# Patient Record
Sex: FEMALE | Race: WHITE | NOT HISPANIC OR LATINO | Employment: FULL TIME | URBAN - METROPOLITAN AREA
[De-identification: names, ages, dates, MRNs, and addresses within clinical notes are randomized per-mention and may not be internally consistent; named-entity substitution may affect disease eponyms.]

---

## 2017-07-17 ENCOUNTER — TELEPHONE (OUTPATIENT)
Dept: FAMILY MEDICINE | Facility: CLINIC | Age: 32
End: 2017-07-17

## 2017-07-17 NOTE — TELEPHONE ENCOUNTER
Patient requested appointment through [facility]:  Comments:      I need to be seen for my anxiety and depression  ASAP. My heart rate has been really high for several days with tingling limbs and shortness of breath. I'm also concerned about my blood pressure. I have been experiencing chest tightness on the right side radiating up my neck and to right should blade. Difficulty falling asleep and maintaining healthy eating patterns. I have been neglecting my Healthcare.       Left message on answering machine for patient to call back. [phone]  Yoselin GALLEGOSN, RN, CPN

## 2017-07-17 NOTE — TELEPHONE ENCOUNTER
Have been off meds for awhile. Increased anxiety past few days. Patient will c/o shortness of breath and feeling tingly all over.  Chest tightness on and off for past month. Not sure if food related. Last episode last night. Lasts 30-45 minutes. Chest tightness with shortness of breath.   Appointment tomorrow with   .Yoselin GALLEGOSN, RN, CPN

## 2017-07-18 ENCOUNTER — OFFICE VISIT (OUTPATIENT)
Dept: FAMILY MEDICINE | Facility: CLINIC | Age: 32
End: 2017-07-18
Payer: COMMERCIAL

## 2017-07-18 VITALS
HEART RATE: 114 BPM | BODY MASS INDEX: 34.2 KG/M2 | DIASTOLIC BLOOD PRESSURE: 80 MMHG | SYSTOLIC BLOOD PRESSURE: 138 MMHG | WEIGHT: 193 LBS | HEIGHT: 63 IN | OXYGEN SATURATION: 97 % | TEMPERATURE: 99.5 F

## 2017-07-18 DIAGNOSIS — F33.0 MAJOR DEPRESSIVE DISORDER, RECURRENT EPISODE, MILD (H): ICD-10-CM

## 2017-07-18 DIAGNOSIS — F51.01 PRIMARY INSOMNIA: ICD-10-CM

## 2017-07-18 DIAGNOSIS — F41.1 GAD (GENERALIZED ANXIETY DISORDER): Primary | ICD-10-CM

## 2017-07-18 PROCEDURE — 99214 OFFICE O/P EST MOD 30 MIN: CPT | Performed by: FAMILY MEDICINE

## 2017-07-18 RX ORDER — BUSPIRONE HYDROCHLORIDE 5 MG/1
TABLET ORAL
Qty: 120 TABLET | Refills: 0 | Status: SHIPPED | OUTPATIENT
Start: 2017-07-18 | End: 2017-12-20

## 2017-07-18 RX ORDER — ALPRAZOLAM 0.25 MG
0.25 TABLET ORAL 2 TIMES DAILY PRN
Qty: 40 TABLET | Refills: 0 | Status: SHIPPED | OUTPATIENT
Start: 2017-07-18 | End: 2017-08-15

## 2017-07-18 ASSESSMENT — ANXIETY QUESTIONNAIRES
6. BECOMING EASILY ANNOYED OR IRRITABLE: MORE THAN HALF THE DAYS
IF YOU CHECKED OFF ANY PROBLEMS ON THIS QUESTIONNAIRE, HOW DIFFICULT HAVE THESE PROBLEMS MADE IT FOR YOU TO DO YOUR WORK, TAKE CARE OF THINGS AT HOME, OR GET ALONG WITH OTHER PEOPLE: VERY DIFFICULT
2. NOT BEING ABLE TO STOP OR CONTROL WORRYING: NEARLY EVERY DAY
1. FEELING NERVOUS, ANXIOUS, OR ON EDGE: NEARLY EVERY DAY
3. WORRYING TOO MUCH ABOUT DIFFERENT THINGS: NEARLY EVERY DAY
7. FEELING AFRAID AS IF SOMETHING AWFUL MIGHT HAPPEN: NEARLY EVERY DAY
5. BEING SO RESTLESS THAT IT IS HARD TO SIT STILL: SEVERAL DAYS
GAD7 TOTAL SCORE: 18

## 2017-07-18 ASSESSMENT — PATIENT HEALTH QUESTIONNAIRE - PHQ9: 5. POOR APPETITE OR OVEREATING: NEARLY EVERY DAY

## 2017-07-18 NOTE — NURSING NOTE
"Chief Complaint   Patient presents with     Anxiety       Initial /80  Pulse 114  Temp 99.5  F (37.5  C) (Oral)  Ht 5' 3\" (1.6 m)  Wt 193 lb (87.5 kg)  SpO2 97%  BMI 34.19 kg/m2 Estimated body mass index is 34.19 kg/(m^2) as calculated from the following:    Height as of this encounter: 5' 3\" (1.6 m).    Weight as of this encounter: 193 lb (87.5 kg).  Medication Reconciliation: complete   Rica Patino CMA    "

## 2017-07-18 NOTE — TELEPHONE ENCOUNTER
Insurance phone # [phone]    Called to do PA-they said that it does not need a PA and that the patient filled this today.Flory Saravia MA/TC

## 2017-07-18 NOTE — PROGRESS NOTES
"SUBJECTIVE:  Halima Alcaraz, a 32 year old female scheduled an appointment to discuss the following issues:  Follow-up anxiety/depression, migraines and insomnia.   Propranolol helpful with anxiety/racing heartrate. 40mg BID PRN seems to be helpful. risperdal prn helpful for sleep not regularly.  Xanax prn up to once day - panic attacks helpful and no sedating - kicks in 15 minutes can last up to 4 hours.   Home - no major stressors. Daughter 9yo - doing ok. financee going well. New job going well.   No changes with season.   Caffeine - one can/pop/day. ALCOHOL qoweekend. No illicit drugs. Tried some medidation. Needs more exercise. Some woods by home.   SSRI x2 not very helpful anxiety - some help with depression.  Medical, social, surgical, and family histories reviewed.    ROS:    OBJECTIVE:  /80  Pulse 114  Temp 99.5  F (37.5  C) (Oral)  Ht 5' 3\" (1.6 m)  Wt 193 lb (87.5 kg)  SpO2 97%  BMI 34.19 kg/m2  EXAM:  GENERAL APPEARANCE: healthy, alert and no distress  RESP: lungs clear to auscultation - no rales, rhonchi or wheezes  CV: regular rates and rhythm, normal S1 S2, no S3 or S4 and no murmur, click or rub -  ABDOMEN:  soft, nontender, no HSM or masses and bowel sounds normal  NEURO: Normal strength and tone, sensory exam grossly normal, mentation intact and speech normal  PSYCH: mentation appears normal and affect normal/bright  PSYCH: mild anxious    ASSESSMENT / PLAN:  (F41.1) ARASH (generalized anxiety disorder)  (primary encounter diagnosis)  Comment: needs help.   Plan: ALPRAZolam (XANAX) 0.25 MG tablet, busPIRone         (BUSPAR) 5 MG tablet        Reveiwed risks and side effects of medication  Add buspar. If SUICIAL IDEATION OR HOMOCIDAL IDEATION OR ANURAG TO ER. Increase exercise/nature walks. Would like [employer] message/update in 2 weeks. Sooner if worse. Call/email with questions/concerns   Limit xanax and don't take if pregnant.     (F33.0) Major depressive disorder, recurrent episode, mild " (H)  Comment: more issues with anxiety  Plan: consider prozac again if desired. Exercise. Good supportive system. Consider therapist or psych.     (F51.01) Primary insomnia  Comment: risperdol prn helpful  Plan: if needs daily will needs labs. Should get cpe in fall. Exercise. Limit caffeine. Reveiwed risks and side effects of medication    Rashad Dinh MD

## 2017-07-18 NOTE — MR AVS SNAPSHOT
"              After Visit Summary   7/18/2017    Halima Alcaraz    MRN: [id number]           Patient Information     Date Of Birth          1985        Visit Information        Provider Department      7/18/2017 3:10 PM Rashad Dinh MD [facility]        Today's Diagnoses     ARASH (generalized anxiety disorder)    -  1    Major depressive disorder, recurrent episode, mild (H)        Primary insomnia           Follow-ups after your visit        Who to contact     If you have questions or need follow up information about today's clinic visit or your schedule please contact [facility] directly at [phone].  Normal or non-critical lab and imaging results will be communicated to you by 500px[employer], letter or phone within 4 business days after the clinic has received the results. If you do not hear from us within 7 days, please contact the clinic through [employer] or phone. If you have a critical or abnormal lab result, we will notify you by phone as soon as possible.  Submit refill requests through [employer] or call your pharmacy and they will forward the refill request to us. Please allow 3 business days for your refill to be completed.          Additional Information About Your Visit        MyChart Information     [employer] gives you secure access to your electronic health record. If you see a primary care provider, you can also send messages to your care team and make appointments. If you have questions, please call your primary care clinic.  If you do not have a primary care provider, please call [phone] and they will assist you.        Care EveryWhere ID     This is your Care EveryWhere ID. This could be used by other organizations to access your [place] medical records  [id number]        Your Vitals Were     Pulse Temperature Height Pulse Oximetry BMI (Body Mass Index)       114 99.5  F (37.5  C) (Oral) 5' 3\" (1.6 m) 97% 34.19 kg/m2        Blood Pressure from Last 3 " Encounters:   07/18/17 138/80   05/04/16 124/84   03/01/16 130/80    Weight from Last 3 Encounters:   07/18/17 193 lb (87.5 kg)   05/04/16 198 lb (89.8 kg)   03/01/16 196 lb (88.9 kg)              We Performed the Following     DEPRESSION ACTION PLAN (DAP)          Today's Medication Changes          These changes are accurate as of: 7/18/17  3:55 PM.  If you have any questions, ask your nurse or doctor.               Start taking these medicines.        Dose/Directions    busPIRone 5 MG tablet   Commonly known as:  BUSPAR   Used for:  ARASH (generalized anxiety disorder)        Start at 5 mg twice daily for 3 days, then 7.5 mg (1.5 tabs) twice daily for 3 days, then 10 mg (2 tabs) twice daily. New for anxiety   Quantity:  120 tablet   Refills:  0         These medicines have changed or have updated prescriptions.        Dose/Directions    ALPRAZolam 0.25 MG tablet   Commonly known as:  XANAX   This may have changed:    - when to take this  - additional instructions   Used for:  ARASH (generalized anxiety disorder)        Dose:  0.25 mg   Take 1 tablet (0.25 mg) by mouth 2 times daily as needed for anxiety (panic attacks) Don't take with ALCOHOL or narcotics or if pregnant   Quantity:  40 tablet   Refills:  0       PROPRANOLOL HCL PO   This may have changed:  Another medication with the same name was removed. Continue taking this medication, and follow the directions you see here.        Dose:  20 mg   Take 20 mg by mouth daily 2 tablets   Refills:  0            Where to get your medicines      These medications were sent to [id number] [address] [facility]  [address] [facility] [address]    Hours:  24-hours Phone:  [phone]     busPIRone 5 MG tablet         Some of these will need a paper prescription and others can be bought over the counter.  Ask your nurse if you have questions.     Bring a paper prescription for each of these  medications     ALPRAZolam 0.25 MG tablet                Primary Care Provider Office Phone # Fax #    Rashad Dinh [phone]       [facility] [address] [facility] [address]        Equal Access to Services     HOLLIE SYLVESTER : Hadii carlos ku hadelenao Soomaali, waaxda luqadaha, qaybta kaalmada adeegyada, waxangel idiin hayjuaniton adedesiree hernández kimberlee sosa. So [facility] [phone].    ATENCIÓN: Si habla español, tiene a miramontes disposición servicios gratuitos de asistencia lingüística. Llame al [phone].    We comply with applicable federal civil rights laws and [place] laws. We do not discriminate on the basis of race, color, national origin, age, disability sex, sexual orientation or gender identity.            Thank you!     Thank you for choosing [facility]  for your care. Our goal is always to provide you with excellent care. Hearing back from our patients is one way we can continue to improve our services. Please take a few minutes to complete the written survey that you may receive in the mail after your visit with us. Thank you!             Your Updated Medication List - Protect others around you: Learn how to safely use, store and throw away your medicines at www.disposemymeds.org.          This list is accurate as of: 7/18/17  3:55 PM.  Always use your most recent med list.                   Brand Name Dispense Instructions for use Diagnosis    ALPRAZolam 0.25 MG tablet    XANAX    40 tablet    Take 1 tablet (0.25 mg) by mouth 2 times daily as needed for anxiety (panic attacks) Don't take with ALCOHOL or narcotics or if pregnant    ARASH (generalized anxiety disorder)       busPIRone 5 MG tablet    BUSPAR    120 tablet    Start at 5 mg twice daily for 3 days, then 7.5 mg (1.5 tabs) twice daily for 3 days, then 10 mg (2 tabs) twice daily. New for anxiety    ARASH (generalized anxiety disorder)       PROPRANOLOL HCL PO      Take 20 mg by mouth daily 2 tablets         risperiDONE 0.5 MG tablet    risperDAL    15 tablet    Take 0.5 tablets (0.25 mg) by mouth At Bedtime For sleep. May double or triple dosage if not effective    Primary insomnia, ARASH (generalized anxiety disorder)

## 2017-07-19 ASSESSMENT — ANXIETY QUESTIONNAIRES: GAD7 TOTAL SCORE: 18

## 2017-08-15 ENCOUNTER — MYC REFILL (OUTPATIENT)
Dept: FAMILY MEDICINE | Facility: CLINIC | Age: 32
End: 2017-08-15

## 2017-08-15 DIAGNOSIS — F41.1 GAD (GENERALIZED ANXIETY DISORDER): ICD-10-CM

## 2017-08-16 RX ORDER — ALPRAZOLAM 0.25 MG
0.25 TABLET ORAL 2 TIMES DAILY PRN
Qty: 40 TABLET | Refills: 0 | Status: SHIPPED | OUTPATIENT
Start: 2017-08-16 | End: 2017-12-28

## 2017-08-16 NOTE — TELEPHONE ENCOUNTER
Message from [employer]:  Original authorizing provider: Rashad Dinh MD    Halima Alcaraz would like a refill of the following medications:  ALPRAZolam (XANAX) 0.25 MG tablet [Rashad Dinh MD]    Preferred pharmacy: [facility] [id number] [place] [address], [place] - [address] [facility]    Comment:  I also need a refill of the propanolol but can't request from website.

## 2017-08-16 NOTE — TELEPHONE ENCOUNTER
Controlled Substance Refill Request for Xanax  Problem List Complete:  No      PROVIDER TO CONSIDER COMPLETION OF PROBLEM LIST AND OVERVIEW/CONTROLLED SUBSTANCE AGREEMENT     Last Written Prescription Date:  7/18/17  Last Fill Quantity: 40,   # refills: 0     Last Office [facility] with [facility]:  7/18/17     Future Office visit: none     Controlled substance agreement on file: No.       checked in past 6 months:  Yes 7/17/17, no concerns      Vangie Oliva RN       Vangie Oliva RN

## 2017-08-16 NOTE — TELEPHONE ENCOUNTER
The requested prescription(s) has/have been approved and has/have been printed and signed. This note was forwarded to the patient care pool to contact the patient to arrange for the patient to obtain the signed prescription(s).  Agustin Rashid    Will forward to PRIMARY MEDICAL PROVIDER for Problem List update

## 2017-08-23 ENCOUNTER — OFFICE VISIT (OUTPATIENT)
Dept: FAMILY MEDICINE | Facility: CLINIC | Age: 32
End: 2017-08-23
Payer: COMMERCIAL

## 2017-08-23 VITALS
BODY MASS INDEX: 33.48 KG/M2 | OXYGEN SATURATION: 97 % | HEART RATE: 104 BPM | SYSTOLIC BLOOD PRESSURE: 154 MMHG | WEIGHT: 189 LBS | DIASTOLIC BLOOD PRESSURE: 92 MMHG

## 2017-08-23 DIAGNOSIS — T78.40XA ALLERGIC REACTION, INITIAL ENCOUNTER: Primary | ICD-10-CM

## 2017-08-23 PROCEDURE — 99213 OFFICE O/P EST LOW 20 MIN: CPT | Performed by: PHYSICIAN ASSISTANT

## 2017-08-23 NOTE — MR AVS SNAPSHOT
After Visit Summary   8/23/2017    Halima Alcaraz    MRN: [id number]           Patient Information     Date Of Birth          1985        Visit Information        Provider Department      8/23/2017 1:50 PM Cirilo Paulino PA-C [facility]        Today's Diagnoses     Allergic reaction, initial encounter    -  1       Follow-ups after your visit        Additional Services     ALLERGY/ASTHMA ADULT REFERRAL       Your provider [place] you [place] [facility] [place] [facility]  [phone] [email or web address]    Please be aware that coverage of these services is subject to the terms and limitations of your health insurance plan.  Call member services at your health plan with any benefit or coverage questions.      Please bring the following with you to your appointment:    (1) Any X-Rays, CTs or MRIs which have been performed.  Contact the facility where they were done to arrange for  prior to your scheduled appointment.    (2) List of current medications  (3) This referral request   (4) Any documents/labs given to you for this referral                  Who to contact     If you have questions or need follow up information about today's clinic visit or your schedule please contact [facility] directly at [phone].  Normal or non-critical lab and imaging results will be communicated to you by MyChart, letter or phone within 4 business days after the clinic has received the results. If you do not hear from us within 7 days, please contact the clinic through MyChart or phone. If you have a critical or abnormal lab result, we will notify you by phone as soon as possible.  Submit refill requests through [employer] or call your pharmacy and they will forward the refill request to us. Please allow 3 business days for your refill to be completed.          Additional Information About Your Visit        MyChart Information     MyChart  gives you secure access to your electronic health record. If you see a primary care provider, you can also send messages to your care team and make appointments. If you have questions, please call your primary care clinic.  If you do not have a primary care provider, please call [phone] and they will assist you.        Care EveryWhere ID     This is your Care EveryWhere ID. This could be used by other organizations to access your [place] medical records  [id number]        Your Vitals Were     Pulse Pulse Oximetry BMI (Body Mass Index)             104 97% 33.48 kg/m2          Blood Pressure from Last 3 Encounters:   08/23/17 (!) 154/92   07/18/17 138/80   05/04/16 124/84    Weight from Last 3 Encounters:   08/23/17 189 lb (85.7 kg)   07/18/17 193 lb (87.5 kg)   05/04/16 198 lb (89.8 kg)              We Performed the Following     ALLERGY/ASTHMA ADULT REFERRAL        Primary Care Provider Office Phone # Fax #    Rashad Dinh [phone] [address] [facility] [address]        Equal Access to Services     [facility]: Hadii aad ku hadasho Soeric, waaxda luqadaha, qaybta kaalmada adeedgar, bekah mohan . So [facility] [phone].    ATENCIÓN: Si habla español, tiene a miramontes disposición servicios gratuitos de asistencia lingüística. [facility] [phone].    We comply with applicable federal civil rights laws and [place] laws. We do not discriminate on the basis of race, color, national origin, age, disability sex, sexual orientation or gender identity.            Thank you!     Thank you for choosing [facility]  for your care. Our goal is always to provide you with excellent care. Hearing back from our patients is one way we can continue to improve our services. Please take a few minutes to complete the written survey that you may receive in the mail after your visit with us. Thank you!             Your Updated Medication List - Protect  others around you: Learn how to safely use, store and throw away your medicines at www.disposemymeds.org.          This list is accurate as of: 8/23/17  2:01 PM.  Always use your most recent med list.                   Brand Name Dispense Instructions for use Diagnosis    ALPRAZolam 0.25 MG tablet    XANAX    40 tablet    Take 1 tablet (0.25 mg) by mouth 2 times daily as needed for anxiety (panic attacks) Don't take with ALCOHOL or narcotics or if pregnant    ARASH (generalized anxiety disorder)       busPIRone 5 MG tablet    BUSPAR    120 tablet    Start at 5 mg twice daily for 3 days, then 7.5 mg (1.5 tabs) twice daily for 3 days, then 10 mg (2 tabs) twice daily. New for anxiety    ARASH (generalized anxiety disorder)       PROPRANOLOL HCL PO      Take 20 mg by mouth daily 2 tablets        risperiDONE 0.5 MG tablet    risperDAL    15 tablet    Take 0.5 tablets (0.25 mg) by mouth At Bedtime For sleep. May double or triple dosage if not effective    Primary insomnia, ARASH (generalized anxiety disorder)

## 2017-08-23 NOTE — NURSING NOTE
"Chief Complaint   Patient presents with     Consult     Possible food allergy       Initial BP (!) 154/92  Pulse 104  Wt 189 lb (85.7 kg)  SpO2 97%  BMI 33.48 kg/m2 Estimated body mass index is 33.48 kg/(m^2) as calculated from the following:    Height as of 7/18/17: 5' 3\" (1.6 m).    Weight as of this encounter: 189 lb (85.7 kg).  Medication Reconciliation: complete  Belem Moralez CMA    "

## 2017-08-23 NOTE — PROGRESS NOTES
SUBJECTIVE:                                                    Halima Alcaraz is a 32 year old female who presents to clinic today for the following health issues:    Pt here for possible food allergy - the last 2 days pt has had her lips swell up on her and have tingling sensation. Pt took a couple benadryl's which helped slowly. Pt had lemonade two days ago and upper lip swelled then the next day had chinese food yesterday and bottom lip swelled.  - unsure what could have been   No chest pain or short of breath of difficulty breathing.     Is on propranolon for heart palpitation and has been out for the alst couple days.   as documentedProblem list and histories reviewed & adjusted, as indicated.  Additional history: as documented      Patient Active Problem List   Diagnosis     Family history of thyroid disease     Hyperlipidemia LDL goal <160     Obesity     Tobacco abuse     Elevated BP     Anxiety     Irregular menstrual cycle     Depression with anxiety     Major depressive disorder, recurrent episode, mild (H)     Primary insomnia     ARASH (generalized anxiety disorder)     Migraine with aura and without status migrainosus, not intractable     Past Surgical History:   Procedure Laterality Date     NO HISTORY OF SURGERY         Social History   Substance Use Topics     Smoking status: Current Every Day Smoker     Types: Cigarettes     Smokeless tobacco: Never Used     Alcohol use Yes      Comment: occ     Family History   Problem Relation Age of Onset     Thyroid Disease Mother      Thyroid Disease Maternal Grandmother      CEREBROVASCULAR DISEASE Paternal Grandfather          Current Outpatient Prescriptions   Medication Sig Dispense Refill     ALPRAZolam (XANAX) 0.25 MG tablet Take 1 tablet (0.25 mg) by mouth 2 times daily as needed for anxiety (panic attacks) Don't take with ALCOHOL or narcotics or if pregnant 40 tablet 0     PROPRANOLOL HCL PO Take 20 mg by mouth daily 2 tablets       busPIRone (BUSPAR) 5  MG tablet Start at 5 mg twice daily for 3 days, then 7.5 mg (1.5 tabs) twice daily for 3 days, then 10 mg (2 tabs) twice daily. New for anxiety 120 tablet 0     risperiDONE (RISPERDAL) 0.5 MG tablet Take 0.5 tablets (0.25 mg) by mouth At Bedtime For sleep. May double or triple dosage if not effective 15 tablet 0     No Known Allergies      ROS:  Constitutional, HEENT, cardiovascular, pulmonary, gi and gu systems are negative, except as otherwise noted.      OBJECTIVE:   BP (!) 154/92  Pulse 104  Wt 189 lb (85.7 kg)  SpO2 97%  BMI 33.48 kg/m2  Body mass index is 33.48 kg/(m^2).  GENERAL: healthy, alert and no distress  EYES: Eyes grossly normal to inspection, PERRL and conjunctivae and sclerae normal  HENT: ear canals and TM's normal, nose and mouth without ulcers or lesions  NECK: no adenopathy, no asymmetry, masses, or scars and thyroid normal to palpation  RESP: lungs clear to auscultation - no rales, rhonchi or wheezes  CV: regular rate and rhythm, normal S1 S2, no S3 or S4, no murmur, click or rub, no peripheral edema and peripheral pulses strong    Diagnostic Test Results:  none     ASSESSMENT/PLAN:         ICD-10-CM    1. Allergic reaction, initial encounter T78.40XA ALLERGY/ASTHMA ADULT REFERRAL   unclear etiology.  Consider follow up  With Allergist for 2nd opinion.   warning signs discussed.      Cirilo Paulino PA-C  [facility]

## 2017-09-08 ENCOUNTER — OFFICE VISIT (OUTPATIENT)
Dept: ALLERGY | Facility: CLINIC | Age: 32
End: 2017-09-08
Payer: COMMERCIAL

## 2017-09-08 VITALS
WEIGHT: 186.2 LBS | HEART RATE: 130 BPM | HEIGHT: 63 IN | DIASTOLIC BLOOD PRESSURE: 82 MMHG | SYSTOLIC BLOOD PRESSURE: 136 MMHG | BODY MASS INDEX: 32.99 KG/M2 | OXYGEN SATURATION: 97 %

## 2017-09-08 DIAGNOSIS — T78.3XXA ANGIOEDEMA, INITIAL ENCOUNTER: Primary | ICD-10-CM

## 2017-09-08 PROCEDURE — 99000 SPECIMEN HANDLING OFFICE-LAB: CPT | Performed by: ALLERGY & IMMUNOLOGY

## 2017-09-08 PROCEDURE — 86003 ALLG SPEC IGE CRUDE XTRC EA: CPT | Performed by: ALLERGY & IMMUNOLOGY

## 2017-09-08 PROCEDURE — 95004 PERQ TESTS W/ALRGNC XTRCS: CPT | Performed by: ALLERGY & IMMUNOLOGY

## 2017-09-08 PROCEDURE — 36415 COLL VENOUS BLD VENIPUNCTURE: CPT | Performed by: ALLERGY & IMMUNOLOGY

## 2017-09-08 PROCEDURE — 99204 OFFICE O/P NEW MOD 45 MIN: CPT | Mod: 25 | Performed by: ALLERGY & IMMUNOLOGY

## 2017-09-08 RX ORDER — EPINEPHRINE 0.3 MG/.3ML
0.3 INJECTION SUBCUTANEOUS PRN
Qty: 0.6 ML | Refills: 1 | Status: SHIPPED | OUTPATIENT
Start: 2017-09-08 | End: 2018-01-27

## 2017-09-08 NOTE — PATIENT INSTRUCTIONS
Allergy Staff Appt Hours Shot Hours Locations    Physician     Phong Ching DO       Support Staff     Zaida ANNE RN      Sandra CLARK MA  Monday:                      [place] 8-7     Tuesday:         [place] 8-5 Wednesday:        [place]: 7-5     Friday:        [place] 7-5   [place]        Monday: 9-6        Friday: 7-2     [place]        Tuesday: 7-12 Thursday: 1-6     [place] Tuesday: 1-6 Wednesday: 11-6 Thursday: 7-12 [facility]  [address] [place] [address]  Appt Line: [phone]  Allergy RN (Monday):  [phone]    [facility]  [address] [place] [address]  Appt Line: [phone]  Allergy RN (Tues & Wed):  [phone]    [facility]  [address] [place] [address]  Appt Line: [phone]  Allergy RN (Friday):  [phone]       Important Scheduling Information  Aspirin Desensitization: Appt will last 2 clinic days. Please call the Allergy RN line for your clinic to schedule. Discontinue antihistamines 7 days prior to the appointment.     Food Challenges: Appt will last 3-4 hours. Please call the Allergy RN line for your clinic to schedule. Discontinue antihistamines 7 days prior to the appointment.     Penicillin Testing: Appt will last 2-3 hours. Please call the Allergy RN line for your clinic to schedule. Discontinue antihistamines 7 days prior to the appointment.     Skin Testing: Appt will about 40 minutes. Call the appointment line for your clinic to schedule. Discontinue antihistamines 7 days prior to the appointment.     Venom Testing: Appt will last 2-3 hours. Please call the Allergy RN line for your clinic to schedule. Discontinue antihistamines 7 days prior to the appointment.     Thank you for trusting us with your Allergy, Asthma, and Immunology care. Please feel free to contact us with any questions or concerns you may have.      - See anaphylaxis action plan.   - Blood work today.   - If symptoms  return please keep a diary of potential causes.   - Continue to avoid fish, soy and pork for now.   - Return to clinic in 3 months or sooner if symptoms develop.

## 2017-09-08 NOTE — ASSESSMENT & PLAN NOTE
Angioedema involving lips and tingling of lips and tongue on 2 separate occasions over the last few weeks. Symptoms have occurred after eating at a Chinese buffet and drinking a can of lemonade. Symptoms occurred within minutes of these 2 episodes. No other etiologies were identified. Not associated with NSAIDs or aspirin. Not associated with venom stings. No other over-the-counter medications were taken. No new soaps, shampoos, lotions or detergents. Symptoms resolved within 2 hours after treatment with diphenhydramine.    Skin testing  Negative for cod, salmon, tuna, pork and soy.     - Serum IgE for fish, pork and soy.  - If negative serum IgE would have her repeat skin testing in 4 weeks. Discussed potential six-week refractory period. however, I did discuss that symptoms are not entirely clear given that occurred after 2 completely separate triggers. Discussed possibility of oral allergy syndrome, but not clearly implicated with raw fruits or vegetables. Yoly could be implicated with oral allergy syndrome, but not sure if they would be in a canned lemonade. Possible the patient is having idiopathic angioedema. If she continues to have symptoms she will keep diary.  - An anaphylaxis action plan was given and reviewed with patient and family.   - Injectable epinephrine use was reviewed and demonstrated. The patient will need to carry injectable epinephrine.   - Injectable epinephrine script provided.   -The patient has been on propranolol for heart racing with anxiety. She has not been taking recently. Discussed risk of beta blocker and epinephrine use. She will discuss continued use of propranolol with her primary care provider. If the patient is going to continue on propranolol she was instructed to contact our office.

## 2017-09-08 NOTE — PROGRESS NOTES
Halima Alcaraz is a 32 year old White female with previous medical history significant for hyperlipidemia, hypertension, anxiety. Halima Alcaraz is being seen today for evaluation of angioedema. The patient is being seen in consultation at the request of Cirilo Paulino PA-C.     The patient reports that over the course of the last few weeks she has had 2 episodes of lip angioedema. These episodes have involved both upper and lower lips. Symptoms persisted approximately 2 hours after taking diphenhydramine and were associated with tingling of tongue and lips. The first episode occurred within 10 minutes of drinking a can of lemonade. She did not eat any food. Treated with diphenhydramine as noted above with resolution after 2 hours. Symptoms started improving before 2 hours, but complete resolution took 2 hours. She denied hives, throat swelling, difficulty swallowing, hoarseness, wheezing, tightness in chest, shortness of breath, vomiting or diarrhea. A second episode occurred after she ate at a Chinese buffet. While at the Chinese buffet she had sushi which contained salmon and tuna, rice, soy, pork, chicken. She subsequently has consumed rice and chicken without symptoms. She has no history of food allergies. Second reaction was similar in resolution to first reaction. She does not believe there were raw vegetables or fruit at the Chinese restaurant as everything was cooked. No history of oral allergy syndrome symptoms. She has never had similar symptoms. No family history of angioedema. Not associated with NSAIDs or aspirin. Not associated with venom stings. No other over-the-counter medications were taken. No new soaps, shampoos, lotions or detergents.     The patient has no history of asthma, eczema, food allergies, medications allergies or hives.     ENVIRONMENTAL HISTORY: The [place] The home is heated with a forced air. They does have central air conditioning. The  patient's bedroom is furnished with Indoor plants and carpeting in bedroom.  Pets inside the house include 1 cat(s). There is not history of cockroach or mice infestation. There is/are 2 smokers in the house.  The house does not have a damp basement.     Past Medical History:   Diagnosis Date     Depression with anxiety      Irregular menstrual cycle      Papanicolaou smear of cervix with low grade squamous intraepithelial lesion (LGSIL) 2009     Family History   Problem Relation Age of Onset     Thyroid Disease Mother      Thyroid Disease Maternal Grandmother      CEREBROVASCULAR DISEASE Paternal Grandfather      Past Surgical History:   Procedure Laterality Date     NO HISTORY OF SURGERY         REVIEW OF SYSTEMS:  General: negative for weight gain. negative for weight loss. negative for changes in sleep.   Ears: negative for fullness. negative for hearing loss. negative for dizziness.   Nose: negative for snoring.negative for changes in smell. negative for drainage.   Eyes: negative for eye watering. negative for eye itching. negative for vision changes. negative for eye redness.  Throat: negative for hoarseness. negative for sore throat. negative for trouble swallowing.   Lungs: negative for shortness of breath.negative for wheezing. negative for sputum production.   Cardiovascular: positive  for chest pain. negative for swelling of ankles. positive  for fast or irregular heartbeat.   Gastrointestinal: negative for nausea. positive  for heartburn. positive  for acid reflux.   Musculoskeletal: negative for joint pain. negative for joint stiffness. negative for joint swelling.   Neurologic: negative for seizures. negative for fainting. negative for weakness.   Psychiatric: negative for changes in mood. positive  for anxiety.   Endocrine: negative for cold intolerance. negative for heat intolerance. negative for tremors.   Lymphatic: negative for lower extremity swelling. negative for lymph node swelling.    Hematologic: negative for easy bruising. negative for easy bleeding.  Integumentary: negative for rash. negative for scaling. negative for nail changes.       Current Outpatient Prescriptions:      EPINEPHrine (EPIPEN/ADRENACLICK/OR ANY BX GENERIC EQUIV) 0.3 MG/0.3ML injection 2-pack, Inject 0.3 mLs (0.3 mg) into the muscle as needed for anaphylaxis, Disp: 0.6 mL, Rfl: 1     ALPRAZolam (XANAX) 0.25 MG tablet, Take 1 tablet (0.25 mg) by mouth 2 times daily as needed for anxiety (panic attacks) Don't take with ALCOHOL or narcotics or if pregnant, Disp: 40 tablet, Rfl: 0     PROPRANOLOL HCL PO, Take 20 mg by mouth daily 2 tablets, Disp: , Rfl:      busPIRone (BUSPAR) 5 MG tablet, Start at 5 mg twice daily for 3 days, then 7.5 mg (1.5 tabs) twice daily for 3 days, then 10 mg (2 tabs) twice daily. New for anxiety, Disp: 120 tablet, Rfl: 0     risperiDONE (RISPERDAL) 0.5 MG tablet, Take 0.5 tablets (0.25 mg) by mouth At Bedtime For sleep. May double or triple dosage if not effective, Disp: 15 tablet, Rfl: 0  Immunization History   Administered Date(s) Administered     TDAP Vaccine (Adacel) 06/26/2014     No Known Allergies      EXAM:   Constitutional:  Appears well-developed and well-nourished. No distress.   HEENT:   Head: Normocephalic.   Right Ear: External ear normal. TM normal  Left Ear: External ear normal. TM normal  Mouth/Throat: No oropharyngeal exudate present.   No cobblestoning of posterior oropharynx.   Nasal tissue pink and normal appearing.  No rhinorrhea noted.    Eyes: Conjunctivae are non-erythematous   Cardiovascular: Normal rate, regular rhythm and normal heart sounds. Exam reveals no gallop and no friction rub.   No murmur heard.  Respiratory: Effort normal and breath sounds normal. No respiratory distress. No wheezes. No rales.   Musculoskeletal: Normal range of motion.   Lymphadenopathy:   No cervical adenopathy.   No lower extremity edema.   Neuro: Oriented to person, place, and time.  Skin: Skin  is warm and dry. No rash noted.   Psychiatric: Normal mood and affect.     Nursing note and vitals reviewed.      WORKUP:   Skin testing  Negative for cod, salmon, tuna, pork and soy.     ASSESSMENT/PLAN:  Problem List Items Addressed This Visit        Other    Angioedema, initial encounter - Primary     Angioedema involving lips and tingling of lips and tongue on 2 separate occasions over the last few weeks. Symptoms have occurred after eating at a Chinese buffet and drinking a can of lemonade. Symptoms occurred within minutes of these 2 episodes. No other etiologies were identified. Not associated with NSAIDs or aspirin. Not associated with venom stings. No other over-the-counter medications were taken. No new soaps, shampoos, lotions or detergents. Symptoms resolved within 2 hours after treatment with diphenhydramine.    Skin testing  Negative for cod, salmon, tuna, pork and soy.     - Serum IgE for fish, pork and soy.  - If negative serum IgE would have her repeat skin testing in 4 weeks. Discussed potential six-week refractory period. however, I did discuss that symptoms are not entirely clear given that occurred after 2 completely separate triggers. Discussed possibility of oral allergy syndrome, but not clearly implicated with raw fruits or vegetables. Yoly could be implicated with oral allergy syndrome, but not sure if they would be in a canned lemonade. Possible the patient is having idiopathic angioedema. If she continues to have symptoms she will keep diary.  - An anaphylaxis action plan was given and reviewed with patient and family.   - Injectable epinephrine use was reviewed and demonstrated. The patient will need to carry injectable epinephrine.   - Injectable epinephrine script provided.   -The patient has been on propranolol for heart racing with anxiety. She has not been taking recently. Discussed risk of beta blocker and epinephrine use. She will discuss continued use of propranolol with her  primary care provider. If the patient is going to continue on propranolol she was instructed to contact our office.          Relevant Medications    EPINEPHrine (EPIPEN/ADRENACLICK/OR ANY BX GENERIC EQUIV) 0.3 MG/0.3ML injection 2-pack    Other Relevant Orders    Allergen codfish IgE (Completed)    Allergen salmon IgE (Completed)    Allergen tuna IgE (Completed)    Allergen soybean IgE (Completed)    Allergen Pork (Completed)    ALLERGY SKIN TESTS,ALLERGENS (Completed)        Return in 4 weeks.     Chart documentation with Dragon Voice recognition Software. Although reviewed after completion, some words and grammatical errors may remain.    Phong Ching,    Allergy/Immunology  [facility] and [place]

## 2017-09-08 NOTE — Clinical Note
I saw Halima in consultation. Please see note for details. Negative testing in clinic. If symptoms continue she will keep journal of symptoms She was provided epipen and blood testing obtained. Thanks.   Phong Ching

## 2017-09-08 NOTE — NURSING NOTE
"Chief Complaint   Patient presents with     Consult     Angioedema of the lips       Initial Pulse 130  Ht 1.595 m (5' 2.8\")  Wt 84.5 kg (186 lb 3.2 oz)  SpO2 97%  BMI 33.2 kg/m2 Estimated body mass index is 33.2 kg/(m^2) as calculated from the following:    Height as of this encounter: 1.595 m (5' 2.8\").    Weight as of this encounter: 84.5 kg (186 lb 3.2 oz).  Medication Reconciliation: complete     Zaida Acuna RN      "

## 2017-09-08 NOTE — MR AVS SNAPSHOT
After Visit Summary   9/8/2017    Halima Alcaraz    MRN: [id number]           Patient Information     Date Of Birth          1985        Visit Information        Provider Department      9/8/2017 4:20 PM Phong Ching DO [facility]        Today's Diagnoses     Angioedema, initial encounter    -  1      Care Instructions    Allergy Staff Appt Hours Shot Hours Locations    Physician     Phong Ching DO       Support Staff     COLLINS Reeves MA  Monday:                      [place] 8-7     Tuesday:         [place] 8-5 Wednesday:        [place]: 7-5 Friday:        [place] 7-5   [place]        Monday: 9-6 Friday: 7-2     [place]        Tuesday: 7-12 Thursday: 1-6 [place] Tuesday: 1-6 Wednesday: 11-6 Thursday: 7-12 [facility]  [address] [place] [address]  Appt Line: [phone]  Allergy RN (Monday):  [phone]    [facility]  [address] [place] [address]  Appt Line: [phone]  Allergy RN (Tues & Wed):  [phone]    [facility]  [address] [place] [address]  Appt Line: [phone]  Allergy RN (Friday):  [phone]       Important Scheduling Information  Aspirin Desensitization: Appt will last 2 clinic days. Please call the Allergy RN line for your clinic to schedule. Discontinue antihistamines 7 days prior to the appointment.     Food Challenges: Appt will last 3-4 hours. Please call the Allergy RN line for your clinic to schedule. Discontinue antihistamines 7 days prior to the appointment.     Penicillin Testing: Appt will last 2-3 hours. Please call the Allergy RN line for your clinic to schedule. Discontinue antihistamines 7 days prior to the appointment.     Skin Testing: Appt will about 40 minutes. Call the appointment line for your clinic to schedule. Discontinue antihistamines 7 days prior to the appointment.     Venom Testing: Appt will  last 2-3 hours. Please call the Allergy RN line for your clinic to schedule. Discontinue antihistamines 7 days prior to the appointment.     Thank you for trusting us with your Allergy, Asthma, and Immunology care. Please feel free to contact us with any questions or concerns you may have.      - See anaphylaxis action plan.   - Blood work today.   - If symptoms return please keep a diary of potential causes.   - Continue to avoid fish, soy and pork for now.   - Return to clinic in 3 months or sooner if symptoms develop.           Follow-ups after your visit        Who to contact     If you have questions or need follow up information about today's clinic visit or your schedule please contact [facility] directly at [phone].  Normal or non-critical lab and imaging results will be communicated to you by [employer], letter or phone within 4 business days after the clinic has received the results. If you do not hear from us within 7 days, please contact the clinic through [employer] or phone. If you have a critical or abnormal lab result, we will notify you by phone as soon as possible.  Submit refill requests through [employer] or call your pharmacy and they will forward the refill request to us. Please allow 3 business days for your refill to be completed.          Additional Information About Your Visit        [employer] Information     [employer] gives you secure access to your electronic health record. If you see a primary care provider, you can also send messages to your care team and make appointments. If you have questions, please call your primary care clinic.  If you do not have a primary care provider, please call [phone] and they will assist you.        Care EveryWhere ID     This is your Care EveryWhere ID. This could be used by other organizations to access your [facility] medical records  [id number]        Your Vitals Were     Pulse Height Pulse Oximetry BMI (Body Mass Index)          130 1.595 m  "(5' 2.8\") 97% 33.2 kg/m2         Blood Pressure from Last 3 Encounters:   09/08/17 136/82   08/23/17 (!) 154/92   07/18/17 138/80    Weight from Last 3 Encounters:   09/08/17 84.5 kg (186 lb 3.2 oz)   08/23/17 85.7 kg (189 lb)   07/18/17 87.5 kg (193 lb)              We Performed the Following     Allergen codfish IgE     Allergen Pork     Allergen salmon IgE     Allergen soybean IgE     Allergen tuna IgE          Today's Medication Changes          These changes are accurate as of: 9/8/17  5:15 PM.  If you have any questions, ask your nurse or doctor.               Start taking these medicines.        Dose/Directions    EPINEPHrine 0.3 MG/0.3ML injection 2-pack   Commonly known as:  EPIPEN/ADRENACLICK/or ANY BX GENERIC EQUIV   Used for:  Angioedema, initial encounter   Started by:  Phong Ching DO        Dose:  0.3 mg   Inject 0.3 mLs (0.3 mg) into the muscle as needed for anaphylaxis   Quantity:  0.6 mL   Refills:  1            Where to get your medicines      These medications were sent to [facility] [id number] [place] [address] [facility]  [id number] [place] [id number]    Hours:  24-hours Phone:  [phone]     EPINEPHrine 0.3 MG/0.3ML injection 2-pack                Primary Care Provider Office Phone # Fax #    Rashad Dinh [phone] [address] [facility] [address]        Equal Access to Services     [facility] AH: Hadii carlos guy hadasho Soomaali, waaxda luqadaha, qaybta kaalmada adeegyalora, bekah sosa. So [facility] [phone].    ATENCIÓN: Si habla español, tiene a miramontes disposición servicios gratuitos de asistencia lingüística. Lisset keen [phone].    We comply with applicable federal civil rights laws and [place] laws. We do not discriminate on the basis of race, color, national origin, age, disability sex, sexual orientation or gender identity.            Thank you!  "    Thank you for choosing [facility]  for your care. Our goal is always to provide you with excellent care. Hearing back from our patients is one way we can continue to improve our services. Please take a few minutes to complete the written survey that you may receive in the mail after your visit with us. Thank you!             Your Updated Medication List - Protect others around you: Learn how to safely use, store and throw away your medicines at www.disposemymeds.org.          This list is accurate as of: 9/8/17  5:15 PM.  Always use your most recent med list.                   Brand Name Dispense Instructions for use Diagnosis    ALPRAZolam 0.25 MG tablet    XANAX    40 tablet    Take 1 tablet (0.25 mg) by mouth 2 times daily as needed for anxiety (panic attacks) Don't take with ALCOHOL or narcotics or if pregnant    ARASH (generalized anxiety disorder)       busPIRone 5 MG tablet    BUSPAR    120 tablet    Start at 5 mg twice daily for 3 days, then 7.5 mg (1.5 tabs) twice daily for 3 days, then 10 mg (2 tabs) twice daily. New for anxiety    ARASH (generalized anxiety disorder)       EPINEPHrine 0.3 MG/0.3ML injection 2-pack    EPIPEN/ADRENACLICK/or ANY BX GENERIC EQUIV    0.6 mL    Inject 0.3 mLs (0.3 mg) into the muscle as needed for anaphylaxis    Angioedema, initial encounter       PROPRANOLOL HCL PO      Take 20 mg by mouth daily 2 tablets        risperiDONE 0.5 MG tablet    risperDAL    15 tablet    Take 0.5 tablets (0.25 mg) by mouth At Bedtime For sleep. May double or triple dosage if not effective    Primary insomnia, ARASH (generalized anxiety disorder)

## 2017-09-08 NOTE — LETTER
CYNTHIA                   FOOD ALLERGY & ANAPHYLAXIS EMERGENCY CARE PLAN  Food Allergy Research & Education         Name: Halima BASILIO:  1985   Allergy to: Unknown    Weight: 186 lbs 3.2 oz  Asthma:  No        -NOTE: Do not depend on antihistamines or inhalers (bronchodilators) to treat a severe reaction. USE EPINEPHRINE.     MEDICATIONS/DOSES  Epinephrine Dose: 0.3 mg IM  Zyrtec (cetirizine) Dose: 10mg                   CYNTHIA                   FOOD ALLERGY & ANAPHYLAXIS EMERGENCY CARE PLAN   Food Allergy Research & Education                PARENT/GUARDIAN AUTHORIZATION SIGNATURE     DATE             PHYSICIAN/H CP AUTHORIZATION SIGNATURE     DATE        FORM PROVIDED COURTESY OF FOOD ALLERGY RESEARCH & EDUCATION (FARE) ([email or web address]) 2014

## 2017-09-11 LAB
DEPRECATED MISC ALLERGEN IGE RAST QL: NORMAL
PORK IGE QN: <0.1 KU/L

## 2017-09-13 LAB
CODFISH IGE QN: <0.1 KU(A)/L
SALMON IGE QN: <0.1 KU(A)/L
SOYBEAN IGE QN: <0.1 KU(A)/L
TUNA IGE QN: <0.1 KU(A)/L

## 2017-09-13 NOTE — PROGRESS NOTES
Testing negative for pork, soy and fish. Please see my note and in the plan please reviewed the treatment plan with her. Thank you.

## 2017-12-20 ENCOUNTER — MYC REFILL (OUTPATIENT)
Dept: FAMILY MEDICINE | Facility: CLINIC | Age: 32
End: 2017-12-20

## 2017-12-20 DIAGNOSIS — F41.1 GAD (GENERALIZED ANXIETY DISORDER): ICD-10-CM

## 2017-12-20 DIAGNOSIS — F51.01 PRIMARY INSOMNIA: ICD-10-CM

## 2017-12-20 NOTE — LETTER
December 21, 2017    Halima Alcaraz  [address] [address] [facility] [address]    Dear Halima,       We recently received a refill request for risperidone and buspirone.  We have refilled this for a one time 30 day supply only because you are due for a:    Anxiety and insomnia office visit      Please call at your earliest convenience so that there will not be a delay with your future refills.          Thank you,   Your [facility] Team/  [phone]

## 2017-12-21 RX ORDER — ALPRAZOLAM 0.25 MG
0.25 TABLET ORAL 2 TIMES DAILY PRN
Qty: 40 TABLET | Refills: 0 | OUTPATIENT
Start: 2017-12-21

## 2017-12-21 RX ORDER — BUSPIRONE HYDROCHLORIDE 5 MG/1
TABLET ORAL
Qty: 120 TABLET | Refills: 0 | Status: SHIPPED | OUTPATIENT
Start: 2017-12-21 | End: 2018-11-27

## 2017-12-21 RX ORDER — RISPERIDONE 0.5 MG/1
0.25 TABLET ORAL AT BEDTIME
Qty: 15 TABLET | Refills: 0 | Status: SHIPPED | OUTPATIENT
Start: 2017-12-21 | End: 2018-11-27

## 2017-12-21 NOTE — TELEPHONE ENCOUNTER
Controlled Substance Refill Request for Xanax  Problem List Complete:  No       PROVIDER TO CONSIDER COMPLETION OF PROBLEM LIST AND OVERVIEW/CONTROLLED SUBSTANCE AGREEMENT      Last Written Prescription Date:  8/16/17  Last Fill Quantity: 40,   # refills: 0      Last Office Visit with [facility] primary care provider:  7/18/17      Future Office visit: none      Controlled substance agreement on file: No.       checked in past 6 months:  Yes 12/21/17

## 2017-12-21 NOTE — TELEPHONE ENCOUNTER
Message from [employer]:  Original authorizing provider: Agustin Rashid MD    Halima Alcaraz would like a refill of the following medications:  ALPRAZolam (XANAX) 0.25 MG tablet [Agustin Rashid MD]    [facility]erred pharmacy[facility] [address] [place] [address] [facility] [address] [facility]    Comment:      Medication renewals requested in this message routed to other providers:  risperiDONE (RISPERDAL) 0.5 MG tablet [Rashad Dinh MD]  busPIRone (BUSPAR) 5 MG tablet [Rashad Dinh MD]

## 2017-12-21 NOTE — TELEPHONE ENCOUNTER
Message from [facility]:  Original authorizing provider: Rashad Dinh MD    Halima Alcaraz would like a refill of the following medications:  risperiDONE (RISPERDAL) 0.5 MG tablet [Rashad Dinh MD]  busPIRone (BUSPAR) 5 MG tablet [Rashad Dinh MD]    Preferred pharmacy: [facility] [address] [place] [address] [place] [id number] [facility]    Comment:      Medication renewals requested in this message routed to other providers:  ALPRAZolam (XANAX) 0.25 MG tablet [Agustin Rashid MD]

## 2017-12-21 NOTE — TELEPHONE ENCOUNTER
See last ov note.  Buspar added and pt advised to follow up in fall.  No appointment made.  To provider to advise.  Rica Thao RN

## 2017-12-28 ENCOUNTER — OFFICE VISIT (OUTPATIENT)
Dept: FAMILY MEDICINE | Facility: CLINIC | Age: 32
End: 2017-12-28
Payer: COMMERCIAL

## 2017-12-28 VITALS
DIASTOLIC BLOOD PRESSURE: 84 MMHG | OXYGEN SATURATION: 96 % | WEIGHT: 191 LBS | HEART RATE: 118 BPM | SYSTOLIC BLOOD PRESSURE: 144 MMHG | HEIGHT: 63 IN | BODY MASS INDEX: 33.84 KG/M2

## 2017-12-28 DIAGNOSIS — Z23 NEED FOR PROPHYLACTIC VACCINATION AND INOCULATION AGAINST INFLUENZA: ICD-10-CM

## 2017-12-28 DIAGNOSIS — I10 ESSENTIAL HYPERTENSION, BENIGN: ICD-10-CM

## 2017-12-28 DIAGNOSIS — N94.6 DYSMENORRHEA: ICD-10-CM

## 2017-12-28 DIAGNOSIS — N92.6 IRREGULAR MENSTRUAL CYCLE: Primary | ICD-10-CM

## 2017-12-28 DIAGNOSIS — R00.0 TACHYCARDIA: ICD-10-CM

## 2017-12-28 PROCEDURE — 90686 IIV4 VACC NO PRSV 0.5 ML IM: CPT | Performed by: PHYSICIAN ASSISTANT

## 2017-12-28 PROCEDURE — 99213 OFFICE O/P EST LOW 20 MIN: CPT | Mod: 25 | Performed by: PHYSICIAN ASSISTANT

## 2017-12-28 PROCEDURE — 90471 IMMUNIZATION ADMIN: CPT | Performed by: PHYSICIAN ASSISTANT

## 2017-12-28 RX ORDER — METOPROLOL SUCCINATE 25 MG/1
25 TABLET, EXTENDED RELEASE ORAL DAILY
Qty: 30 TABLET | Refills: 1 | Status: SHIPPED | OUTPATIENT
Start: 2017-12-28 | End: 2018-01-23

## 2017-12-28 ASSESSMENT — PATIENT HEALTH QUESTIONNAIRE - PHQ9: SUM OF ALL RESPONSES TO PHQ QUESTIONS 1-9: 17

## 2017-12-28 NOTE — LETTER
[facility]  [address] [facility] [address]  Phone: [phone]    December 28, 2017        Halima Alcaraz  [address] [facility] [address]          To whom it may concern:    RE: Halima Alcaraz    Patient was seen and treated today at our clinic and missed work.    Please contact me for questions or concerns.      Sincerely,        Cirilo Paulino PA-C

## 2017-12-28 NOTE — MR AVS SNAPSHOT
After Visit Summary   12/28/2017    Halima Alcaraz    MRN: [id number]           Patient Information     Date Of Birth          1985        Visit Information        Provider Department      12/28/2017 1:30 PM Cirilo Paulino PA-C [facility]        Today's Diagnoses     Need for prophylactic vaccination and inoculation against influenza    -  1    Essential hypertension, benign        Irregular menstrual cycle        Dysmenorrhea           Follow-ups after your visit        Additional Services     OB/GYN REFERRAL       Your provider has referred you to:  [facility]: [facility] [phone]   [email or web address]    Please be aware that coverage of these services is subject to the terms and limitations of your health insurance plan.  Call member services at your health plan with any benefit or coverage questions.      Please bring the following with you to your appointment:    (1) Any X-Rays, CTs or MRIs which have been performed.  Contact the facility where they were done to arrange for  prior to your scheduled appointment.   (2) List of current medications   (3) This referral request   (4) Any documents/labs given to you for this referral                  Your next 10 appointments already scheduled     Jan 03, 2018  2:10 PM CST   Office Visit with MIGUEL Mcclelland CNP   [facility]    [address] [facility] [address] [phone]           Bring a current list of meds and any records pertaining to this visit. For Physicals, please bring immunization records and any forms needing to be filled out. Please arrive 10 minutes early to complete paperwork.              Who to contact     If you have questions or need follow up information about today's clinic visit or your schedule please contact [facility] directly at [phone].  Normal or non-critical lab and  "imaging results will be communicated to you by MyChart, letter or phone within 4 business days after the clinic has received the results. If you do not hear from us within 7 days, please contact the clinic through [employer] or phone. If you have a critical or abnormal lab result, we will notify you by phone as soon as possible.  Submit refill requests through [employer] or call your pharmacy and they will forward the refill request to us. Please allow 3 business days for your refill to be completed.          Additional Information About Your Visit        [employer] Information     [employer] gives you secure access to your electronic health record. If you see a primary care provider, you can also send messages to your care team and make appointments. If you have questions, please call your primary care clinic.  If you do not have a primary care provider, please call [phone] and they will assist you.        Care EveryWhere ID     This is your Care EveryWhere ID. This could be used by other organizations to access your [place] medical records  [id number]        Your Vitals Were     Pulse Height Pulse Oximetry BMI (Body Mass Index)          118 5' 2.75\" (1.594 m) 96% 34.1 kg/m2         Blood Pressure from Last 3 Encounters:   12/28/17 144/84   09/08/17 136/82   08/23/17 (!) 154/92    Weight from Last 3 Encounters:   12/28/17 191 lb (86.6 kg)   09/08/17 186 lb 3.2 oz (84.5 kg)   08/23/17 189 lb (85.7 kg)              We Performed the Following     FLU VAC, SPLIT VIRUS IM > 3 YO (QUADRIVALENT) [40162]     OB/GYN REFERRAL     Vaccine Administration, Initial [60273]          Today's Medication Changes          These changes are accurate as of: 12/28/17  1:42 PM.  If you have any questions, ask your nurse or doctor.               Start taking these medicines.        Dose/Directions    metoprolol 25 MG 24 hr tablet   Commonly known as:  TOPROL-XL   Used for:  Essential hypertension, benign   Started by:  Cirilo Paulino, " PA-C        Dose:  25 mg   Take 1 tablet (25 mg) by mouth daily   Quantity:  30 tablet   Refills:  1            Where to get your medicines      These medications were sent to [facility] [address]  [address] [facility], [address], [facility] [address]     Phone:  [phone]     metoprolol 25 MG 24 hr tablet                Primary Care Provider Office Phone # Fax #    Rashad Dinh [phone]       [id number] [place] [address]        Equal Access to Services     [facility]: Hadii aad ku hadasho Soomaali, waaxda luqadaha, qaybta kaalmada adeegyada, waxangel beaulieu haykrys mohan . So [facility] [phone].    ATENCIÓN: Si habla español, tiene a miramontes disposición servicios gratuitos de asistencia lingüística. [facility] [phone].    We comply with applicable federal civil rights laws and [place] laws. We do not discriminate on the basis of race, color, national origin, age, disability, sex, sexual orientation, or gender identity.            Thank you!     Thank you for choosing [facility]  for your care. Our goal is always to provide you with excellent care. Hearing back from our patients is one way we can continue to improve our services. Please take a few minutes to complete the written survey that you may receive in the mail after your visit with us. Thank you!             Your Updated Medication List - Protect others around you: Learn how to safely use, store and throw away your medicines at www.disposemymeds.org.          This list is accurate as of: 12/28/17  1:42 PM.  Always use your most recent med list.                   Brand Name Dispense Instructions for use Diagnosis    busPIRone 5 MG tablet    BUSPAR    120 tablet    Start at 5 mg twice daily for 3 days, then 7.5 mg (1.5 tabs) twice daily for 3 days, then 10 mg (2 tabs) twice daily. New for anxiety    ARASH (generalized anxiety disorder)       EPINEPHrine  0.3 MG/0.3ML injection 2-pack    EPIPEN/ADRENACLICK/or ANY BX GENERIC EQUIV    0.6 mL    Inject 0.3 mLs (0.3 mg) into the muscle as needed for anaphylaxis    Angioedema, initial encounter       metoprolol 25 MG 24 hr tablet    TOPROL-XL    30 tablet    Take 1 tablet (25 mg) by mouth daily    Essential hypertension, benign       risperiDONE 0.5 MG tablet    risperDAL    15 tablet    Take 0.5 tablets (0.25 mg) by mouth At Bedtime For sleep. md appointment needed in next month please    Primary insomnia, ARASH (generalized anxiety disorder)

## 2017-12-28 NOTE — NURSING NOTE
"Chief Complaint   Patient presents with     Consult     Menstrual issues        Initial BP (!) 171/100  Pulse 118  Ht 5' 2.75\" (1.594 m)  Wt 191 lb (86.6 kg)  SpO2 96%  BMI 34.1 kg/m2 Estimated body mass index is 34.1 kg/(m^2) as calculated from the following:    Height as of this encounter: 5' 2.75\" (1.594 m).    Weight as of this encounter: 191 lb (86.6 kg).  Medication Reconciliation: complete  Belem Moralez CMA    "

## 2017-12-28 NOTE — PROGRESS NOTES
SUBJECTIVE:                                                    Halima Alcaraz is a 32 year old female who presents to clinic today for the following health issues:    Pt has had issues with her menstrual cycle for years - the last few have gotten worse. Strong pelvic pain and cramping. Pt was seen at [facility] last Friday and had a full work up done. Pt is thought to have endometriosis  - pt here to discuss today   Had CD of [place]   Was seen [place] [facility] and had testing done.     irregular periods every 1-2  months. Cycles are gotten longer. Increase pain during period but con't pulling in lower abdomin, bloating all month.     Problem list and histories reviewed & adjusted, as indicated.  Additional history: as documented    Patient Active Problem List   Diagnosis     Family history of thyroid disease     Hyperlipidemia LDL goal <160     Obesity     Tobacco abuse     Elevated BP     Anxiety     Irregular menstrual cycle     Depression with anxiety     Major depressive disorder, recurrent episode, mild (H)     Primary insomnia     ARASH (generalized anxiety disorder)     Migraine with aura and without status migrainosus, not intractable     Angioedema, initial encounter     Past Surgical History:   Procedure Laterality Date     NO HISTORY OF SURGERY         Social History   Substance Use Topics     Smoking status: Current Every Day Smoker     Types: Cigarettes     Smokeless tobacco: Never Used     Alcohol use Yes      Comment: occ     Family History   Problem Relation Age of Onset     Thyroid Disease Mother      Thyroid Disease Maternal Grandmother      CEREBROVASCULAR DISEASE Paternal Grandfather          Current Outpatient Prescriptions   Medication Sig Dispense Refill     metoprolol (TOPROL-XL) 25 MG 24 hr tablet Take 1 tablet (25 mg) by mouth daily 30 tablet 1     risperiDONE (RISPERDAL) 0.5 MG tablet Take 0.5 tablets (0.25 mg) by mouth At Bedtime For sleep. md appointment needed in next month please 15  "tablet 0     busPIRone (BUSPAR) 5 MG tablet Start at 5 mg twice daily for 3 days, then 7.5 mg (1.5 tabs) twice daily for 3 days, then 10 mg (2 tabs) twice daily. New for anxiety 120 tablet 0     EPINEPHrine (EPIPEN/ADRENACLICK/OR ANY BX GENERIC EQUIV) 0.3 MG/0.3ML injection 2-pack Inject 0.3 mLs (0.3 mg) into the muscle as needed for anaphylaxis (Patient not taking: Reported on 12/28/2017) 0.6 mL 1     No Known Allergies  Labs reviewed in EPIC      ROS:  Constitutional, HEENT, cardiovascular, pulmonary, gi and gu systems are negative, except as otherwise noted.      OBJECTIVE:   /84  Pulse 118  Ht 5' 2.75\" (1.594 m)  Wt 191 lb (86.6 kg)  SpO2 96%  BMI 34.1 kg/m2  Body mass index is 34.1 kg/(m^2).  GENERAL: healthy, alert and no distress  EYES: Eyes grossly normal to inspection, PERRL and conjunctivae and sclerae normal  HENT: ear canals and TM's normal, nose and mouth without ulcers or lesions  NECK: no adenopathy, no asymmetry, masses, or scars and thyroid normal to palpation  RESP: lungs clear to auscultation - no rales, rhonchi or wheezes  CV: regular rate and rhythm, normal S1 S2, no S3 or S4, no murmur, click or rub, no peripheral edema and peripheral pulses strong  ABDOMEN: soft, nontender, no hepatosplenomegaly, no masses and bowel sounds normal  MS: no gross musculoskeletal defects noted, no edema  SKIN: no suspicious lesions or rashes  NEURO: Normal strength and tone, mentation intact and speech normal  PSYCH: mentation appears normal, affect normal/bright    Diagnostic Test Results:  none     ASSESSMENT/PLAN:         ICD-10-CM    1. Irregular menstrual cycle N92.6 OB/GYN REFERRAL   2. Essential hypertension, benign I10 metoprolol (TOPROL-XL) 25 MG 24 hr tablet   3. Tachycardia R00.0    4. Dysmenorrhea N94.6 OB/GYN REFERRAL   5. Need for prophylactic vaccination and inoculation against influenza Z23 FLU VAC, SPLIT VIRUS IM > 3 YO (QUADRIVALENT) [15919]     Vaccine Administration, Initial [57650] "   1. Follow up  With Ob/gyn  2-3. Start metoprolol. warning signs discussed. side effects discussed recheck blood pressure in 2 wks with pharmacy. Work on Healthy diet and exercise. Getting heart rate elevated for 30 mins most days of week.      Cirilo Paulino PA-C  [facility]  Injectable Influenza Immunization Documentation    1.  Is the person to be vaccinated sick today?   No    2. Does the person to be vaccinated have an allergy to a component   of the vaccine?   No  Egg Allergy Algorithm Link    3. Has the person to be vaccinated ever had a serious reaction   to influenza vaccine in the past?   No    4. Has the person to be vaccinated ever had Guillain-Barré syndrome?   No    Form completed by Belem Moralez CMA

## 2018-01-03 ENCOUNTER — OFFICE VISIT (OUTPATIENT)
Dept: OBGYN | Facility: CLINIC | Age: 33
End: 2018-01-03
Payer: COMMERCIAL

## 2018-01-03 VITALS
DIASTOLIC BLOOD PRESSURE: 97 MMHG | WEIGHT: 189.8 LBS | BODY MASS INDEX: 33.63 KG/M2 | HEART RATE: 103 BPM | TEMPERATURE: 97.9 F | HEIGHT: 63 IN | SYSTOLIC BLOOD PRESSURE: 155 MMHG

## 2018-01-03 DIAGNOSIS — R10.2 CHRONIC PELVIC PAIN IN FEMALE: Primary | ICD-10-CM

## 2018-01-03 DIAGNOSIS — G89.29 CHRONIC PELVIC PAIN IN FEMALE: Primary | ICD-10-CM

## 2018-01-03 DIAGNOSIS — F32.81 PMDD (PREMENSTRUAL DYSPHORIC DISORDER): ICD-10-CM

## 2018-01-03 PROCEDURE — 99214 OFFICE O/P EST MOD 30 MIN: CPT | Performed by: NURSE PRACTITIONER

## 2018-01-03 RX ORDER — FLUOXETINE 20 MG/1
1 TABLET ORAL DAILY
Qty: 30 TABLET | Refills: 1 | Status: SHIPPED | OUTPATIENT
Start: 2018-01-03 | End: 2020-05-11

## 2018-01-03 ASSESSMENT — PAIN SCALES - GENERAL: PAINLEVEL: MODERATE PAIN (5)

## 2018-01-03 NOTE — MR AVS SNAPSHOT
After Visit Summary   1/3/2018    Halima Alcaraz    MRN: [id number]           Patient Information     Date Of Birth          1985        Visit Information        Provider Department      1/3/2018 2:10 PM Di Alarcon APRN CNP [facility]        Today's Diagnoses     Chronic pelvic pain in female    -  1    PMDD (premenstrual dysphoric disorder)           Follow-ups after your visit        Your next 10 appointments already scheduled     Toney 15, 2018 12:00 PM CST   Office Visit with Kei Kelley MD   [facility] ([facility]    [address] [facility] [address] [phone]           Bring a current list of meds and any records pertaining to this visit. For Physicals, please bring immunization records and any forms needing to be filled out. Please arrive 10 minutes early to complete paperwork.              Who to contact     If you have questions or need follow up information about today's clinic visit or your schedule please contact [facility] directly at [phone].  Normal or non-critical lab and imaging results will be communicated to you by Impriv[employer], letter or phone within 4 business days after the clinic has received the results. If you do not hear from us within 7 days, please contact the clinic through [employer] or phone. If you have a critical or abnormal lab result, we will notify you by phone as soon as possible.  Submit refill requests through [email or web address] or call your pharmacy and they will forward the refill request to us. Please allow 3 business days for your refill to be completed.          Additional Information About Your Visit        MyChart Information     [email or web address] gives you secure access to your electronic health record. If you see a primary care provider, you can also send messages to your care team and make appointments. If you have questions, please call your primary care clinic.  If you do not have a  "primary care provider, please call [phone] and they will assist you.        Care EveryWhere ID     This is your Care EveryWhere ID. This could be used by other organizations to access your [place] medical records  [id number]        Your Vitals Were     Pulse Temperature Height Last Period BMI (Body Mass Index)       103 97.9  F (36.6  C) (Oral) 5' 3.25\" (1.607 m) 12/20/2017 (Approximate) 33.36 kg/m2        Blood Pressure from Last 3 Encounters:   01/03/18 (!) 155/97   12/28/17 144/84   09/08/17 136/82    Weight from Last 3 Encounters:   01/03/18 189 lb 12.8 oz (86.1 kg)   12/28/17 191 lb (86.6 kg)   09/08/17 186 lb 3.2 oz (84.5 kg)              Today, you had the following     No orders found for display         Today's Medication Changes          These changes are accurate as of: 1/3/18  3:03 PM.  If you have any questions, ask your nurse or doctor.               Start taking these medicines.        Dose/Directions    FLUoxetine HCl (PMDD) 20 MG Tabs   Used for:  PMDD (premenstrual dysphoric disorder)   Started by:  Di Alarcon APRN CNP        Dose:  1 tablet   Take 1 tablet by mouth daily   Quantity:  30 tablet   Refills:  1            Where to get your medicines      These medications were sent to [facility] [address] [facility]  [address] [facility] [address]    Hours:  24-hours Phone:  [phone]     FLUoxetine HCl (PMDD) 20 MG Tabs                Primary Care Provider Office Phone # Fax #    Rashad Dinh [phone] [address] [facility] [address]        Equal Access to Services     HOLLIE SYLVESTER : Royce Cook, waaxda luqadaha, qaybta kaalmada zia, bekah sosa. So [facility] [phone].    ATENCIÓN: Si habla español, tiene a miramontes disposición servicios gratuitos de asistencia lingüística. Llame al [phone].    We comply " with applicable federal civil rights laws and [place] laws. We do not discriminate on the basis of race, color, national origin, age, disability, sex, sexual orientation, or gender identity.            Thank you!     Thank you for choosing [facility]  for your care. Our goal is always to provide you with excellent care. Hearing back from our patients is one way we can continue to improve our services. Please take a few minutes to complete the written survey that you may receive in the mail after your visit with us. Thank you!             Your Updated Medication List - Protect others around you: Learn how to safely use, store and throw away your medicines at www.disposemymeds.org.          This list is accurate as of: 1/3/18  3:03 PM.  Always use your most recent med list.                   Brand Name Dispense Instructions for use Diagnosis    busPIRone 5 MG tablet    BUSPAR    120 tablet    Start at 5 mg twice daily for 3 days, then 7.5 mg (1.5 tabs) twice daily for 3 days, then 10 mg (2 tabs) twice daily. New for anxiety    ARASH (generalized anxiety disorder)       EPINEPHrine 0.3 MG/0.3ML injection 2-pack    EPIPEN/ADRENACLICK/or ANY BX GENERIC EQUIV    0.6 mL    Inject 0.3 mLs (0.3 mg) into the muscle as needed for anaphylaxis    Angioedema, initial encounter       FLUoxetine HCl (PMDD) 20 MG Tabs     30 tablet    Take 1 tablet by mouth daily    PMDD (premenstrual dysphoric disorder)       metoprolol 25 MG 24 hr tablet    TOPROL-XL    30 tablet    Take 1 tablet (25 mg) by mouth daily    Essential hypertension, benign       risperiDONE 0.5 MG tablet    risperDAL    15 tablet    Take 0.5 tablets (0.25 mg) by mouth At Bedtime For sleep. md appointment needed in next month please    Primary insomnia, ARASH (generalized anxiety disorder)

## 2018-01-03 NOTE — PROGRESS NOTES
S: Halima is a 32 year old presenting today in follow up from Urgent Care. She has a history of progressively more painful menstrual cycles. Has been worsening over the last few years. Cycles are irregular Q 45-60 days. Can last up to 1 week. Has pain nearly consistently, but significantly worse during menses. Bleeding can be heavy for 1-2 days and then lightens up. Has brown spotting between cycles on occasion. Pain is lower pelvis, bilateral but generally R>L. Sharp, stabbing. Not relieved with NSAIDS. Not related to activities, movements. Additionally, has a hard time fully emptying bladder during menses due to discomfort, painful bowel movements as well. Constant bloating feeling. Mood symptoms worsening in the 2 weeks leading up to her menses and she often feels anxious, sad, cries easily, significant fatigue.   They are desiring a pregnancy sooner than later. Has not been using contraception x 3 years, but not actively timing and tracking cycles. Ready to start trying. Had 1  in . No gynecologic surgeries. No known family history of gynecologic disorders.  She was seen at the [facility] in [place] [facility] as her pain was unbearable and per patient pelvic ultrasound was normal. Patient would like to discuss surgical treatment with laparoscopy as was suggested by the provider at the [facility]. Patient medical, surgical, social, and family history reviewed and updated at today's visit. ROS: 10 point ROS neg other than the symptoms noted above in the HPI.    O: This is a well appearing female in no acute distress. Answers questions and maintains eye contact appropriately. Vital signs noted.  RESPIRATORY: Clear to auscultation bilaterally.  CV: Regular rate and rhythm without murmur, gallop, rub  ABDOMEN: Soft, nondistended, normoactive bowel sounds. No hepatosplenomegaly. No guarding, rebounding, or rigidity. Tenderness present in the lower quadrants bilaterally, R>L. Sharp pain radiates around  to her right low back.  Vulva: No external lesions, normal hair distribution, no adenopathy  BUS:  Normal, no masses noted  Vagina: Moist, pink, no abnormal discharge, well rugated, no lesions  Cervix: Pink, parous, midline. Cervical motion tenderness present.  Uterus: Normal size and shape, tender to palpation, mobile  Ovaries: No masses, non-tender, mobile    A/P:  (R10.2,  G89.29) Chronic pelvic pain in female  (primary encounter diagnosis)  Comment: We discussed her painful menstrual cycles and pain between cycles. We discussed her questions related to endometriosis at length. As they are hoping for pregnancy soon, she prefers not to try hormonal medication, given her history of HTN, migraines and being a smoker, she would not be a great candidate for long term estrogen use, but progesterone may be beneficial in the event she decides against pregnancy. We discussed surgical intervention to assess for endometriosis and discussed procedure, recovery. ZAHIDA completed to get ultrasound records from [facility]. Patient will then schedule consult with Dr. Kelley as she would like to move forward with surgical intervention.    (F32.81) PMDD (premenstrual dysphoric disorder)  Comment: We discussed her mood, fatigue, bloating and headaches associated with the premenstrual time. We discussed management options including hormonal medication-which is declined due to desire for pregnancy. Discussed use of Fluoxetine for 2 weeks out of the cycle or continuously. She would like to try this on a daily basis. Discussed use, possible side effects. Prescription sent. Patient is given an opportunity to ask questions and have them answered.  Plan: FLUoxetine HCl, PMDD, 20 MG TABS  Approximately 25 minutes face to face time was spent with the patient today with greater than 50% spent in education and counseling regarding pelvic pain management.          Di RIVERA CNP

## 2018-01-03 NOTE — LETTER
[facility]  [address] [place] [address]  [phone]          RE: Halima Alcaraz  : 1985    DATE: January 3, 2018      To Whom It May Concern,        Halima Alcaraz was seen in the clinic today. Please excuse her absence from work.        Thank you.      Sincerely,            Di RIVERA CNP

## 2018-01-03 NOTE — NURSING NOTE
"Chief Complaint   Patient presents with     Abnormal Uterine Bleeding     Irregular menses x 2 months       Initial BP (!) 155/97  Pulse 103  Temp 97.9  F (36.6  C) (Oral)  Ht 5' 3.25\" (1.607 m)  Wt 189 lb 12.8 oz (86.1 kg)  LMP 12/20/2017 (Approximate)  BMI 33.36 kg/m2 Estimated body mass index is 33.36 kg/(m^2) as calculated from the following:    Height as of this encounter: 5' 3.25\" (1.607 m).    Weight as of this encounter: 189 lb 12.8 oz (86.1 kg)..  BP completed using cuff size: vita Mackenzie CMA    "

## 2018-01-15 ENCOUNTER — TELEPHONE (OUTPATIENT)
Dept: OBGYN | Facility: CLINIC | Age: 33
End: 2018-01-15

## 2018-01-15 ENCOUNTER — OFFICE VISIT (OUTPATIENT)
Dept: OBGYN | Facility: CLINIC | Age: 33
End: 2018-01-15
Payer: COMMERCIAL

## 2018-01-15 VITALS
WEIGHT: 185.2 LBS | TEMPERATURE: 97.4 F | BODY MASS INDEX: 32.55 KG/M2 | OXYGEN SATURATION: 97 % | DIASTOLIC BLOOD PRESSURE: 88 MMHG | SYSTOLIC BLOOD PRESSURE: 152 MMHG | HEART RATE: 88 BPM

## 2018-01-15 DIAGNOSIS — N93.9 ABNORMAL UTERINE BLEEDING (AUB): Primary | ICD-10-CM

## 2018-01-15 DIAGNOSIS — N93.9 ABNORMAL UTERINE BLEEDING (AUB): ICD-10-CM

## 2018-01-15 DIAGNOSIS — R10.2 FEMALE PELVIC PAIN: ICD-10-CM

## 2018-01-15 DIAGNOSIS — R10.2 FEMALE PELVIC PAIN: Primary | ICD-10-CM

## 2018-01-15 PROCEDURE — 99214 OFFICE O/P EST MOD 30 MIN: CPT | Performed by: OBSTETRICS & GYNECOLOGY

## 2018-01-15 NOTE — TELEPHONE ENCOUNTER
Surgery Scheduled.    Date of Surgery 1/26/18 Time of Surgery 9:30 am  Procedure: Diagnostic Laparoscopy/ Diagnostic Hysteroscopy  Hospital/Surgical Facility: [facility]  Surgeon: Dr. Kelley  Type of Anesthesia Anticipated: General  Pre-op: 1/23/18 with Rashad Dinh at [facility]   Pre-certification 1/15/18  Consent signed: NA  Hospital Stay NA       [facility] surgery packet mailed to patient's home address.  Patient instructed NPO 12 hours prior to surgery, arrive 1 hours prior to surgery, must have a .  Patient understood and agrees to the plan.      Surgery Pre-Certification     Medical Record Number: [id number]   Halima Alcaraz   YOB: 1985   Phone: [phone] (home)   Primary Provider: Rashad Dinh     Reason for Admit:   Abnormal Uterine Bleeding/ Female Pelvic Pain     Surgeon: ZELALEM Kelley MD   Surgical Procedure: Diagnostic Laparoscopy/ Diagnostic Hysteroscopy   ICD-9 Coded: N93.9/ R10.2   Date of Surgery: 1/26/18   Consent signed? N/A   Hospital: [facility]   Outpatient     Requestor:   Joanne Anthony     Location:   [facility]   Dorcas Lopez CMA

## 2018-01-15 NOTE — PROGRESS NOTES
Halima is a 32 year old   is here today complaining of painful periods.  This has been a problem since menarche but worse over the last 6 months.  Now having it in PREMENSTRUAL SYNDROME period, but also at other times.  She is having pain with a full bladder and with BM in the PREMENSTRUAL SYNDROME and menstrual time periods.  She has some discomfort after intercourse.  Her cycles are 45-60 days.  They are very heavy for 3-4 days, but has spotting up to 10 days or more.  They have 1 child 11 years of age, but hasn't been on birth control.  She would like additional children..     POSITIVE for:, dysuria, painful menses, irregular menses, heavy periods, Negative for hematuria, stress incontinence, vaginal discharge, vaginal itching or burning, dysparunia    ROS: Ten point review of systems was reviewed and negative except the above.    Gyn Hx:      Past Medical History:   Diagnosis Date     Depression with anxiety      Irregular menstrual cycle      Papanicolaou smear of cervix with low grade squamous intraepithelial lesion (LGSIL)      Past Surgical History:   Procedure Laterality Date     NO HISTORY OF SURGERY       Patient Active Problem List   Diagnosis     Family history of thyroid disease     Hyperlipidemia LDL goal <160     Obesity     Tobacco abuse     Elevated BP     Anxiety     Irregular menstrual cycle     Depression with anxiety     Major depressive disorder, recurrent episode, mild (H)     Primary insomnia     ARASH (generalized anxiety disorder)     Migraine with aura and without status migrainosus, not intractable     Angioedema, initial encounter       ALL/Meds: Her medication and allergy histories were reviewed and are documented in their appropriate chart areas.    SH: Reviewed and documented in the appropriate area of the chart.  FH:  Her family history is reviewed and updated in the chart, today.  PMH: Her past medical, surgical, and obstetric histories were reviewed and updated today in  the appropriate chart areas.    PE: /88  Pulse 88  Temp 97.4  F (36.3  C) (Oral)  Wt 185 lb 3.2 oz (84 kg)  LMP 12/20/2017 (Approximate)  SpO2 97%  BMI 32.55 kg/m2  Body mass index is 32.55 kg/(m^2).    General Appearance:  healthy, alert, active, no distress  Cardiovascular:  Regular rate and Rhythm  Neck: Supple, no adenopathy and thyroid normal  Lungs:  Clear, without wheeze, rale or rhonchi  Breast: deferred  Abdomen: Benign, Soft, flat,  Mildly tender in the suprapubic region, No masses, organomegaly, No inguinal nodes, Bowel sounds normoactive and no rebound.   Pelvic:       - Ext: Vulva and perineum are normal without lesion, mass or discharge        - Urethra: normal without discharge or scarring no hypermobility       - Urethral Meatus: normal appearance,        - Bladder: mild tenderness, no masses       - Vagina:  without discharge and rugated       - Cervix: normal       - Uterus:Normal shape, position and consistency and mildly tender       - Adnexa: no mass, mildly tender       - Rectal: deferred    Given that she does wish more children, I would recommend a diagnostic laparoscopy and given the heavy and irregular cycles, also a hysteroscopy.  Discussed the risks, benefits and alternatives to laparoscopy.  These include the risks of bleeding, infection, and injury to other structures.  We discussed the outpatient nature and post-operative recovery.  Discussed the possible need to proceed to laparotomy.    She is given the opportunity to ask questions and have them answered.  Reviewed  risks, benefits, and alternatives of Hysteroscopy including but not limited to bleeding, infection, uterine perforation with damage to other organs, and possible need to for Laparoscopy or Laparotomy.  Reviewed pre and post operative course. Patient to see her primary care provider for pre-op evaluation.  All questions answered. .    A/P:(R10.2) Female pelvic pain  (primary encounter diagnosis)  (N93.9)  Abnormal uterine bleeding (AUB)  Comment: Out of 30 minutes spent face to face with the patient, > 50% of this was spent in consultation.  Plan: Diagnostic Laparoscopy, Diagnostic Hysteroscopy            - No orders of the defined types were placed in this encounter.

## 2018-01-15 NOTE — TELEPHONE ENCOUNTER
Halima Alcaraz  1985  Gender: Female  Phone: [phone] (home)      Pre-operative diagnosis:   Encounter Diagnoses   Name Primary?     Abnormal uterine bleeding (AUB) Yes     Female pelvic pain      Surgical procedure: Diagnostic Laparoscopy, Diagnostic Hysteroscopy  Special equipment: Myosure    Anesthesia: General  Position:  Lithotomy   Latex Allergy: No  VRE or MRSA or Other Precautions: None  Initiate Pre-op orders for above procedure: Yes as ordered in Epic.   Additional orders noted there also.   Location for Surgery: [facility],  Consent signed: Not yet.     Sterilization or Hysterectomy consent signed in advance: N/A  Date sterilization consent signed for Med Assistance pt:  Not applicable.  Surgeon: MD LARY MaldonadoOG    PA assistant: No  Physician assistant: No  Operating room  requested: Yes    Electronically signed by: MD LARY MaldonadoOG         January 15, 2018          1:21 PM   There is no height or weight on file to calculate BMI.  (Cut off for ASC  is 45)   Surgery Date:  TBD  Estimated Case Length: 1 hour(s).  Scheduled as: Same Day Surgery   Other Special Preparations: None.  Pre-anesthetic medical evaluation:to be scheduled with their primary care provider or with Di Alarcon.  Post op appointment needed:  No

## 2018-01-15 NOTE — MR AVS SNAPSHOT
After Visit Summary   1/15/2018    Halima Alcaraz    MRN: [id number]           Patient Information     Date Of Birth          1985        Visit Information        Provider Department      1/15/2018 12:00 PM Kei Kelley MD [facility]        Today's Diagnoses     Female pelvic pain    -  1    Abnormal uterine bleeding (AUB)           Follow-ups after your visit        Who to contact     If you have questions or need follow up information about today's clinic visit or your schedule please contact [facility] directly at [phone].  Normal or non-critical lab and imaging results will be communicated to you by VISEOhart, letter or phone within 4 business days after the clinic has received the results. If you do not hear from us within 7 days, please contact the clinic through Anywhere to Got or phone. If you have a critical or abnormal lab result, we will notify you by phone as soon as possible.  Submit refill requests through [employer] or call your pharmacy and they will forward the refill request to us. Please allow 3 business days for your refill to be completed.          Additional Information About Your Visit        MyChart Information     [employer] gives you secure access to your electronic health record. If you see a primary care provider, you can also send messages to your care team and make appointments. If you have questions, please call your primary care clinic.  If you do not have a primary care provider, please call [phone] and they will assist you.        Care EveryWhere ID     This is your Care EveryWhere ID. This could be used by other organizations to access your [place] medical records  [id number]        Your Vitals Were     Pulse Temperature Last Period Pulse Oximetry BMI (Body Mass Index)       88 97.4  F (36.3  C) (Oral) 12/20/2017 (Approximate) 97% 32.55 kg/m2        Blood Pressure from Last 3 Encounters:   01/15/18 152/88   01/03/18 (!) 155/97    12/28/17 144/84    Weight from Last 3 Encounters:   01/15/18 185 lb 3.2 oz (84 kg)   01/03/18 189 lb 12.8 oz (86.1 kg)   12/28/17 191 lb (86.6 kg)              Today, you had the following     No orders found for display       Primary Care Provider Office Phone # Fax #    Rashad Dinh [phone] [address] [facility] [address]        Equal Access to Services     JACKELINE SYLVESTER : Hadii aad ku hadasho Soomaali, waaxda luqadaha, qaybta kaalmada adeegyada, waxay idiin hayaan adeeg chrisarasharlene mohan . So [facility] [phone].    ATENCIÓN: Si habla español, tiene a miramontes disposición servicios gratuitos de asistencia lingüística. Llame al [phone].    We comply with applicable federal civil rights laws and [place] laws. We do not discriminate on the basis of race, color, national origin, age, disability, sex, sexual orientation, or gender identity.            Thank you!     Thank you for choosing [facility]  for your care. Our goal is always to provide you with excellent care. Hearing back from our patients is one way we can continue to improve our services. Please take a few minutes to complete the written survey that you may receive in the mail after your visit with us. Thank you!             Your Updated Medication List - Protect others around you: Learn how to safely use, store and throw away your medicines at www.disposemymeds.org.          This list is accurate as of: 1/15/18  1:30 PM.  Always use your most recent med list.                   Brand Name Dispense Instructions for use Diagnosis    busPIRone 5 MG tablet    BUSPAR    120 tablet    Start at 5 mg twice daily for 3 days, then 7.5 mg (1.5 tabs) twice daily for 3 days, then 10 mg (2 tabs) twice daily. New for anxiety    ARASH (generalized anxiety disorder)       EPINEPHrine 0.3 MG/0.3ML injection 2-pack    EPIPEN/ADRENACLICK/or ANY BX GENERIC EQUIV    0.6 mL    Inject 0.3 mLs (0.3 mg) into the muscle as needed for  anaphylaxis    Angioedema, initial encounter       FLUoxetine HCl (PMDD) 20 MG Tabs     30 tablet    Take 1 tablet by mouth daily    PMDD (premenstrual dysphoric disorder)       metoprolol succinate 25 MG 24 hr tablet    TOPROL-XL    30 tablet    Take 1 tablet (25 mg) by mouth daily    Essential hypertension, benign       risperiDONE 0.5 MG tablet    risperDAL    15 tablet    Take 0.5 tablets (0.25 mg) by mouth At Bedtime For sleep. md appointment needed in next month please    Primary insomnia, ARASH (generalized anxiety disorder)

## 2018-01-22 NOTE — PROGRESS NOTES
[facility]  [address] [address]  [phone]  Dept: [phone]    PRE-OP EVALUATION:  Today's date: 2018    Halima Alcaraz (: 1985) presents for pre-operative evaluation assessment as requested by Dr. Kelley.  She requires evaluation and anesthesia risk assessment prior to undergoing surgery/procedure for treatment of  .  Proposed procedure: Laparoscopy and Hysteroscopy  endometrometosis and fertile. Possible laser usage. No surgery in past. No family history anesthesia. Patient would like to go back to propranolol.   Date of Surgery/ Procedure: 2018  Time of Surgery/ Procedure: 9:30 am  Hospital/Surgical Facility: [facility]    Primary Physician: Rashad Dinh  Type of Anesthesia Anticipated: to be determined    Patient has a Health Care Directive or Living Will:  NO    Preop Questions 2018   1.  Do you have a history of heart attack, stroke, stent, bypass or surgery on an artery in the head, neck, heart or legs? No   2.  Do you ever have any pain or discomfort in your chest? No    3.  Do you have a history of  Heart Failure? No   4.   Are you troubled by shortness of breath when:  walking on a level surface, or up a slight hill, or at night? No   5.  Do you currently have a cold, bronchitis or other respiratory infection? No   6.  Do you have a cough, shortness of breath, or wheezing? No   7.  Do you sometimes get pains in the calves of your legs when you walk? No   8. Do you or anyone in your family have previous history of blood clots? No   9.  Do you or does anyone in your family have a serious bleeding problem such as prolonged bleeding following surgeries or cuts? No   10. Have you ever had problems with anemia or been told to take iron pills? No   11. Have you had any abnormal blood loss such as black, tarry or bloody stools, or abnormal vaginal bleeding? No   12. Have you ever had a blood transfusion? No   13. Have you or  any of your relatives ever had problems with anesthesia? No   14. Do you have sleep apnea, excessive snoring or daytime drowsiness? YES - snoring. No apnea noted   15. Do you have any prosthetic heart valves? No   16. Do you have prosthetic joints? No   17. Is there any chance that you may be pregnant? YES - history irregular menses.            HPI:                                                      Brief HPI related to upcoming procedure: heavy menses      HYPERTENSION - borderline.                                                                                                                                                                                          .  DEPRESSION - Patient has a long history of Depression of moderate severity requiring medication for control with recent symptoms being stable..Current symptoms of depression include none.                                                                                                                                                                                    .    MEDICAL HISTORY:                                                    Patient Active Problem List    Diagnosis Date Noted     Female pelvic pain 01/15/2018     Priority: Medium     Abnormal uterine bleeding (AUB) 01/15/2018     Priority: Medium     Angioedema, initial encounter 09/08/2017     Priority: Medium     Migraine with aura and without status migrainosus, not intractable 05/04/2016     Priority: Medium     ARASH (generalized anxiety disorder) 03/17/2016     Priority: Medium     Major depressive disorder, recurrent episode, mild (H) 01/21/2016     Priority: Medium     Primary insomnia 01/21/2016     Priority: Medium     Irregular menstrual cycle      Priority: Medium     Depression with anxiety      Priority: Medium     Anxiety 07/08/2014     Priority: Medium     Family history of thyroid disease 04/18/2012     Priority: Medium     Hyperlipidemia LDL goal <160 04/18/2012      Priority: Medium              Obesity 04/18/2012     Priority: Medium     Tobacco abuse 04/18/2012     Priority: Medium     Elevated BP 04/18/2012     Priority: Medium      Past Medical History:   Diagnosis Date     Depression with anxiety      Irregular menstrual cycle      Papanicolaou smear of cervix with low grade squamous intraepithelial lesion (LGSIL) 2009     Past Surgical History:   Procedure Laterality Date     NO HISTORY OF SURGERY       Current Outpatient Prescriptions   Medication Sig Dispense Refill     FLUoxetine HCl, PMDD, 20 MG TABS Take 1 tablet by mouth daily 30 tablet 1     metoprolol (TOPROL-XL) 25 MG 24 hr tablet Take 1 tablet (25 mg) by mouth daily 30 tablet 1     risperiDONE (RISPERDAL) 0.5 MG tablet Take 0.5 tablets (0.25 mg) by mouth At Bedtime For sleep. md appointment needed in next month please 15 tablet 0     busPIRone (BUSPAR) 5 MG tablet Start at 5 mg twice daily for 3 days, then 7.5 mg (1.5 tabs) twice daily for 3 days, then 10 mg (2 tabs) twice daily. New for anxiety 120 tablet 0     EPINEPHrine (EPIPEN/ADRENACLICK/OR ANY BX GENERIC EQUIV) 0.3 MG/0.3ML injection 2-pack Inject 0.3 mLs (0.3 mg) into the muscle as needed for anaphylaxis (Patient not taking: Reported on 12/28/2017) 0.6 mL 1     OTC products: None, except as noted above    No Known Allergies   Latex Allergy: NO    Social History   Substance Use Topics     Smoking status: Current Every Day Smoker     Packs/day: 0.50     Types: Cigarettes     Smokeless tobacco: Never Used     Alcohol use Yes      Comment: occ     History   Drug Use No       REVIEW OF SYSTEMS:                                                    C: NEGATIVE for fever, chills, change in weight  INTEGUMENTARY/SKIN: NEGATIVE for worrisome rashes  E/M: NEGATIVE for ear, mouth and throat problems  R: NEGATIVE for significant cough or SOB  CV: NEGATIVE for chest pain, palpitations or peripheral edema. Good exercise tolerance  GI: NEGATIVE for nausea, abdominal pain,  "+heartburn, or change in bowel habits. No black or bloody stools  : heavy menstrual cycles, no urine changes or std concerns.   MUSCULOSKELETAL: NEGATIVE for significant arthralgias or myalgia  NEURO: NEGATIVE for weakness, dizziness or paresthesias, history migraines  ENDOCRINE: NEGATIVE for temperature intolerance, skin/hair changes  HEME/ALLERGY/IMMUNE: NEGATIVE for bleeding problems  PSYCHIATRIC: NEGATIVE for changes in mood or affect.emotionally doing ok.     EXAM:                                                    LMP 12/20/2017 (Approximate)   /84  Pulse 107  Temp 97.5  F (36.4  C) (Oral)  Ht 5' 3.25\" (1.607 m)  Wt 190 lb (86.2 kg)  LMP 12/20/2017 (Approximate)  SpO2 95%  BMI 33.39 kg/m2    GENERAL APPEARANCE: healthy, alert and no distress  EYES: Eyes grossly normal to inspection, PERRL and conjunctivae and sclerae normal  HENT: ear canals and TM's normal and nose and mouth without ulcers or lesions  NECK: no adenopathy, no asymmetry, masses, or scars and thyroid normal to palpation  RESP: lungs clear to auscultation - no rales, rhonchi or wheezes  CV: regular rate and rhythm, normal S1 S2, no S3 or S4 and no murmur, click or rub   ABDOMEN: soft, nontender, no HSM or masses and bowel sounds normal  MS: extremities normal- no gross deformities noted  NEURO: Normal strength and tone, sensory exam grossly normal, mentation intact and speech normal  PSYCH: mentation appears normal, affect normal/bright and mildly anxious    DIAGNOSTICS:                                                      Labs Resulted Today:   CR=pending  Results for orders placed or performed in visit on 01/23/18   CBC with platelets   Result Value Ref Range    WBC 7.7 4.0 - 11.0 10e9/L    RBC Count 4.91 3.8 - 5.2 10e12/L    Hemoglobin 15.0 11.7 - 15.7 g/dL    Hematocrit 44.5 35.0 - 47.0 %    MCV 91 78 - 100 fl    MCH 30.5 26.5 - 33.0 pg    MCHC 33.7 31.5 - 36.5 g/dL    RDW 13.0 10.0 - 15.0 %    Platelet Count 264 150 - 450 " 10e9/L     Labs Drawn and in Process:   Unresulted Labs Ordered in the Past 30 Days of this Admission     Date and Time Order Name Status Description    1/23/2018 1446 CREATININE In process           Recent Labs   Lab Test  01/08/14   0847  04/18/12   1151   HGB  14.3  15.1   PLT  278  270   NA  138   --    POTASSIUM  4.1   --    CR  0.60   --         IMPRESSION:                                                    Reason for surgery/procedure: heavy menses/infertility// lap  Diagnosis/reason for consult: htn/anxiety//fitness for surgery  Ok for low risk procedure. Denies chest pain or shortness of breath. Blood pressure a little high but history anxiety.  Emotionally stable and here with significant order. HOLD asa/nsaids or ALCOHOL. We leave pregnancy test up to ob/gyn if they would like to do.     ASSESSMENT / PLAN:    (N93.9) Abnormal uterine bleeding (AUB)  Plan: CBC with platelets            (R03.0) Elevated blood pressure reading without diagnosis of hypertension  Plan: Creatinine        I noted that allergist had concerns about allergies and beta-blocker. Patient not had to sure her epi-pen. Propranolol was helpful prn in past. Will try norvasc instead. Reveiwed risks and side effects of medication  Consider prn hctz too.Recheck in 3 months  Sooner if side effects/worse blood pressure.  Self-monitor and exercise/ weight loss.   Sooner if worse. Chest pain or shortness of breath to er.     (K21.9) Gastroesophageal reflux disease without esophagitis  Comment: mild  Plan: omeprazole (PRILOSEC) 20 MG CR capsule        Prn prilosec. Weight loss. Limit caffeine/late eating.     (F41.1) ARASH (generalized anxiety disorder)  Plan: propranolol (INDERAL LA) 80 MG 24 hr capsule        Patient would like to go back to propranolol. Reveiwed risks and side effects of medication  Expected course and warning signs reviewed. If SUICIAL IDEATION OR HOMOCIDAL IDEATION OR ANURAG TO ER. .Recheck in 4 months  Sooner if new  issues/concerns. Call/email with questions/concerns    (G43.109) Migraine with aura and without status migrainosus, not intractable  Plan: propranolol (INDERAL LA) 80 MG 24 hr capsule        Patient would like to go back to propranolol. Reveiwed risks and side effects of medication        The proposed surgical procedure is considered LOW risk.    REVISED CARDIAC RISK INDEX  The patient has the following serious cardiovascular risks for perioperative complications such as (MI, PE, VFib and 3  AV Block):  No serious cardiac risks  INTERPRETATION: 1 risks: Class II (low risk - 0.9% complication rate)    The patient has the following additional risks for perioperative complications:  No identified additional risks    No diagnosis found.    RECOMMENDATIONS:                                                        --Patient is to take all scheduled medications on the day of surgery EXCEPT for modifications listed below. NO asa/nsaids or ALCOHOL.    APPROVAL GIVEN to proceed with proposed procedure, without further diagnostic evaluation       Signed Electronically by: Rashad Dinh MD    Copy of this evaluation report is provided to requesting physician.    [place] Preop Guidelines

## 2018-01-23 ENCOUNTER — OFFICE VISIT (OUTPATIENT)
Dept: FAMILY MEDICINE | Facility: CLINIC | Age: 33
End: 2018-01-23
Payer: COMMERCIAL

## 2018-01-23 VITALS
TEMPERATURE: 97.5 F | HEIGHT: 63 IN | SYSTOLIC BLOOD PRESSURE: 150 MMHG | DIASTOLIC BLOOD PRESSURE: 84 MMHG | WEIGHT: 190 LBS | HEART RATE: 107 BPM | BODY MASS INDEX: 33.66 KG/M2 | OXYGEN SATURATION: 95 %

## 2018-01-23 DIAGNOSIS — G43.109 MIGRAINE WITH AURA AND WITHOUT STATUS MIGRAINOSUS, NOT INTRACTABLE: ICD-10-CM

## 2018-01-23 DIAGNOSIS — R03.0 ELEVATED BLOOD PRESSURE READING WITHOUT DIAGNOSIS OF HYPERTENSION: ICD-10-CM

## 2018-01-23 DIAGNOSIS — N93.9 ABNORMAL UTERINE BLEEDING (AUB): ICD-10-CM

## 2018-01-23 DIAGNOSIS — Z01.818 PREOP GENERAL PHYSICAL EXAM: Primary | ICD-10-CM

## 2018-01-23 DIAGNOSIS — F41.1 GAD (GENERALIZED ANXIETY DISORDER): ICD-10-CM

## 2018-01-23 DIAGNOSIS — K21.9 GASTROESOPHAGEAL REFLUX DISEASE WITHOUT ESOPHAGITIS: ICD-10-CM

## 2018-01-23 LAB
CREAT SERPL-MCNC: 0.65 MG/DL (ref 0.52–1.04)
ERYTHROCYTE [DISTWIDTH] IN BLOOD BY AUTOMATED COUNT: 13 % (ref 10–15)
GFR SERPL CREATININE-BSD FRML MDRD: >90 ML/MIN/1.7M2
HCT VFR BLD AUTO: 44.5 % (ref 35–47)
HGB BLD-MCNC: 15 G/DL (ref 11.7–15.7)
MCH RBC QN AUTO: 30.5 PG (ref 26.5–33)
MCHC RBC AUTO-ENTMCNC: 33.7 G/DL (ref 31.5–36.5)
MCV RBC AUTO: 91 FL (ref 78–100)
PLATELET # BLD AUTO: 264 10E9/L (ref 150–450)
RBC # BLD AUTO: 4.91 10E12/L (ref 3.8–5.2)
WBC # BLD AUTO: 7.7 10E9/L (ref 4–11)

## 2018-01-23 PROCEDURE — 99215 OFFICE O/P EST HI 40 MIN: CPT | Mod: 25 | Performed by: FAMILY MEDICINE

## 2018-01-23 PROCEDURE — 36415 COLL VENOUS BLD VENIPUNCTURE: CPT | Performed by: FAMILY MEDICINE

## 2018-01-23 PROCEDURE — 85027 COMPLETE CBC AUTOMATED: CPT | Performed by: FAMILY MEDICINE

## 2018-01-23 PROCEDURE — 82565 ASSAY OF CREATININE: CPT | Performed by: FAMILY MEDICINE

## 2018-01-23 RX ORDER — PROPRANOLOL HYDROCHLORIDE 80 MG/1
80 CAPSULE, EXTENDED RELEASE ORAL DAILY
Qty: 90 CAPSULE | Refills: 1 | Status: SHIPPED | OUTPATIENT
Start: 2018-01-23 | End: 2018-01-23

## 2018-01-23 RX ORDER — AMLODIPINE BESYLATE 5 MG/1
5 TABLET ORAL DAILY
Qty: 30 TABLET | Refills: 2 | Status: SHIPPED | OUTPATIENT
Start: 2018-01-23 | End: 2018-11-27

## 2018-01-23 RX ORDER — PROPRANOLOL HYDROCHLORIDE 80 MG/1
80 CAPSULE, EXTENDED RELEASE ORAL DAILY
Qty: 1 CAPSULE | Refills: 0 | Status: SHIPPED | OUTPATIENT
Start: 2018-01-23 | End: 2018-01-23

## 2018-01-23 NOTE — MR AVS SNAPSHOT
After Visit Summary   1/23/2018    Halima Alcaraz    MRN: [id number]           Patient Information     Date Of Birth          1985        Visit Information        Provider Department      1/23/2018 2:30 PM Rashad Dinh MD [facility]        Today's Diagnoses     Preop general physical exam    -  1    Abnormal uterine bleeding (AUB)        Elevated blood pressure reading without diagnosis of hypertension        Gastroesophageal reflux disease without esophagitis        ARASH (generalized anxiety disorder)        Migraine with aura and without status migrainosus, not intractable          Care Instructions      Before Your Surgery      Call your surgeon if there is any change in your health. This includes signs of a cold or flu (such as a sore throat, runny nose, cough, rash or fever).    Do not smoke, drink alcohol or take over the counter medicine (unless your surgeon or primary care doctor tells you to) for the 24 hours before and after surgery.    If you take prescribed drugs: Follow your doctor s orders about which medicines to take and which to stop until after surgery.    Eating and drinking prior to surgery: follow the instructions from your surgeon    Take a shower or bath the night before surgery. Use the soap your surgeon gave you to gently clean your skin. If you do not have soap from your surgeon, use your regular soap. Do not shave or scrub the surgery site.  Wear clean pajamas and have clean sheets on your bed.           Follow-ups after your visit        Your next 10 appointments already scheduled     Jan 26, 2018   Procedure with Kei Kelley MD   [facility] (--)    [address]  [facility] [address] [phone]              Who to contact     If you have questions or need follow up information about today's clinic visit or your schedule please contact [facility] directly at [phone].  Normal or non-critical lab  "and imaging results will be communicated to you by [employer]art, letter or phone within 4 business days after the clinic has received the results. If you do not hear from us within 7 days, please contact the clinic through [employer] or phone. If you have a critical or abnormal lab result, we will notify you by phone as soon as possible.  Submit refill requests through [employer] or call your pharmacy and they will forward the refill request to us. Please allow 3 business days for your refill to be completed.          Additional Information About Your Visit        [employer]ar[employer] Information     [employer] gives you secure access to your electronic health record. If you see a primary care provider, you can also send messages to your care team and make appointments. If you have questions, please call your primary care clinic.  If you do not have a primary care provider, please call [phone] and they will assist you.        Care EveryWhere ID     This is your Care EveryWhere ID. This could be used by other organizations to access your [place] medical records  [id number]        Your Vitals Were     Pulse Temperature Height Last Period Pulse Oximetry BMI (Body Mass Index)    107 97.5  F (36.4  C) (Oral) 5' 3.25\" (1.607 m) 12/20/2017 (Approximate) 95% 33.39 kg/m2       Blood Pressure from Last 3 Encounters:   01/23/18 150/84   01/15/18 152/88   01/03/18 (!) 155/97    Weight from Last 3 Encounters:   01/23/18 190 lb (86.2 kg)   01/15/18 185 lb 3.2 oz (84 kg)   01/03/18 189 lb 12.8 oz (86.1 kg)              We Performed the Following     CBC with platelets     Creatinine          Today's Medication Changes          These changes are accurate as of: 1/23/18  3:11 PM.  If you have any questions, ask your nurse or doctor.               Start taking these medicines.        Dose/Directions    amLODIPine 5 MG tablet   Commonly known as:  NORVASC   Used for:  Elevated blood pressure reading without diagnosis of hypertension        Dose:  5 mg "   Take 1 tablet (5 mg) by mouth daily Replaces metoprolol and propranolol for blood pressure   Quantity:  30 tablet   Refills:  2       omeprazole 20 MG CR capsule   Commonly known as:  priLOSEC   Used for:  Gastroesophageal reflux disease without esophagitis        Dose:  20 mg   Take 1 capsule (20 mg) by mouth daily As needed for heartburn   Quantity:  30 capsule   Refills:  1         Stop taking these medicines if you haven't already. Please contact your care team if you have questions.     metoprolol succinate 25 MG 24 hr tablet   Commonly known as:  TOPROL-XL                Where to get your medicines      These medications were sent to [id number] - [place], [place] - [address] [address]  [address] [address] [address]    Hours:  24-hours Phone:  [phone]     amLODIPine 5 MG tablet    omeprazole 20 MG CR capsule                Primary Care Provider Office Phone # Fax #    Rashad Dinh [phone] [address] [address] [facility] [address]        Equal Access to Services     [facility]: Hadii aad ku hadasho Soomaali, waaxda luqadaha, qaybta kaalmada adeegyada, waxay idiin hayjuaniton steve mohan . So [facility] [phone].    ATENCIÓN: Si habla español, tiene a miramontes disposición servicios gratuitos de asistencia lingüística. [facility] [phone].    We comply with applicable federal civil rights laws and [place] laws. We do not discriminate on the basis of race, color, national origin, age, disability, sex, sexual orientation, or gender identity.            Thank you!     Thank you for choosing [facility]  for your care. Our goal is always to provide you with excellent care. Hearing back from our patients is one way we can continue to improve our services. Please take a few minutes to complete the written survey that you may receive in the mail after your visit with us. Thank you!             Your  Updated Medication List - Protect others around you: Learn how to safely use, store and throw away your medicines at www.disposemymeds.org.          This list is accurate as of: 1/23/18  3:11 PM.  Always use your most recent med list.                   Brand Name Dispense Instructions for use Diagnosis    amLODIPine 5 MG tablet    NORVASC    30 tablet    Take 1 tablet (5 mg) by mouth daily Replaces metoprolol and propranolol for blood pressure    Elevated blood pressure reading without diagnosis of hypertension       busPIRone 5 MG tablet    BUSPAR    120 tablet    Start at 5 mg twice daily for 3 days, then 7.5 mg (1.5 tabs) twice daily for 3 days, then 10 mg (2 tabs) twice daily. New for anxiety    ARASH (generalized anxiety disorder)       EPINEPHrine 0.3 MG/0.3ML injection 2-pack    EPIPEN/ADRENACLICK/or ANY BX GENERIC EQUIV    0.6 mL    Inject 0.3 mLs (0.3 mg) into the muscle as needed for anaphylaxis    Angioedema, initial encounter       FLUoxetine HCl (PMDD) 20 MG Tabs     30 tablet    Take 1 tablet by mouth daily    PMDD (premenstrual dysphoric disorder)       omeprazole 20 MG CR capsule    priLOSEC    30 capsule    Take 1 capsule (20 mg) by mouth daily As needed for heartburn    Gastroesophageal reflux disease without esophagitis       risperiDONE 0.5 MG tablet    risperDAL    15 tablet    Take 0.5 tablets (0.25 mg) by mouth At Bedtime For sleep. md appointment needed in next month please    Primary insomnia, ARASH (generalized anxiety disorder)

## 2018-01-23 NOTE — NURSING NOTE
"Chief Complaint   Patient presents with     Pre-Op Exam       Initial /84  Pulse 107  Temp 97.5  F (36.4  C) (Oral)  Ht 5' 3.25\" (1.607 m)  Wt 190 lb (86.2 kg)  LMP 12/20/2017 (Approximate)  SpO2 95%  BMI 33.39 kg/m2 Estimated body mass index is 33.39 kg/(m^2) as calculated from the following:    Height as of this encounter: 5' 3.25\" (1.607 m).    Weight as of this encounter: 190 lb (86.2 kg).  Medication Reconciliation: complete   Rica Patino CMA    "

## 2018-01-24 ENCOUNTER — ANESTHESIA EVENT (OUTPATIENT)
Dept: SURGERY | Facility: AMBULATORY SURGERY CENTER | Age: 33
End: 2018-01-24

## 2018-01-26 ENCOUNTER — HOSPITAL ENCOUNTER (OUTPATIENT)
Facility: AMBULATORY SURGERY CENTER | Age: 33
Discharge: HOME | End: 2018-01-26
Attending: OBSTETRICS & GYNECOLOGY | Admitting: OBSTETRICS & GYNECOLOGY
Payer: COMMERCIAL

## 2018-01-26 ENCOUNTER — SURGERY (OUTPATIENT)
Age: 33
End: 2018-01-26

## 2018-01-26 ENCOUNTER — ANESTHESIA (OUTPATIENT)
Dept: SURGERY | Facility: AMBULATORY SURGERY CENTER | Age: 33
End: 2018-01-26
Payer: COMMERCIAL

## 2018-01-26 VITALS
DIASTOLIC BLOOD PRESSURE: 73 MMHG | TEMPERATURE: 96.8 F | RESPIRATION RATE: 19 BRPM | SYSTOLIC BLOOD PRESSURE: 122 MMHG | OXYGEN SATURATION: 97 %

## 2018-01-26 DIAGNOSIS — Z98.890 POST-OPERATIVE STATE: Primary | ICD-10-CM

## 2018-01-26 LAB — BETA HCG QUAL IFA URINE: NEGATIVE

## 2018-01-26 PROCEDURE — 49320 DIAG LAPARO SEPARATE PROC: CPT

## 2018-01-26 PROCEDURE — 88305 TISSUE EXAM BY PATHOLOGIST: CPT | Performed by: OBSTETRICS & GYNECOLOGY

## 2018-01-26 PROCEDURE — G8907 PT DOC NO EVENTS ON DISCHARG: HCPCS

## 2018-01-26 PROCEDURE — 49320 DIAG LAPARO SEPARATE PROC: CPT | Mod: 51 | Performed by: OBSTETRICS & GYNECOLOGY

## 2018-01-26 PROCEDURE — 58558 HYSTEROSCOPY BIOPSY: CPT | Performed by: OBSTETRICS & GYNECOLOGY

## 2018-01-26 PROCEDURE — 58558 HYSTEROSCOPY BIOPSY: CPT

## 2018-01-26 PROCEDURE — G8916 PT W IV AB GIVEN ON TIME: HCPCS

## 2018-01-26 PROCEDURE — 84703 CHORIONIC GONADOTROPIN ASSAY: CPT | Performed by: OBSTETRICS & GYNECOLOGY

## 2018-01-26 RX ORDER — ONDANSETRON 4 MG/1
4 TABLET, ORALLY DISINTEGRATING ORAL EVERY 30 MIN PRN
Status: DISCONTINUED | OUTPATIENT
Start: 2018-01-26 | End: 2018-01-27 | Stop reason: HOSPADM

## 2018-01-26 RX ORDER — MEPERIDINE HYDROCHLORIDE 25 MG/ML
12.5 INJECTION INTRAMUSCULAR; INTRAVENOUS; SUBCUTANEOUS
Status: DISCONTINUED | OUTPATIENT
Start: 2018-01-26 | End: 2018-01-27 | Stop reason: HOSPADM

## 2018-01-26 RX ORDER — PROPOFOL 10 MG/ML
INJECTION, EMULSION INTRAVENOUS PRN
Status: DISCONTINUED | OUTPATIENT
Start: 2018-01-26 | End: 2018-01-26

## 2018-01-26 RX ORDER — GABAPENTIN 300 MG/1
300 CAPSULE ORAL ONCE
Status: COMPLETED | OUTPATIENT
Start: 2018-01-26 | End: 2018-01-26

## 2018-01-26 RX ORDER — ONDANSETRON 2 MG/ML
4 INJECTION INTRAMUSCULAR; INTRAVENOUS EVERY 30 MIN PRN
Status: DISCONTINUED | OUTPATIENT
Start: 2018-01-26 | End: 2018-01-27 | Stop reason: HOSPADM

## 2018-01-26 RX ORDER — LIDOCAINE 40 MG/G
CREAM TOPICAL
Status: DISCONTINUED | OUTPATIENT
Start: 2018-01-26 | End: 2018-01-27 | Stop reason: HOSPADM

## 2018-01-26 RX ORDER — NALOXONE HYDROCHLORIDE 0.4 MG/ML
.1-.4 INJECTION, SOLUTION INTRAMUSCULAR; INTRAVENOUS; SUBCUTANEOUS
Status: DISCONTINUED | OUTPATIENT
Start: 2018-01-26 | End: 2018-01-27 | Stop reason: HOSPADM

## 2018-01-26 RX ORDER — ACETAMINOPHEN 325 MG/1
975 TABLET ORAL ONCE
Status: DISCONTINUED | OUTPATIENT
Start: 2018-01-26 | End: 2018-01-27 | Stop reason: HOSPADM

## 2018-01-26 RX ORDER — CEFAZOLIN SODIUM 1 G/3ML
1 INJECTION, POWDER, FOR SOLUTION INTRAMUSCULAR; INTRAVENOUS SEE ADMIN INSTRUCTIONS
Status: DISCONTINUED | OUTPATIENT
Start: 2018-01-26 | End: 2018-01-27 | Stop reason: HOSPADM

## 2018-01-26 RX ORDER — SODIUM CHLORIDE, SODIUM LACTATE, POTASSIUM CHLORIDE, CALCIUM CHLORIDE 600; 310; 30; 20 MG/100ML; MG/100ML; MG/100ML; MG/100ML
INJECTION, SOLUTION INTRAVENOUS CONTINUOUS
Status: DISCONTINUED | OUTPATIENT
Start: 2018-01-26 | End: 2018-01-27 | Stop reason: HOSPADM

## 2018-01-26 RX ORDER — KETOROLAC TROMETHAMINE 30 MG/ML
INJECTION, SOLUTION INTRAMUSCULAR; INTRAVENOUS PRN
Status: DISCONTINUED | OUTPATIENT
Start: 2018-01-26 | End: 2018-01-26

## 2018-01-26 RX ORDER — CEFAZOLIN SODIUM 2 G/100ML
2 INJECTION, SOLUTION INTRAVENOUS
Status: COMPLETED | OUTPATIENT
Start: 2018-01-26 | End: 2018-01-26

## 2018-01-26 RX ORDER — LIDOCAINE HYDROCHLORIDE 20 MG/ML
INJECTION, SOLUTION INFILTRATION; PERINEURAL PRN
Status: DISCONTINUED | OUTPATIENT
Start: 2018-01-26 | End: 2018-01-26

## 2018-01-26 RX ORDER — DEXAMETHASONE SODIUM PHOSPHATE 4 MG/ML
INJECTION, SOLUTION INTRA-ARTICULAR; INTRALESIONAL; INTRAMUSCULAR; INTRAVENOUS; SOFT TISSUE PRN
Status: DISCONTINUED | OUTPATIENT
Start: 2018-01-26 | End: 2018-01-26

## 2018-01-26 RX ORDER — PROPOFOL 10 MG/ML
INJECTION, EMULSION INTRAVENOUS CONTINUOUS PRN
Status: DISCONTINUED | OUTPATIENT
Start: 2018-01-26 | End: 2018-01-26

## 2018-01-26 RX ORDER — BUPIVACAINE HYDROCHLORIDE AND EPINEPHRINE 5; 5 MG/ML; UG/ML
INJECTION, SOLUTION PERINEURAL PRN
Status: DISCONTINUED | OUTPATIENT
Start: 2018-01-26 | End: 2018-01-26 | Stop reason: HOSPADM

## 2018-01-26 RX ORDER — FENTANYL CITRATE 50 UG/ML
25-50 INJECTION, SOLUTION INTRAMUSCULAR; INTRAVENOUS
Status: DISCONTINUED | OUTPATIENT
Start: 2018-01-26 | End: 2018-01-27 | Stop reason: HOSPADM

## 2018-01-26 RX ORDER — FENTANYL CITRATE 50 UG/ML
INJECTION, SOLUTION INTRAMUSCULAR; INTRAVENOUS PRN
Status: DISCONTINUED | OUTPATIENT
Start: 2018-01-26 | End: 2018-01-26

## 2018-01-26 RX ORDER — ACETAMINOPHEN 325 MG/1
975 TABLET ORAL ONCE
Status: COMPLETED | OUTPATIENT
Start: 2018-01-26 | End: 2018-01-26

## 2018-01-26 RX ORDER — OXYCODONE HYDROCHLORIDE 5 MG/1
5 TABLET ORAL
Status: COMPLETED | OUTPATIENT
Start: 2018-01-26 | End: 2018-01-26

## 2018-01-26 RX ORDER — LIDOCAINE HYDROCHLORIDE 40 MG/ML
INJECTION, SOLUTION RETROBULBAR PRN
Status: DISCONTINUED | OUTPATIENT
Start: 2018-01-26 | End: 2018-01-26

## 2018-01-26 RX ORDER — FENTANYL CITRATE 50 UG/ML
25-50 INJECTION, SOLUTION INTRAMUSCULAR; INTRAVENOUS EVERY 5 MIN PRN
Status: DISCONTINUED | OUTPATIENT
Start: 2018-01-26 | End: 2018-01-27 | Stop reason: HOSPADM

## 2018-01-26 RX ORDER — ONDANSETRON 2 MG/ML
INJECTION INTRAMUSCULAR; INTRAVENOUS PRN
Status: DISCONTINUED | OUTPATIENT
Start: 2018-01-26 | End: 2018-01-26

## 2018-01-26 RX ORDER — HYDROMORPHONE HYDROCHLORIDE 1 MG/ML
.3-.5 INJECTION, SOLUTION INTRAMUSCULAR; INTRAVENOUS; SUBCUTANEOUS EVERY 10 MIN PRN
Status: DISCONTINUED | OUTPATIENT
Start: 2018-01-26 | End: 2018-01-27 | Stop reason: HOSPADM

## 2018-01-26 RX ORDER — GABAPENTIN 300 MG/1
300 CAPSULE ORAL ONCE
Status: DISCONTINUED | OUTPATIENT
Start: 2018-01-26 | End: 2018-01-27 | Stop reason: HOSPADM

## 2018-01-26 RX ORDER — NAPROXEN 500 MG/1
TABLET ORAL
COMMUNITY
Start: 2017-12-22 | End: 2020-05-11

## 2018-01-26 RX ORDER — OXYCODONE AND ACETAMINOPHEN 5; 325 MG/1; MG/1
1 TABLET ORAL EVERY 4 HOURS PRN
Qty: 10 TABLET | Refills: 0 | Status: SHIPPED | OUTPATIENT
Start: 2018-01-26 | End: 2018-02-06

## 2018-01-26 RX ADMIN — LIDOCAINE HYDROCHLORIDE 160 MG: 40 INJECTION, SOLUTION RETROBULBAR at 09:45

## 2018-01-26 RX ADMIN — PROPOFOL 50 MG: 10 INJECTION, EMULSION INTRAVENOUS at 10:06

## 2018-01-26 RX ADMIN — ACETAMINOPHEN 975 MG: 325 TABLET ORAL at 08:45

## 2018-01-26 RX ADMIN — FENTANYL CITRATE 50 MCG: 50 INJECTION, SOLUTION INTRAMUSCULAR; INTRAVENOUS at 09:41

## 2018-01-26 RX ADMIN — LIDOCAINE HYDROCHLORIDE 40 MG: 20 INJECTION, SOLUTION INFILTRATION; PERINEURAL at 09:41

## 2018-01-26 RX ADMIN — PROPOFOL 50 MG: 10 INJECTION, EMULSION INTRAVENOUS at 10:11

## 2018-01-26 RX ADMIN — OXYCODONE HYDROCHLORIDE 5 MG: 5 TABLET ORAL at 10:52

## 2018-01-26 RX ADMIN — PROPOFOL 150 MCG/KG/MIN: 10 INJECTION, EMULSION INTRAVENOUS at 09:43

## 2018-01-26 RX ADMIN — Medication 30 MG: at 09:45

## 2018-01-26 RX ADMIN — FENTANYL CITRATE 50 MCG: 50 INJECTION, SOLUTION INTRAMUSCULAR; INTRAVENOUS at 10:00

## 2018-01-26 RX ADMIN — SODIUM CHLORIDE, SODIUM LACTATE, POTASSIUM CHLORIDE, CALCIUM CHLORIDE: 600; 310; 30; 20 INJECTION, SOLUTION INTRAVENOUS at 09:30

## 2018-01-26 RX ADMIN — DEXAMETHASONE SODIUM PHOSPHATE 4 MG: 4 INJECTION, SOLUTION INTRA-ARTICULAR; INTRALESIONAL; INTRAMUSCULAR; INTRAVENOUS; SOFT TISSUE at 09:50

## 2018-01-26 RX ADMIN — GABAPENTIN 300 MG: 300 CAPSULE ORAL at 08:45

## 2018-01-26 RX ADMIN — BUPIVACAINE HYDROCHLORIDE AND EPINEPHRINE 10 ML: 5; 5 INJECTION, SOLUTION PERINEURAL at 10:23

## 2018-01-26 RX ADMIN — KETOROLAC TROMETHAMINE 30 MG: 30 INJECTION, SOLUTION INTRAMUSCULAR; INTRAVENOUS at 10:16

## 2018-01-26 RX ADMIN — PROPOFOL 150 MG: 10 INJECTION, EMULSION INTRAVENOUS at 09:43

## 2018-01-26 RX ADMIN — CEFAZOLIN SODIUM 2 G: 2 INJECTION, SOLUTION INTRAVENOUS at 09:37

## 2018-01-26 RX ADMIN — ONDANSETRON 4 MG: 2 INJECTION INTRAMUSCULAR; INTRAVENOUS at 10:18

## 2018-01-26 ASSESSMENT — LIFESTYLE VARIABLES: TOBACCO_USE: 1

## 2018-01-26 NOTE — DISCHARGE INSTRUCTIONS
[facility]  Same-Day Surgery   Adult Discharge Orders & Instructions   For 24 hours after surgery  1. Get plenty of rest.  A responsible adult must stay with you for at least 24 hours after you leave the hospital.   2. Do not drive or use heavy equipment.  If you have weakness or tingling, don't drive or use heavy equipment until this feeling goes away.  3. Do not drink alcohol.  4. Avoid strenuous or risky activities.  Ask for help when climbing stairs.   5. You may feel lightheaded.  IF so, sit for a few minutes before standing.  Have someone help you get up.   6. If you have nausea (feel sick to your stomach): Drink only clear liquids such as apple juice, ginger ale, broth or 7-Up.  Rest may also help.  Be sure to drink enough fluids.  Move to a regular diet as you feel able.  7. You may have a slight fever. Call the doctor if your fever is over 100 F (37.7 C) (taken under the tongue) or lasts longer than 24 hours.  8. You may have a dry mouth, a sore throat, muscle aches or trouble sleeping.  These should go away after 24 hours.  9. Do not make important or legal decisions.   Call your doctor for any of the followin.  Signs of infection (fever, growing tenderness at the surgery site, a large amount of drainage or bleeding, severe pain, foul-smelling drainage, redness, swelling).    2. It has been over 8 to 10 hours since surgery and you are still not able to urinate (pass water).    3.  Headache for over 24 hours.    4.  Numbness, tingling or weakness the day after surgery (if you had spinal anesthesia).  To contact a doctor, call _______[phone]____________________       Gynecology Outpatient Post-operative Instructions    1.Dressing (if present) may be removed tomorrow.  Leave incision uncovered.    2.You may shower as normal tomorrow.    3. You may eat as tolerated today.    4. You may take ibuprofen over the counter as tolerated.    5. Tomorrow, lifting and physical activity  as tolerated.    6.  You may drive when you are no longer requiring narcotic pain medication.    7. You may return to work/school when you feel able.    8.  Please contact my office with any problems.    9.  I would like to see you in 2 weeks.  Please contact my office to set it up.

## 2018-01-26 NOTE — ANESTHESIA POSTPROCEDURE EVALUATION
Patient: Halima Alcaraz    Procedure(s):  Diagnostic laparoscopy, hysteroscopy with biopsy - Wound Class: II-Clean Contaminated   - Wound Class: II-Clean Contaminated    Diagnosis:Uterine bleeding  Diagnosis Additional Information: No value filed.    Anesthesia Type:  General, LMA    Note:  Anesthesia Post Evaluation    Patient location during evaluation: Phase 2 and PACU  Patient participation: Able to fully participate in evaluation  Level of consciousness: awake and alert  Pain management: adequate  Airway patency: patent  Cardiovascular status: acceptable  Respiratory status: acceptable  Hydration status: acceptable  PONV: none     Anesthetic complications: None          Last vitals:  Vitals:    01/26/18 1040 01/26/18 1045 01/26/18 1050   BP: 134/76 (!) 137/105 135/77   Resp: 18 19    Temp: 96.8  F (36  C)     SpO2: 98% 98% 98%         Electronically Signed By: Phong Burch DO  January 26, 2018  11:01 AM

## 2018-01-26 NOTE — ANESTHESIA PREPROCEDURE EVALUATION
Anesthesia Evaluation     . Pt has had prior anesthetic.     No history of anesthetic complications          ROS/MED HX    ENT/Pulmonary:  - neg pulmonary ROS   (+)tobacco use, Current use , . .    Neurologic:  - neg neurologic ROS   (+)migraines,     Cardiovascular:  - neg cardiovascular ROS   (+) ----. : . . . :. . Previous cardiac testing date:results:date: results:ECG reviewed date: results:NSR date: results:          METS/Exercise Tolerance:  >4 METS   Hematologic:  - neg hematologic  ROS       Musculoskeletal:  - neg musculoskeletal ROS       GI/Hepatic:  - neg GI/hepatic ROS       Renal/Genitourinary:     (+) Other Renal/ Genitourinary, Abnormal uterine bleeding      Endo:     (+) Obesity, .      Psychiatric:     (+) psychiatric history anxiety and depression      Infectious Disease:  - neg infectious disease ROS       Malignancy:      - no malignancy   Other:    - neg other ROS                 Physical Exam  Normal systems: cardiovascular, pulmonary and dental    Airway   Mallampati: I  TM distance: >3 FB  Neck ROM: full    Dental     Cardiovascular   Rhythm and rate: regular and normal      Pulmonary    breath sounds clear to auscultation                    Anesthesia Plan      History & Physical Review  History and physical reviewed and following examination; no interval change.    ASA Status:  2 .    NPO Status:  > 8 hours    Plan for General and LMA with Intravenous and Propofol induction. Maintenance will be Balanced.    PONV prophylaxis:  Ondansetron (or other 5HT-3) and Dexamethasone or Solumedrol       Postoperative Care  Postoperative pain management:  Multi-modal analgesia.      Consents  Anesthetic plan, risks, benefits and alternatives discussed with:  Patient.  Use of blood products discussed: No .   .                          .

## 2018-01-26 NOTE — ANESTHESIA CARE TRANSFER NOTE
Patient: Halima Alcaraz    Procedure(s):  Diagnostic laparoscopy, hysteroscopy with biopsy - Wound Class: II-Clean Contaminated   - Wound Class: II-Clean Contaminated    Diagnosis: Uterine bleeding  Diagnosis Additional Information: No value filed.    Anesthesia Type:   General, LMA     Note:  Airway :Room Air  Patient transferred to:PACU  Comments: Prior to extubation, oropharynx gently suctioned, awake extubation, good aeration, SpO2 99%, equal bilateral breath sounds.  Transported to PACU on room air, SpO2 97% in PACU.  No apparent anesthesia complications.          Vitals: (Last set prior to Anesthesia Care Transfer)    CRNA VITALS  1/26/2018 1006 - 1/26/2018 1040      1/26/2018             SpO2: 97 %    Resp Rate (observed): (!)  4                Electronically Signed By: MIGUEL Pal CRNA  January 26, 2018  10:40 AM

## 2018-01-26 NOTE — PROCEDURES
Surgeon: Kei Kelley MD FACOG  Anesthesia: General  Assist:  [facility] OR staff  Procedure:  Diagnostic Laparoscopy, Diagnostic Hysteroscopy, Endometrial biopsy  Findings: Grossly normal uterus, tubes and ovaries, Normal endometrial cavity  Description of the Operative Procedure:  The patient was taken to the operating room where general anesthesia was induced.  She was then placed in dorsal lithotomy position and prepped and draped in normal sterile fashion.    A small umbilical incision was made and a blunt 5 mm trocar was placed under direct visualization.  Placement in the peritoneal cavity was verified and a pneumoperitoneum was achieved.     An additional 5 mm port is placed suprapubically.  There  Is no adhesive disease, no peritoneal implants consistent with endometriosis.  The tubes and ovaries are normal.    Good hemostasis is noted.  The pelvis is not irrigated.  The rest of the pelvis appears normal.   The skin incisions are closed using 4-0 Vicryl in an interrupted fashion.    The was then re-positioned and placed in dorsal lithotomy position..    The cervix was then visualized and grasped with a single toothed tenaculum.  The cervix was not infiltrated with marcaine.  The uterus was sounded to a depth of 7 cm and dilated to accept a 30 degree hysteroscope.  The endometrial cavity is distended using normal saline and visualized.    The endometrium appears normal and there are no structural abnormalities.  Using the myosure, represntative biopsies are taken from all 4 quadrants.    Good hemostasis was noted.  The cavity was then inspected and noted to be otherwise normal.   The scope and tenaculum are removed.  The patient tolerated the procedure well.  Sponge, lap and needle counts are correct X2  Specimen:  Endometrial biopsy  EBL:  5cc  Fluid balance: 130cc NaCl  The patient was taken to the recovery room in stable condition.    The patient is taken to the recovery room in stable condition.  Sponge,  Lap, and needle counts are correct X2.

## 2018-01-26 NOTE — IP AVS SNAPSHOT
MRN:[id number]                      After Visit Summary   1/26/2018    Halima Alcaraz    MRN: [id number]           Thank you!     Thank you for choosing [facility] for your care. Our goal is always to provide you with excellent care. Hearing back from our patients is one way we can continue to improve our services. Please take a few minutes to complete the written survey that you may receive in the mail after you visit with us. Thank you!        Patient Information     Date Of Birth          1985        About your hospital stay     You were admitted on:  January 26, 2018 You last received care in the:  [facility] [place]    You were discharged on:  January 26, 2018       Who to Call     For medical emergencies, please call 911.  For non-urgent questions about your medical care, please call your primary care provider or clinic, [phone]  For questions related to your surgery, please call your surgery clinic        Attending Provider     Provider Kei Betancourt MD OB/Gyn       Primary Care Provider Office Phone # Fax #    Rashad Dinh [phone]      After Care Instructions     Discharge Instructions       Resume pre procedure diet            Ice to affected area       PRN as tolerated            No alcohol       NO ALCOHOL for 24 hours post procedure            No driving or operating machinery       No driving or operating machinery until day after procedure            Shower        Shower on Post-op day  1.   DO NOT take a bath                  Further instructions from your care team       [facility]  Same-Day Surgery   Adult Discharge Orders & Instructions   For 24 hours after surgery  1. Get plenty of rest.  A responsible adult must stay with you for at least 24 hours after you leave the hospital.   2. Do not drive or use heavy equipment.  If you have weakness or tingling, don't drive or use heavy equipment until  this feeling goes away.  3. Do not drink alcohol.  4. Avoid strenuous or risky activities.  Ask for help when climbing stairs.   5. You may feel lightheaded.  IF so, sit for a few minutes before standing.  Have someone help you get up.   6. If you have nausea (feel sick to your stomach): Drink only clear liquids such as apple juice, ginger ale, broth or 7-Up.  Rest may also help.  Be sure to drink enough fluids.  Move to a regular diet as you feel able.  7. You may have a slight fever. Call the doctor if your fever is over 100 F (37.7 C) (taken under the tongue) or lasts longer than 24 hours.  8. You may have a dry mouth, a sore throat, muscle aches or trouble sleeping.  These should go away after 24 hours.  9. Do not make important or legal decisions.   Call your doctor for any of the followin.  Signs of infection (fever, growing tenderness at the surgery site, a large amount of drainage or bleeding, severe pain, foul-smelling drainage, redness, swelling).    2. It has been over 8 to 10 hours since surgery and you are still not able to urinate (pass water).    3.  Headache for over 24 hours.    4.  Numbness, tingling or weakness the day after surgery (if you had spinal anesthesia).  To contact a doctor, call _______[phone]____________________       Gynecology Outpatient Post-operative Instructions    1.Dressing (if present) may be removed tomorrow.  Leave incision uncovered.    2.You may shower as normal tomorrow.    3. You may eat as tolerated today.    4. You may take ibuprofen over the counter as tolerated.    5. Tomorrow, lifting and physical activity as tolerated.    6.  You may drive when you are no longer requiring narcotic pain medication.    7. You may return to work/school when you feel able.    8.  Please contact my office with any problems.    9.  I would like to see you in 2 weeks.  Please contact my office to set it up.    Additional Information     If you use hormonal birth control (such as  the pill, patch, ring or implants): You'll need a second form of birth control for 7 days (condoms, a diaphragm or contraceptive foam). While in the hospital, you received a medicine called Bridion. Your normal birth control will not work as well for a week after taking this medicine.          Pending Results     No orders found from 1/24/2018 to 1/27/2018.            Admission Information     Date & Time Provider Department Dept. Phone    1/26/2018 Kei Kelley MD [facility] [place] [phone]      Your Vitals Were     Blood Pressure Temperature Respirations Last Period Pulse Oximetry       134/76 96.8  F (36  C) (Temporal) 18 12/20/2017 (Approximate) 98%       MyChart Information     [employer] gives you secure access to your electronic health record. If you see a primary care provider, you can also send messages to your care team and make appointments. If you have questions, please call your primary care clinic.  If you do not have a primary care provider, please call [phone] and they will assist you.        Care EveryWhere ID     This is your Care EveryWhere ID. This could be used by other organizations to access your [facility] medical records  [id number]        Equal Access to Services     HOLLIE SYLVESTER : Hadii carlos Cook, walucieda thong, radha brionesalheather lizarraga, bekah sosa. So [facility] [phone].    ATENCIÓN: Si habla español, tiene a miramontes disposición servicios gratuitos de asistencia lingüística. Llame al [phone].    We comply with applicable federal civil rights laws and [place] laws. We do not discriminate on the basis of race, color, national origin, age, disability, sex, sexual orientation, or gender identity.               Review of your medicines      START taking        Dose / Directions    oxyCODONE-acetaminophen 5-325 MG per tablet   Commonly known as:  PERCOCET   Used for:  Post-operative state        Dose:  1 tablet   Take 1  tablet by mouth every 4 hours as needed for pain (moderate to severe)   Quantity:  10 tablet   Refills:  0         CONTINUE these medicines which have NOT CHANGED        Dose / Directions    amLODIPine 5 MG tablet   Commonly known as:  NORVASC   Used for:  Elevated blood pressure reading without diagnosis of hypertension        Dose:  5 mg   Take 1 tablet (5 mg) by mouth daily Replaces metoprolol and propranolol for blood pressure   Quantity:  30 tablet   Refills:  2       busPIRone 5 MG tablet   Commonly known as:  BUSPAR   Used for:  ARASH (generalized anxiety disorder)        Start at 5 mg twice daily for 3 days, then 7.5 mg (1.5 tabs) twice daily for 3 days, then 10 mg (2 tabs) twice daily. New for anxiety   Quantity:  120 tablet   Refills:  0       EPINEPHrine 0.3 MG/0.3ML injection 2-pack   Commonly known as:  EPIPEN/ADRENACLICK/or ANY BX GENERIC EQUIV   Used for:  Angioedema, initial encounter        Dose:  0.3 mg   Inject 0.3 mLs (0.3 mg) into the muscle as needed for anaphylaxis   Quantity:  0.6 mL   Refills:  1       FLUoxetine HCl (PMDD) 20 MG Tabs   Used for:  PMDD (premenstrual dysphoric disorder)        Dose:  1 tablet   Take 1 tablet by mouth daily   Quantity:  30 tablet   Refills:  1       naproxen 500 MG tablet   Commonly known as:  NAPROSYN        Refills:  0       omeprazole 20 MG CR capsule   Commonly known as:  priLOSEC   Used for:  Gastroesophageal reflux disease without esophagitis        Dose:  20 mg   Take 1 capsule (20 mg) by mouth daily As needed for heartburn   Quantity:  30 capsule   Refills:  1       risperiDONE 0.5 MG tablet   Commonly known as:  risperDAL   Used for:  Primary insomnia, ARASH (generalized anxiety disorder)        Dose:  0.25 mg   Take 0.5 tablets (0.25 mg) by mouth At Bedtime For sleep. md appointment needed in next month please   Quantity:  15 tablet   Refills:  0            Where to get your medicines      Some of these will need a paper prescription and others can be  bought over the counter. Ask your nurse if you have questions.     Bring a paper prescription for each of these medications     oxyCODONE-acetaminophen 5-325 MG per tablet                Protect others around you: Learn how to safely use, store and throw away your medicines at www.disposemymeds.org.        Information about OPIOIDS     PRESCRIPTION OPIOIDS: WHAT YOU NEED TO KNOW    Prescription opioids can be used to help relieve moderate to severe pain and are often prescribed following a surgery or injury, or for certain health conditions. These medications can be an important part of treatment but also come with serious risks. It is important to work with your health care provider to make sure you are getting the safest, most effective care.    WHAT ARE THE RISKS AND SIDE EFFECTS OF OPIOID USE?  Prescription opioids carry serious risks of addiction and overdose, especially with prolonged use. An opioid overdose, often marked by slowed breathing can cause sudden death. The use of prescription opioids can have a number of side effects as well, even when taken as directed:      Tolerance - meaning you might need to take more of a medication for the same pain relief    Physical dependence - meaning you have symptoms of withdrawal when a medication is stopped    Increased sensitivity to pain    Constipation    Nausea, vomiting, and dry mouth    Sleepiness and dizziness    Confusion    Depression    Low levels of testosterone that can result in lower sex drive, energy, and strength    Itching and sweating    RISKS ARE GREATER WITH:    History of drug misuse, substance use disorder, or overdose    Mental health conditions (such as depression or anxiety)    Sleep apnea    Older age (65 years or older)    Pregnancy    Avoid alcohol while taking prescription opioids.   Also, unless specifically advised by your health care provider, medications to avoid include:    Benzodiazepines (such as Xanax or Valium)    Muscle  relaxants (such as Soma or Flexeril)    Hypnotics (such as Ambien or Lunesta)    Other prescription opioids    KNOW YOUR OPTIONS:  Talk to your health care provider about ways to manage your pain that do not involve prescription opioids. Some of these options may actually work better and have fewer risks and side effects:    Pain relievers such as acetaminophen, ibuprofen, and naproxen    Some medications that are also used for depression or seizures    Physical therapy and exercise    Cognitive behavioral therapy, a psychological, goal-directed approach, in which patients learn how to modify physical, behavioral, and emotional triggers of pain and stress    IF YOU ARE PRESCRIBED OPIOIDS FOR PAIN:    Never take opioids in greater amounts or more often than prescribed    Follow up with your primary health care provider and work together to create a plan on how to manage your pain.    Talk about ways to help manage your pain that do not involve prescription opioids    Talk about all concerns and side effects    Help prevent misuse and abuse    Never sell or share prescription opioids    Never use another person's prescription opioids    Store prescription opioids in a secure place and out of reach of others (this may include visitors, children, friends, and family)    Visit www.cdc.gov/drugoverdose to learn about risks of opioid abuse and overdose    If you believe you may be struggling with addiction, tell your health care provider and ask for guidance or call [facility]'s National Helpline at 0-650-033-HELP    LEARN MORE / www.cdc.gov/drugoverdose/prescribing/guideline.html    Safely dispose of unused prescription opioids: Find your local drug take-back programs and more information about the importance of safe disposal at www.doseofreality.[place].gov             Medication List: This is a list of all your medications and when to take them. Check marks below indicate your daily home schedule. Keep this list as a reference.       Medications           Morning Afternoon Evening Bedtime As Needed    amLODIPine 5 MG tablet   Commonly known as:  NORVASC   Take 1 tablet (5 mg) by mouth daily Replaces metoprolol and propranolol for blood pressure                                busPIRone 5 MG tablet   Commonly known as:  BUSPAR   Start at 5 mg twice daily for 3 days, then 7.5 mg (1.5 tabs) twice daily for 3 days, then 10 mg (2 tabs) twice daily. New for anxiety                                EPINEPHrine 0.3 MG/0.3ML injection 2-pack   Commonly known as:  EPIPEN/ADRENACLICK/or ANY BX GENERIC EQUIV   Inject 0.3 mLs (0.3 mg) into the muscle as needed for anaphylaxis                                FLUoxetine HCl (PMDD) 20 MG Tabs   Take 1 tablet by mouth daily                                naproxen 500 MG tablet   Commonly known as:  NAPROSYN                                omeprazole 20 MG CR capsule   Commonly known as:  priLOSEC   Take 1 capsule (20 mg) by mouth daily As needed for heartburn                                oxyCODONE-acetaminophen 5-325 MG per tablet   Commonly known as:  PERCOCET   Take 1 tablet by mouth every 4 hours as needed for pain (moderate to severe)                                risperiDONE 0.5 MG tablet   Commonly known as:  risperDAL   Take 0.5 tablets (0.25 mg) by mouth At Bedtime For sleep. md appointment needed in next month please

## 2018-01-26 NOTE — IP AVS SNAPSHOT
[facility] [place]    [address] [place] [address]    Phone:  [phone]                                       After Visit Summary   1/26/2018    Halima Alcaraz    MRN: [id number]           After Visit Summary Signature Page     I have received my discharge instructions, and my questions have been answered. I have discussed any challenges I see with this plan with the nurse or doctor.    ..........................................................................................................................................  Patient/Patient Representative Signature      ..........................................................................................................................................  Patient Representative Print Name and Relationship to Patient    ..................................................               ................................................  Date                                            Time    ..........................................................................................................................................  Reviewed by Signature/Title    ...................................................              ..............................................  Date                                                            Time

## 2018-01-27 ENCOUNTER — MYC REFILL (OUTPATIENT)
Dept: ALLERGY | Facility: OTHER | Age: 33
End: 2018-01-27

## 2018-01-27 DIAGNOSIS — T78.3XXA ANGIOEDEMA, INITIAL ENCOUNTER: ICD-10-CM

## 2018-01-29 LAB — COPATH REPORT: NORMAL

## 2018-01-29 RX ORDER — EPINEPHRINE 0.3 MG/.3ML
0.3 INJECTION SUBCUTANEOUS PRN
Qty: 0.6 ML | Refills: 1 | Status: SHIPPED | OUTPATIENT
Start: 2018-01-29 | End: 2020-05-11

## 2018-01-29 NOTE — TELEPHONE ENCOUNTER
Message from [place]:  Sandra Young MA Mon Jan 29, 2018 8:04 AM        ----- Message -----   From: Halima Alcaraz   Sent: 1/27/2018 9:39 AM   To:  Allergy Patient Care Pool  Subject: Medication Renewal Request     Original authorizing provider: DO Halima Foster would like a refill of the following medications:  EPINEPHrine (EPIPEN/ADRENACLICK/OR ANY BX GENERIC EQUIV) 0.3 MG/0.3ML injection 2-pack [Phong Ching DO]    Preferred pharmacy: [facility] [address] [place] [id number] [facility]    Comment:  Been having hives for several days, worried I may need this.

## 2018-02-05 ENCOUNTER — MYC MEDICAL ADVICE (OUTPATIENT)
Dept: OBGYN | Facility: CLINIC | Age: 33
End: 2018-02-05

## 2018-02-05 NOTE — TELEPHONE ENCOUNTER
Left message asking pt to call back to clinic about her MyChart message. Left clinic call back phone number and RN hours. Bethanie Boyd RN, BAN

## 2018-02-06 ENCOUNTER — OFFICE VISIT (OUTPATIENT)
Dept: FAMILY MEDICINE | Facility: CLINIC | Age: 33
End: 2018-02-06
Payer: COMMERCIAL

## 2018-02-06 VITALS
SYSTOLIC BLOOD PRESSURE: 132 MMHG | DIASTOLIC BLOOD PRESSURE: 85 MMHG | RESPIRATION RATE: 16 BRPM | WEIGHT: 189 LBS | HEART RATE: 102 BPM | BODY MASS INDEX: 33.22 KG/M2 | TEMPERATURE: 97.6 F | OXYGEN SATURATION: 97 %

## 2018-02-06 DIAGNOSIS — N76.0 LABIAL INFECTION: Primary | ICD-10-CM

## 2018-02-06 PROCEDURE — 99214 OFFICE O/P EST MOD 30 MIN: CPT | Performed by: INTERNAL MEDICINE

## 2018-02-06 RX ORDER — SULFAMETHOXAZOLE/TRIMETHOPRIM 800-160 MG
1 TABLET ORAL 2 TIMES DAILY
Qty: 20 TABLET | Refills: 0 | Status: SHIPPED | OUTPATIENT
Start: 2018-02-06 | End: 2020-05-11

## 2018-02-06 NOTE — PROGRESS NOTES
SUBJECTIVE:  Halima Alcaraz is an 32 year old female who presents for concerns.  Had surgery on 1/26/17 - hysteroscopy and laparoscopy for bad menstrual bleeding.  A couple days later started to have some labial swelling. Messaged her doctor had they told her to come in.  Hurts to sit, and to touch the area.  Felt a little feverish a couple days ago, but no documented temp.  No n/v/d.  No drainage noted.  Eating normally.  No recent travel.  Has taken naproxen and iced the area which help a little.         has a past medical history of Depression with anxiety; Irregular menstrual cycle; and Papanicolaou smear of cervix with low grade squamous intraepithelial lesion (LGSIL) (2009).  Social History     Social History     Marital status: Single     Spouse name: N/A     Number of children: N/A     Years of education: N/A     Social History Main Topics     Smoking status: Current Every Day Smoker     Packs/day: 0.50     Types: Cigarettes     Smokeless tobacco: Never Used     Alcohol use Yes      Comment: occ     Drug use: No     Sexual activity: Yes     Partners: Male     Other Topics Concern     None     Social History Narrative     Family History   Problem Relation Age of Onset     Thyroid Disease Mother      Thyroid Disease Maternal Grandmother      CEREBROVASCULAR DISEASE Paternal Grandfather        ALLERGIES:  Review of patient's allergies indicates no known allergies.    Current Outpatient Prescriptions   Medication     EPINEPHrine (EPIPEN/ADRENACLICK/OR ANY BX GENERIC EQUIV) 0.3 MG/0.3ML injection 2-pack     naproxen (NAPROSYN) 500 MG tablet     omeprazole (PRILOSEC) 20 MG CR capsule     amLODIPine (NORVASC) 5 MG tablet     FLUoxetine HCl, PMDD, 20 MG TABS     risperiDONE (RISPERDAL) 0.5 MG tablet     busPIRone (BUSPAR) 5 MG tablet     No current facility-administered medications for this visit.          ROS:  ROS is done and is negative for general, constitutional, eye, ENT, Respiratory, cardiovascular, GI, ,  Skin, musculoskeletal except as noted elsewhere.      OBJECTIVE:  /85  Pulse 102  Temp 97.6  F (36.4  C) (Oral)  Resp 16  Wt 189 lb (85.7 kg)  SpO2 97%  Breastfeeding? No  BMI 33.22 kg/m2  GENERAL APPEARANCE: Alert, in no acute distress  EYES: normal  NOSE:normal  OROPHARYNX:normal  NECK:No adenopathy,masses or thyromegaly  RESP: normal and clear to auscultation  CV:regular rate and rhythm and no murmurs, clicks, or gallops  ABDOMEN: Abdomen soft, non-tender. BS normal. No masses, organomegaly.   suprapubic incision with mild erythema but no tenderness or drainage.  : left proximal labial mildly edematous and erythematous and mildly tender.  Interior of labial with 1cm raised area which is very tender, moderately erythematous, and mild fluctuance, no drainage,  small opening in center of raised area.  SKIN: no ulcers, lesions or rash  MUSCULOSKELETAL:Musculoskeletal normal      RESULTS  .  No results found for this or any previous visit (from the past 48 hour(s)).    ASSESSMENT/PLAN:    ASSESSMENT / PLAN:  (N76.0) Labial infection  (primary encounter diagnosis)  Comment: start on bactrim, which will cover mrsa as there is a small amount of fluctuance present.  She did have recent surgery, and although this was not an incision site, the recent procedure may have increased risk of infection.    Plan: sulfamethoxazole-trimethoprim (BACTRIM         DS/SEPTRA DS) 800-160 MG per tablet        Reviewed medication instructions and side effects. Follow up if experiences side effects.. I reviewed supportive care, expected course, and signs of concern including worsened pain, swelling or redness, or fevers.  Pt advised to check the affected area daily for progress. Follow up with ob/gyn  if she does not begin improve within 3 day(s) or if worsens in any way, or if not fully resolved within 10 days.  Reviewed red flag symptoms and is to go to the ER if experiences any of these.      See [facility] for orders,  medications, letters, patient instructions    Natacha Simms M.D.

## 2018-02-06 NOTE — MR AVS SNAPSHOT
After Visit Summary   2/6/2018    Halima Alcaraz    MRN: [id number]           Patient Information     Date Of Birth          1985        Visit Information        Provider Department      2/6/2018 8:20 AM Natacha Simms MD [facility]        Today's Diagnoses     Labial infection    -  1       Follow-ups after your visit        Follow-up notes from your care team     Return in about 3 days (around 2/9/2018), or if symptoms worsen or do not start to improve, for with ob/gyn.      Your next 10 appointments already scheduled     Feb 19, 2018  2:15 PM CST   Post Op with Kei Kelley MD   [facility] ([facility])    [address] [facility] [address] [phone]              Who to contact     If you have questions or need follow up information about today's clinic visit or your schedule please contact [facility] directly at [phone].  Normal or non-critical lab and imaging results will be communicated to you by [employer], letter or phone within 4 business days after the clinic has received the results. If you do not hear from us within 7 days, please contact the clinic through [employer] or phone. If you have a critical or abnormal lab result, we will notify you by phone as soon as possible.  Submit refill requests through [employer] or call your pharmacy and they will forward the refill request to us. Please allow 3 business days for your refill to be completed.          Additional Information About Your Visit        MyChart Information     [employer] gives you secure access to your electronic health record. If you see a primary care provider, you can also send messages to your care team and make appointments. If you have questions, please call your primary care clinic.  If you do not have a primary care provider, please call [phone] and they will assist you.        Care EveryWhere ID     This is your Care EveryWhere ID. This  could be used by other organizations to access your [place] medical records  [id number]        Your Vitals Were     Pulse Temperature Respirations Pulse Oximetry Breastfeeding? BMI (Body Mass Index)    102 97.6  F (36.4  C) (Oral) 16 97% No 33.22 kg/m2       Blood Pressure from Last 3 Encounters:   02/06/18 132/85   01/26/18 122/73   01/23/18 150/84    Weight from Last 3 Encounters:   02/06/18 189 lb (85.7 kg)   01/23/18 190 lb (86.2 kg)   01/15/18 185 lb 3.2 oz (84 kg)              Today, you had the following     No orders found for display         Today's Medication Changes          These changes are accurate as of 2/6/18  8:59 AM.  If you have any questions, ask your nurse or doctor.               Start taking these medicines.        Dose/Directions    sulfamethoxazole-trimethoprim 800-160 MG per tablet   Commonly known as:  BACTRIM DS/SEPTRA DS   Used for:  Labial infection   Started by:  Natacha Simms MD        Dose:  1 tablet   Take 1 tablet by mouth 2 times daily   Quantity:  20 tablet   Refills:  0            Where to get your medicines      These medications were sent to [facility] [address] [place] [address] & [facility]  [address] [facility] [address]    Hours:  24-hours Phone:  [phone]     sulfamethoxazole-trimethoprim 800-160 MG per tablet                Primary Care Provider Office Phone # Fax #    Rashad Dinh -[phone] [address] [facility] [address]        Equal Access to Services     [facility]: Hadii aad ku hadashsuresh Soeric, waaxda luqadaha, qaybta kaalmada bekah lizarraga. So [facility] [phone].    ATENCIÓN: Si habla español, tiene a miramontes disposición servicios gratuitos de asistencia lingüística. Llame al [phone].    We comply with applicable federal civil rights laws and [place] laws. We do not discriminate on the basis of race, color,  national origin, age, disability, sex, sexual orientation, or gender identity.            Thank you!     [facility] you for [facility]  [facility]. Our goal is always to provide you with excellent care. Hearing back from our patients is one way we can continue to improve our services. Please take a few minutes to complete the written survey that you may receive in the mail after your visit with us. Thank you!             Your Updated Medication List - Protect others around you: Learn how to safely use, store and throw away your medicines at www.disposemymeds.org.          This list is accurate as of 2/6/18  8:59 AM.  Always use your most recent med list.                   Brand Name Dispense Instructions for use Diagnosis    amLODIPine 5 MG tablet    NORVASC    30 tablet    Take 1 tablet (5 mg) by mouth daily Replaces metoprolol and propranolol for blood pressure    Elevated blood pressure reading without diagnosis of hypertension       busPIRone 5 MG tablet    BUSPAR    120 tablet    Start at 5 mg twice daily for 3 days, then 7.5 mg (1.5 tabs) twice daily for 3 days, then 10 mg (2 tabs) twice daily. New for anxiety    ARASH (generalized anxiety disorder)       EPINEPHrine 0.3 MG/0.3ML injection 2-pack    EPIPEN/ADRENACLICK/or ANY BX GENERIC EQUIV    0.6 mL    Inject 0.3 mLs (0.3 mg) into the muscle as needed for anaphylaxis    Angioedema, initial encounter       FLUoxetine HCl (PMDD) 20 MG Tabs     30 tablet    Take 1 tablet by mouth daily    PMDD (premenstrual dysphoric disorder)       naproxen 500 MG tablet    NAPROSYN          omeprazole 20 MG CR capsule    priLOSEC    30 capsule    Take 1 capsule (20 mg) by mouth daily As needed for heartburn    Gastroesophageal reflux disease without esophagitis       risperiDONE 0.5 MG tablet    risperDAL    15 tablet    Take 0.5 tablets (0.25 mg) by mouth At Bedtime For sleep. md appointment needed in next month please    Primary insomnia, ARASH (generalized  anxiety disorder)       sulfamethoxazole-trimethoprim 800-160 MG per tablet    BACTRIM DS/SEPTRA DS    20 tablet    Take 1 tablet by mouth 2 times daily    Labial infection

## 2018-02-06 NOTE — LETTER
February 6, 2018      Halima Alcaraz  [address] [facility] [address]        To Whom It May Concern:    Halima Alcaraz was seen in our clinic. She may return to work without restrictions.      Sincerely,        Natacha Simms MD

## 2018-02-06 NOTE — NURSING NOTE
"Chief Complaint   Patient presents with     Swelling     pt c/o swelling and pain after hysteroscopy and laparascopy on 1/26/18       Initial /85  Pulse 102  Temp 97.6  F (36.4  C) (Oral)  Resp 16  Wt 189 lb (85.7 kg)  SpO2 97%  Breastfeeding? No  BMI 33.22 kg/m2 Estimated body mass index is 33.22 kg/(m^2) as calculated from the following:    Height as of 1/23/18: 5' 3.25\" (1.607 m).    Weight as of this encounter: 189 lb (85.7 kg).  Medication Reconciliation: complete    Carolina Farrar MA    "

## 2018-03-03 ENCOUNTER — MYC REFILL (OUTPATIENT)
Dept: FAMILY MEDICINE | Facility: CLINIC | Age: 33
End: 2018-03-03

## 2018-03-03 DIAGNOSIS — N76.0 LABIAL INFECTION: ICD-10-CM

## 2018-03-03 DIAGNOSIS — K21.9 GASTROESOPHAGEAL REFLUX DISEASE WITHOUT ESOPHAGITIS: ICD-10-CM

## 2018-03-03 DIAGNOSIS — R03.0 ELEVATED BLOOD PRESSURE READING WITHOUT DIAGNOSIS OF HYPERTENSION: ICD-10-CM

## 2018-03-03 RX ORDER — AMLODIPINE BESYLATE 5 MG/1
5 TABLET ORAL DAILY
Qty: 30 TABLET | Refills: 2 | Status: CANCELLED | OUTPATIENT
Start: 2018-03-03

## 2018-03-03 NOTE — LETTER
March 8, 2018    Halima Alcaraz  [address] [address] [address]    Dear Halima,       We recently received a refill request for sulfamethoxazole-trimethoprim (BACTRIM DS/SEPTRA DS) 800-160 MG per tablet  .  We have denied this request because you are due for a:    OBGYN office visit      Please call at your earliest convenience so that there will not be a delay with your future refills.          Thank you,   Your [facility] Team  [phone]

## 2018-03-05 NOTE — TELEPHONE ENCOUNTER
Message from QuicklyChat:  Original authorizing provider: Rashad Dinh MD    Halima Alcaraz would like a refill of the following medications:  omeprazole (PRILOSEC) 20 MG CR capsule [Rashad Dinh MD]  amLODIPine (NORVASC) 5 MG tablet [Rashad Dinh MD]    Preferred pharmacy[facility] [address] [place] [address] [facility] [address] [facility]    Comment:  Infection went away with antibiotics, but seems to be coming back.    Medication renewals requested in this message routed to other providers:  sulfamethoxazole-trimethoprim (BACTRIM DS/SEPTRA DS) 800-160 MG per tablet [Natacha Simms MD]

## 2018-03-05 NOTE — TELEPHONE ENCOUNTER
Message from Hua Kang:  Original authorizing provider: Natacha Simms MD    Halima Alcaraz would like a refill of the following medications:  sulfamethoxazole-trimethoprim (BACTRIM DS/SEPTRA DS) 800-160 MG per tablet [Natacha Simms MD]    Preferred pharmacy: [facility] [id number] - [place], [place] - [address] [facility]    Comment:  Infection went away with antibiotics, but seems to be coming back.    Medication renewals requested in this message routed to other providers:  omeprazole (PRILOSEC) 20 MG CR capsule [Rashad Dinh MD]  amLODIPine (NORVASC) 5 MG tablet [Rashad Dinh MD]

## 2018-03-05 NOTE — TELEPHONE ENCOUNTER
"Requested Prescriptions   Pending Prescriptions Disp Refills     sulfamethoxazole-trimethoprim (BACTRIM DS/SEPTRA DS) 800-160 MG per tablet  Last Written Prescription Date:  02/06/18  Last Fill Quantity: 20,  # refills: 0   Last Office Visit with [facility], P or [facility] prescribing provider:  02/06/18   Future Office Visit:    20 tablet 0     Sig: Take 1 tablet by mouth 2 times daily    Oral Acne/Rosacea Medications Protocol Failed    3/5/2018  6:37 AM       Failed - Confirmation of diagnosis is required    Please confirm diagnosis is acne or rosacea.     If NOT acne or rosacea; refer request to provider for further evaluation.    If diagnosis IS acne or rosacea, OK to refill BASED ON PREVIOUS REFILL CLINICAL NOTE RECOMMENDATION.         Passed - Patient is 12 years of age or older       Passed - Recent (12 mo) or future (30 days) visit within the authorizing provider's specialty    Patient had office visit in the last year or has a visit in the next 30 days with authorizing provider.  See \"Patient Info\" tab in inbasket, or \"Choose Columns\" in Meds & Orders section of the refill encounter.            Passed - No active pregnancy on record       Passed - No positive prenancy test is past 12 months          "

## 2018-03-06 RX ORDER — SULFAMETHOXAZOLE/TRIMETHOPRIM 800-160 MG
1 TABLET ORAL 2 TIMES DAILY
Qty: 20 TABLET | Refills: 0 | OUTPATIENT
Start: 2018-03-06

## 2018-03-06 NOTE — TELEPHONE ENCOUNTER
Please inform patient she needs an appt or follow up with her obgyn. We do not refill antibiotics. She needs evaluation if she is not improved.

## 2018-11-27 ENCOUNTER — MYC REFILL (OUTPATIENT)
Dept: OBGYN | Facility: CLINIC | Age: 33
End: 2018-11-27

## 2018-11-27 DIAGNOSIS — F32.81 PMDD (PREMENSTRUAL DYSPHORIC DISORDER): ICD-10-CM

## 2018-11-27 NOTE — LETTER
[facility]  [address] [facility] [address]  Phone: [phone]    11/30/18    Halima Alcaraz  [address] [address] [facility] [address]      Dear Halima-    Regarding a recent refill request we received- your refill has been denied.  You are due for a follow up visit in clinic.  Please contact our office to schedule this appointment    Sincerely,      MIGUEL Mcclelland CNP

## 2018-11-29 RX ORDER — FLUOXETINE 20 MG/1
1 TABLET ORAL DAILY
OUTPATIENT
Start: 2018-11-29

## 2018-11-29 NOTE — TELEPHONE ENCOUNTER
Prescription given in January for #30 and 1 refill, patient would need to be seen for additional refills. Denial sent to pharmacy. Di RIVERA CNP

## 2018-11-29 NOTE — TELEPHONE ENCOUNTER
"Requested Prescriptions   Pending Prescriptions Disp Refills     FLUoxetine HCl, PMDD, 20 MG TABS 30 tablet 1     Sig: Take 1 tablet by mouth daily    SSRIs Protocol Failed    11/29/2018 10:36 AM       Failed - PHQ-9 score less than 5 in past 6 months    Please review last PHQ-9 score.          Failed - Recent (6 mo) or future (30 days) visit within the authorizing provider's specialty    Patient had office visit in the last 6 months or has a visit in the next 30 days with authorizing provider or within the authorizing provider's specialty.  See \"Patient Info\" tab in inbasket, or \"Choose Columns\" in Meds & Orders section of the refill encounter.           Passed - Patient is age 18 or older       Passed - No active pregnancy on record       Passed - No positive pregnancy test in last 12 months        Routing refill request to provider for review/approval because:  A break in medication.  Rx was last prescribed on 1/3/18 with a month supply and one additional refill.    Jenny Diaz RN          "

## 2018-11-29 NOTE — TELEPHONE ENCOUNTER
Message from Mignon:  Original authorizing provider: MIGUEL Mcclelland CNP    Halima Alcaraz would like a refill of the following medications:  FLUoxetine HCl, PMDD, 20 MG TABS [MIGUEL Mcclelland CNP]    Preferred pharmacy: [facility] [id number] - [place], [place] - [address] [facility]    Comment:      Medication renewals requested in this message routed to other providers:  risperiDONE (RISPERDAL) 0.5 MG tablet [Rashad Dinh MD]  busPIRone (BUSPAR) 5 MG tablet [Rashad Dinh MD]  omeprazole (PRILOSEC) 20 MG CR capsule [Rashad Dinh MD]  amLODIPine (NORVASC) 5 MG tablet [Rashad Dinh MD]

## 2018-11-29 NOTE — TELEPHONE ENCOUNTER
Left message for patient. Will need office visit per Di for further refills.    Amanda Calderon  Women's Health

## 2019-04-19 ENCOUNTER — HEALTH MAINTENANCE LETTER (OUTPATIENT)
Age: 34
End: 2019-04-19

## 2019-08-22 ENCOUNTER — TRANSFERRED RECORDS (OUTPATIENT)
Dept: HEALTH INFORMATION MANAGEMENT | Facility: CLINIC | Age: 34
End: 2019-08-22

## 2020-01-27 ENCOUNTER — MEDICAL CORRESPONDENCE (OUTPATIENT)
Dept: HEALTH INFORMATION MANAGEMENT | Facility: CLINIC | Age: 35
End: 2020-01-27

## 2020-02-23 ENCOUNTER — HEALTH MAINTENANCE LETTER (OUTPATIENT)
Age: 35
End: 2020-02-23

## 2020-05-11 ENCOUNTER — APPOINTMENT (OUTPATIENT)
Dept: OBGYN | Facility: CLINIC | Age: 35
End: 2020-05-11

## 2020-05-11 ENCOUNTER — PRENATAL OFFICE VISIT (OUTPATIENT)
Dept: OBGYN | Facility: CLINIC | Age: 35
End: 2020-05-11

## 2020-05-11 DIAGNOSIS — Z34.80 PRENATAL CARE, SUBSEQUENT PREGNANCY: ICD-10-CM

## 2020-05-11 PROCEDURE — 99207 ZZC NO CHARGE NURSE ONLY: CPT | Performed by: OBSTETRICS & GYNECOLOGY

## 2020-05-11 RX ORDER — PRENATAL VIT/IRON FUM/FOLIC AC 27MG-0.8MG
1 TABLET ORAL DAILY
COMMUNITY
Start: 2020-04-20 | End: 2021-02-03

## 2020-05-11 NOTE — PROGRESS NOTES
Lifestyle and nutrition teaching completed   [facility] OB Intake Nurse      Patient supplied answers from flow sheet for:  Prenatal OB Questionnaire.  Past Medical History  Diabetes?: No  Hypertension : (!) Yes(was on medication 2 years ago)  Heart disease, mitral valve prolapse or rheumatic fever?: No  An autoimmune disease such as lupus or rheumatoid arthritis?: No  Kidney disease or urinary tract infection?: No  Epilepsy, seizures or spells?: No  Migraine headaches?: (!) Yes  A stroke or loss of function or sensation?: No  Any other neurological problems?: No  Have you ever been treated for depression?: (!) Yes  Are you having problems with crying spells or loss of self-esteem?: No  Have you ever required psychiatric care?: No  Have you ever had hepatitis, liver disease or jaundice?: No  Have you been treated for blood clots in your veins, deep vein thromosis, inflammation in the veins, thrombosis, phlebitis, pulmonary embolism or varicosities?: No  Have you had excessive bleeding after surgery or dental work?: No  Do you bleed more than other women after a cut or scratch?: No  Do you have a history of anemia?: No  Have you ever had thyroid problems or taken thyroid medication?: No   Do you have any endocrine problems?: No  Have you ever been in a major accident or suffered serious trauma?: No  Within the last year, has anyone hit, slapped, kicked or otherwise hurt you?: No  In the last year, has anyone forced you to have sex when you didn't want to?: No    Past Medical History 2   Have you ever received a blood transfusion?: No  Would you refuse a blood transfusion if a doctor judged it to be medically necessary?: No  Does anyone in your home smoke?: (!) Yes()  Do you use tobacco products?: No(quit with pregnancy)  Do you drink beer, wine or hard liquor?: No(quit with pregnancy)  Do you use any of the following: marijuana, speed, cocaine, heroin, hallucinogens or other drugs?: No   Is your blood type Rh  negative?: Unknown  Have you ever had abnormal antibodies in your blood?: Unknown  Have you ever had asthma?: No  Have you ever had tuberculosis?: No  Do you have any allergies to drugs or over-the-counter medications?: No  Allergies: Dust Mites, Aspartame, Ethanol, Venlafaxine, Hydrochloride, Sertraline: No  Have you had any breast problems?: No  Have you ever ?: No  Have you had any gynecological surgical procedures such as cervical conization, a LEEP procedure, laser treatment, cryosurgery of the cervix or a dilation and curettage, etc?: No  Have you ever had any other surgical procedures?: (!) Yes(diagnostic lap with hysteroscopy)  Have you been hospitalized for a nonsurgical reason excluding normal delivery?: No  Have you ever had any anesthetic complications?: No  Have you ever had an abnormal pap smear?: No    Past Medical History (Continued)  Do you have a history of abnormalities of the uterus?: No  Did your mother take LIVAN or any other hormones when she was pregnant with you?: No  Did it take you more than a year to become pregnant?: (!) Yes  Have you ever been evaluated or treated for infertility?: (!) YES  Is there a history of medical problems in your family, which you feel may be important to this pregnancy?: No  Do you have any other problems we have not asked about which you feel may be important to this pregnancy?: No    Symptoms since last menstrual period  Do you have any of the following symptoms: abdominal pain, blood in stools or urine, chest pain, shortness of breath, coughing or vomiting up blood, your heart racing or skipping beats, nausea and vomiting, pain on urination or vaginal discharge or bleed: (!) Yes(nausea)  Current medications, including over-the-counter medications, you are using? (If not applicable answer none): prenatal  Will the patient be 35 years old or older at the time of delivery?: No    Has the patient, baby's father or anyone in either family had:  Thalassemia  (Yi, Wallisian, Mediterranean or  background only) and an MCV result less than 80?: No  Neural tube defect such as meningomyelocele, spina bifida or anencephaly?: No  Congenital heart defect?: No  Down's Syndrome?: No  Justin-Sachs disease (Bahai, Cajun, Tajik-Odin)?: No  Sickle cell disease or trait ()?: No  Hemophilia or other inherited problems of blood?: No  Muscular dystrophy?: No  Cystic fibrosis?: No  Freeborn's chorea?: No  Mental retardation/autism?: No  Any other inherited genetic or chromosomal disorder?: No  Maternal metabolic disorder (e.g Insulin-dependent diabetes, PKU)?: No  A child with birth defects not listed above?: No  Recurrent pregnancy loss or stillbirth?: No   Has the patient had any medications/street drugs/alcohol since her last menstrual period?: (!) Yes(prenatal)  Does the patient or baby's father have any other genetic risks?: No    Infection History   Do you object to being tested for Hepatitis B?: No  Do you object to being tested for HIV?: No   Do you feel that you are at high risk for coming in contact with the AIDS virus?: No  Have you ever been treated for tuberculosis?: No  Have you ever had a positive skin test for tuberculosis?: No  Do you live with someone who has tuberculosis?: No  Have you ever been exposed to tuberculosis?: No  Do you have genital herpes?: No  Does your partner have genital herpes?: No  Have you had a viral illness since your last period?: No  Have you ever had gonorrhea, chlamydia, syphilis, venereal warts, trichomoniasis, pelvic inflammatory disease or any other sexually transmitted disease?: No  Do you know if you are a genital group B streptococcus carrier?: Unknown  Have you had chicken pox/varicella?: (!) Yes   Have you been vaccinated against chicken Pox?: No  Have you had any other infectious diseases?: No

## 2020-06-08 ENCOUNTER — PRENATAL OFFICE VISIT (OUTPATIENT)
Dept: OBGYN | Facility: CLINIC | Age: 35
End: 2020-06-08
Payer: MEDICAID

## 2020-06-08 ENCOUNTER — HOSPITAL ENCOUNTER (OUTPATIENT)
Dept: ULTRASOUND IMAGING | Facility: CLINIC | Age: 35
Discharge: HOME | End: 2020-06-08
Attending: OBSTETRICS & GYNECOLOGY | Admitting: OBSTETRICS & GYNECOLOGY
Payer: MEDICAID

## 2020-06-08 VITALS
SYSTOLIC BLOOD PRESSURE: 124 MMHG | TEMPERATURE: 98.6 F | OXYGEN SATURATION: 98 % | WEIGHT: 180 LBS | DIASTOLIC BLOOD PRESSURE: 83 MMHG | HEART RATE: 98 BPM | BODY MASS INDEX: 31.63 KG/M2

## 2020-06-08 DIAGNOSIS — Z34.80 SUPERVISION OF OTHER NORMAL PREGNANCY, ANTEPARTUM: ICD-10-CM

## 2020-06-08 DIAGNOSIS — O09.529 ANTEPARTUM MULTIGRAVIDA OF ADVANCED MATERNAL AGE: ICD-10-CM

## 2020-06-08 DIAGNOSIS — Z34.80 SUPERVISION OF OTHER NORMAL PREGNANCY, ANTEPARTUM: Primary | ICD-10-CM

## 2020-06-08 PROBLEM — T78.3XXA ANGIOEDEMA, INITIAL ENCOUNTER: Status: RESOLVED | Noted: 2017-09-08 | Resolved: 2020-06-08

## 2020-06-08 PROBLEM — O20.8 SUBCHORIONIC HEMORRHAGE IN FIRST TRIMESTER: Status: ACTIVE | Noted: 2020-06-08

## 2020-06-08 PROBLEM — N93.9 ABNORMAL UTERINE BLEEDING (AUB): Status: RESOLVED | Noted: 2018-01-15 | Resolved: 2020-06-08

## 2020-06-08 PROBLEM — R10.2 FEMALE PELVIC PAIN: Status: RESOLVED | Noted: 2018-01-15 | Resolved: 2020-06-08

## 2020-06-08 LAB
ABO + RH BLD: NORMAL
ABO + RH BLD: NORMAL
BLD GP AB SCN SERPL QL: NORMAL
BLOOD BANK CMNT PATIENT-IMP: NORMAL
ERYTHROCYTE [DISTWIDTH] IN BLOOD BY AUTOMATED COUNT: 11.6 % (ref 10–15)
HBA1C MFR BLD: 5.1 % (ref 0–5.6)
HBV SURFACE AG SERPL QL IA: NONREACTIVE
HCT VFR BLD AUTO: 39 % (ref 35–47)
HGB BLD-MCNC: 13.8 G/DL (ref 11.7–15.7)
HIV 1+2 AB+HIV1 P24 AG SERPL QL IA: NONREACTIVE
MCH RBC QN AUTO: 30.7 PG (ref 26.5–33)
MCHC RBC AUTO-ENTMCNC: 35.4 G/DL (ref 31.5–36.5)
MCV RBC AUTO: 87 FL (ref 78–100)
PLATELET # BLD AUTO: 303 10E9/L (ref 150–450)
RBC # BLD AUTO: 4.5 10E12/L (ref 3.8–5.2)
SPECIMEN EXP DATE BLD: NORMAL
T PALLIDUM AB SER QL: NONREACTIVE
WBC # BLD AUTO: 9.6 10E9/L (ref 4–11)

## 2020-06-08 PROCEDURE — 86762 RUBELLA ANTIBODY: CPT | Performed by: OBSTETRICS & GYNECOLOGY

## 2020-06-08 PROCEDURE — 76801 OB US < 14 WKS SINGLE FETUS: CPT

## 2020-06-08 PROCEDURE — 36415 COLL VENOUS BLD VENIPUNCTURE: CPT | Performed by: OBSTETRICS & GYNECOLOGY

## 2020-06-08 PROCEDURE — 87086 URINE CULTURE/COLONY COUNT: CPT | Performed by: OBSTETRICS & GYNECOLOGY

## 2020-06-08 PROCEDURE — G0499 HEPB SCREEN HIGH RISK INDIV: HCPCS | Performed by: OBSTETRICS & GYNECOLOGY

## 2020-06-08 PROCEDURE — 99213 OFFICE O/P EST LOW 20 MIN: CPT | Performed by: OBSTETRICS & GYNECOLOGY

## 2020-06-08 PROCEDURE — 86900 BLOOD TYPING SEROLOGIC ABO: CPT | Performed by: OBSTETRICS & GYNECOLOGY

## 2020-06-08 PROCEDURE — 87389 HIV-1 AG W/HIV-1&-2 AB AG IA: CPT | Performed by: OBSTETRICS & GYNECOLOGY

## 2020-06-08 PROCEDURE — 86780 TREPONEMA PALLIDUM: CPT | Performed by: OBSTETRICS & GYNECOLOGY

## 2020-06-08 PROCEDURE — 86850 RBC ANTIBODY SCREEN: CPT | Performed by: OBSTETRICS & GYNECOLOGY

## 2020-06-08 PROCEDURE — 86901 BLOOD TYPING SEROLOGIC RH(D): CPT | Performed by: OBSTETRICS & GYNECOLOGY

## 2020-06-08 PROCEDURE — 85027 COMPLETE CBC AUTOMATED: CPT | Performed by: OBSTETRICS & GYNECOLOGY

## 2020-06-08 PROCEDURE — 83036 HEMOGLOBIN GLYCOSYLATED A1C: CPT | Performed by: OBSTETRICS & GYNECOLOGY

## 2020-06-08 RX ORDER — MULTIVITAMIN WITH IRON
1 TABLET ORAL DAILY
COMMUNITY
End: 2021-08-05

## 2020-06-08 RX ORDER — OMEGA-3 FATTY ACIDS/FISH OIL 300-1000MG
CAPSULE ORAL
COMMUNITY
End: 2021-08-05

## 2020-06-08 NOTE — Clinical Note
Please arrange first trimester screen (this includes ultrasound, labs, and genetic counseling) and possible cell free DNA with MFM at  site if possible. This is for AMA. I will sign the paper referral order later as needed.

## 2020-06-08 NOTE — PATIENT INSTRUCTIONS
If you have any questions regarding your visit, Please contact your care team.     [employer]: [phone]  [facility] CLINIC HOURS TELEPHONE NUMBER       Cassius Coppola M.D.      Madelyn Durant-Medical Assistant    Katie-  Brandi-     Monday-[place]  8:00a.m-4:45 p.m  Tuesday-[place]  9:00a.m-4:00 p.m  Wednesday-[place] 8:00a.m-4:45 p.m.  Thursday-[place]  8:00a.m-4:45 p.m.  Friday-[place]  8:00a.m-4:45 p.m. [facility]  [address]  [place], [place] [address] [phone] ask [facility]  [phone] Fax  Imaging [phone]    [facility]  [address]  [place], [place] [address] [phone]    [facility]  [address] [facility] [place] [address] [phone] [facility]  [phone] Fax  Imaging [phone]     Urgent Care locations:    [facility] Monday-Friday  5 pm - 9 pm  Saturday and Sunday   9 am - 5 pm  Monday-Friday   11 am - 9 pm  Saturday and Sunday   9 am - 5 pm   [phone]   If you need a medication refill, please contact your pharmacy. Please allow 3 business days for your refill to be completed.  As always, Thank you for trusting us with your healthcare needs!

## 2020-06-08 NOTE — PROGRESS NOTES
"\"Fam\" Halima Alcaraz is a 35 year old year old G 2 P 1 who presents for an initial obstetrical visit.  Referred by self.    Currently experiencing normal pregnancy symptoms without particular problems including pain, bleeding, marked vomiting or weight loss except: no exceptions.  This was not a planned pregnancy but accepting. Plans to parent. No contraception with history of infertility in past.   Here today alone.   Additional concerns: dates, AMA, chronic HTN improved with weight loss, mood fine now off meds. Pelvic pain in past resolved.    Discussed special diagnostic and screening tests and plans (y = yes, n = no/declined, p = possibly/considering): first trimester scr with NT and later AFP or with cell free DNA and later AFP = y, cell free DNA= y, CF carrier scr = p, hemoglobinopathy scr= n, SMA, fragile X  and other genetic scr= n, genetic amnio/CVS = n, quad scr = n, survey u/s = n, Level 2 survey u/s with MFM = y.      ROS:     Systems reviewed include constitutional; breast;                 cardiac; respiratory; gastrointestinal; genitourinary;                                musculoskeletal; integumentary; psychological;                                hematologic/lymphatic and endocrine.                  These systems were negative for significant symptoms except                 for the following: none and see above HPI.    Past medical, obstetrical, surgical, family and social history reviewed and as noted or updated in chart.     Allergies, meds and supplements are as noted or updated in chart.                    Episode OB dating, demographic and history forms completed or reviewed.    EXAM:  VS as noted. BMI- Body mass index is 31.63 kg/m .    Relatively recent normal general exam- not repeated now.       Halima was seen today for prenatal care.    Diagnoses and all orders for this visit:    Supervision of other normal pregnancy, antepartum  -     ABO/Rh type and screen  -     CBC with " platelets  -     Hepatitis B surface antigen  -     Rubella Antibody IgG Quantitative  -     Urine Culture Aerobic Bacterial  -     HIV Antigen Antibody Combo  -     Treponema Abs w Reflex to RPR and Titer  -     US OB < 14 Weeks Single; Future  -     Hemoglobin A1c    Antepartum multigravida of advanced maternal age        PLAN: Total encounter time (physician together with patient) = 30min. Direct counseling, education and care coordination time (physician together with patient) = 20min.This counseling included the following: Advice appropriate to gestational age reviewed including pertinent Checklist items. Discussed condition, tests and general care plan. Symptoms, problems and concerns reviewed. Recommended weight gain discussed. Problem list initiated.   Discussed low dose aspirin for reduction of preeclampsia risk in patients with previous preeclampsia, chronic HTN, obesity, chronic renal disease, autoimmune disorders, diabetes, or twins.   Discussed screening for overt diabetes in high risk patients using Hgb A1c.   Check ultrasound now. Pap due postpartum. Orders as noted. RTC in 4 weeks.     Cassius Coppola MD

## 2020-06-08 NOTE — LETTER
June 8, 2020      Halima Alcaraz  [address] [address] [facility] [address]        To Whom It May Concern:    Halima Alcaraz  was seen on 6-8-20.  Please excuse her from work today for this appointment.         Sincerely,        Cassius Coppola MD

## 2020-06-09 LAB
BACTERIA SPEC CULT: NORMAL
RUBV IGG SERPL IA-ACNC: 48 IU/ML
SPECIMEN SOURCE: NORMAL

## 2020-06-22 ENCOUNTER — TRANSFERRED RECORDS (OUTPATIENT)
Dept: HEALTH INFORMATION MANAGEMENT | Facility: CLINIC | Age: 35
End: 2020-06-22

## 2020-07-06 ENCOUNTER — PRENATAL OFFICE VISIT (OUTPATIENT)
Dept: OBGYN | Facility: CLINIC | Age: 35
End: 2020-07-06
Payer: COMMERCIAL

## 2020-07-06 VITALS
OXYGEN SATURATION: 99 % | BODY MASS INDEX: 32.34 KG/M2 | DIASTOLIC BLOOD PRESSURE: 77 MMHG | WEIGHT: 184 LBS | HEART RATE: 94 BPM | SYSTOLIC BLOOD PRESSURE: 132 MMHG

## 2020-07-06 DIAGNOSIS — Z34.80 SUPERVISION OF OTHER NORMAL PREGNANCY, ANTEPARTUM: Primary | ICD-10-CM

## 2020-07-06 PROBLEM — O20.8 SUBCHORIONIC HEMORRHAGE IN FIRST TRIMESTER: Status: RESOLVED | Noted: 2020-06-08 | Resolved: 2020-07-06

## 2020-07-06 PROCEDURE — 99207 ZZC PRENATAL VISIT: CPT | Performed by: OBSTETRICS & GYNECOLOGY

## 2020-07-06 RX ORDER — ASPIRIN 81 MG/1
81 TABLET, CHEWABLE ORAL DAILY
COMMUNITY
End: 2021-08-05

## 2020-07-06 NOTE — PATIENT INSTRUCTIONS
If you have any questions regarding your visit, Please contact your care team.     [employer] Services: [phone]  [facility] CLINIC HOURS TELEPHONE NUMBER       Cassius Coppola M.D.      Madelyn Durant-Medical Assistant    Katie-  Brandi-     Monday-[place]  8:00a.m-4:45 p.m  Tuesday-[place]  9:00a.m-4:00 p.m  Wednesday-[place] 8:00a.m-4:45 p.m.  Thursday-[place]  8:00a.m-4:45 p.m.  Friday-[place]  8:00a.m-4:45 p.m. [facility]  [address]  [place], [place] [address] [phone] ask [facility]  [phone] Fax  Imaging [phone]    [facility] Labor and Delivery  [address]  [place], [place] [address] [phone]    [facility]  [address] [facility] [place] [address] [phone] [facility]  [phone] Fax  Imaging [phone]     Urgent Care locations:    [facility] Monday-Friday  5 pm - 9 pm  Saturday and Sunday   9 am - 5 pm  Monday-Friday   11 am - 9 pm  Saturday and Sunday   9 am - 5 pm   [phone]   If you need a medication refill, please contact your pharmacy. Please allow 3 business days for your refill to be completed.  As always, Thank you for trusting us with your healthcare needs!

## 2020-07-06 NOTE — PROGRESS NOTES
No significant signs, symptoms or concerns.    Total encounter time (physician together with patient) = 15min. Direct counseling, education and care coordination time (physician together with patient) = 10min. This counseling included the following: Advice appropriate to gestational age reviewed including pertinent Checklist items. Discussed condition, tests and care plan including risks, benefits, and alternatives. Problem list updated. AFP next.  A/P:  Halima was seen today for prenatal care.    Diagnoses and all orders for this visit:    Supervision of other normal pregnancy, antepartum        Cassius Coppola MD

## 2020-08-05 ENCOUNTER — PRENATAL OFFICE VISIT (OUTPATIENT)
Dept: OBGYN | Facility: CLINIC | Age: 35
End: 2020-08-05
Payer: COMMERCIAL

## 2020-08-05 VITALS
WEIGHT: 185 LBS | DIASTOLIC BLOOD PRESSURE: 82 MMHG | OXYGEN SATURATION: 98 % | SYSTOLIC BLOOD PRESSURE: 140 MMHG | HEART RATE: 115 BPM | BODY MASS INDEX: 32.51 KG/M2

## 2020-08-05 DIAGNOSIS — Z34.80 SUPERVISION OF OTHER NORMAL PREGNANCY, ANTEPARTUM: ICD-10-CM

## 2020-08-05 PROCEDURE — 99207 ZZC PRENATAL VISIT: CPT | Performed by: OBSTETRICS & GYNECOLOGY

## 2020-08-05 PROCEDURE — 82105 ALPHA-FETOPROTEIN SERUM: CPT | Mod: 90 | Performed by: OBSTETRICS & GYNECOLOGY

## 2020-08-05 PROCEDURE — 99000 SPECIMEN HANDLING OFFICE-LAB: CPT | Performed by: OBSTETRICS & GYNECOLOGY

## 2020-08-05 PROCEDURE — 36415 COLL VENOUS BLD VENIPUNCTURE: CPT | Performed by: OBSTETRICS & GYNECOLOGY

## 2020-08-05 NOTE — Clinical Note
Please arrange Level 2 ultrasound with MFM at [facility] site if possible. This is for AMA, chronic hypertension, and obesity. I will sign the paper referral order later as needed.

## 2020-08-05 NOTE — PATIENT INSTRUCTIONS
If you have any questions regarding your visit, Please contact your care team.     [employer] [facility]: [phone]  [facility] CLINIC HOURS TELEPHONE NUMBER       Cassius Coppola M.D.      Madelyn Durant-Medical Assistant    Katie-  Brandi-     Monday-[place]  8:00a.m-4:45 p.m  Tuesday-[place]  9:00a.m-4:00 p.m  Wednesday-[place] 8:00a.m-4:45 p.m.  Thursday-[place]  8:00a.m-4:45 p.m.  Friday-[place]  8:00a.m-4:45 p.m. [facility]  [address]  [place], [place] [address] [phone] ask [facility]  [phone] Fax  Imaging [phone]    [facility] Labor and Delivery  [address]  [place], [place] [address] [phone]    [facility]  [address] [facility] [address] [place] [address] [phone] [facility]  [phone] Fax  Imaging [phone]     Urgent Care locations:    [facility] Monday-Friday  5 pm - 9 pm  Saturday and Sunday   9 am - 5 pm  Monday-Friday   11 am - 9 pm  Saturday and Sunday   9 am - 5 pm   [phone]   If you need a medication refill, please contact your pharmacy. Please allow 3 business days for your refill to be completed.  As always, Thank you for trusting us with your healthcare needs!

## 2020-08-06 ENCOUNTER — TELEPHONE (OUTPATIENT)
Dept: OBGYN | Facility: CLINIC | Age: 35
End: 2020-08-06

## 2020-08-06 NOTE — PROGRESS NOTES
No significant signs, symptoms or concerns except occasional headache and lip swelling.    Total encounter time (physician together with patient) = 15min. Direct counseling, education and care coordination time (physician together with patient) = 10min. This counseling included the following: Advice appropriate to gestational age reviewed including pertinent Checklist items. Discussed condition, tests and care plan including risks, benefits, and alternatives. Problem list updated. Level 2 ultrasound next.  A/P:  Halima was seen today for prenatal care.    Diagnoses and all orders for this visit:    Supervision of other normal pregnancy, antepartum  -     Alpha fetoprotein maternal screen        Cassius Coppola MD

## 2020-08-06 NOTE — TELEPHONE ENCOUNTER
Patient needs you to put ultrasound orders in epic    Call when done so she can set it up  Thank you    [phone] ok to leave message

## 2020-08-08 LAB
# FETUSES US: NORMAL
# FETUSES: NORMAL
AFP ADJ MOM AMN: 1.54
AFP SERPL-MCNC: 56 NG/ML
AGE - REPORTED: 35.7 YR
CURRENT SMOKER: NO
CURRENT SMOKER: NO
DIABETES STATUS PATIENT: NO
FAMILY MEMBER DISEASES HX: NO
FAMILY MEMBER DISEASES HX: NO
GA METHOD: NORMAL
GA METHOD: NORMAL
GA: NORMAL WK
IDDM PATIENT QL: NO
INTEGRATED SCN PATIENT-IMP: NORMAL
LMP START DATE: NORMAL
MONOCHORIONIC TWINS: NO
SERVICE CMNT-IMP: NO
SPECIMEN DRAWN SERPL: NORMAL
VALPROIC/CARBAMAZEPINE STATUS: NO
WEIGHT UNITS: NORMAL

## 2020-08-10 ENCOUNTER — MEDICAL CORRESPONDENCE (OUTPATIENT)
Dept: HEALTH INFORMATION MANAGEMENT | Facility: CLINIC | Age: 35
End: 2020-08-10

## 2020-09-04 ENCOUNTER — PRENATAL OFFICE VISIT (OUTPATIENT)
Dept: OBGYN | Facility: CLINIC | Age: 35
End: 2020-09-04
Payer: COMMERCIAL

## 2020-09-04 VITALS
OXYGEN SATURATION: 99 % | DIASTOLIC BLOOD PRESSURE: 77 MMHG | HEART RATE: 111 BPM | SYSTOLIC BLOOD PRESSURE: 140 MMHG | WEIGHT: 185 LBS | BODY MASS INDEX: 32.51 KG/M2

## 2020-09-04 DIAGNOSIS — Z34.80 SUPERVISION OF OTHER NORMAL PREGNANCY, ANTEPARTUM: Primary | ICD-10-CM

## 2020-09-04 PROCEDURE — 99207 ZZC PRENATAL VISIT: CPT | Performed by: OBSTETRICS & GYNECOLOGY

## 2020-09-04 NOTE — PATIENT INSTRUCTIONS
If you have any questions regarding your visit, Please contact your care team.     [employer] Services: [phone]  [facility] CLINIC HOURS TELEPHONE NUMBER       Cassius Coppola M.D.      Madelyn Durant-Medical Assistant    Katie-  Brandi-     Monday-[place]  8:00a.m-4:45 p.m  Tuesday-[place]  9:00a.m-4:00 p.m  Wednesday-[place] 8:00a.m-4:45 p.m.  Thursday-[place]  8:00a.m-4:45 p.m.  Friday-[place]  8:00a.m-4:45 p.m. [facility]  [address]  [place], [place] [address] [phone] ask [facility]  [phone] Fax  Imaging [phone]     Labor and Delivery  [address]  [place], [place] [address] [phone]    [facility]  [address] [facility] [place] [address] [phone] [facility]  [phone] Fax  Imaging [phone]     Urgent Care locations:    [facility] Monday-Friday  5 pm - 9 pm  Saturday and Sunday   9 am - 5 pm  Monday-Friday   11 am - 9 pm  Saturday and Sunday   9 am - 5 pm   [phone]   If you need a medication refill, please contact your pharmacy. Please allow 3 business days for your refill to be completed.  As always, Thank you for trusting us with your healthcare needs!

## 2020-09-05 NOTE — PROGRESS NOTES
No significant signs, symptoms or concerns. Chronic HTN stable. Has MFM follow-up.    Total encounter time (physician together with patient) = 15min. Direct counseling, education and care coordination time (physician together with patient) = 10min. This counseling included the following: Advice appropriate to gestational age reviewed including pertinent Checklist items. Discussed condition, tests and care plan including risks, benefits, and alternatives. Problem list updated. 1h GTT next.  A/P:  Halima was seen today for prenatal care.    Diagnoses and all orders for this visit:    Supervision of other normal pregnancy, antepartum        Cassius Coppola MD

## 2020-09-16 ENCOUNTER — MEDICAL CORRESPONDENCE (OUTPATIENT)
Dept: HEALTH INFORMATION MANAGEMENT | Facility: CLINIC | Age: 35
End: 2020-09-16

## 2020-09-16 ENCOUNTER — TRANSFERRED RECORDS (OUTPATIENT)
Dept: HEALTH INFORMATION MANAGEMENT | Facility: CLINIC | Age: 35
End: 2020-09-16

## 2020-09-17 ENCOUNTER — TRANSCRIBE ORDERS (OUTPATIENT)
Dept: MATERNAL FETAL MEDICINE | Facility: CLINIC | Age: 35
End: 2020-09-17

## 2020-09-17 DIAGNOSIS — O26.90 PREGNANCY RELATED CONDITION, ANTEPARTUM: Primary | ICD-10-CM

## 2020-09-17 PROBLEM — O35.BXX0 VENTRICULAR SEPTAL DEFECT (VSD) OF FETUS IN SINGLETON PREGNANCY, ANTEPARTUM: Status: ACTIVE | Noted: 2020-09-17

## 2020-09-25 ENCOUNTER — HOSPITAL ENCOUNTER (OUTPATIENT)
Dept: CARDIOLOGY | Facility: CLINIC | Age: 35
Discharge: HOME | End: 2020-09-25
Admitting: FAMILY MEDICINE
Payer: COMMERCIAL

## 2020-09-25 DIAGNOSIS — O26.90 PREGNANCY RELATED CONDITION, ANTEPARTUM: ICD-10-CM

## 2020-09-25 PROCEDURE — 76827 ECHO EXAM OF FETAL HEART: CPT

## 2020-09-25 NOTE — PROGRESS NOTES
Fetal Cardiology Consultation    Patient:  Halima Razo MRN:  [id number]   YOB: 1985 Age:  35 year old   Date of Visit:  2020 PCP:  Rashad Dinh MD   RUFINA: 2021 EGA: 25+2 weeks     Dear Dr. Bhatia:    I had the pleasure of seeing Halima Razo at the [facility] Fetal [facility]chocardi[facility] Laboratory in [place] on 2020 in consultation for fetal echocardiography results. She presented today accompanied by her partner. As you know, she is a 35 year old female with suspected fetal cardiac anomaly on obstetrical ultrasound (muscular ventricular septal defect).    The fetal echocardiogram showed a tiny/small apical muscular ventricular septal defect. Otherwise normal cardiac anatomy. Normal right and left ventricular size and function without hypertrophy. No evidence of diastolic dysfunction. No pericardial effusion. No arrhythmia.     I reviewed and interpreted the fetal echocardiogram today. Using pictures I discussed the anatomy, physiology, expected fetal course, and need for post- confirmation.    -- No additional fetal echocardiograms are recommended for this pregnancy.  -- A post-duane transthoracic echocardiogram is recommended as an outpatient within several weeks after delivery.    Thank you for allowing me to participate in Ms. Razo's care. Please don't hesitate to contact me or the Fetal Cardiology team at [facility] with any questions or concerns.     I spent a total of 20 minutes face-to-face with Ms. Razo during today's office visit. Over 50% of this time was spent counseling the patient and/or coordinating care regarding the fetal echocardiography results.     Bandar Naranjo MD  Pediatric Cardiology  [facility]  Phone [phone]

## 2020-10-02 ENCOUNTER — PRENATAL OFFICE VISIT (OUTPATIENT)
Dept: OBGYN | Facility: CLINIC | Age: 35
End: 2020-10-02
Payer: COMMERCIAL

## 2020-10-02 VITALS
OXYGEN SATURATION: 98 % | SYSTOLIC BLOOD PRESSURE: 138 MMHG | WEIGHT: 188 LBS | DIASTOLIC BLOOD PRESSURE: 80 MMHG | BODY MASS INDEX: 33.04 KG/M2 | HEART RATE: 113 BPM

## 2020-10-02 DIAGNOSIS — O24.419 GESTATIONAL DIABETES MELLITUS (GDM), ANTEPARTUM, GESTATIONAL DIABETES METHOD OF CONTROL UNSPECIFIED: Primary | ICD-10-CM

## 2020-10-02 DIAGNOSIS — O35.BXX0 VENTRICULAR SEPTAL DEFECT (VSD) OF FETUS IN SINGLETON PREGNANCY, ANTEPARTUM: ICD-10-CM

## 2020-10-02 DIAGNOSIS — Z34.80 SUPERVISION OF OTHER NORMAL PREGNANCY, ANTEPARTUM: ICD-10-CM

## 2020-10-02 LAB
GLUCOSE 1H P 50 G GLC PO SERPL-MCNC: 210 MG/DL (ref 60–129)
HGB BLD-MCNC: 11.2 G/DL (ref 11.7–15.7)

## 2020-10-02 PROCEDURE — 82950 GLUCOSE TEST: CPT | Performed by: OBSTETRICS & GYNECOLOGY

## 2020-10-02 PROCEDURE — 99N1025 PR STATISTIC OBHBG - HEMOGLOBIN: Performed by: OBSTETRICS & GYNECOLOGY

## 2020-10-02 PROCEDURE — 99207 PR PRENATAL VISIT: CPT | Performed by: OBSTETRICS & GYNECOLOGY

## 2020-10-02 PROCEDURE — 36415 COLL VENOUS BLD VENIPUNCTURE: CPT | Performed by: OBSTETRICS & GYNECOLOGY

## 2020-10-02 NOTE — PATIENT INSTRUCTIONS
If you have any questions regarding your visit, Please contact your care team.     [employer] [facility]: [phone]  [facility] Health CLINIC HOURS TELEPHONE NUMBER       Cassius Coppola M.D.      Madelyn Durant-Medical Assistant    Katie-  Brandi-     Monday-[place]  8:00a.m-4:45 p.m  Tuesday-[place]  9:00a.m-4:00 p.m  Wednesday-[place] 8:00a.m-4:45 p.m.  Thursday-[place]  8:00a.m-4:45 p.m.  Friday-[place]  8:00a.m-4:45 p.m. [facility]  [address]  [place], [place] [address] [phone] ask [facility]  [phone] Fax  Imaging [phone]    [facility] Labor and Delivery  [address]  [place], [place] [address] [phone]    [facility]  [address] [facility] [place] [address] [phone] [facility]  [phone] Fax  Imaging [phone]     Urgent Care locations:    [facility] Monday-Friday  5 pm - 9 pm  Saturday and Sunday   9 am - 5 pm  Monday-Friday   11 am - 9 pm  Saturday and Sunday   9 am - 5 pm   [phone]   If you need a medication refill, please contact your pharmacy. Please allow 3 business days for your refill to be completed.  As always, Thank you for trusting us with your healthcare needs!

## 2020-10-02 NOTE — PROGRESS NOTES
No significant signs, symptoms or concerns except small fetal VSD noted. Continues MFM follow-up.    Total encounter time (physician together with patient) = 15min. Direct counseling, education and care coordination time (physician together with patient) = 10min. This counseling included the following: Advice appropriate to gestational age reviewed including pertinent Checklist items. Discussed condition, tests and care plan including risks, benefits, and alternatives. Problem list updated.   A/P:  Halima was seen today for prenatal care.    Diagnoses and all orders for this visit:    Supervision of other normal pregnancy, antepartum  -     Glucose tolerance gest screen 1 hour  -     OB hemoglobin    Ventricular septal defect (VSD) of fetus in guevara pregnancy, antepartum        Cassius Coppola MD

## 2020-10-04 PROBLEM — O24.419 GESTATIONAL DIABETES MELLITUS (GDM), ANTEPARTUM, GESTATIONAL DIABETES METHOD OF CONTROL UNSPECIFIED: Status: ACTIVE | Noted: 2020-10-04

## 2020-10-09 ENCOUNTER — PATIENT OUTREACH (OUTPATIENT)
Dept: EDUCATION SERVICES | Facility: CLINIC | Age: 35
End: 2020-10-09
Attending: OBSTETRICS & GYNECOLOGY
Payer: COMMERCIAL

## 2020-10-09 DIAGNOSIS — O09.529 ANTEPARTUM MULTIGRAVIDA OF ADVANCED MATERNAL AGE: Primary | ICD-10-CM

## 2020-10-09 DIAGNOSIS — O24.419 GESTATIONAL DIABETES MELLITUS (GDM), ANTEPARTUM, GESTATIONAL DIABETES METHOD OF CONTROL UNSPECIFIED: ICD-10-CM

## 2020-10-09 DIAGNOSIS — Z34.80 SUPERVISION OF OTHER NORMAL PREGNANCY, ANTEPARTUM: ICD-10-CM

## 2020-10-09 PROCEDURE — G0108 DIAB MANAGE TRN  PER INDIV: HCPCS | Mod: 95

## 2020-10-09 RX ORDER — URINE GLUCOSE-ACET TEST STRIP
1 STRIP MISCELLANEOUS DAILY
Qty: 50 EACH | Refills: 3 | Status: SHIPPED | OUTPATIENT
Start: 2020-10-09 | End: 2021-02-03

## 2020-10-09 RX ORDER — BLOOD-GLUCOSE METER
1 EACH MISCELLANEOUS DAILY
Qty: 1 KIT | Refills: 0 | Status: SHIPPED | OUTPATIENT
Start: 2020-10-09 | End: 2021-02-03

## 2020-10-09 NOTE — PROGRESS NOTES
Diabetes Self-Management Education & Support  SUBJECTIVE/OBJECTIVE:  Presents for education related to gestational diabetes. Patient verbally consented to the telephone visit service today: yes    Accompanied by: Self  Gestational weeks: 27w1d  Hospital planned for delivery: [facility]  Number of previous preganancies: 1  Previously had Gestational Diabetes: No    Cultural Influences/Ethnic Background:  American    Estimated Date of Delivery: Jan 7, 2021    1 hour OGTT  Lab Results   Component Value Date    GLU1 210 (H) 10/02/2020       3 hour OGTT    Fasting  No results found for: GLF    1 hour  No results found for: GL1    2 hour  No results found for: GL2    3 hour  No results found for: GL3    Lifestyle and Health Behaviors:  Exercise:: Yes  How intense was your typical exercise? : Moderate (like brisk walking)  Meal planning/habits: Avoiding sweets(after seeing the OGTT - started watching carbohydrates -  Meals include: Breakfast, Lunch, Dinner  Breakfast: since dx: keto yogurts (2gm)  with raspberries.  Stopped eating toast and really cut carbs.  Likes diet coke  Lunch: wraps with meat / tortilla/ spinach / berries.  Dinner: chicken blt warp wtih guac OR meat / veggie / carb  Snacks: fiber one bars, sunflower seeds, cheese sticks, chips and cheese  Beverages: Water, Milk  Biggest challenges to healthy eating: None  Experiencing nausea?: No    Healthy Coping:  Emotional response to diabetes: Ready to learn  Stage of change: ACTION (Actively working towards change)    Current Management:  Taking medications for gestational diabetes?: No    ASSESSMENT/INTERVENTION:  Sent to Diabetes education after results from the 1 hour OGTT.  Patient was instructed on Contour Next One meter.      Use INBEP for ongoing communication, discussed when to update and need for patient to mychart in blood sugars at least weekly so that MD can be updated.     Went lower carbohydrate / more keto foods after diagnosis  in fear of higher blood sugars, will re add back in some carbohydrates to reach needs after discussing nutrition goals today.     Educational topics covered today:  GDM diagnosis, pathophysiology, Risks and Complications of GDM, Means of controlling GDM, Using a Blood Glucose Monitor, Blood Glucose Goals, Logging and Interpreting Glucose Results, Ketone Testing, When to Call a Diabetes Educator or OB Provider, Healthy Eating During Pregnancy, Counting Carbohydrates, Meal Planning for GDM, and Physical Activity    Educational materials provided today:   Venkat Understanding Gestational Diabetes  GDM Log Book  Care After Delivery    Pt verbalized understanding of concepts discussed and recommendations provided today.     PLAN:  Check glucose 4 times daily, before breakfast and 1 hour after each meal.   Check Ketones daily for one week, if negative, reduce testing to once a week.   Physical activity recommended: as tolearted .  Meal plan: 2-3 carbs at breakfast,3-4 carbs at lunch, 3-4 carbs at supper,1-2 carbs at 3 snacks a day.  Follow consistent CHO meal plan, eat CHO and protein/fat at all meals/snacks.  Call/e-mail/Th[employer] message diabetes educator if 3 or more blood sugars are above the goal in 1 week, if ketones are positive, or with questions/concerns.    Marissa Ramirez RD, LD, CDE  Diabetes Education    Time Spent: 60 minutes  Encounter Type: Individual     A copy of this encounter was shared with the provider.

## 2020-10-10 ENCOUNTER — TELEPHONE (OUTPATIENT)
Dept: OBGYN | Facility: CLINIC | Age: 35
End: 2020-10-10

## 2020-10-10 DIAGNOSIS — O24.419 GESTATIONAL DIABETES MELLITUS (GDM), ANTEPARTUM, GESTATIONAL DIABETES METHOD OF CONTROL UNSPECIFIED: Primary | ICD-10-CM

## 2020-10-10 NOTE — TELEPHONE ENCOUNTER
Urine Glucose-Ketones Test (KETO-DIASTIX) STRP requires prior authorization.    [employer]/login  KEY: [id number]  Last Name:  :            [facility]

## 2020-10-12 NOTE — PATIENT INSTRUCTIONS
Patient was advised of today's exam findings.  Fill glasses prescription  Return in 1-2 years for eye exam    Ana Aguiar O.D.  [facility]   [address] [place], [place] [address] [phone]

## 2020-10-12 NOTE — TELEPHONE ENCOUNTER
Urine Glucose-Ketones Test (KETO-DIASTIX) STRP 50 each 3 10/9/2020  --   Sig - Route: 1 strip by In Vitro route daily - In Vitro   Sent to pharmacy as: Keto-Diastix In Vitro Strip   Class: E-Prescribe   Order: [id number]   E-Prescribing Status: Receipt confirmed by pharmacy (10/9/2020 10:19 AM CDT)       RN routing to PA team to advise on above prescription.    Deya Ortiz RN on 10/12/2020 at 9:14 AM

## 2020-10-12 NOTE — TELEPHONE ENCOUNTER
Central Prior Authorization Team   Phone: [phone]    PA Initiation    Medication: Urine Glucose-Ketones Test (KETO-DIASTIX) STRP  Insurance Company: [employer] [place] - Phone [phone] Fax [phone]  Pharmacy Filling the Rx: [id number] - [place] - [address]  Filling Pharmacy Phone: [phone]  Filling Pharmacy Fax: [phone]  Start Date: 10/12/2020

## 2020-10-13 NOTE — TELEPHONE ENCOUNTER
Will need to contact and check with insurance company of what is covered/formulary.    FYI. Insurance company might not provide information.    Bhargav Feliciano, CMA

## 2020-10-13 NOTE — TELEPHONE ENCOUNTER
PRIOR AUTHORIZATION DENIED    Medication: Urine Glucose-Ketones Test (KETO-DIASTIX) STRP-DENIED    Denial Date: 10/13/2020    Denial Rational: PATIENT MUST TRY/FAIL FORMULARY ALTERNATIVES -         Appeal Information:  IF PATIENT IS UNABLE TO TRY/FAIL ALTERNATIVE(S) PLEASE SUPPLY PA TEAM WITH A LETTER OF MEDICAL NECESSITY WITH CLINICAL REASON.

## 2020-10-15 ENCOUNTER — OFFICE VISIT (OUTPATIENT)
Dept: OPTOMETRY | Facility: CLINIC | Age: 35
End: 2020-10-15
Payer: COMMERCIAL

## 2020-10-15 ENCOUNTER — APPOINTMENT (OUTPATIENT)
Dept: OPTOMETRY | Facility: CLINIC | Age: 35
End: 2020-10-15
Payer: COMMERCIAL

## 2020-10-15 DIAGNOSIS — H52.223 REGULAR ASTIGMATISM OF BOTH EYES: ICD-10-CM

## 2020-10-15 DIAGNOSIS — H52.13 MYOPIA OF BOTH EYES: Primary | ICD-10-CM

## 2020-10-15 PROCEDURE — V2100 LENS SPHER SINGLE PLANO 4.00: HCPCS | Mod: RT | Performed by: OPTOMETRIST

## 2020-10-15 PROCEDURE — V2020 VISION SVCS FRAMES PURCHASES: HCPCS | Performed by: OPTOMETRIST

## 2020-10-15 PROCEDURE — 92004 COMPRE OPH EXAM NEW PT 1/>: CPT | Performed by: OPTOMETRIST

## 2020-10-15 PROCEDURE — 92015 DETERMINE REFRACTIVE STATE: CPT | Performed by: OPTOMETRIST

## 2020-10-15 ASSESSMENT — VISUAL ACUITY
METHOD: SNELLEN - LINEAR
OD_CC: 20/25-1
OD_CC: 20/20
CORRECTION_TYPE: GLASSES
OD_CC+: -2
OS_CC: 20/20
OS_CC+: -1
OS_CC: 20/20

## 2020-10-15 ASSESSMENT — REFRACTION_WEARINGRX
OD_AXIS: 145
OS_SPHERE: -1.00
OD_CYLINDER: +0.50
SPECS_TYPE: SVL
OS_AXIS: 147
OS_CYLINDER: +0.75
OD_SPHERE: -1.50

## 2020-10-15 ASSESSMENT — CUP TO DISC RATIO
OD_RATIO: 0.2
OS_RATIO: 0.3

## 2020-10-15 ASSESSMENT — CONF VISUAL FIELD
METHOD: COUNTING FINGERS
OS_NORMAL: 1
OD_NORMAL: 1

## 2020-10-15 ASSESSMENT — TONOMETRY
IOP_METHOD: APPLANATION
OD_IOP_MMHG: 19
OS_IOP_MMHG: 19

## 2020-10-15 ASSESSMENT — EXTERNAL EXAM - RIGHT EYE: OD_EXAM: NORMAL

## 2020-10-15 ASSESSMENT — KERATOMETRY
OS_K2POWER_DIOPTERS: 47.00
OD_K1POWER_DIOPTERS: 46.25
OS_K1POWER_DIOPTERS: 46.25
OD_AXISANGLE2_DEGREES: 29
OS_AXISANGLE2_DEGREES: 27
OD_K2POWER_DIOPTERS: 46.50

## 2020-10-15 ASSESSMENT — REFRACTION_MANIFEST
OS_SPHERE: -1.75
OS_CYLINDER: +1.00
METHOD_AUTOREFRACTION: 1
OS_SPHERE: -1.25
OD_AXIS: 140
OS_CYLINDER: +1.00
OS_AXIS: 133
OD_CYLINDER: +0.50
OS_AXIS: 130
OD_SPHERE: -1.50
OD_CYLINDER: +0.75
OD_AXIS: 164
OD_SPHERE: -2.00

## 2020-10-15 ASSESSMENT — SLIT LAMP EXAM - LIDS
COMMENTS: NORMAL
COMMENTS: NORMAL

## 2020-10-15 ASSESSMENT — EXTERNAL EXAM - LEFT EYE: OS_EXAM: NORMAL

## 2020-10-15 NOTE — PROGRESS NOTES
Chief Complaint   Patient presents with     Annual Eye Exam     New to Eye Dept       Accompanied by daughter    Last Eye Exam: 2 years ago   Dilated Previously: Yes    What are you currently using to see?  Glasses, wears them all of the time        Distance Vision Acuity: Satisfied with vision, not great, but no changes     Near Vision Acuity: Vision is ok, not perfect. Can read with or without the glasses     Eye Comfort: good  Do you use eye drops? 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       OBJECTIVE: See Ophthalmology exam    ASSESSMENT:    ICD-10-CM    1. Myopia of both eyes  H52.13 EYE EXAM (SIMPLE-NONBILLABLE)     REFRACTION   2. Regular astigmatism of both eyes  H52.223 EYE EXAM (SIMPLE-NONBILLABLE)     REFRACTION      PLAN:     Patient Instructions   Patient was advised of today's exam findings.  Fill glasses prescription  Return in 1-2 years for eye exam    Ana Aguiar O.D.  [facility]   [address] [place], [place] [address] [phone]

## 2020-10-15 NOTE — LETTER
10/15/2020         RE: Halima Razo  [address] [address]        Dear Colleague,    Thank you for referring your patient, Halima Razo, to the [facility]. Please see a copy of my visit note below.    Chief Complaint   Patient presents with     Annual Eye Exam     New to Eye Dept       Accompanied by daughter    Last Eye Exam: 2 years ago   Dilated Previously: Yes    What are you currently using to see?  Glasses, wears them all of the time        Distance Vision Acuity: Satisfied with vision, not great, but no changes     Near Vision Acuity: Vision is ok, not perfect. Can read with or without the glasses     Eye Comfort: good  Do you use eye drops? 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       OBJECTIVE: See Ophthalmology exam    ASSESSMENT:    ICD-10-CM    1. Myopia of both eyes  H52.13 EYE EXAM (SIMPLE-NONBILLABLE)     REFRACTION   2. Regular astigmatism of both eyes  H52.223 EYE EXAM (SIMPLE-NONBILLABLE)     REFRACTION      PLAN:     Patient Instructions   Patient was advised of today's exam findings.  Fill glasses prescription  Return in 1-2 years for eye exam    Ana Aguiar O.D.  [facility]   [address] [place] [address] [phone]           Again, thank you for allowing me to participate in the care of your patient.        Sincerely,        Ana Aguiar, OD

## 2020-10-15 NOTE — TELEPHONE ENCOUNTER
The following strips are pended and should be covered according to plan.    Routing to provider.    Bhargav Feliciano CMA

## 2020-10-16 ENCOUNTER — PATIENT OUTREACH (OUTPATIENT)
Dept: EDUCATION SERVICES | Facility: CLINIC | Age: 35
End: 2020-10-16
Attending: OBSTETRICS & GYNECOLOGY
Payer: COMMERCIAL

## 2020-10-16 DIAGNOSIS — Z34.80 SUPERVISION OF OTHER NORMAL PREGNANCY, ANTEPARTUM: ICD-10-CM

## 2020-10-16 DIAGNOSIS — O24.419 GESTATIONAL DIABETES MELLITUS (GDM), ANTEPARTUM, GESTATIONAL DIABETES METHOD OF CONTROL UNSPECIFIED: Primary | ICD-10-CM

## 2020-10-16 PROCEDURE — 98967 PH1 ASSMT&MGMT NQHP 11-20: CPT | Mod: 95

## 2020-10-16 NOTE — PROGRESS NOTES
Diabetes Self-Management Education & Support    SUBJECTIVE/OBJECTIVE:  Presents for education related to gestational diabetes.  Patient verbally consented to the telephone visit service today: yes    Accompanied by: Self  Gestational weeks: 27w1d  Number of previous preganancies: 1  Previously had Gestational Diabetes: No    Cultural Influences/Ethnic Background:  American    LMP 03/20/2020 (Approximate)     Wt Readings from Last 5 Encounters:   10/02/20 85.3 kg (188 lb)   09/04/20 83.9 kg (185 lb)   08/05/20 83.9 kg (185 lb)   07/06/20 83.5 kg (184 lb)   06/08/20 81.6 kg (180 lb)       Estimated Date of Delivery: Jan 7, 2021    Blood Glucose/Ketone Log:  Date Breakfast  Lunch  Dinner  ketones    Before After  After  After    10/10 83 133  112  90    10/11 85 125  80  119 small   10/12 90 93  121  158    10/13 101 131  88  107    10/14 87 128  114  114    10/15 93   123  153    10/16              10/10 dinner: pork steak and mashed potatoes, green beans   10/11 lunch:   Baby spinach / onions, mushrooms, pork, milk   10/12 breakfast:  2 fiber ones bars (32gm carb, 10 fiber)   10/13 dinner  Arbys roast beef cheddar s/w, half small hendrickson, chicken slider   10/15:  Beef, rice, shimp, david de patton, milk       Lifestyle and Health Behaviors:  Exercise:: Yes  How intense was your typical exercise? : Moderate (like brisk walking)  Meal planning/habits: Avoiding sweets, watching carbohydrates   Meals include: Breakfast, Lunch, Dinner  Beverages: Water, Milk  Biggest challenges to healthy eating: None  Experiencing nausea?: No    Healthy Coping:  Emotional response to diabetes: Ready to learn  Stage of change: ACTION (Actively working towards change)    Current Management:  Taking medications for gestational diabetes?: No    ASSESSMENT:  Ketones: small one day, will increase testing .   Fasting blood glucoses: 83% in target.  After breakfast: 100% in target.  After lunch: 100% in target.  After dinner: 67% in  target.    INTERVENTION:  Educational topics covered today:  What to expect after delivery, Future testing for Type 2 diabetes (2 hour OGTT at 6 week post-partum check-up and annual fasting blood glucose level), Risk of GDM and planning ahead for future pregnancies, Recommended lifestyle interventions for reducing the risk of Type 2 Diabetes, When to Call a Diabetes Educator or OB Provider    Educational Materials provided today:  Venkat Preventing Diabetes    PLAN:  Check glucose 4 times daily.  Check ketones once a week when readings are consistently negative.  Continue with recommended physical activity.  Continue to follow recommended meal plan: 2-3 carbs at breakfast, 3-4 carbs at lunch, 3-4 carbs at supper, 1-2 carbs at snacks.  Follow consistent CHO meal plan, eat CHO and protein/fat at all meals/snacks.    Call/e-mail/[employer] message diabetes educator if 3 or more blood sugars are above the goal in 1 week or if ketones are positive.    Marissa Ramirez RD, LD, CDE  Diabetes Education    Time Spent: 20 minutes  Encounter Type: Individual    A copy of this encounter was shared with the provider.

## 2020-10-22 ENCOUNTER — APPOINTMENT (OUTPATIENT)
Dept: OPTOMETRY | Facility: CLINIC | Age: 35
End: 2020-10-22
Payer: COMMERCIAL

## 2020-10-22 PROCEDURE — 92340 FIT SPECTACLES MONOFOCAL: CPT | Performed by: OPTOMETRIST

## 2020-10-23 ENCOUNTER — MYC MEDICAL ADVICE (OUTPATIENT)
Dept: NUTRITION | Facility: CLINIC | Age: 35
End: 2020-10-23

## 2020-10-23 ENCOUNTER — PRENATAL OFFICE VISIT (OUTPATIENT)
Dept: OBGYN | Facility: CLINIC | Age: 35
End: 2020-10-23
Payer: COMMERCIAL

## 2020-10-23 VITALS
OXYGEN SATURATION: 95 % | DIASTOLIC BLOOD PRESSURE: 80 MMHG | HEART RATE: 115 BPM | BODY MASS INDEX: 32.51 KG/M2 | WEIGHT: 185 LBS | SYSTOLIC BLOOD PRESSURE: 117 MMHG

## 2020-10-23 DIAGNOSIS — Z34.80 SUPERVISION OF OTHER NORMAL PREGNANCY, ANTEPARTUM: ICD-10-CM

## 2020-10-23 PROCEDURE — 99207 PR PRENATAL VISIT: CPT | Performed by: OBSTETRICS & GYNECOLOGY

## 2020-10-23 NOTE — PROGRESS NOTES
No significant signs, symptoms or concerns. Glc at target on diet. Weekly BPP with MFM at 32 weeks. Plan delivery by 38 weeks.    Total encounter time (physician together with patient) = 15min. Direct counseling, education and care coordination time (physician together with patient) = 10min. This counseling included the following: Advice appropriate to gestational age reviewed including pertinent Checklist items. Discussed condition, tests and care plan including risks, benefits, and alternatives. Problem list updated.   A/P:  Halima was seen today for prenatal care.    Diagnoses and all orders for this visit:    Supervision of other normal pregnancy, antepartum        Cassius Coppola MD

## 2020-10-23 NOTE — TELEPHONE ENCOUNTER
Gestational Diabetes Follow-up    Subjective/Objective:    Halima Razo sent in blood glucose log for review. Last date of communication was: 10/16/2020.    Gestational diabetes is being managed with diet and activity    Taking diabetes medications: no    Estimated Date of Delivery: Jan 7, 2021    BG/Food Log:       Assessment:    Ketones: na.   Fasting blood glucoses: 100% in target.  After breakfast: 100% in target.  Before lunch: % in target.  After lunch: 100% in target.  Before dinner: % in target.  After dinner: 100% in target.    Plan/Response:  No changes in the patient's current treatment plan.    Good morning Fam, wonderful work, all of your numbers are in goal and you are doing very well.  Keep up the good work.  I am sending you a record book that should be easier for you to be able to just take a picture of your numbers and send in via my chart.  If it is difficult, [phone] call and they can help you, it is our my chart help line  Send us numbers again in 2 wks unless you have 3 or more in 7 days out of range.  Follow-up in 2 weeks.    Take care and be safe    Renata Raymond RN/ANAE  [facility]      Any diabetes medication dose changes were made via the CDE Protocol and Collaborative Practice Agreement with the patient's primary care provider and OB/GYN provider. A copy of this encounter was shared with the provider.

## 2020-10-23 NOTE — PATIENT INSTRUCTIONS
If you have any questions regarding your visit, Please contact your care team.     [employer]: [phone]  [facility] CLINIC HOURS TELEPHONE NUMBER       Cassius Coppola M.D.      Madelyn Durant-Medical Assistant    Katie-  Brandi-     Monday-[place]  8:00a.m-4:45 p.m  Tuesday-[place]  9:00a.m-4:00 p.m  Wednesday-[place] 8:00a.m-4:45 p.m.  Thursday-[place]  8:00a.m-4:45 p.m.  Friday-[place]  8:00a.m-4:45 p.m. [facility]  [address]  [place], [place] [address] [phone] ask [facility]  [phone] Fax  Imaging [phone]    [facility]  [address]  [place], [place] [address] [phone]    [facility]  [address] [facility] [place] [address] [phone] [facility]  [phone] Fax  Imaging [phone]     Urgent Care locations:    [facility] Monday-Friday  5 pm - 9 pm  Saturday and Sunday   9 am - 5 pm  Monday-Friday   11 am - 9 pm  Saturday and Sunday   9 am - 5 pm   [phone]   If you need a medication refill, please contact your pharmacy. Please allow 3 business days for your refill to be completed.  As always, Thank you for trusting us with your healthcare needs!

## 2020-11-03 ENCOUNTER — MYC MEDICAL ADVICE (OUTPATIENT)
Dept: NUTRITION | Facility: CLINIC | Age: 35
End: 2020-11-03

## 2020-11-03 NOTE — TELEPHONE ENCOUNTER
Gestational Diabetes Follow-up    Subjective/Objective:    Halima Razo sent in blood glucose log for review. Last date of communication was: 10/16/2020.    Gestational diabetes is being managed with diet and activity    Taking diabetes medications: no    Estimated Date of Delivery: Jan 7, 2021    BG/Food Log:         Assessment:    Ketones: na.   Fasting blood glucoses: 83% in target.  After breakfast: 100% in target.  After lunch: 91% in target.  After dinner: 100% in target.    Plan/Response:  No changes in the patient's current treatment plan.  Follow-up Friday (See [employer] message for recommendations)    BETY Cardenas CDE    Any diabetes medication dose changes were made via the CDE Protocol and Collaborative Practice Agreement with the patient's OB/GYN provider. A copy of this encounter was shared with the provider.

## 2020-11-20 ENCOUNTER — PRENATAL OFFICE VISIT (OUTPATIENT)
Dept: OBGYN | Facility: CLINIC | Age: 35
End: 2020-11-20
Payer: COMMERCIAL

## 2020-11-20 VITALS
DIASTOLIC BLOOD PRESSURE: 91 MMHG | OXYGEN SATURATION: 97 % | SYSTOLIC BLOOD PRESSURE: 158 MMHG | HEART RATE: 117 BPM | WEIGHT: 187 LBS | BODY MASS INDEX: 32.86 KG/M2

## 2020-11-20 DIAGNOSIS — Z34.80 SUPERVISION OF OTHER NORMAL PREGNANCY, ANTEPARTUM: ICD-10-CM

## 2020-11-20 DIAGNOSIS — I10 CHRONIC HYPERTENSION: Primary | ICD-10-CM

## 2020-11-20 LAB
ALT SERPL W P-5'-P-CCNC: 11 U/L (ref 0–50)
AST SERPL W P-5'-P-CCNC: 6 U/L (ref 0–45)
CREAT SERPL-MCNC: 0.52 MG/DL (ref 0.52–1.04)
CREAT UR-MCNC: 137 MG/DL
ERYTHROCYTE [DISTWIDTH] IN BLOOD BY AUTOMATED COUNT: 13 % (ref 10–15)
GFR SERPL CREATININE-BSD FRML MDRD: >90 ML/MIN/{1.73_M2}
HCT VFR BLD AUTO: 37.5 % (ref 35–47)
HGB BLD-MCNC: 12.8 G/DL (ref 11.7–15.7)
MCH RBC QN AUTO: 29.1 PG (ref 26.5–33)
MCHC RBC AUTO-ENTMCNC: 34.1 G/DL (ref 31.5–36.5)
MCV RBC AUTO: 85 FL (ref 78–100)
PLATELET # BLD AUTO: 326 10E9/L (ref 150–450)
PROT UR-MCNC: 0.15 G/L
PROT/CREAT 24H UR: 0.11 G/G CR (ref 0–0.2)
RBC # BLD AUTO: 4.4 10E12/L (ref 3.8–5.2)
WBC # BLD AUTO: 11 10E9/L (ref 4–11)

## 2020-11-20 PROCEDURE — 99207 PR PRENATAL VISIT: CPT | Performed by: OBSTETRICS & GYNECOLOGY

## 2020-11-20 PROCEDURE — 82565 ASSAY OF CREATININE: CPT | Performed by: OBSTETRICS & GYNECOLOGY

## 2020-11-20 PROCEDURE — 36415 COLL VENOUS BLD VENIPUNCTURE: CPT | Performed by: OBSTETRICS & GYNECOLOGY

## 2020-11-20 PROCEDURE — 84450 TRANSFERASE (AST) (SGOT): CPT | Performed by: OBSTETRICS & GYNECOLOGY

## 2020-11-20 PROCEDURE — 85027 COMPLETE CBC AUTOMATED: CPT | Performed by: OBSTETRICS & GYNECOLOGY

## 2020-11-20 PROCEDURE — 84156 ASSAY OF PROTEIN URINE: CPT | Performed by: OBSTETRICS & GYNECOLOGY

## 2020-11-20 PROCEDURE — 84460 ALANINE AMINO (ALT) (SGPT): CPT | Performed by: OBSTETRICS & GYNECOLOGY

## 2020-11-20 NOTE — PATIENT INSTRUCTIONS
If you have any questions regarding your visit, Please contact your care team.     [facility]: [phone]  [facility] CLINIC HOURS TELEPHONE NUMBER       Cassius Coppola M.D.      Kelton Alvarado-COLLINS Durant-Medical Assistant    Katie-  Brandi-     Monday-[place]  8:00a.m-4:45 p.m  Tuesday-[place]  9:00a.m-4:00 p.m  Wednesday-[place] 8:00a.m-4:45 p.m.  Thursday-[place]  8:00a.m-4:45 p.m.  Friday-[place]  8:00a.m-4:45 p.m. [facility]  [address] [place] [address]  [phone] ask for [facility]  [phone] Fax  Imaging [phone]    [facility] Delivery  [address] [place] [address] [place] [address]  [phone]    [facility]  [address] [place], [place] [address] [phone] ask [facility]  [phone] Fax  Imaging [phone]     Urgent Care locations:    [facility] Monday-Friday  5 pm - 9 pm  Saturday and Sunday   9 am - 5 pm  Monday-Friday   11 am - 9 pm  Saturday and Sunday   9 am - 5 pm   [phone]   If you need a medication refill, please contact your pharmacy. Please allow 3 business days for your refill to be completed.  As always, Thank you for trusting us with your healthcare needs!

## 2020-11-21 NOTE — PROGRESS NOTES
No significant signs, symptoms or concerns except some nausea, occasional dizziness upon arising, occasional RUQ pain, and increased edema. BP elevated today. Will check labs and follow-up. Glc levels generally at target with diet. BPP normal per MFM.    Total encounter time (physician together with patient) = 15min. Direct counseling, education and care coordination time (physician together with patient) = 10min. This counseling included the following: Advice appropriate to gestational age reviewed including pertinent Checklist items. Discussed condition, tests and care plan including risks, benefits, and alternatives. Problem list updated.   A/P:  Halima was seen today for prenatal care.    Diagnoses and all orders for this visit:    Chronic hypertension  -     Protein  random urine with Creat Ratio  -     CBC with platelets  -     ALT  -     AST  -     Creatinine  -     Creatinine urine calculation only    Supervision of other normal pregnancy, antepartum  -     Protein  random urine with Creat Ratio  -     CBC with platelets  -     ALT  -     AST  -     Creatinine        Cassius Coppola MD

## 2020-11-25 ENCOUNTER — PRENATAL OFFICE VISIT (OUTPATIENT)
Dept: OBGYN | Facility: CLINIC | Age: 35
End: 2020-11-25
Payer: COMMERCIAL

## 2020-11-25 VITALS
SYSTOLIC BLOOD PRESSURE: 128 MMHG | TEMPERATURE: 98.8 F | HEART RATE: 114 BPM | DIASTOLIC BLOOD PRESSURE: 80 MMHG | WEIGHT: 190.56 LBS | BODY MASS INDEX: 33.49 KG/M2

## 2020-11-25 DIAGNOSIS — O09.523 MULTIGRAVIDA OF ADVANCED MATERNAL AGE IN THIRD TRIMESTER: ICD-10-CM

## 2020-11-25 DIAGNOSIS — O24.419 GESTATIONAL DIABETES MELLITUS (GDM), ANTEPARTUM, GESTATIONAL DIABETES METHOD OF CONTROL UNSPECIFIED: ICD-10-CM

## 2020-11-25 DIAGNOSIS — I10 CHRONIC HYPERTENSION: Primary | ICD-10-CM

## 2020-11-25 LAB
ALT SERPL W P-5'-P-CCNC: 8 U/L (ref 0–50)
AST SERPL W P-5'-P-CCNC: 7 U/L (ref 0–45)
CREAT UR-MCNC: 90 MG/DL
ERYTHROCYTE [DISTWIDTH] IN BLOOD BY AUTOMATED COUNT: 13.1 % (ref 10–15)
HCT VFR BLD AUTO: 35.9 % (ref 35–47)
HGB BLD-MCNC: 12 G/DL (ref 11.7–15.7)
MCH RBC QN AUTO: 28.7 PG (ref 26.5–33)
MCHC RBC AUTO-ENTMCNC: 33.4 G/DL (ref 31.5–36.5)
MCV RBC AUTO: 86 FL (ref 78–100)
PLATELET # BLD AUTO: 328 10E9/L (ref 150–450)
PROT UR-MCNC: 0.07 G/L
PROT/CREAT 24H UR: 0.08 G/G CR (ref 0–0.2)
RBC # BLD AUTO: 4.18 10E12/L (ref 3.8–5.2)
WBC # BLD AUTO: 11.9 10E9/L (ref 4–11)

## 2020-11-25 PROCEDURE — 84460 ALANINE AMINO (ALT) (SGPT): CPT | Performed by: ADVANCED PRACTICE MIDWIFE

## 2020-11-25 PROCEDURE — 84450 TRANSFERASE (AST) (SGOT): CPT | Performed by: ADVANCED PRACTICE MIDWIFE

## 2020-11-25 PROCEDURE — 85027 COMPLETE CBC AUTOMATED: CPT | Performed by: ADVANCED PRACTICE MIDWIFE

## 2020-11-25 PROCEDURE — 84156 ASSAY OF PROTEIN URINE: CPT | Performed by: ADVANCED PRACTICE MIDWIFE

## 2020-11-25 PROCEDURE — 36415 COLL VENOUS BLD VENIPUNCTURE: CPT | Performed by: ADVANCED PRACTICE MIDWIFE

## 2020-11-25 PROCEDURE — 99207 PR PRENATAL VISIT: CPT | Performed by: ADVANCED PRACTICE MIDWIFE

## 2020-11-25 NOTE — PROGRESS NOTES
Feeling off for a couple weeks. Nausea has returned.   Has had mild HA on and off for a couple weeks as well.   Will repeat labs today.  Has an MFM scan immediately following.  Reports blood sugars as in good control.  Labs so far normal AST, ALT, CBC  RTC 1 week.  Chiqui Price, MIGUEL, CNM

## 2020-12-04 ENCOUNTER — PRENATAL OFFICE VISIT (OUTPATIENT)
Dept: OBGYN | Facility: CLINIC | Age: 35
End: 2020-12-04
Payer: COMMERCIAL

## 2020-12-04 VITALS
DIASTOLIC BLOOD PRESSURE: 85 MMHG | WEIGHT: 188 LBS | HEART RATE: 112 BPM | OXYGEN SATURATION: 97 % | BODY MASS INDEX: 33.04 KG/M2 | SYSTOLIC BLOOD PRESSURE: 139 MMHG

## 2020-12-04 DIAGNOSIS — Z34.80 SUPERVISION OF OTHER NORMAL PREGNANCY, ANTEPARTUM: ICD-10-CM

## 2020-12-04 DIAGNOSIS — O24.419 GESTATIONAL DIABETES MELLITUS (GDM), ANTEPARTUM, GESTATIONAL DIABETES METHOD OF CONTROL UNSPECIFIED: Primary | ICD-10-CM

## 2020-12-04 DIAGNOSIS — I10 CHRONIC HYPERTENSION: ICD-10-CM

## 2020-12-04 PROCEDURE — 99207 PR PRENATAL VISIT: CPT | Performed by: OBSTETRICS & GYNECOLOGY

## 2020-12-04 NOTE — PATIENT INSTRUCTIONS
If you have any questions regarding your visit, Please contact your care team.     [facility]: [phone]  [facility] CLINIC HOURS TELEPHONE NUMBER       Cassius Coppola M.D.      Kelton Alvarado-COLLINS Durant-Medical Assistant    Katie-  Brandi-     Monday-[place]  8:00a.m-4:45 p.m  Tuesday-[place]  9:00a.m-4:00 p.m  Wednesday-[place] 8:00a.m-4:45 p.m.  Thursday-[place]  8:00a.m-4:45 p.m.  Friday-[place]  8:00a.m-4:45 p.m. [facility]  [address] [place] [address]  [phone] ask for [facility]  [phone] Fax  Imaging [phone]    [facility]  [address] [place] [address] [place] [address]  [phone]    [facility]  [address] [place], [place] [address] [phone] ask [facility]  [phone] Fax  Imaging [phone]     Urgent Care locations:    [facility] Monday-Friday  5 pm - 9 pm  Saturday and Sunday   9 am - 5 pm  Monday-Friday   11 am - 9 pm  Saturday and Sunday   9 am - 5 pm   [phone]   If you need a medication refill, please contact your pharmacy. Please allow 3 business days for your refill to be completed.  As always, Thank you for trusting us with your healthcare needs!

## 2020-12-04 NOTE — PROGRESS NOTES
No significant signs, symptoms or concerns. Feels better now. Glc at target on diet. Continues MFM follow-up. Plans to start maternity leave 12/21.   Total encounter time (physician together with patient) = 15min. Direct counseling, education and care coordination time (physician together with patient) = 10min. This counseling included the following: Advice appropriate to gestational age reviewed including pertinent Checklist items. Discussed condition, tests and care plan including risks, benefits, and alternatives. Problem list updated. GBS next.  A/P:  Halima was seen today for prenatal care.    Diagnoses and all orders for this visit:    Supervision of other normal pregnancy, antepartum        Cassius Coppola MD

## 2020-12-06 ENCOUNTER — HEALTH MAINTENANCE LETTER (OUTPATIENT)
Age: 35
End: 2020-12-06

## 2020-12-16 ENCOUNTER — PRENATAL OFFICE VISIT (OUTPATIENT)
Dept: OBGYN | Facility: CLINIC | Age: 35
End: 2020-12-16
Payer: COMMERCIAL

## 2020-12-16 VITALS
BODY MASS INDEX: 34.09 KG/M2 | HEART RATE: 116 BPM | OXYGEN SATURATION: 98 % | DIASTOLIC BLOOD PRESSURE: 90 MMHG | SYSTOLIC BLOOD PRESSURE: 158 MMHG | WEIGHT: 194 LBS

## 2020-12-16 DIAGNOSIS — Z34.80 SUPERVISION OF OTHER NORMAL PREGNANCY, ANTEPARTUM: ICD-10-CM

## 2020-12-16 LAB
CREAT UR-MCNC: 110 MG/DL
PROT UR-MCNC: 0.12 G/L
PROT/CREAT 24H UR: 0.11 G/G CR (ref 0–0.2)

## 2020-12-16 PROCEDURE — 87653 STREP B DNA AMP PROBE: CPT | Performed by: OBSTETRICS & GYNECOLOGY

## 2020-12-16 PROCEDURE — 99207 PR PRENATAL VISIT: CPT | Performed by: OBSTETRICS & GYNECOLOGY

## 2020-12-16 PROCEDURE — 84156 ASSAY OF PROTEIN URINE: CPT | Performed by: OBSTETRICS & GYNECOLOGY

## 2020-12-16 NOTE — PATIENT INSTRUCTIONS
If you have any questions regarding your visit, Please contact your care team.     [employer]: [phone]  [facility] CLINIC HOURS TELEPHONE NUMBER       Cassius Coppola M.D.    Deya-COLLINS Alvarado-COLLINS Durant-Medical Assistant    Katie-  Brandi-     Monday-[place]  8:00a.m-4:45 p.m  Tuesday-[place]  9:00a.m-4:00 p.m  Wednesday-[place] 8:00a.m-4:45 p.m.  Thursday-[place]  8:00a.m-4:45 p.m.  Friday-[place]  8:00a.m-4:45 p.m. [facility]  [address] [facility] [place] [address] [phone] ask for [facility]  [phone] Fax  Imaging [phone]    [facility] Labor and Delivery  [address]  [place], [place] [address] [phone]    [facility]  [address] [place]  [place] [address] [phone] ask [facility]  [phone] Fax  Imaging [phone]     Urgent Care locations:    [place]        [place] Monday-Friday  5 pm - 9 pm  Saturday and Sunday   9 am - 5 pm  Monday-Friday   11 am - 9 pm  Saturday and Sunday   9 am - 5 pm   [phone]   If you need a medication refill, please contact your pharmacy. Please allow 3 business days for your refill to be completed.  As always, Thank you for trusting us with your healthcare needs!

## 2020-12-17 LAB
GP B STREP DNA SPEC QL NAA+PROBE: NEGATIVE
SPECIMEN SOURCE: NORMAL

## 2020-12-17 NOTE — PROGRESS NOTES
No significant signs, symptoms or concerns except more edema. BP high again today on arrival. Will recheck along with full BPP- arrange for 12/17 and will notify by MyChart. Anticipate induction and delivery by 38 weeks. Glc levels at target on diet.    Total encounter time (physician together with patient) = 15min. Direct counseling, education and care coordination time (physician together with patient) = 10min. This counseling included the following: Advice appropriate to gestational age reviewed including pertinent Checklist items. Discussed condition, tests and care plan including risks, benefits, and alternatives. Problem list updated.   A/P:  Halima was seen today for prenatal care.    Diagnoses and all orders for this visit:    Supervision of other normal pregnancy, antepartum  -     Strep, Group B by PCR  -     Protein  random urine with Creat Ratio  -     Creatinine urine calculation only        Cassius Coppola MD

## 2020-12-21 ENCOUNTER — PRENATAL OFFICE VISIT (OUTPATIENT)
Dept: OBGYN | Facility: CLINIC | Age: 35
End: 2020-12-21
Payer: COMMERCIAL

## 2020-12-21 VITALS
HEART RATE: 107 BPM | SYSTOLIC BLOOD PRESSURE: 157 MMHG | DIASTOLIC BLOOD PRESSURE: 94 MMHG | WEIGHT: 196.8 LBS | BODY MASS INDEX: 34.59 KG/M2

## 2020-12-21 DIAGNOSIS — O13.3 PREGNANCY-INDUCED HYPERTENSION IN THIRD TRIMESTER: Primary | ICD-10-CM

## 2020-12-21 PROCEDURE — 99207 PR PRENATAL VISIT: CPT | Performed by: OBSTETRICS & GYNECOLOGY

## 2020-12-21 PROCEDURE — 59426 ANTEPARTUM CARE ONLY: CPT | Performed by: OBSTETRICS & GYNECOLOGY

## 2020-12-21 NOTE — PROGRESS NOTES
She reports feeling reassuring daily fetal activity and will continue to record.  She gained 2.8 lbs since 5 days ago and denies any fluid leakage or regular uterine contractions.  Her GBS status is negative but her initial BP was elevated at 161/95 so will need to recheck.  She denies any headache, visual changes, or RUQ pain but is looking forward to induction this week.  This pregnancy has been complicated by PIH, gestational diabetes, and VSD concerns for the baby.  Her cervix remains unchanged and unfavorable (see above) so will need cervical ripening.  If her second BP is also elevated, then the plan will be to send her to L&D for further evaluation.  Since her repeat BP was 157/94, I called L&D and gave report to the charge RN and Dr. Agbeh since the pt is being sent over to L&D.

## 2020-12-21 NOTE — PATIENT INSTRUCTIONS
If you have any questions regarding your visit, Please contact your care team.    [email or web address] [employer]: [phone]      [facility] CLINIC HOURS TELEPHONE NUMBER   Maria Eugenia Laguna .    ADRIANA Robertson -  Brandi -       COLLINS Alvarado, RN  Deya, RN     Monday, Wednesday, Thursday and Friday, [place]  8:30a.m-5:00 p.m   [facility]  [address] [place] [address]  [phone] ask for [facility]    Imaging [phone]       Urgent Care locations:    [facility] Saturday and Sunday   9 am - 5 pm    Monday-Friday   12 pm - 8 pm  Saturday and Sunday   9 am - 5 pm   [phone]     [facility] Labor and Delivery:  [phone]    If you need a medication refill, please contact your pharmacy. Please allow 3 business days for your refill to be completed.  As always, Thank you for trusting us with your healthcare needs!

## 2020-12-28 ENCOUNTER — OFFICE VISIT (OUTPATIENT)
Dept: FAMILY MEDICINE | Facility: CLINIC | Age: 35
End: 2020-12-28
Payer: COMMERCIAL

## 2020-12-28 VITALS
DIASTOLIC BLOOD PRESSURE: 94 MMHG | SYSTOLIC BLOOD PRESSURE: 158 MMHG | OXYGEN SATURATION: 98 % | BODY MASS INDEX: 31.28 KG/M2 | TEMPERATURE: 99.3 F | WEIGHT: 178 LBS | HEART RATE: 96 BPM

## 2020-12-28 DIAGNOSIS — I10 CHRONIC HYPERTENSION: Primary | ICD-10-CM

## 2020-12-28 PROCEDURE — 82088 ASSAY OF ALDOSTERONE: CPT | Performed by: FAMILY MEDICINE

## 2020-12-28 PROCEDURE — 84244 ASSAY OF RENIN: CPT | Mod: 90 | Performed by: FAMILY MEDICINE

## 2020-12-28 PROCEDURE — 36415 COLL VENOUS BLD VENIPUNCTURE: CPT | Performed by: FAMILY MEDICINE

## 2020-12-28 PROCEDURE — 80048 BASIC METABOLIC PNL TOTAL CA: CPT | Performed by: FAMILY MEDICINE

## 2020-12-28 PROCEDURE — 99000 SPECIMEN HANDLING OFFICE-LAB: CPT | Performed by: FAMILY MEDICINE

## 2020-12-28 PROCEDURE — 99213 OFFICE O/P EST LOW 20 MIN: CPT | Performed by: FAMILY MEDICINE

## 2020-12-28 PROCEDURE — 99N1160 PR STATISICAL ALDOSTERONE/RENIN RATIO: Performed by: FAMILY MEDICINE

## 2020-12-28 PROCEDURE — 84443 ASSAY THYROID STIM HORMONE: CPT | Performed by: FAMILY MEDICINE

## 2020-12-28 RX ORDER — LABETALOL 100 MG/1
200 TABLET, FILM COATED ORAL 2 TIMES DAILY
Qty: 60 TABLET | Refills: 3 | Status: SHIPPED | OUTPATIENT
Start: 2020-12-28 | End: 2021-08-05

## 2020-12-28 RX ORDER — IBUPROFEN 600 MG/1
600 TABLET, FILM COATED ORAL
COMMUNITY
Start: 2020-12-24 | End: 2021-02-03

## 2020-12-28 RX ORDER — LABETALOL 100 MG/1
100 TABLET, FILM COATED ORAL 2 TIMES DAILY
COMMUNITY
Start: 2020-12-24 | End: 2020-12-28

## 2020-12-28 NOTE — PROGRESS NOTES
Subjective     Halima Razo is a 35 year old female who presents to clinic today for the following health issues:    HPI         Hypertension Follow-up      Do you check your blood pressure regularly outside of the clinic? Yes Was induced last week ago due to high BP, still monitoring at home and is continuing to be high    Are you following a low salt diet? No    Are your blood pressures ever more than 140 on the top number (systolic) OR more   than 90 on the bottom number (diastolic), for example 140/90? Yes    Patient had hypertension during pregnancy   She was induced on 12/21/2020 at 37 weeks for concern of preeclampsia.   She was started labetalol 100 mg twice daily after delivery   She was on aspirin only during pregnancy   She check BP two times daily reading today at 4 pm 170/93   Patient denies shortness of breath or chest pain   She reports headache today   She has hx of hypertension and was on Norvasc in 2018 stopped because BP normalized after losing weight intentionally   She is not breast feeding     Review of Systems   Constitutional, HEENT, cardiovascular, pulmonary, gi and gu systems are negative, except as otherwise noted.      Objective    BP (!) 158/94   Pulse 96   Temp 99.3  F (37.4  C) (Tympanic)   Wt 80.7 kg (178 lb)   LMP 03/20/2020 (Approximate)   SpO2 98%   BMI 31.28 kg/m    Body mass index is 31.28 kg/m .  Physical Exam  Vitals signs and nursing note reviewed.   Constitutional:       General: She is not in acute distress.     Appearance: Normal appearance. She is not ill-appearing, toxic-appearing or diaphoretic.   HENT:      Head: Normocephalic and atraumatic.   Neck:      Musculoskeletal: Normal range of motion and neck supple.   Cardiovascular:      Rate and Rhythm: Normal rate and regular rhythm.      Pulses: Normal pulses.      Heart sounds: Normal heart sounds. No murmur. No friction rub. No gallop.    Pulmonary:      Effort: Pulmonary effort is normal. No respiratory  distress.      Breath sounds: Normal breath sounds. No stridor. No wheezing, rhonchi or rales.   Chest:      Chest wall: No tenderness.   Skin:     Capillary Refill: Capillary refill takes less than 2 seconds.   Neurological:      General: No focal deficit present.      Mental Status: She is alert and oriented to person, place, and time.                    Assessment & Plan     Chronic hypertension  Patient had hypertension during pregnancy   She was induced on 12/21/2020 at 37 weeks for concern of preeclampsia.   She was started labetalol 100 mg twice daily after delivery   She was on aspirin only during pregnancy   She check BP two times daily reading today at 4 pm 170/93   Patient denies shortness of breath or chest pain   She reports headache today   She has hx of hypertension and was on Norvasc in 2018 stopped because BP normalized after losing weight intentionally   She is not breast feeding   I had a long discussion with the patient about his condition , diagnosis treatment options.  Blood pressure is not well controlled.  Increase labetalol to 200 mg twice daily.  Will obtain labs as below to rule out second hypertension including ultrasound  renal complete.  Return in 1 week for ancillary blood pressure check.  Continue check blood pressure at home 3 times daily and send the readings to our clinic  If blood pressure does not well controlled we will add a diuretic hydrochlorothiazide 12.5 mg .    I recommend the following :    - labetalol (NORMODYNE) 100 MG tablet; Take 2 tablets (200 mg) by mouth 2 times daily  - Basic metabolic panel  (Ca, Cl, CO2, Creat, Gluc, K, Na, BUN)  - TSH with free T4 reflex  - Aldosterone  - Renin activity  - Aldosterone Renin Ratio  - US Renal Complete            Return in about 4 weeks (around 1/25/2021).      Check blood pressure 3 times daily and record reading and send to us or your OBGYN   Follow up with OBGYN for post partum appointment   Increase Labetalol to 200 mg two  times daily   Monitor your heart rate as this medication can lower heart rate   Lower slat intake   Labs today   Schedule ultrasound for your kidneys   If blood  Pressure is very high above 180 /120 or if you develop any headache please call 911 or go the ED  Patient verbalized understanding and agreed on the plan of care.  All questions answered.  Corwin Fuentes MD  [facility]

## 2020-12-28 NOTE — PATIENT INSTRUCTIONS
Check blood pressure 3 times daily and record reading and send to us or your OBGYN   Follow up with OBGYN for post partum appointment   Increase Labetalol to 200 mg two times daily   Monitor your heart rate as this medication can lower heart rate   Lower slat intake   Labs today   Schedule ultrasound for your kidneys   If blood  Pressure is very high above 180 /120 or if you develop any headache please call 911 or go the ED

## 2020-12-29 LAB
ANION GAP SERPL CALCULATED.3IONS-SCNC: 7 MMOL/L (ref 3–14)
BUN SERPL-MCNC: 16 MG/DL (ref 7–30)
CALCIUM SERPL-MCNC: 9.4 MG/DL (ref 8.5–10.1)
CHLORIDE SERPL-SCNC: 107 MMOL/L (ref 94–109)
CO2 SERPL-SCNC: 24 MMOL/L (ref 20–32)
CREAT SERPL-MCNC: 0.63 MG/DL (ref 0.52–1.04)
GFR SERPL CREATININE-BSD FRML MDRD: >90 ML/MIN/{1.73_M2}
GLUCOSE SERPL-MCNC: 92 MG/DL (ref 70–99)
POTASSIUM SERPL-SCNC: 4.1 MMOL/L (ref 3.4–5.3)
SODIUM SERPL-SCNC: 138 MMOL/L (ref 133–144)
TSH SERPL DL<=0.005 MIU/L-ACNC: 1.32 MU/L (ref 0.4–4)

## 2020-12-31 LAB — ALDOST SERPL-MCNC: <3 NG/DL (ref 0–31)

## 2021-01-03 LAB — RENIN PLAS-CCNC: 0.3 NG/ML/HR

## 2021-01-04 LAB — ALDOSTERONE RENIN RATIO: NORMAL (ref 0–25)

## 2021-01-14 ENCOUNTER — ANCILLARY PROCEDURE (OUTPATIENT)
Dept: ULTRASOUND IMAGING | Facility: CLINIC | Age: 36
End: 2021-01-14
Attending: FAMILY MEDICINE
Payer: COMMERCIAL

## 2021-01-14 PROCEDURE — 76770 US EXAM ABDO BACK WALL COMP: CPT | Performed by: RADIOLOGY

## 2021-01-25 ENCOUNTER — OFFICE VISIT (OUTPATIENT)
Dept: FAMILY MEDICINE | Facility: CLINIC | Age: 36
End: 2021-01-25
Payer: COMMERCIAL

## 2021-01-25 VITALS
DIASTOLIC BLOOD PRESSURE: 86 MMHG | SYSTOLIC BLOOD PRESSURE: 136 MMHG | BODY MASS INDEX: 30.3 KG/M2 | HEART RATE: 99 BPM | TEMPERATURE: 98 F | WEIGHT: 171 LBS | HEIGHT: 63 IN | OXYGEN SATURATION: 97 %

## 2021-01-25 DIAGNOSIS — R20.2 NUMBNESS AND TINGLING IN RIGHT HAND: Primary | ICD-10-CM

## 2021-01-25 DIAGNOSIS — M25.532 LEFT WRIST PAIN: ICD-10-CM

## 2021-01-25 DIAGNOSIS — R20.0 NUMBNESS AND TINGLING IN RIGHT HAND: Primary | ICD-10-CM

## 2021-01-25 PROCEDURE — 99214 OFFICE O/P EST MOD 30 MIN: CPT | Performed by: FAMILY MEDICINE

## 2021-01-25 ASSESSMENT — ANXIETY QUESTIONNAIRES
7. FEELING AFRAID AS IF SOMETHING AWFUL MIGHT HAPPEN: MORE THAN HALF THE DAYS
1. FEELING NERVOUS, ANXIOUS, OR ON EDGE: MORE THAN HALF THE DAYS
GAD7 TOTAL SCORE: 11
2. NOT BEING ABLE TO STOP OR CONTROL WORRYING: MORE THAN HALF THE DAYS
6. BECOMING EASILY ANNOYED OR IRRITABLE: SEVERAL DAYS
3. WORRYING TOO MUCH ABOUT DIFFERENT THINGS: MORE THAN HALF THE DAYS
IF YOU CHECKED OFF ANY PROBLEMS ON THIS QUESTIONNAIRE, HOW DIFFICULT HAVE THESE PROBLEMS MADE IT FOR YOU TO DO YOUR WORK, TAKE CARE OF THINGS AT HOME, OR GET ALONG WITH OTHER PEOPLE: SOMEWHAT DIFFICULT
5. BEING SO RESTLESS THAT IT IS HARD TO SIT STILL: NOT AT ALL

## 2021-01-25 ASSESSMENT — PATIENT HEALTH QUESTIONNAIRE - PHQ9
5. POOR APPETITE OR OVEREATING: MORE THAN HALF THE DAYS
SUM OF ALL RESPONSES TO PHQ QUESTIONS 1-9: 8

## 2021-01-25 ASSESSMENT — MIFFLIN-ST. JEOR: SCORE: 1443.74

## 2021-01-25 NOTE — PROGRESS NOTES
"SUBJECTIVE:  Halima Razo, a 35 year old female scheduled an appointment to discuss the following issues:  left wrist pain and right finger tingling.   Left wrist Painful most bothersome 3 months and worse (started in pregnancy). left handed. 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 Some wrist weakness. No injury. Ibuprofen prn. Not breastfeeding. Heat > ice. Brace for wrist but thumb open. Not Using at night. Using prn activities.  right finger tips numbness.  No pain. No weakness. No wrist pain.   Worse throughout day.  No neck pain. No chest pain or shortness of breath.   Neck ok. Sleep ok but new born x2/night to eat.   Emotionally doing ok. No SUICIAL IDEATION OR HOMOCIDAL IDEATION OR ANURAG.   No gerd symptoms. Urine ok.   Needs more water. 2 days cans/soda.   Medical, social, surgical, and family histories reviewed.    ROS:  All other ROS negative    OBJECTIVE:  /86   Pulse 99   Temp 98  F (36.7  C) (Tympanic)   Ht 1.607 m (5' 3.25\")   Wt 77.6 kg (171 lb)   LMP 03/20/2020 (Approximate)   SpO2 97%   BMI 30.05 kg/m    EXAM:  GENERAL APPEARANCE: healthy, alert and no distress  NECK: no adenopathy, no asymmetry, masses, or scars and thyroid normal to palpation  RESP: lungs clear to auscultation - no rales, rhonchi or wheezes  CV: regular rates and rhythm, normal S1 S2, no S3 or S4 and no murmur, click or rub -  ABDOMEN:  soft, nontender, no HSM or masses and bowel sounds normal  MS: extremities normal- no gross deformities noted, no evidence of inflammation in joints, FROM in all extremities.  MS: pain with ulnar deviation of left wrist and some tenderness thumb base  SKIN: no suspicious lesions or rashes  NEURO: Normal strength and tone, sensory exam grossly normal, mentation intact and speech normal  PSYCH: mentation appears normal and affect normal/bright    ASSESSMENT / PLAN:  (R20.0,  R20.2) Numbness and tingling in right hand  (primary encounter diagnosis)  Comment: likely carpel " tunnel  Plan: Orthopedic & Spine  Referral,         nabumetone (RELAFEN) 750 MG tablet        Prn wrist splint. More water/less caffeine. Heat. Follow-up ortho if worse/not improving in next month.    (M25.532) Left wrist pain  Comment: likely DeQuervan's tendonitis  Plan: Orthopedic & Spine  Referral,         nabumetone (RELAFEN) 750 MG tablet        Thumb wrist splint. Reveiwed risks and side effects of medication  Heat and more water. Follow-up ortho if persists. Call/email with questions/concerns     MD Rashad Farfan MD

## 2021-01-26 ASSESSMENT — ANXIETY QUESTIONNAIRES: GAD7 TOTAL SCORE: 11

## 2021-02-03 ENCOUNTER — PRENATAL OFFICE VISIT (OUTPATIENT)
Dept: OBGYN | Facility: CLINIC | Age: 36
End: 2021-02-03
Payer: COMMERCIAL

## 2021-02-03 VITALS
BODY MASS INDEX: 29.7 KG/M2 | WEIGHT: 169 LBS | SYSTOLIC BLOOD PRESSURE: 115 MMHG | HEART RATE: 90 BPM | DIASTOLIC BLOOD PRESSURE: 80 MMHG | OXYGEN SATURATION: 100 %

## 2021-02-03 DIAGNOSIS — I10 CHRONIC HYPERTENSION: ICD-10-CM

## 2021-02-03 PROBLEM — O35.BXX0 VENTRICULAR SEPTAL DEFECT (VSD) OF FETUS IN SINGLETON PREGNANCY, ANTEPARTUM: Status: RESOLVED | Noted: 2020-09-17 | Resolved: 2021-02-03

## 2021-02-03 PROBLEM — Z34.80 SUPERVISION OF OTHER NORMAL PREGNANCY, ANTEPARTUM: Status: RESOLVED | Noted: 2020-06-08 | Resolved: 2021-02-03

## 2021-02-03 PROBLEM — O24.419 GESTATIONAL DIABETES MELLITUS (GDM), ANTEPARTUM, GESTATIONAL DIABETES METHOD OF CONTROL UNSPECIFIED: Status: RESOLVED | Noted: 2020-10-04 | Resolved: 2021-02-03

## 2021-02-03 PROBLEM — O09.529 ANTEPARTUM MULTIGRAVIDA OF ADVANCED MATERNAL AGE: Status: RESOLVED | Noted: 2020-06-08 | Resolved: 2021-02-03

## 2021-02-03 PROCEDURE — G0145 SCR C/V CYTO,THINLAYER,RESCR: HCPCS | Performed by: OBSTETRICS & GYNECOLOGY

## 2021-02-03 PROCEDURE — 87624 HPV HI-RISK TYP POOLED RSLT: CPT | Performed by: OBSTETRICS & GYNECOLOGY

## 2021-02-03 PROCEDURE — 99207 PR POST PARTUM EXAM: CPT | Performed by: OBSTETRICS & GYNECOLOGY

## 2021-02-03 SDOH — ECONOMIC STABILITY: TRANSPORTATION INSECURITY
IN THE PAST 12 MONTHS, HAS THE LACK OF TRANSPORTATION KEPT YOU FROM MEDICAL APPOINTMENTS OR FROM GETTING MEDICATIONS?: NOT ASKED

## 2021-02-03 SDOH — ECONOMIC STABILITY: TRANSPORTATION INSECURITY
IN THE PAST 12 MONTHS, HAS LACK OF TRANSPORTATION KEPT YOU FROM MEETINGS, WORK, OR FROM GETTING THINGS NEEDED FOR DAILY LIVING?: NOT ASKED

## 2021-02-03 SDOH — ECONOMIC STABILITY: FOOD INSECURITY: WITHIN THE PAST 12 MONTHS, YOU WORRIED THAT YOUR FOOD WOULD RUN OUT BEFORE YOU GOT MONEY TO BUY MORE.: NOT ASKED

## 2021-02-03 SDOH — ECONOMIC STABILITY: INCOME INSECURITY: HOW HARD IS IT FOR YOU TO PAY FOR THE VERY BASICS LIKE FOOD, HOUSING, MEDICAL CARE, AND HEATING?: NOT ASKED

## 2021-02-03 SDOH — ECONOMIC STABILITY: FOOD INSECURITY: WITHIN THE PAST 12 MONTHS, THE FOOD YOU BOUGHT JUST DIDN'T LAST AND YOU DIDN'T HAVE MONEY TO GET MORE.: NOT ASKED

## 2021-02-03 ASSESSMENT — ANXIETY QUESTIONNAIRES
5. BEING SO RESTLESS THAT IT IS HARD TO SIT STILL: NOT AT ALL
3. WORRYING TOO MUCH ABOUT DIFFERENT THINGS: MORE THAN HALF THE DAYS
GAD7 TOTAL SCORE: 13
1. FEELING NERVOUS, ANXIOUS, OR ON EDGE: MORE THAN HALF THE DAYS
2. NOT BEING ABLE TO STOP OR CONTROL WORRYING: MORE THAN HALF THE DAYS
7. FEELING AFRAID AS IF SOMETHING AWFUL MIGHT HAPPEN: NEARLY EVERY DAY
IF YOU CHECKED OFF ANY PROBLEMS ON THIS QUESTIONNAIRE, HOW DIFFICULT HAVE THESE PROBLEMS MADE IT FOR YOU TO DO YOUR WORK, TAKE CARE OF THINGS AT HOME, OR GET ALONG WITH OTHER PEOPLE: SOMEWHAT DIFFICULT
6. BECOMING EASILY ANNOYED OR IRRITABLE: MORE THAN HALF THE DAYS

## 2021-02-03 ASSESSMENT — PATIENT HEALTH QUESTIONNAIRE - PHQ9
5. POOR APPETITE OR OVEREATING: MORE THAN HALF THE DAYS
SUM OF ALL RESPONSES TO PHQ QUESTIONS 1-9: 3

## 2021-02-03 NOTE — PROGRESS NOTES
Halima Razo is a 35 year old year old G 2 P 2 who is here today for a postpartum exam.    HPI:      Doing well and without signif sx or concerns. Currently bottle (formula) feeding infant. Here today alone. Infant doing well. Contraceptive method planned is condoms. No dyspareunia since delivery. PP depression screening as noted. See PHQ-9. Score = 3.    Past medical, obstetrical, surgical, family and social history reviewed and as noted or updated in chart.     Allergies, meds and supplements are as noted or updated in chart.      ROS:     Systems reviewed include constitutional; breast;                 cardiac; respiratory; gastrointestinal; genitourinary;                                musculoskeletal; integumentary; psychological;                                hematologic/lymphatic and endocrine.                  These systems were negative for significant symptoms except                 for the following: none and see HPI.                                Exam:  VS as noted.                    Abd and Pelvis are                             normal or negative except for, or in particular noting, the following                pertinent findings: none.       Assessment: Postpartum exam    Plan and Recommendations: Counseling included the following: Symptoms, problems and concerns reviewed. Discussed pregnancy, birth, future pregnancy plans, work plans and infant care issues.  Problem list updated and Pregnancy Episode closed. See orders. Return to clinic in 12 months.  Taper off labetalol and observe.   30 minutes spent on the date of encounter doing chart review, history, examination, discussion with patient, and documentation, and further activities as noted above, and review of appropriateness of decision-making for care.    Halima was seen today for post partum exam.    Diagnoses and all orders for this visit:    Routine postpartum follow-up  -     GLUCOSE FASTING 75 GM; Future  -     Pap imaged thin  layer screen with HPV - recommended age 30 - 65  -     HPV High Risk Types DNA Cervical      Atiya Acuña NP student  I was present with the student who participated in the service and in the documentation of the note. I have verified the history and personally performed the physical exam, medical decision making, and provided counseling in the time period documented. I agree with the assessment and plan of care as documented in the note.  Cassius Coppola MD

## 2021-02-03 NOTE — PATIENT INSTRUCTIONS
If you have any questions regarding your visit, Please contact your care team.     [employer]: [phone]  Women s Health CLINIC HOURS TELEPHONE NUMBER       Cassius Coppola M.D.    Deya-COLLINS Alvarado-COLLINS Durant-Medical Assistant    Katie-  Brandi-     Monday-[place]  8:00a.m-4:45 p.m  Tuesday-[place]  9:00a.m-4:00 p.m  Wednesday-[place] 8:00a.m-4:45 p.m.  Thursday-[place]  8:00a.m-4:45 p.m.  Friday-[place]  8:00a.m-4:45 p.m. [facility]  [address] [place] [id number] [phone] ask for [facility]  [phone] Fax  Imaging [phone]    [facility]  [address]  [place], [place] [address] [phone]    [facility]  [address]  [place] [address] [phone] ask [facility]  [phone] Fax  Imaging [phone]     Urgent Care locations:    [place]        [place] Monday-Friday  5 pm - 9 pm  Saturday and Sunday   9 am - 5 pm  Monday-Friday   11 am - 9 pm  Saturday and Sunday   9 am - 5 pm   [phone]   If you need a medication refill, please contact your pharmacy. Please allow 3 business days for your refill to be completed.  As always, Thank you for trusting us with your healthcare needs!

## 2021-02-03 NOTE — LETTER
February 3, 2021    RE: Halima Razo,  1985    To Whom It May Concern:    Halima Razo is under our care for her postpartum recovery following hospitalization at [facility] on 2020.      She will be able to return to work on 2021 without restrictions.      I hope this information is sufficient for your needs.  If you have any additional questions regarding this matter please contact our office.  Thank you.    Sincerely,        Cassius Coppola MD

## 2021-02-04 ASSESSMENT — ANXIETY QUESTIONNAIRES: GAD7 TOTAL SCORE: 13

## 2021-02-08 LAB
COPATH REPORT: NORMAL
PAP: NORMAL

## 2021-02-10 LAB
FINAL DIAGNOSIS: NORMAL
HPV HR 12 DNA CVX QL NAA+PROBE: NEGATIVE
HPV16 DNA SPEC QL NAA+PROBE: NEGATIVE
HPV18 DNA SPEC QL NAA+PROBE: NEGATIVE
SPECIMEN DESCRIPTION: NORMAL
SPECIMEN SOURCE CVX/VAG CYTO: NORMAL

## 2021-02-11 DIAGNOSIS — R20.2 NUMBNESS AND TINGLING IN RIGHT HAND: ICD-10-CM

## 2021-02-11 DIAGNOSIS — M25.532 LEFT WRIST PAIN: ICD-10-CM

## 2021-02-11 DIAGNOSIS — R20.0 NUMBNESS AND TINGLING IN RIGHT HAND: ICD-10-CM

## 2021-02-11 NOTE — TELEPHONE ENCOUNTER
Routing refill request to provider for review/approval because:  Labs out of range:  CBC    Vangie Oliva BSN, RN

## 2021-08-05 ENCOUNTER — APPOINTMENT (OUTPATIENT)
Dept: OBGYN | Facility: CLINIC | Age: 36
End: 2021-08-05
Payer: COMMERCIAL

## 2021-08-05 ENCOUNTER — PRENATAL OFFICE VISIT (OUTPATIENT)
Dept: OBGYN | Facility: CLINIC | Age: 36
End: 2021-08-05

## 2021-08-05 DIAGNOSIS — Z34.80 PRENATAL CARE, SUBSEQUENT PREGNANCY: Primary | ICD-10-CM

## 2021-08-05 PROCEDURE — 99207 PR NO CHARGE NURSE ONLY: CPT | Performed by: OBSTETRICS & GYNECOLOGY

## 2021-08-05 RX ORDER — PRENATAL VIT/IRON FUM/FOLIC AC 27MG-0.8MG
1 TABLET ORAL DAILY
COMMUNITY
Start: 2021-07-30 | End: 2022-07-26

## 2021-08-05 NOTE — PROGRESS NOTES
Denied need for review of teaching  [facility] OB Intake Nuse    Patient supplied answers from flow sheet for:  Prenatal OB Questionnaire.  Past Medical History  Have you ever recieved care for your mental health? : (!) Yes  Have you ever been in a major accident or suffered serious trauma?: No  Within the last year, has anyone hit, slapped, kicked or otherwise hurt you?: No  In the last year, has anyone forced you to have sex when you didn't want to?: No    Past Medical History 2   Have you ever received a blood transfusion?: No  Would you accept a blood transfusion if was medically recommended?: Yes  Does anyone in your home smoke?: (!) Yes (patient and her )   Is your blood type Rh negative?: Unknown  Have you ever ?: No  Have you been hospitalized for a nonsurgical reason excluding normal delivery?: No  Have you ever had an abnormal pap smear?: (!) Yes    Past Medical History (Continued)  Do you have a history of abnormalities of the uterus?: No  Did your mother take LIVAN or any other hormones when she was pregnant with you?: No  Do you have any other problems we have not asked about which you feel may be important to this pregnancy?: No

## 2021-09-02 ENCOUNTER — PRENATAL OFFICE VISIT (OUTPATIENT)
Dept: OBGYN | Facility: CLINIC | Age: 36
End: 2021-09-02
Payer: COMMERCIAL

## 2021-09-02 VITALS
WEIGHT: 179.2 LBS | SYSTOLIC BLOOD PRESSURE: 133 MMHG | BODY MASS INDEX: 31.75 KG/M2 | DIASTOLIC BLOOD PRESSURE: 88 MMHG | HEIGHT: 63 IN | TEMPERATURE: 99.7 F | HEART RATE: 103 BPM

## 2021-09-02 DIAGNOSIS — O99.210 OBESITY IN PREGNANCY: ICD-10-CM

## 2021-09-02 DIAGNOSIS — Z34.81 PRENATAL CARE, SUBSEQUENT PREGNANCY IN FIRST TRIMESTER: Primary | ICD-10-CM

## 2021-09-02 DIAGNOSIS — O13.3 PREGNANCY-INDUCED HYPERTENSION IN THIRD TRIMESTER: ICD-10-CM

## 2021-09-02 LAB
ABO/RH(D): NORMAL
ALBUMIN UR-MCNC: NEGATIVE MG/DL
ALT SERPL W P-5'-P-CCNC: 19 U/L (ref 0–50)
ANTIBODY SCREEN: NEGATIVE
APPEARANCE UR: ABNORMAL
AST SERPL W P-5'-P-CCNC: 14 U/L (ref 0–45)
BACTERIA #/AREA URNS HPF: ABNORMAL /HPF
BILIRUB UR QL STRIP: NEGATIVE
COLOR UR AUTO: YELLOW
CREAT UR-MCNC: 121 MG/DL
ERYTHROCYTE [DISTWIDTH] IN BLOOD BY AUTOMATED COUNT: 12.4 % (ref 10–15)
GLUCOSE UR STRIP-MCNC: NEGATIVE MG/DL
HBA1C MFR BLD: 5.3 % (ref 0–5.6)
HBV SURFACE AG SERPL QL IA: NONREACTIVE
HCT VFR BLD AUTO: 40.5 % (ref 35–47)
HCV AB SERPL QL IA: NONREACTIVE
HGB BLD-MCNC: 13.8 G/DL (ref 11.7–15.7)
HGB UR QL STRIP: NEGATIVE
HIV 1+2 AB+HIV1 P24 AG SERPL QL IA: NONREACTIVE
KETONES UR STRIP-MCNC: ABNORMAL MG/DL
LEUKOCYTE ESTERASE UR QL STRIP: ABNORMAL
MCH RBC QN AUTO: 28.8 PG (ref 26.5–33)
MCHC RBC AUTO-ENTMCNC: 34.1 G/DL (ref 31.5–36.5)
MCV RBC AUTO: 85 FL (ref 78–100)
NITRATE UR QL: NEGATIVE
PH UR STRIP: 5.5 [PH] (ref 5–7)
PLATELET # BLD AUTO: 324 10E3/UL (ref 150–450)
PROT UR-MCNC: 0.1 G/L
PROT/CREAT 24H UR: 0.08 G/G CR (ref 0–0.2)
RBC # BLD AUTO: 4.79 10E6/UL (ref 3.8–5.2)
RBC #/AREA URNS AUTO: ABNORMAL /HPF
RUBV IGG SERPL QL IA: 8.34 INDEX
RUBV IGG SERPL QL IA: POSITIVE
SP GR UR STRIP: 1.02 (ref 1–1.03)
SPECIMEN EXPIRATION DATE: NORMAL
SQUAMOUS #/AREA URNS AUTO: ABNORMAL /LPF
T PALLIDUM AB SER QL: NONREACTIVE
URATE SERPL-MCNC: 4.8 MG/DL (ref 2.6–6)
UROBILINOGEN UR STRIP-ACNC: 0.2 E.U./DL
WBC # BLD AUTO: 8.6 10E3/UL (ref 4–11)
WBC #/AREA URNS AUTO: ABNORMAL /HPF

## 2021-09-02 PROCEDURE — 84450 TRANSFERASE (AST) (SGOT): CPT | Performed by: OBSTETRICS & GYNECOLOGY

## 2021-09-02 PROCEDURE — 84156 ASSAY OF PROTEIN URINE: CPT | Performed by: OBSTETRICS & GYNECOLOGY

## 2021-09-02 PROCEDURE — 87591 N.GONORRHOEAE DNA AMP PROB: CPT | Performed by: OBSTETRICS & GYNECOLOGY

## 2021-09-02 PROCEDURE — 76817 TRANSVAGINAL US OBSTETRIC: CPT | Performed by: OBSTETRICS & GYNECOLOGY

## 2021-09-02 PROCEDURE — 86900 BLOOD TYPING SEROLOGIC ABO: CPT | Performed by: OBSTETRICS & GYNECOLOGY

## 2021-09-02 PROCEDURE — 99207 PR FIRST OB VISIT: CPT | Performed by: OBSTETRICS & GYNECOLOGY

## 2021-09-02 PROCEDURE — 36415 COLL VENOUS BLD VENIPUNCTURE: CPT | Performed by: OBSTETRICS & GYNECOLOGY

## 2021-09-02 PROCEDURE — 83036 HEMOGLOBIN GLYCOSYLATED A1C: CPT | Performed by: OBSTETRICS & GYNECOLOGY

## 2021-09-02 PROCEDURE — 86762 RUBELLA ANTIBODY: CPT | Performed by: OBSTETRICS & GYNECOLOGY

## 2021-09-02 PROCEDURE — 84550 ASSAY OF BLOOD/URIC ACID: CPT | Performed by: OBSTETRICS & GYNECOLOGY

## 2021-09-02 PROCEDURE — 87491 CHLMYD TRACH DNA AMP PROBE: CPT | Performed by: OBSTETRICS & GYNECOLOGY

## 2021-09-02 PROCEDURE — 81001 URINALYSIS AUTO W/SCOPE: CPT | Performed by: OBSTETRICS & GYNECOLOGY

## 2021-09-02 PROCEDURE — 85027 COMPLETE CBC AUTOMATED: CPT | Performed by: OBSTETRICS & GYNECOLOGY

## 2021-09-02 PROCEDURE — 86901 BLOOD TYPING SEROLOGIC RH(D): CPT | Performed by: OBSTETRICS & GYNECOLOGY

## 2021-09-02 PROCEDURE — 84460 ALANINE AMINO (ALT) (SGPT): CPT | Performed by: OBSTETRICS & GYNECOLOGY

## 2021-09-02 PROCEDURE — 86803 HEPATITIS C AB TEST: CPT | Performed by: OBSTETRICS & GYNECOLOGY

## 2021-09-02 PROCEDURE — 86780 TREPONEMA PALLIDUM: CPT | Performed by: OBSTETRICS & GYNECOLOGY

## 2021-09-02 PROCEDURE — 87086 URINE CULTURE/COLONY COUNT: CPT | Performed by: OBSTETRICS & GYNECOLOGY

## 2021-09-02 PROCEDURE — 86850 RBC ANTIBODY SCREEN: CPT | Performed by: OBSTETRICS & GYNECOLOGY

## 2021-09-02 PROCEDURE — 87340 HEPATITIS B SURFACE AG IA: CPT | Performed by: OBSTETRICS & GYNECOLOGY

## 2021-09-02 PROCEDURE — 87389 HIV-1 AG W/HIV-1&-2 AB AG IA: CPT | Performed by: OBSTETRICS & GYNECOLOGY

## 2021-09-02 RX ORDER — MULTIVITAMIN WITH IRON
1 TABLET ORAL DAILY
COMMUNITY
End: 2022-07-26

## 2021-09-02 ASSESSMENT — MIFFLIN-ST. JEOR: SCORE: 1464.04

## 2021-09-02 NOTE — PROGRESS NOTES
[facility]   OB/GYN Clinic    CC: New OB     Subjective:    Halima is a 36 year old  at 8w4d by No LMP recorded. Patient is pregnant. who presents for her initial OB visit. This is a planned pregnancy and her and her partner Adolfo are excited. She reports feeling well. Denies any uterine cramping, abdominal pain or vaginal bleeding. Denies nausea and vomiting.   Prior to a +UPT, she reports regular monthly periods without contraception use.   Depression stable off meds.     OB History    Para Term  AB Living   3 2 2 0 0 2   SAB TAB Ectopic Multiple Live Births   0 0 0 0 2      # Outcome Date GA Lbr Aiden/2nd Weight Sex Delivery Anes PTL Lv   3 Current            2 Term 20 37w6d  2.58 kg (5 lb 11 oz) F Vag-Spont EPI N ERYN      Complications: GDM, class A1, Chronic hypertension, SGA (small for gestational age), Fetal ventricular septal defect affecting antepartum care of mother      Name: Winter      Apgar1: 8  Apgar5: 9   1 Term 10/12/06 40w0d  3.515 kg (7 lb 12 oz) F  EPI  ERYN      Name: Xiomara Man - born in [facility]  Xiomara - born in [facility]     Past Medical History:   Diagnosis Date     Angioedema 2019    non-specific cause     Depression with anxiety      Gestational diabetes mellitus (GDM), antepartum, gestational diabetes method of control unspecified          Hypertension 2018    on medication for about 6 months in 2018     Infertility, female      Migraines      Obesity 2012     Papanicolaou smear of cervix with low grade squamous intraepithelial lesion (LGSIL)     resolved     Pre-eclampsia in third trimester      Ventricular septal defect (VSD) of fetus in guevara pregnancy, antepartum        Past Surgical History:   Procedure Laterality Date     BIOPSY      Underarm mole,     LAPAROSCOPY DIAGNOSTIC (GYN) N/A 2018    Procedure: LAPAROSCOPY DIAGNOSTIC (GYN);;  Surgeon: Kei Kelley MD;  Location: [facility] OR     OPERATIVE  HYSTEROSCOPY WITH MORCELLATOR N/A 01/26/2018    Procedure: OPERATIVE HYSTEROSCOPY WITH MORCELLATOR (MYOSURE);  Diagnostic laparoscopy, hysteroscopy with biopsy;  Surgeon: Kei Kelley MD;  Location: [facility] OR       Current Outpatient Medications   Medication Sig Dispense Refill     Prenatal Vit-Fe Fumarate-FA (PRENATAL MULTIVITAMIN W/IRON) 27-0.8 MG tablet Take 1 tablet by mouth daily         Family History   Problem Relation Age of Onset     Other Cancer Father         Skin Cancer     Thyroid Disease Mother      Depression Mother      Anxiety Disorder Mother      Depression Brother      Anxiety Disorder Brother      Mental Illness Brother      Thyroid Disease Maternal Grandmother      Heart Failure Maternal Grandmother      Depression Maternal Grandmother      Mental Illness Maternal Grandmother      Brain Cancer Maternal Grandfather      Other Cancer Maternal Grandfather         Brain Cancer     Lung Cancer Paternal Grandmother      Other Cancer Paternal Grandmother         Lung Cancer     Cerebrovascular Disease Paternal Grandfather      Other Cancer Paternal Grandfather         Skin Cancer     Other Cancer Cousin         Testicular Cancer     Depression Cousin         Maternal Cousin     Diabetes Other         Maternal Aunt     Hypertension Other      Hyperlipidemia Other      Other Cancer Other         Brain Cancer     Anxiety Disorder Other         Maternal Uncle     Anxiety Disorder Other         Maternal Uncle     Mental Illness Other         Paternal Uncle     Thyroid Disease Other         Maternal Aunt     Obesity Other      Glaucoma No family hx of      Macular Degeneration No family hx of        Social History     Tobacco Use     Smoking status: Former Smoker     Packs/day: 0.50     Years: 14.00     Pack years: 7.00     Types: Cigarettes     Start date: 12/30/2003     Smokeless tobacco: Never Used     Tobacco comment: quit with pregnancy   Substance Use Topics     Alcohol use: Not Currently       ROS:  "A 10 pt ROS was completed and found to be negative unless mentioned in the HPI.     Objective:  /88 (BP Location: Left arm, Patient Position: Chair, Cuff Size: Adult Regular)   Pulse 103   Temp 99.7  F (37.6  C) (Tympanic)   Ht 1.588 m (5' 2.5\")   Wt 81.3 kg (179 lb 3.2 oz)   LMP 2021 (Exact Date)   BMI 32.25 kg/m      Estimated body mass index is 32.25 kg/m  as calculated from the following:    Height as of this encounter: 1.588 m (5' 2.5\").    Weight as of this encounter: 81.3 kg (179 lb 3.2 oz).    Physical Exam:  Gen: Pleasant, talkative female in no apparent distress   Respiratory: Lungs clear, breathing comfortably on room air   Cardiac: Regular rate and rhythm with no murmurs, gallops or rubs. Warm and well-perfused.   GI: Abd soft and non-tender  : External genitalia is free of lesion. Urethra and bartholin glands normal.  Vaginal mucosa is moist and pink without unusual discharge.  Cervix is without lesions or discharge. Bimanual exam reveals mobile atneverted uterus without cervical motion tenderness.  Adenexa are mobile and non-tender bilaterally. No appreciable adnexal enlargement. Uterus is consistent with a 8 week gestation.   MSK: Grossly normal movement of all four extremities  Psych: mood and affect bright   Lower extremity: edema not present     Transvaginal US: guevara IUP measuring 8w2d, +cardiac activity, normal adnexa          Assessment/Plan:   36 year old  at 8w4d by LMP of No LMP recorded. Patient is pregnant. c/w 8w4d US who presents for her initial OB visit.   1) Plan to draw new OB lab today (T&S, CBC, HIV, RPR, HepBsAg, Hep B antibody, rubella, GC/Chlam, UC)  2) Discussed routine prenatal care, group practice model at [facility], tertiary support and frequency of visits. Options for  testing for chromosomal and birth defects were discussed with the patient including nuchal translucency/blood marker testing in the first trimester, progenity and " quad screening. She would like NIPT.  3) Plan for dating/viability scan now - RUFINA 4/10/2022  4) Concerns: none  5) PMH/OBHx problems:    -cHTN, hx of pre-e: no meds, HELLP labs, ASA, L2   -baby with a VSD; likely fetal ECHO   - AMA; L2, NIPT planned   -GDMA1 hx; hgba1c today   6) Medication review: no changes, continue prenatal vitamin   7) Reviewed ectopic and miscarriage precautions (present to ED or call clinic with abdominal pain, vaginal bleeding or fever)   8) Weight gain: discussed weight gain expectations (BMI >30: 11-20lbs) - hgba1c   9) PAP smear: UTD  10) Delivery plan: continue to discuss, breastfeeding, pp contraception, pain management in labor - continue to discuss  11) Immunizations: flu shot in the fall, Tdap at 28wks, COVID s/p  12) No increased risk for gestational diabetes for plan for screen at 28wks   13) Discussed risk of COVID in pregnancy including a doubled risk of needing ICU levels care, intubation or death. Recommended social distancing, mask-wearing and avoiding unnecessary contacts. I also recommended the COVID in pregnancy per CDCs recommendations. S/P COVID vaccine.     Return to clinic in 4 weeks.     Meenakshi Swanson MD  OB/GYN  9/2/2021

## 2021-09-03 LAB
BACTERIA UR CULT: NORMAL
C TRACH DNA SPEC QL PROBE+SIG AMP: NEGATIVE
N GONORRHOEA DNA SPEC QL NAA+PROBE: NEGATIVE

## 2021-09-13 ENCOUNTER — PRENATAL OFFICE VISIT (OUTPATIENT)
Dept: OBGYN | Facility: CLINIC | Age: 36
End: 2021-09-13
Payer: COMMERCIAL

## 2021-09-13 DIAGNOSIS — O09.521 MULTIGRAVIDA OF ADVANCED MATERNAL AGE IN FIRST TRIMESTER: Primary | ICD-10-CM

## 2021-09-13 PROCEDURE — 36415 COLL VENOUS BLD VENIPUNCTURE: CPT

## 2021-09-13 PROCEDURE — 99207 PR NO CHARGE NURSE ONLY: CPT

## 2021-09-20 LAB — SCANNED LAB RESULT: NORMAL

## 2021-09-26 ENCOUNTER — HEALTH MAINTENANCE LETTER (OUTPATIENT)
Age: 36
End: 2021-09-26

## 2021-09-30 ENCOUNTER — PRENATAL OFFICE VISIT (OUTPATIENT)
Dept: OBGYN | Facility: CLINIC | Age: 36
End: 2021-09-30
Payer: COMMERCIAL

## 2021-09-30 VITALS
HEIGHT: 63 IN | TEMPERATURE: 99.2 F | WEIGHT: 185.3 LBS | HEART RATE: 92 BPM | DIASTOLIC BLOOD PRESSURE: 80 MMHG | BODY MASS INDEX: 32.83 KG/M2 | SYSTOLIC BLOOD PRESSURE: 139 MMHG

## 2021-09-30 DIAGNOSIS — Z34.81 ENCOUNTER FOR SUPERVISION OF OTHER NORMAL PREGNANCY IN FIRST TRIMESTER: ICD-10-CM

## 2021-09-30 DIAGNOSIS — O13.9 GESTATIONAL HYPERTENSION AFFECTING THIRD PREGNANCY: ICD-10-CM

## 2021-09-30 DIAGNOSIS — Z23 NEED FOR PROPHYLACTIC VACCINATION AND INOCULATION AGAINST INFLUENZA: Primary | ICD-10-CM

## 2021-09-30 PROCEDURE — 99207 PR PRENATAL VISIT: CPT | Performed by: OBSTETRICS & GYNECOLOGY

## 2021-09-30 PROCEDURE — 90471 IMMUNIZATION ADMIN: CPT | Performed by: OBSTETRICS & GYNECOLOGY

## 2021-09-30 PROCEDURE — 90686 IIV4 VACC NO PRSV 0.5 ML IM: CPT | Performed by: OBSTETRICS & GYNECOLOGY

## 2021-09-30 RX ORDER — LABETALOL 100 MG/1
100 TABLET, FILM COATED ORAL 2 TIMES DAILY
COMMUNITY
End: 2021-09-30

## 2021-09-30 RX ORDER — LABETALOL 100 MG/1
100 TABLET, FILM COATED ORAL 2 TIMES DAILY
Qty: 100 TABLET | Refills: 1 | Status: SHIPPED | OUTPATIENT
Start: 2021-09-30 | End: 2022-07-26

## 2021-09-30 ASSESSMENT — MIFFLIN-ST. JEOR: SCORE: 1499.65

## 2021-09-30 NOTE — PROGRESS NOTES
Concerns: she developed a headache last week   Noted that her BP was elevated to 150/80  She had gestational htn with her last pregnancy and was treated with Labetalol to 200 mg BID  She reported her elevated BP to the clinic   She had 100 Labetalol tablets and was instructed to take 100 mg po BID   She has also restarted het ASA 81 mg daily  Doing well.  No concerns today.  Reportable signs and symptoms discussed.  Will schedule anatomy ultrasound.  RTC 4 weeks.  (Z23) Need for prophylactic vaccination and inoculation against influenza  (primary encounter diagnosis)  Comment:   Plan: INFLUENZA VACCINE IM > 6 MONTHS VALENT IIV4         (AFLURIA/FLUZONE)            (O13.9) Gestational hypertension affecting third pregnancy  Comment:   Plan: labetalol (NORMODYNE) 100 MG tablet, Mat Fetal         [facility] Referral - Pregnancy            (Z34.81) Encounter for supervision of other normal pregnancy in first trimester          Chase Huggins MD

## 2021-10-01 ENCOUNTER — TRANSCRIBE ORDERS (OUTPATIENT)
Dept: MATERNAL FETAL MEDICINE | Facility: CLINIC | Age: 36
End: 2021-10-01

## 2021-10-01 DIAGNOSIS — O26.90 PREGNANCY RELATED CONDITION, ANTEPARTUM: Primary | ICD-10-CM

## 2021-10-29 ENCOUNTER — PRENATAL OFFICE VISIT (OUTPATIENT)
Dept: OBGYN | Facility: CLINIC | Age: 36
End: 2021-10-29
Payer: COMMERCIAL

## 2021-10-29 VITALS
HEART RATE: 101 BPM | WEIGHT: 184.4 LBS | BODY MASS INDEX: 32.67 KG/M2 | DIASTOLIC BLOOD PRESSURE: 78 MMHG | SYSTOLIC BLOOD PRESSURE: 135 MMHG | TEMPERATURE: 98.3 F | HEIGHT: 63 IN | RESPIRATION RATE: 14 BRPM

## 2021-10-29 DIAGNOSIS — Z34.82 PRENATAL CARE, SUBSEQUENT PREGNANCY IN SECOND TRIMESTER: Primary | ICD-10-CM

## 2021-10-29 PROCEDURE — 99207 PR PRENATAL VISIT: CPT | Performed by: OBSTETRICS & GYNECOLOGY

## 2021-10-29 RX ORDER — ASPIRIN 81 MG/1
81 TABLET, CHEWABLE ORAL DAILY
Status: ON HOLD | COMMUNITY
End: 2022-03-28

## 2021-10-29 ASSESSMENT — MIFFLIN-ST. JEOR: SCORE: 1495.56

## 2021-10-29 NOTE — PROGRESS NOTES
"[facility]   OB/GYN Clinic     CC: F/U OB      Subjective:     Halima is a 36 year old  at 16w5d for return OB visit.   Feeling well. No n/v, LOF or VB. Rare flutters of FM.       Objective:  /78 (BP Location: Right arm, Patient Position: Chair, Cuff Size: Adult Regular)   Pulse 101   Temp 98.3  F (36.8  C) (Tympanic)   Resp 14   Ht 1.6 m (5' 3\")   Wt 83.6 kg (184 lb 6.4 oz)   LMP 2021 (Exact Date)   BMI 32.66 kg/m       Physical Exam:  Gen: Pleasant, talkative female in no apparent distress   Respiratory: breathing comfortably on room air   Cardiac: Regular rate, warm and well-perfused.   GI: Abd soft and non-tender  MSK: Grossly normal movement of all four extremities  Psych: mood and affect bright   Lower extremity: edema not present      See OB flowsheet       Assessment/Plan:   36 year old  at 16w5d by LMP c/w 8w4d US who presents for F/UOB visit.   1) normal labs  2) NIPT nl, FLYNN BRO  3) L2 ordered   4) Concerns: none  5) PMH/OBHx problems:                -cHTN, hx of pre-e: no meds, HELLP labs, ASA, L2               -baby with a VSD; likely fetal ECHO               - AMA; L2, NIPT nl                -GDMA1 hx; hgba1c nl  6) S/P COVID and flu      Return to clinic in 4 weeks.      Meenakshi Swanson MD  OB/GYN  10/29/2021    "

## 2021-11-08 ENCOUNTER — PRE VISIT (OUTPATIENT)
Dept: MATERNAL FETAL MEDICINE | Facility: CLINIC | Age: 36
End: 2021-11-08
Payer: COMMERCIAL

## 2021-11-12 ENCOUNTER — OFFICE VISIT (OUTPATIENT)
Dept: MATERNAL FETAL MEDICINE | Facility: CLINIC | Age: 36
End: 2021-11-12
Attending: OBSTETRICS & GYNECOLOGY
Payer: COMMERCIAL

## 2021-11-12 ENCOUNTER — HOSPITAL ENCOUNTER (OUTPATIENT)
Dept: ULTRASOUND IMAGING | Facility: CLINIC | Age: 36
End: 2021-11-12
Attending: OBSTETRICS & GYNECOLOGY
Payer: COMMERCIAL

## 2021-11-12 DIAGNOSIS — O26.90 PREGNANCY RELATED CONDITION, ANTEPARTUM: ICD-10-CM

## 2021-11-12 PROCEDURE — 76811 OB US DETAILED SNGL FETUS: CPT

## 2021-11-12 PROCEDURE — 99202 OFFICE O/P NEW SF 15 MIN: CPT | Mod: 25 | Performed by: OBSTETRICS & GYNECOLOGY

## 2021-11-12 PROCEDURE — 76811 OB US DETAILED SNGL FETUS: CPT | Mod: 26 | Performed by: OBSTETRICS & GYNECOLOGY

## 2021-11-12 NOTE — PROGRESS NOTES
Dear Dr. Swanson,     Thank you for your request for a Maternal Fetal Medicine consultation on your patient, MsDarya Razo regarding her hx of chronic HTN.     Fam is a 35 y/o  @ 18 5/7 weeks with chronic HTN.  No complaints today.     PMH: chronic HTN, obesity  PSH: none  Meds: ASA, labetalol 100mg PO BID, s/p covid and flu vaccine  All: none  Social: , denies t/e/d  Ob hx:   - - 40 week   - 2020-  6/7 week , IOL due to HTN.  Baby was FGR, VSD.     ROS:   10 pt ROS negative    PE not performed today      Reviewed labs  Baseline preE labs wnl.  Neg proteinuria.     Chronic Hypertension    The concern with chronic hypertension is that such patients are more likely to develop preeclampsia during the pregnancy, with a risk of at least 20% in the general population but much higher in her as listed above. Low dose aspirin has been used to lower this risk. We also reviewed with risks of fetal growth restriction, stillbirth, placental abruption, and maternal stroke in the setting of uncontrolled hypertension.     Without baseline laboratory assessment, it may be difficult to distinguish an exacerbation of hypertension from preeclampsia, especially in the third trimester. We reviewed that it is   generally anticipated that blood pressure will gradually decrease during early pregnancy, reaching at jabier at 28-32 weeks, and then slowly rise to pre-pregnancy levels.     Guidelines suggest starting antihypertensive medication in women with chronic hypertension if the systolic blood pressure is persistently 160 mmHg or higher or the diastolic blood pressure is persistently 105 mmHg or higher or if there is evidence of end organ damage. Treatment with antihypertensives is generally used to maintain blood pressure in the general range of 120-160 mmHg systolic and  mmHg diastolic. Lower blood pressures may increase the risk of fetal growth restriction. The primary reason for antihypertensive  is to reduce the risk of maternal stroke. Antihypertensives with studied safety profiles in pregnancy include aldomet, nifedipine, or labetolol. Unfortunately, even exquisite control of blood pressure does not reduce the risk of superimposed preeclampsia.     Recommendations:    Continue necessary medications    Low dose aspirin for preeclampsia prevention    Baseline studies:   o Performed and negative    Close monitoring for evidence of superimposed preeclampsia  o Repeat labs and 24 hour urine evaluation as clinically indicated, with a low threshold to rule-out superimposed preeclampsia, especially if it is thought that medication may need to be increased.   o Close monitoring of maternal blood pressure in office and with home blood pressure monitoring.   o Signs and symptoms of preeclampsia reviewed.     Ultrasounds for growth monthly after 24 weeks     fetal surveillance with weekly BPP (or NST and MVP) starting at 32-36 weeks depending on severity of maternal disease    Delivery by 39 weeks  - A repeat US and fetal echo will be performed here in 3 weeks due to suboptimal anatomy    I spent a total of 22 minutes with patient  - 15 min in counseling  - 5 min in chart review  - 2 min documentation    Mikaela Tierney DO FACOG  Maternal Fetal Medicine Specialist  Pager: [phone]  Mobile: [phone]

## 2021-11-12 NOTE — NURSING NOTE
Patient here for MFM consult and level 2 ultrasound due to CHTN, History of PreE and Hx of GDM.  Fam accompanied by her  and infant daughter at todays visit.   Dr. Tierney in to discuss plan of care for pregnancy with patient.  Patient to have f/u Level 2 in 3-4 weeks due to sub-optimal anatomy views.  Patient discharged ambulatory and stable.

## 2021-11-15 DIAGNOSIS — O09.299 CURRENT SINGLETON PREGNANCY WITH HISTORY OF CONGENITAL HEART DISEASE IN PRIOR CHILD, ANTEPARTUM: Primary | ICD-10-CM

## 2021-12-03 ENCOUNTER — HOSPITAL ENCOUNTER (OUTPATIENT)
Dept: ULTRASOUND IMAGING | Facility: CLINIC | Age: 36
End: 2021-12-03
Attending: OBSTETRICS & GYNECOLOGY
Payer: COMMERCIAL

## 2021-12-03 ENCOUNTER — OFFICE VISIT (OUTPATIENT)
Dept: MATERNAL FETAL MEDICINE | Facility: CLINIC | Age: 36
End: 2021-12-03
Attending: OBSTETRICS & GYNECOLOGY
Payer: COMMERCIAL

## 2021-12-03 DIAGNOSIS — O09.522 MULTIGRAVIDA OF ADVANCED MATERNAL AGE IN SECOND TRIMESTER: Primary | ICD-10-CM

## 2021-12-03 DIAGNOSIS — O09.299 CURRENT SINGLETON PREGNANCY WITH HISTORY OF CONGENITAL HEART DISEASE IN PRIOR CHILD, ANTEPARTUM: ICD-10-CM

## 2021-12-03 DIAGNOSIS — O26.90 PREGNANCY RELATED CONDITION, ANTEPARTUM: ICD-10-CM

## 2021-12-03 PROCEDURE — 76816 OB US FOLLOW-UP PER FETUS: CPT

## 2021-12-03 PROCEDURE — 76816 OB US FOLLOW-UP PER FETUS: CPT | Mod: 26 | Performed by: OBSTETRICS & GYNECOLOGY

## 2021-12-03 NOTE — PROGRESS NOTES
Please refer to ultrasound report under 'Imaging' Studies of 'Chart Review' tabs.    Junito Mack M.D.

## 2021-12-10 ENCOUNTER — PRENATAL OFFICE VISIT (OUTPATIENT)
Dept: OBGYN | Facility: CLINIC | Age: 36
End: 2021-12-10
Payer: COMMERCIAL

## 2021-12-10 VITALS
SYSTOLIC BLOOD PRESSURE: 138 MMHG | DIASTOLIC BLOOD PRESSURE: 78 MMHG | TEMPERATURE: 97.9 F | HEART RATE: 93 BPM | WEIGHT: 183 LBS | BODY MASS INDEX: 32.42 KG/M2

## 2021-12-10 DIAGNOSIS — Z34.82 PRENATAL CARE, SUBSEQUENT PREGNANCY IN SECOND TRIMESTER: Primary | ICD-10-CM

## 2021-12-10 PROCEDURE — 99207 PR PRENATAL VISIT: CPT | Performed by: OBSTETRICS & GYNECOLOGY

## 2021-12-10 NOTE — PROGRESS NOTES
[facility]   OB/GYN Clinic     CC: F/U OB      Subjective:     Halima is a 36 year old  at 22w5d for return OB visit.   Feeling well. No n/v, LOF or VB. +FM.       Objective:  /78 (BP Location: Right arm, Patient Position: Chair, Cuff Size: Adult Large)   Pulse 93   Temp 97.9  F (36.6  C) (Tympanic)   Wt 83 kg (183 lb)   LMP 2021 (Exact Date)   Breastfeeding No   BMI 32.42 kg/m       Physical Exam:  Gen: Pleasant, talkative female in no apparent distress   Respiratory: breathing comfortably on room air   Cardiac: Regular rate, warm and well-perfused.   GI: Abd soft and non-tender  MSK: Grossly normal movement of all four extremities  Psych: mood and affect bright   Lower extremity: edema not present      See OB flowsheet       Assessment/Plan:   36 year old  at 22w5d by LMP c/w 8w4d US who presents for F/UOB visit.   1) normal labs  2) NIPT nl, BOY - ADALI  3) L2 nl, plan for pp fetal ECHO   4) Concerns: none  5) PMH/OBHx problems:                -cHTN, hx of pre-e: no meds, HELLP labs, ASA, L2, on labetalol 100mg BID, BP at goal                - hx of baby with a VSD; plan pp fetal ECHO                - AMA; L2, NIPT nl                -GDMA1 hx; hgba1c nl    - S>D; recent growth at 80%ile, watch fundal heights at next appt   6) S/P COVID and flu, recommended booster      Return to clinic in 4 weeks. FM and labor precautions reviewed.      Meenakshi Swanson MD  OB/GYN  12/10/2021

## 2021-12-10 NOTE — NURSING NOTE
"Initial /78 (BP Location: Right arm, Patient Position: Chair, Cuff Size: Adult Large)   Pulse 93   Temp 97.9  F (36.6  C) (Tympanic)   Wt 83 kg (183 lb)   LMP 07/04/2021 (Exact Date)   Breastfeeding No   BMI 32.42 kg/m   Estimated body mass index is 32.42 kg/m  as calculated from the following:    Height as of 10/29/21: 1.6 m (5' 3\").    Weight as of this encounter: 83 kg (183 lb). .    Sahara See MA    "

## 2021-12-24 ENCOUNTER — IMMUNIZATION (OUTPATIENT)
Dept: FAMILY MEDICINE | Facility: CLINIC | Age: 36
End: 2021-12-24
Payer: COMMERCIAL

## 2021-12-24 DIAGNOSIS — Z23 HIGH PRIORITY FOR 2019-NCOV VACCINE: Primary | ICD-10-CM

## 2021-12-24 PROCEDURE — 91300 COVID-19,PF,PFIZER (12+ YRS): CPT

## 2021-12-24 PROCEDURE — 0004A COVID-19,PF,PFIZER (12+ YRS): CPT

## 2022-01-14 ENCOUNTER — PRENATAL OFFICE VISIT (OUTPATIENT)
Dept: OBGYN | Facility: CLINIC | Age: 37
End: 2022-01-14
Payer: COMMERCIAL

## 2022-01-14 VITALS
RESPIRATION RATE: 16 BRPM | TEMPERATURE: 98.7 F | SYSTOLIC BLOOD PRESSURE: 134 MMHG | DIASTOLIC BLOOD PRESSURE: 72 MMHG | HEIGHT: 63 IN | HEART RATE: 104 BPM | WEIGHT: 184.2 LBS | BODY MASS INDEX: 32.64 KG/M2

## 2022-01-14 DIAGNOSIS — Z34.82 PRENATAL CARE, SUBSEQUENT PREGNANCY IN SECOND TRIMESTER: Primary | ICD-10-CM

## 2022-01-14 LAB
ERYTHROCYTE [DISTWIDTH] IN BLOOD BY AUTOMATED COUNT: 13.2 % (ref 10–15)
GLUCOSE 1H P 50 G GLC PO SERPL-MCNC: 152 MG/DL (ref 70–129)
HCT VFR BLD AUTO: 32.3 % (ref 35–47)
HGB BLD-MCNC: 11 G/DL (ref 11.7–15.7)
MCH RBC QN AUTO: 29.6 PG (ref 26.5–33)
MCHC RBC AUTO-ENTMCNC: 34.1 G/DL (ref 31.5–36.5)
MCV RBC AUTO: 87 FL (ref 78–100)
PLATELET # BLD AUTO: 287 10E3/UL (ref 150–450)
RBC # BLD AUTO: 3.72 10E6/UL (ref 3.8–5.2)
WBC # BLD AUTO: 10.9 10E3/UL (ref 4–11)

## 2022-01-14 PROCEDURE — 36415 COLL VENOUS BLD VENIPUNCTURE: CPT | Performed by: OBSTETRICS & GYNECOLOGY

## 2022-01-14 PROCEDURE — 82950 GLUCOSE TEST: CPT | Performed by: OBSTETRICS & GYNECOLOGY

## 2022-01-14 PROCEDURE — 90715 TDAP VACCINE 7 YRS/> IM: CPT | Performed by: OBSTETRICS & GYNECOLOGY

## 2022-01-14 PROCEDURE — 99207 PR PRENATAL VISIT: CPT | Performed by: OBSTETRICS & GYNECOLOGY

## 2022-01-14 PROCEDURE — 85027 COMPLETE CBC AUTOMATED: CPT | Performed by: OBSTETRICS & GYNECOLOGY

## 2022-01-14 PROCEDURE — 90471 IMMUNIZATION ADMIN: CPT | Performed by: OBSTETRICS & GYNECOLOGY

## 2022-01-14 PROCEDURE — 86780 TREPONEMA PALLIDUM: CPT | Performed by: OBSTETRICS & GYNECOLOGY

## 2022-01-14 ASSESSMENT — MIFFLIN-ST. JEOR: SCORE: 1494.66

## 2022-01-14 NOTE — PROGRESS NOTES
Prior to immunization administration, verified patients identity using patient s name and date of birth. Please see Immunization Activity for additional information.     Screening Questionnaire for Adult Immunization    Are you sick today?   No   Do you have allergies to medications, food, a vaccine component or latex?   No   Have you ever had a serious reaction after receiving a vaccination?   No   Do you have a long-term health problem with heart, lung, kidney, or metabolic disease (e.g., diabetes), asthma, a blood disorder, no spleen, complement component deficiency, a cochlear implant, or a spinal fluid leak?  Are you on long-term aspirin therapy?   No   Do you have cancer, leukemia, HIV/AIDS, or any other immune system problem?   No   Do you have a parent, brother, or sister with an immune system problem?   No   In the past 3 months, have you taken medications that affect  your immune system, such as prednisone, other steroids, or anticancer drugs; drugs for the treatment of rheumatoid arthritis, Crohn s disease, or psoriasis; or have you had radiation treatments?   No   Have you had a seizure, or a brain or other nervous system problem?   No   During the past year, have you received a transfusion of blood or blood    products, or been given immune (gamma) globulin or antiviral drug?   No   For women: Are you pregnant or is there a chance you could become       pregnant during the next month?   yes   Have you received any vaccinations in the past 4 weeks?   No             Per orders of Dr. Swanson, injection of TDAP given by Mandi Robert MA. Patient instructed to remain in clinic for 15 minutes afterwards, and to report any adverse reaction to me immediately.       Screening performed by Mandi Robert MA on 1/14/2022 at 9:52 AM.

## 2022-01-14 NOTE — NURSING NOTE
"Initial /72 (BP Location: Right arm, Patient Position: Chair, Cuff Size: Adult Regular)   Pulse 104   Temp 98.7  F (37.1  C) (Tympanic)   Resp 16   Ht 1.6 m (5' 3\")   Wt 83.6 kg (184 lb 3.2 oz)   LMP 07/04/2021 (Exact Date)   BMI 32.63 kg/m   Estimated body mass index is 32.63 kg/m  as calculated from the following:    Height as of this encounter: 1.6 m (5' 3\").    Weight as of this encounter: 83.6 kg (184 lb 3.2 oz). .    Mandi Robert LPN  "

## 2022-01-14 NOTE — PROGRESS NOTES
"[facility]   OB/GYN Clinic    CC: Return OB     Subjective:    Halima is a 36 year old  at 27w5d by Patient's last menstrual period was 2021 (exact date). who presents for return OB visit. She reports feeling well. Denies uterine cramping, vaginal bleeding or leaking, dysuria. +fetal movement.     Objective:  /72 (BP Location: Right arm, Patient Position: Chair, Cuff Size: Adult Regular)   Pulse 104   Temp 98.7  F (37.1  C) (Tympanic)   Resp 16   Ht 1.6 m (5' 3\")   Wt 83.6 kg (184 lb 3.2 oz)   LMP 2021 (Exact Date)   BMI 32.63 kg/m      Estimated body mass index is 32.63 kg/m  as calculated from the following:    Height as of this encounter: 1.6 m (5' 3\").    Weight as of this encounter: 83.6 kg (184 lb 3.2 oz).    Physical Exam:  Gen: Pleasant, talkative female in no apparent distress   Respiratory: breathing comfortably on room air   Cardiac: Warm and well-perfused.   GI: Abd soft and non-tender, gravid  MSK: Grossly normal movement of all four extremities  Psych: mood and affect bright     See OB flowsheet    Assessment/Plan:   36 year old  at 27w5d by LMP c/w 8w4d US who presents for follow-up OB visit.   1) 3rd tri labs today.   2) PMH/OBHx problems:    -cHTN, hx of pre-e: base line HELLP labs nl, ASA, L2, on labetalol 100mg BID, BP at goal                - hx of baby with a VSD; plan pp fetal ECHO                - AMA; L2, NIPT nl                -GDMA1 hx; hgba1c nl                - S>D; recent growth at 80%ile, watch fundal heights at appt, normal today   3) Immunizations: flu shot s/p, Tdap today, s/p COVID     Return to clinic in 2 weeks. Labor and FM precautions reviewed.     Meenakshi Swanson MD  OB/GYN       "

## 2022-01-15 LAB — T PALLIDUM AB SER QL: NONREACTIVE

## 2022-01-21 ENCOUNTER — DOCUMENTATION ONLY (OUTPATIENT)
Dept: LAB | Facility: CLINIC | Age: 37
End: 2022-01-21
Payer: COMMERCIAL

## 2022-01-21 DIAGNOSIS — Z34.83 PRENATAL CARE, SUBSEQUENT PREGNANCY IN THIRD TRIMESTER: Primary | ICD-10-CM

## 2022-01-28 ENCOUNTER — PRENATAL OFFICE VISIT (OUTPATIENT)
Dept: OBGYN | Facility: CLINIC | Age: 37
End: 2022-01-28
Payer: COMMERCIAL

## 2022-01-28 ENCOUNTER — LAB (OUTPATIENT)
Dept: LAB | Facility: CLINIC | Age: 37
End: 2022-01-28
Payer: COMMERCIAL

## 2022-01-28 VITALS
BODY MASS INDEX: 32.96 KG/M2 | RESPIRATION RATE: 16 BRPM | WEIGHT: 186 LBS | HEIGHT: 63 IN | HEART RATE: 107 BPM | DIASTOLIC BLOOD PRESSURE: 77 MMHG | SYSTOLIC BLOOD PRESSURE: 136 MMHG | TEMPERATURE: 98.4 F

## 2022-01-28 DIAGNOSIS — Z34.83 PRENATAL CARE, SUBSEQUENT PREGNANCY IN THIRD TRIMESTER: Primary | ICD-10-CM

## 2022-01-28 DIAGNOSIS — Z34.83 PRENATAL CARE, SUBSEQUENT PREGNANCY IN THIRD TRIMESTER: ICD-10-CM

## 2022-01-28 LAB
GESTATIONAL GTT 1 HR POST DOSE: 186 MG/DL (ref 60–179)
GESTATIONAL GTT 2 HR POST DOSE: 152 MG/DL (ref 60–154)
GESTATIONAL GTT 3 HR POST DOSE: 149 MG/DL (ref 60–139)
GLUCOSE P FAST SERPL-MCNC: 90 MG/DL (ref 60–94)

## 2022-01-28 PROCEDURE — 36415 COLL VENOUS BLD VENIPUNCTURE: CPT

## 2022-01-28 PROCEDURE — 99207 PR PRENATAL VISIT: CPT | Performed by: OBSTETRICS & GYNECOLOGY

## 2022-01-28 PROCEDURE — 82952 GTT-ADDED SAMPLES: CPT

## 2022-01-28 PROCEDURE — 82951 GLUCOSE TOLERANCE TEST (GTT): CPT

## 2022-01-28 ASSESSMENT — MIFFLIN-ST. JEOR: SCORE: 1502.82

## 2022-01-28 NOTE — NURSING NOTE
"Initial /77 (BP Location: Right arm, Patient Position: Chair, Cuff Size: Adult Regular)   Pulse 107   Temp 98.4  F (36.9  C) (Tympanic)   Resp 16   Ht 1.6 m (5' 3\")   Wt 84.4 kg (186 lb)   LMP 07/04/2021 (Exact Date)   BMI 32.95 kg/m   Estimated body mass index is 32.95 kg/m  as calculated from the following:    Height as of this encounter: 1.6 m (5' 3\").    Weight as of this encounter: 84.4 kg (186 lb). .    Mandi Robert LPN  "

## 2022-01-28 NOTE — PROGRESS NOTES
"[facility]   OB/GYN Clinic     CC: Return OB      Subjective:     Halima is a 36 year old  at 29w5d by Patient's last menstrual period was 2021 (exact date). who presents for return OB visit. She reports feeling well. Denies uterine cramping, vaginal bleeding or leaking, dysuria. +fetal movement.      Objective:  /77 (BP Location: Right arm, Patient Position: Chair, Cuff Size: Adult Regular)   Pulse 107   Temp 98.4  F (36.9  C) (Tympanic)   Resp 16   Ht 1.6 m (5' 3\")   Wt 84.4 kg (186 lb)   LMP 2021 (Exact Date)   BMI 32.95 kg/m      Physical Exam:  Gen: Pleasant, talkative female in no apparent distress   Respiratory: breathing comfortably on room air   Cardiac: Warm and well-perfused.   GI: Abd soft and non-tender, gravid  MSK: Grossly normal movement of all four extremities  Psych: mood and affect bright      See OB flowsheet     Assessment/Plan:   36 year old  at 29w5d by LMP c/w 8w4d US who presents for follow-up OB visit.   1) 3rd tri labs today.   2) PMH/OBHx problems:                -cHTN, hx of pre-e: base line HELLP labs nl, ASA, L2, on labetalol 100mg BID, BP at goal, IOL at 38wks               - hx of baby with a VSD; plan pp fetal ECHO                - AMA; L2, NIPT nl                -GDMA1 hx; hgba1c nl, doing GTT today                -S>D; recent growth at 80%ile, watch fundal heights at appt, normal today   3) Immunizations: flu shot s/p, s/p Tdap, s/p COVID      Return to clinic in 2 weeks. Labor and FM precautions reviewed.      Meenakshi Swanson MD  OB/GYN   "

## 2022-02-04 ENCOUNTER — VIRTUAL VISIT (OUTPATIENT)
Dept: EDUCATION SERVICES | Facility: CLINIC | Age: 37
End: 2022-02-04
Payer: COMMERCIAL

## 2022-02-04 DIAGNOSIS — Z34.83 PRENATAL CARE, SUBSEQUENT PREGNANCY IN THIRD TRIMESTER: ICD-10-CM

## 2022-02-04 DIAGNOSIS — O24.419 GESTATIONAL DIABETES MELLITUS, ANTEPARTUM: ICD-10-CM

## 2022-02-04 PROCEDURE — 98968 PH1 ASSMT&MGMT NQHP 21-30: CPT | Mod: TEL | Performed by: DIETITIAN, REGISTERED

## 2022-02-04 RX ORDER — LANCETS
EACH MISCELLANEOUS
Qty: 100 EACH | Refills: 4 | OUTPATIENT
Start: 2022-02-04 | End: 2022-02-04

## 2022-02-04 RX ORDER — LANCETS 33 GAUGE
1 EACH MISCELLANEOUS 4 TIMES DAILY
Qty: 100 EACH | Refills: 3 | Status: SHIPPED | OUTPATIENT
Start: 2022-02-04 | End: 2022-07-26

## 2022-02-04 RX ORDER — BLOOD-GLUCOSE METER
1 EACH MISCELLANEOUS ONCE
Qty: 1 KIT | Refills: 0 | OUTPATIENT
Start: 2022-02-04 | End: 2022-02-04

## 2022-02-04 RX ORDER — BLOOD SUGAR DIAGNOSTIC
STRIP MISCELLANEOUS
Qty: 200 STRIP | Refills: 3 | OUTPATIENT
Start: 2022-02-04 | End: 2022-02-04

## 2022-02-04 RX ORDER — BLOOD SUGAR DIAGNOSTIC
STRIP MISCELLANEOUS
Qty: 200 STRIP | Refills: 3 | Status: SHIPPED | OUTPATIENT
Start: 2022-02-04 | End: 2022-07-26

## 2022-02-04 RX ORDER — BLOOD-GLUCOSE METER
1 EACH MISCELLANEOUS ONCE
Qty: 1 KIT | Refills: 0 | Status: SHIPPED | OUTPATIENT
Start: 2022-02-04 | End: 2022-02-04

## 2022-02-04 NOTE — LETTER
2/4/2022         RE: Halima Razo  [address] [address]        Dear Colleague,    Thank you for referring your patient, Halima Razo, to the [facility]. Please see a copy of my visit note below.    Diabetes Self-Management Education & Support      SUBJECTIVE/OBJECTIVE:  Presents for education related to gestational diabetes.    Type of Service: Telephone Visit    Originating Location (Patient Location): Home  Distant Location (Provider Location): Home  Mode of Communication:  Telephone    Telephone Visit Start Time: 9:30  Telephone Visit End Time (telephone visit stop time): 9:56    How would patient like to obtain AVS? MyChart      Accompanied by: Self  Gestational weeks: 30 5  [facility]  Number of previous pregnancies: 1  Had any babies over 9 lbs: No  Previously had Gestational Diabetes: Yes  Had Diabetes Education before: Yes  Previous insulin or other diabetes medication during that preganancy: No  Have you ever had thyroid problems or taken thyroid medication?: No  Heart disease, mitral valve prolapse or rheumatic fever?: No  Hypertension : (!) Yes (elevated BP with pregnancy)  High Cholesterol: No  High Triglycerides: No  Do you use tobacco products?: No  Do you drink beer, wine or hard liquor?: No    Cultural Influences/Ethnic Background:  Not  or     Estimated Date of Delivery: Apr 10, 2022    1 hour OGTT  Lab Results   Component Value Date    GLU1 152 (H) 01/14/2022         3 hour OGTT    Fasting  Lab Results   Component Value Date    GTTGF 90 01/28/2022       1 hour  Lab Results   Component Value Date    GTTG1 186 (H) 01/28/2022       2 hour  Lab Results   Component Value Date    GTTG2 152 01/28/2022       3 hour  Lab Results   Component Value Date    GTTG3 149 (H) 01/28/2022       Lifestyle and Health Behaviors:  Pre-pregnancy weight (lbs): 180 (182 this morning)  Exercise:: Currently not  exercising  Cultural/Buddhism diet restrictions?: No  Meal planning/habits: Carb counting,Avoiding sweets  Meals include: Breakfast,Lunch,Dinner,Morning Snack,Afternoon Snack,Evening Snack  Breakfast: leftover chicken cheddar vegetable soup OR 2 eggs, ham, theodore, OR whole wheat tortilla with cheese and chicken breast --with water and diet coke  Lunch: 1 oz munster cheese, oven roasted turkey breast OR 2 beef sticks and 2 string cheese OR dill piclkles, hard salami, feliciano lettuce and cheese and ranch dressing OR rotisseri chicken, green beans, black cherry yogurt  Dinner: taco bell power bowl with no rice, OR cheeseburger with no bun OR 1/4 cup seafood salad, 1/4 cup quinoa salad and grilled chicken thigh  Snacks: pistachios, sliced cucumbers with lemon and salt, sunflower seeds, low sugar yogurt  Beverages: Water,Diet soda  Pre-duane vitamin?: Yes  Supplements?: Yes  List supplements currently taking: magnesium  Experiencing nausea?: No (prenatal without eating makes her nauseas)  Experiencing heartburn?: Yes (constant heart burn)    Healthy Coping:       Current Management:  Taking medications for gestational diabetes?: No  Difficulty affording diabetes medication?: No  Difficulty affording diabetes testing supplies?: No    Date Breakfast  Lunch  Dinner  Bedtime    Before After Before After Before After     103         2/3 77 85  77  95     83   89  85     77 98  84  89        88  92        ASSESSMENT:  Fam had GDM with her last pregnancy in 2020. She states that the GDM is education is still pretty fresh in her mind. We reviewed BG and ketone testing. Sent patient new BG meter and supplies. We discussed meal planning, Fam states that she cut back on carbohydrates significantly. We discussed the GDM meal plan and carb goals.  Fam agrees to increasing carbohydrate intake.     INTERVENTION:.    Educational topics covered today:  GDM diagnosis, Means of controlling GDM,  Blood Glucose Goals,  Logging and Interpreting Glucose Results, Ketone Testing, When to Call a Diabetes Educator or OB Provider, Healthy Eating During Pregnancy, Counting Carbohydrates, Meal Planning for GDM, and Physical Activity    Educational materials provided today:   Venkat Understanding Gestational Diabetes  GDM Log Book  Sharps Disposal  Care After Delivery    Pt verbalized understanding of concepts discussed and recommendations provided today.     PLAN:  Check glucose 4 times daily, before breakfast and 1 hour after each meal.     Check Ketones daily for one week, if negative, reduce testing to once a week.     Physical activity recommended: as tolerated.    Meal plan: 2-3 carbs at breakfast, 3-4 carbs at lunch, 3-4 carbs at supper, 1-2 carbs at 3 snacks a day.  Follow consistent CHO meal plan, eat CHO and protein/fat at all meals/snacks.    Call/e-mail/[employer] message diabetes educator if 3 or more blood sugars are above the goal in 1 week, if ketones are positive, or with questions/concerns.    Zaida Nguyen RD, LD, CDE  Time Spent: 26 minutes  Encounter Type: Individual    Any diabetes medication dose changes were made via the CDE Protocol and Collaborative Practice Agreement with the patient's referring provider. A copy of this encounter was shared with the provider.

## 2022-02-04 NOTE — PROGRESS NOTES
Diabetes Self-Management Education & Support      SUBJECTIVE/OBJECTIVE:  Presents for education related to gestational diabetes.    Type of Service: Telephone Visit    Originating Location (Patient Location): Home  Distant Location (Provider Location): Home  Mode of Communication:  Telephone    Telephone Visit Start Time: 9:30  Telephone Visit End Time (telephone visit stop time): 9:56    How would patient like to obtain AVS? MyChart      Accompanied by: Self  Gestational weeks: 30 5  [facility]  Number of previous pregnancies: 1  Had any babies over 9 lbs: No  Previously had Gestational Diabetes: Yes  Had Diabetes Education before: Yes  Previous insulin or other diabetes medication during that preganancy: No  Have you ever had thyroid problems or taken thyroid medication?: No  Heart disease, mitral valve prolapse or rheumatic fever?: No  Hypertension : (!) Yes (elevated BP with pregnancy)  High Cholesterol: No  High Triglycerides: No  Do you use tobacco products?: No  Do you drink beer, wine or hard liquor?: No    Cultural Influences/Ethnic Background:  Not  or     Estimated Date of Delivery: Apr 10, 2022    1 hour OGTT  Lab Results   Component Value Date    GLU1 152 (H) 01/14/2022         3 hour OGTT    Fasting  Lab Results   Component Value Date    GTTGF 90 01/28/2022       1 hour  Lab Results   Component Value Date    GTTG1 186 (H) 01/28/2022       2 hour  Lab Results   Component Value Date    GTTG2 152 01/28/2022       3 hour  Lab Results   Component Value Date    GTTG3 149 (H) 01/28/2022       Lifestyle and Health Behaviors:  Pre-pregnancy weight (lbs): 180 (182 this morning)  Exercise:: Currently not exercising  Cultural/Zoroastrian diet restrictions?: No  Meal planning/habits: Carb counting,Avoiding sweets  Meals include: Breakfast,Lunch,Dinner,Morning Snack,Afternoon Snack,Evening Snack  Breakfast: leftover chicken cheddar vegetable soup OR 2 eggs, ham,  theodore, OR whole wheat tortilla with cheese and chicken breast --with water and diet coke  Lunch: 1 oz munster cheese, oven roasted turkey breast OR 2 beef sticks and 2 string cheese OR dill piclkles, hard salami, feliciano lettuce and cheese and ranch dressing OR rotisseri chicken, green beans, black cherry yogurt  Dinner: taco bell power bowl with no rice, OR cheeseburger with no bun OR 1/4 cup seafood salad, 1/4 cup quinoa salad and grilled chicken thigh  Snacks: pistachios, sliced cucumbers with lemon and salt, sunflower seeds, low sugar yogurt  Beverages: Water,Diet soda  Pre- vitamin?: Yes  Supplements?: Yes  List supplements currently taking: magnesium  Experiencing nausea?: No (prenatal without eating makes her nauseas)  Experiencing heartburn?: Yes (constant heart burn)    Healthy Coping:       Current Management:  Taking medications for gestational diabetes?: No  Difficulty affording diabetes medication?: No  Difficulty affording diabetes testing supplies?: No    Date Breakfast  Lunch  Dinner  Bedtime    Before After Before After Before After     103         2/3 77 85  77  95     83   89  85     77 98  84  89        88  92        ASSESSMENT:  Fam had GDM with her last pregnancy in 2020. She states that the GDM is education is still pretty fresh in her mind. We reviewed BG and ketone testing. Sent patient new BG meter and supplies. We discussed meal planning, Fam states that she cut back on carbohydrates significantly. We discussed the GDM meal plan and carb goals.  Fam agrees to increasing carbohydrate intake.     INTERVENTION:.    Educational topics covered today:  GDM diagnosis, Means of controlling GDM,  Blood Glucose Goals, Logging and Interpreting Glucose Results, Ketone Testing, When to Call a Diabetes Educator or OB Provider, Healthy Eating During Pregnancy, Counting Carbohydrates, Meal Planning for GDM, and Physical Activity    Educational materials provided today:   Venkat  Understanding Gestational Diabetes  GDM Log Book  Sharps Disposal  Care After Delivery    Pt verbalized understanding of concepts discussed and recommendations provided today.     PLAN:  Check glucose 4 times daily, before breakfast and 1 hour after each meal.     Check Ketones daily for one week, if negative, reduce testing to once a week.     Physical activity recommended: as tolerated.    Meal plan: 2-3 carbs at breakfast, 3-4 carbs at lunch, 3-4 carbs at supper, 1-2 carbs at 3 snacks a day.  Follow consistent CHO meal plan, eat CHO and protein/fat at all meals/snacks.    Call/e-mail/[employer] message diabetes educator if 3 or more blood sugars are above the goal in 1 week, if ketones are positive, or with questions/concerns.    Zaida Nguyen RD, LD, CDE  Time Spent: 26 minutes  Encounter Type: Individual    Any diabetes medication dose changes were made via the CDE Protocol and Collaborative Practice Agreement with the patient's referring provider. A copy of this encounter was shared with the provider.

## 2022-02-07 ENCOUNTER — PRENATAL OFFICE VISIT (OUTPATIENT)
Dept: OBGYN | Facility: CLINIC | Age: 37
End: 2022-02-07
Payer: COMMERCIAL

## 2022-02-07 VITALS
DIASTOLIC BLOOD PRESSURE: 59 MMHG | SYSTOLIC BLOOD PRESSURE: 132 MMHG | HEART RATE: 102 BPM | RESPIRATION RATE: 18 BRPM | TEMPERATURE: 97.9 F | HEIGHT: 63 IN | BODY MASS INDEX: 33.13 KG/M2 | WEIGHT: 187 LBS

## 2022-02-07 DIAGNOSIS — Z34.83 PRENATAL CARE, SUBSEQUENT PREGNANCY IN THIRD TRIMESTER: Primary | ICD-10-CM

## 2022-02-07 DIAGNOSIS — O24.410 DIET CONTROLLED GESTATIONAL DIABETES MELLITUS (GDM) IN THIRD TRIMESTER: ICD-10-CM

## 2022-02-07 PROCEDURE — 99207 PR PRENATAL VISIT: CPT | Performed by: ADVANCED PRACTICE MIDWIFE

## 2022-02-07 ASSESSMENT — MIFFLIN-ST. JEOR: SCORE: 1507.36

## 2022-02-07 NOTE — NURSING NOTE
"Initial /59 (BP Location: Right arm, Patient Position: Chair, Cuff Size: Adult Large)   Pulse 102   Temp 97.9  F (36.6  C) (Tympanic)   Resp 18   Ht 1.6 m (5' 3\")   Wt 84.8 kg (187 lb)   LMP 07/04/2021 (Exact Date)   Breastfeeding No   BMI 33.13 kg/m   Estimated body mass index is 33.13 kg/m  as calculated from the following:    Height as of this encounter: 1.6 m (5' 3\").    Weight as of this encounter: 84.8 kg (187 lb). .    Leonila Nelson, [facility]    "

## 2022-02-07 NOTE — PROGRESS NOTES
Feeling well.  Baby is active. Denies any leaking of fluid, vaginal bleeding, regular uterine contractions, or headaches or other concerns.  Recently diagnosed with GDM.  Working with Diabetes Ed.   Reviewed to call for contractions, loss of fluid, vaginal bleeding, decreased fetal movement or any other questions or concerns.    RTC in 2 weeks.  Cleopatra Varela, SEGUNDO, APRN, CNM

## 2022-02-21 ENCOUNTER — VIRTUAL VISIT (OUTPATIENT)
Dept: EDUCATION SERVICES | Facility: CLINIC | Age: 37
End: 2022-02-21
Payer: COMMERCIAL

## 2022-02-21 DIAGNOSIS — O24.410 DIET CONTROLLED GESTATIONAL DIABETES MELLITUS (GDM), ANTEPARTUM: Primary | ICD-10-CM

## 2022-02-21 PROCEDURE — 98967 PH1 ASSMT&MGMT NQHP 11-20: CPT | Performed by: DIETITIAN, REGISTERED

## 2022-02-21 NOTE — PROGRESS NOTES
Diabetes Self-Management Education & Support    SUBJECTIVE/OBJECTIVE:  Presents for education related to gestational diabetes.    Accompanied by: Self  Diabetes management related comments/concerns: none  Gestational weeks: 33w1d  Number of previous pregnancies: 1  Had any babies over 9 lbs: No  Previously had Gestational Diabetes: Yes  Had Diabetes Education before: Yes  Previous insulin or other diabetes medication during that preganancy: No  Have you ever had thyroid problems or taken thyroid medication?: No  Heart disease, mitral valve prolapse or rheumatic fever?: No  Hypertension : (!) Yes (elevated BP with pregnancy)  High Cholesterol: No  High Triglycerides: No  Do you use tobacco products?: No  Do you drink beer, wine or hard liquor?: No    Cultural Influences/Ethnic Background:  Not  or       LMP 07/04/2021 (Exact Date)     Wt Readings from Last 3 Encounters:   02/07/22 84.8 kg (187 lb)   01/28/22 84.4 kg (186 lb)   01/14/22 83.6 kg (184 lb 3.2 oz)     Estimated Date of Delivery: Apr 10, 2022    Blood Glucose/Ketone Log:    Date Ketones Fasting Post Breakfast Post Lunch Post Supper   2/15 large 92 89 110 106   2/16 large    98 91 97 102   2/17 large    89 101 103 140 (mecca lynch)   2/18 large    83 128 126 96   2/19 large    89 76 127 121   2/20 large    77 119 89 120   2/21 large    85 100         Lifestyle and Health Behaviors:  Pre-pregnancy weight (lbs): 180 (182 this morning)  Exercise:: Currently not exercising  Cultural/Anabaptist diet restrictions?: No  Meal planning/habits: Carb counting, Avoiding sweets  Meals include: Breakfast, Lunch, Dinner, Morning Snack, Afternoon Snack, Evening Snack  Breakfast: leftover chicken cheddar vegetable soup OR 2 eggs, ham, theodore, OR whole wheat tortilla with cheese and chicken breast --with water and diet coke  Lunch: 1 oz munster cheese, oven roasted turkey breast OR 2 beef sticks and 2 string cheese OR dill piclkles, hard salami, feliciano  lettuce and cheese and ranch dressing OR rotisseri chicken, green beans, black cherry yogurt  Dinner: taco bell power bowl with no rice, OR cheeseburger with no bun OR 1/4 cup seafood salad, 1/4 cup quinoa salad and grilled chicken thigh  Snacks: pistachios, sliced cucumbers with lemon and salt, sunflower seeds, low sugar yogurt  Beverages: Water, Diet soda  Pre-duane vitamin?: Yes  Supplements?: Yes  List supplements currently taking: magnesium  Experiencing nausea?: No (prenatal without eating makes her nauseas)  Experiencing heartburn?: Yes (constant heart burn)    Healthy Coping:  Stage of change: ACTION (Actively working towards change)    Current Management:  Taking medications for gestational diabetes?: No  Difficulty affording diabetes medication?: No  Difficulty affording diabetes testing supplies?: No    ASSESSMENT:  Ketones: large.   Fasting blood glucoses: 100% in target.  After breakfast: 100% in target.  After lunch: 100% in target.  After dinner: 100% in target.    Feels that she is doing well with meal plan, however may not always be hitting the minimum carbs at meals. Does want to keep numbers as low as she can, however a bit concerned about her ketone levels. Encouraged her to have at least the minimum carbs at meals and keep hydration up to see if we can help clear these ketones. She has an appt with OB Friday to view weight trends as well.     INTERVENTION:  Educational topics covered today:  What to expect after delivery, Future testing for Type 2 diabetes (2 hour OGTT at 6 week post-partum check-up and annual fasting blood glucose level), Risk of GDM and planning ahead for future pregnancies, Recommended lifestyle interventions for reducing the risk of Type 2 Diabetes, When to Call a Diabetes Educator or OB Provider    Educational Materials provided today:  Venkat Preventing Diabetes    PLAN:  Check glucose 4 times daily.  Check ketones once a week when readings are consistently  negative.  Continue with recommended physical activity.  Continue to follow recommended meal plan: 2-3 carbs at breakfast, 3-4 carbs at lunch, 3-4 carbs at supper, 1-2 carbs at snacks.  Follow consistent CHO meal plan, eat CHO and protein/fat at all meals/snacks.    Call/e-mail/MyChart message diabetes educator if 3 or more blood sugars are above the goal in 1 week or if ketones are positive.    [facility]  Time Spent: 19:31 minutes  Encounter Type: Individual    Any diabetes medication dose changes were made via the CDE Protocol and Collaborative Practice Agreement with the patient's OB/GYN provider. A copy of this encounter was shared with the provider.

## 2022-02-25 ENCOUNTER — ANCILLARY PROCEDURE (OUTPATIENT)
Dept: ULTRASOUND IMAGING | Facility: CLINIC | Age: 37
End: 2022-02-25
Attending: OBSTETRICS & GYNECOLOGY
Payer: COMMERCIAL

## 2022-02-25 ENCOUNTER — PRENATAL OFFICE VISIT (OUTPATIENT)
Dept: OBGYN | Facility: CLINIC | Age: 37
End: 2022-02-25
Payer: COMMERCIAL

## 2022-02-25 ENCOUNTER — MYC MEDICAL ADVICE (OUTPATIENT)
Dept: OBGYN | Facility: CLINIC | Age: 37
End: 2022-02-25

## 2022-02-25 VITALS
TEMPERATURE: 98.8 F | BODY MASS INDEX: 32.78 KG/M2 | SYSTOLIC BLOOD PRESSURE: 132 MMHG | DIASTOLIC BLOOD PRESSURE: 73 MMHG | HEIGHT: 63 IN | RESPIRATION RATE: 16 BRPM | WEIGHT: 185 LBS | HEART RATE: 93 BPM

## 2022-02-25 DIAGNOSIS — O10.913 CHRONIC HYPERTENSION WITH EXACERBATION DURING PREGNANCY IN THIRD TRIMESTER: Primary | ICD-10-CM

## 2022-02-25 DIAGNOSIS — O10.913 CHRONIC HYPERTENSION WITH EXACERBATION DURING PREGNANCY IN THIRD TRIMESTER: ICD-10-CM

## 2022-02-25 PROCEDURE — 99207 PR PRENATAL VISIT: CPT | Performed by: OBSTETRICS & GYNECOLOGY

## 2022-02-25 PROCEDURE — 76819 FETAL BIOPHYS PROFIL W/O NST: CPT | Performed by: RADIOLOGY

## 2022-02-25 PROCEDURE — 76816 OB US FOLLOW-UP PER FETUS: CPT | Performed by: RADIOLOGY

## 2022-02-25 NOTE — PROGRESS NOTES
"[facility]   OB/GYN Clinic     CC: Return OB      Subjective:     Halima is a 36 year old  at 33w5d by Patient's last menstrual period was 2021 (exact date). who presents for return OB visit. She reports feeling well. Denies uterine cramping, vaginal bleeding or leaking, dysuria. +fetal movement.   Fastings in 80. 1hr . All with diet adjustments.      Objective:  /73 (BP Location: Right arm, Patient Position: Chair, Cuff Size: Adult Regular)   Pulse 93   Temp 98.8  F (37.1  C) (Tympanic)   Resp 16   Ht 1.6 m (5' 3\")   Wt 83.9 kg (185 lb)   LMP 2021 (Exact Date)   BMI 32.77 kg/m       Physical Exam:  Gen: Pleasant, talkative female in no apparent distress   Respiratory: breathing comfortably on room air   Cardiac: Warm and well-perfused.   GI: Abd soft and non-tender, gravid  MSK: Grossly normal movement of all four extremities  Psych: mood and affect bright      See OB flowsheet     Assessment/Plan:   36 year old  at 33w5d by LMP c/w 8w4d US who presents for follow-up OB visit.   1) 3rd tri labs today.   2) PMH/OBHx problems:                -cHTN, hx of pre-e: base line HELLP labs nl, ASA, L2, on labetalol 100mg BID, BP at goal, IOL at 38wks, serial growths and BPPs                - hx of baby with a VSD; plan pp fetal ECHO                - AMA; L2, NIPT nl                -GDMA1; all blood sugars at goal with diet                 -S>D; recent growth at 80%ile > repeat ordered  3) Immunizations: flu shot s/p, s/p Tdap, s/p COVID      Return to clinic in 2 weeks. Labor and FM precautions reviewed.      Meenakshi Swanson MD  OB/GYN   "

## 2022-02-25 NOTE — PROGRESS NOTES
"Initial /73 (BP Location: Right arm, Patient Position: Chair, Cuff Size: Adult Regular)   Pulse 93   Temp 98.8  F (37.1  C) (Tympanic)   Resp 16   Ht 1.6 m (5' 3\")   Wt 83.9 kg (185 lb)   LMP 07/04/2021 (Exact Date)   BMI 32.77 kg/m   Estimated body mass index is 32.77 kg/m  as calculated from the following:    Height as of this encounter: 1.6 m (5' 3\").    Weight as of this encounter: 83.9 kg (185 lb). .      "

## 2022-02-28 ENCOUNTER — MYC MEDICAL ADVICE (OUTPATIENT)
Dept: EDUCATION SERVICES | Facility: CLINIC | Age: 37
End: 2022-02-28
Payer: COMMERCIAL

## 2022-02-28 NOTE — TELEPHONE ENCOUNTER
Gestational Diabetes Follow-up    Subjective/Objective:    Halima WOODWARD Dung sent in blood glucose log for review. Last date of communication was: 2/21.    Gestational diabetes is being managed with diet and activity    Taking diabetes medications: no    Estimated Date of Delivery: Apr 10, 2022    BG/Food Log:   Morning! I think you are who I'm supposed to send this to if I remember correctly per my discussion with the nutritionist last week. We discussed my ketone levels being up be she didn't seem too concerned since weights been steady and numbers have otherwise been good.      I had two high glucose readings. After we spoke I tried upping my carbs a bit but I went too far on my lunch on the 21st (tried pizza) and breakfast on the 23rd (tried adding some fruit). Otherwise everything has been fine        Assessment:    Ketones: S - L.   Fasting blood glucoses: 100% in target.  After breakfast: 86% in target.  After lunch: 86% in target.  After dinner: 100% in target.    Plan/Response:  Meal Plan Recommendation: 30 carbs at breakfast, 45 carbs at lunch, 45 carbs at supper, 5 carbs at each of 3 snacks between meals, nightly HS snack of pro + carb  Follow-up on Monday.    Reply:  John Otto,   Thanks for the update! 3 cheers for you trying to make adjustments :)  ~ I checked Sophia's note from last week, I think the main idea is to continue to get minimum recommended carb throughout the day which is 30 grams at breakfast, 45 at lunch, and 45 at dinner along with 15 grams carb at snacks in AM, afternoon, and bedtime.     A few helpful hints :)  ~ most moms can't tolerated fruit or cold cereal at breakfast, so think about toast, toaster waffle, whole wheat English muffin, corn tortilla, oatmeal, etc as breakfast options along with strong protein like egg, chicken, cheese  ~ pizza is kind of famous for not behaving as predicted and looks like it was a tricky one for you, but you tried it! Maybe no need to try again  ~ to  get more carbs, think about those whole grain crackers, bread, pastas, even sweet potato or potato along with fruit any time except breakfast, yogurt or a glass of milk or milk alternative can add carb too    The ketones are a hint that you're running out of nourishment for you and baby overnight, so if you can experiment with adding a carb + protein snack right before bed, that might make a nice difference ;) Like cheese and crackers, half a meat sandwich, fruit and cottage cheese, leftover dinner like chicken and mashed potatoes, you get the idea    Best news of all, nearly all the numbers are in target as you mentioned, yay! You're doing super! Just thinking about a little fine tuning for those ketones.   Can you please send again next week? It's a work in progress :)    Thanks, Fam!   Bria Ortiz RD, LD, [place]      Any diabetes medication dose changes were made via the CDE Protocol and Collaborative Practice Agreement with the patient's OB/GYN provider.

## 2022-03-01 ENCOUNTER — HOSPITAL ENCOUNTER (OUTPATIENT)
Dept: ULTRASOUND IMAGING | Facility: CLINIC | Age: 37
Discharge: HOME | End: 2022-03-01
Attending: OBSTETRICS & GYNECOLOGY | Admitting: OBSTETRICS & GYNECOLOGY
Payer: COMMERCIAL

## 2022-03-01 DIAGNOSIS — O10.913 CHRONIC HYPERTENSION WITH EXACERBATION DURING PREGNANCY IN THIRD TRIMESTER: ICD-10-CM

## 2022-03-01 PROBLEM — Z34.90 ENCOUNTER FOR INDUCTION OF LABOR: Status: ACTIVE | Noted: 2022-03-01

## 2022-03-01 PROCEDURE — 76819 FETAL BIOPHYS PROFIL W/O NST: CPT

## 2022-03-10 ENCOUNTER — HOSPITAL ENCOUNTER (OUTPATIENT)
Dept: ULTRASOUND IMAGING | Facility: CLINIC | Age: 37
Discharge: HOME | End: 2022-03-10
Attending: OBSTETRICS & GYNECOLOGY | Admitting: OBSTETRICS & GYNECOLOGY
Payer: COMMERCIAL

## 2022-03-10 DIAGNOSIS — O10.913 CHRONIC HYPERTENSION WITH EXACERBATION DURING PREGNANCY IN THIRD TRIMESTER: ICD-10-CM

## 2022-03-10 PROCEDURE — 76819 FETAL BIOPHYS PROFIL W/O NST: CPT

## 2022-03-10 NOTE — TELEPHONE ENCOUNTER
Gestational Diabetes Follow-up    Subjective/Objective:    Halima Razo sent in blood glucose log for review. Last date of communication was: 3/3.    Gestational diabetes is being managed with diet and activity    Taking diabetes medications: no    Estimated Date of Delivery: Apr 10, 2022    BG/Food Log:       Assessment:    Ketones: not noted.   Fasting blood glucoses: 100% in target.  After breakfast: 100% in target.  Before lunch: x% in target.  After lunch: 100% in target.  Before dinner: x% in target.  After dinner: 100% in target.    Plan/Response:  Follow-up in 1 week.    Reply:  John Otto!  Sorry, I think we're late with this!  It showed up dated 2/28, I think it was a reply to a note that's closed and I think we missed it.   BUT, I saw numbers and all is right on track :) Great!    You're doing so well!  I would suggest send again in a week, the end of next week maybe Thursday or Friday.   If you can, please start a new note in mychart :) Choose Marissa Ramirez as the provide and we'll get a fresh note going :)  Jayce, Rosie Ortiz RD, CECI, CDC    CECI Deluna RD, [facility]      Any diabetes medication dose changes were made via the CDE Protocol and Collaborative Practice Agreement with the patient's OB/GYN provider.

## 2022-03-11 ENCOUNTER — MYC MEDICAL ADVICE (OUTPATIENT)
Dept: OBGYN | Facility: CLINIC | Age: 37
End: 2022-03-11

## 2022-03-11 ENCOUNTER — PRENATAL OFFICE VISIT (OUTPATIENT)
Dept: OBGYN | Facility: CLINIC | Age: 37
End: 2022-03-11
Payer: COMMERCIAL

## 2022-03-11 VITALS
HEART RATE: 104 BPM | RESPIRATION RATE: 16 BRPM | BODY MASS INDEX: 32.74 KG/M2 | HEIGHT: 63 IN | DIASTOLIC BLOOD PRESSURE: 69 MMHG | WEIGHT: 184.8 LBS | TEMPERATURE: 98 F | SYSTOLIC BLOOD PRESSURE: 125 MMHG

## 2022-03-11 DIAGNOSIS — Z34.83 PRENATAL CARE, SUBSEQUENT PREGNANCY IN THIRD TRIMESTER: Primary | ICD-10-CM

## 2022-03-11 PROCEDURE — 87653 STREP B DNA AMP PROBE: CPT | Performed by: OBSTETRICS & GYNECOLOGY

## 2022-03-11 PROCEDURE — 99207 PR PRENATAL VISIT: CPT | Performed by: OBSTETRICS & GYNECOLOGY

## 2022-03-11 NOTE — NURSING NOTE
"Initial /69 (BP Location: Right arm, Patient Position: Chair, Cuff Size: Adult Regular)   Pulse 104   Temp 98  F (36.7  C) (Tympanic)   Resp 16   Ht 1.6 m (5' 3\")   Wt 83.8 kg (184 lb 12.8 oz)   LMP 07/04/2021 (Exact Date)   Breastfeeding No   BMI 32.74 kg/m   Estimated body mass index is 32.74 kg/m  as calculated from the following:    Height as of this encounter: 1.6 m (5' 3\").    Weight as of this encounter: 83.8 kg (184 lb 12.8 oz). .    Leonila Nelson, ADRIANA    "

## 2022-03-11 NOTE — PROGRESS NOTES
"[facility]   OB/GYN Clinic     CC: Return OB      Subjective:     Halima is a 36 year old  at 35w5d by Patient's last menstrual period was 2021 (exact date). who presents for return OB visit. She reports feeling well. Denies uterine cramping, vaginal bleeding or leaking, dysuria. +fetal movement.   Fastings in 80. 1hr . All with diet adjustments.      Objective:  /69 (BP Location: Right arm, Patient Position: Chair, Cuff Size: Adult Regular)   Pulse 104   Temp 98  F (36.7  C) (Tympanic)   Resp 16   Ht 1.6 m (5' 3\")   Wt 83.8 kg (184 lb 12.8 oz)   LMP 2021 (Exact Date)   Breastfeeding No   BMI 32.74 kg/m       Physical Exam:  Gen: Pleasant, talkative female in no apparent distress   Respiratory: breathing comfortably on room air   Cardiac: Warm and well-perfused.   GI: Abd soft and non-tender, gravid  MSK: Grossly normal movement of all four extremities  Psych: mood and affect bright      See OB flowsheet     Assessment/Plan:   36 year old  at 35w5d by LMP c/w 8w4d US who presents for follow-up OB visit.   1) 3rd tri labs today.   2) PMH/OBHx problems:                -cHTN, hx of pre-e: base line HELLP labs nl, ASA, L2, on labetalol 100mg BID, BP at goal, IOL at 38wks, serial growths and BPPs                - hx of baby with a VSD; plan pp fetal ECHO                - AMA; L2, NIPT nl                -GDMA1; all blood sugars at goal with diet                 -S>D; recent growth at 80%ile > repeat 49%ile      - WANTS PPTL, private ins     - planning epidural  3) Immunizations: flu shot s/p, s/p Tdap, s/p COVID      Return to clinic weekly until delivery. Labor and FM precautions reviewed. Scheduled for IOL on 3/25. Will likely need cervical ripening.      Meenakshi Swanson MD  OB/GYN   "

## 2022-03-12 ENCOUNTER — HEALTH MAINTENANCE LETTER (OUTPATIENT)
Age: 37
End: 2022-03-12

## 2022-03-12 LAB — GP B STREP DNA SPEC QL NAA+PROBE: NEGATIVE

## 2022-03-14 NOTE — TELEPHONE ENCOUNTER
Please advise next steps regarding coding and paying for charges    Thank you,  Elijah ACOSTA.    Specialty Clinics - Flex RN

## 2022-03-15 NOTE — TELEPHONE ENCOUNTER
I don't really know the next steps here. Her insurance should be covering BPPs for chronic hypertension in pregnancy. Is this something we can call her insurance to double check on?   Dr. Swanson     Message text

## 2022-03-15 NOTE — TELEPHONE ENCOUNTER
I called and spoke to Dorothy at [employer] [phone] and she explained to me that the BPP is covered under her insurance however it goes through pts deductible that she has to meet, Per Dorothy everything is beginning charged correctly. But due to pts deductible pt is responsible for that.     I spoke to Fam and explained to her what [employer] said and she is going to call [facility] to confirm they have the right diagnosis  code per her insurance. BPP [id number], [id number]. Pt believes the BPP should be covered under preventive coverage.     Shasta Mcleod   Clinic Station    [facility]  [phone]

## 2022-03-17 ENCOUNTER — MYC MEDICAL ADVICE (OUTPATIENT)
Dept: EDUCATION SERVICES | Facility: CLINIC | Age: 37
End: 2022-03-17
Payer: COMMERCIAL

## 2022-03-17 NOTE — TELEPHONE ENCOUNTER
Gestational Diabetes Follow-up    Subjective/Objective:    Halima Razo sent in blood glucose log for review. Last date of communication was: 2/28/22.    Gestational diabetes is being managed with diet and activity    Taking diabetes medications: no    Estimated Date of Delivery: Apr 10, 2022, induction planned 3/25/22, currently 36w4d    BG/Food Log:         Assessment:  Blood sugars in target, do not anticipate rise prior to delivery.     Ketones: none reported.   Fasting blood glucoses: 100% in target.  After breakfast: 100% in target.  After lunch: 100% in target.  After dinner: 100% in target.    Plan/Response:  No changes in the patient's current treatment plan.  No follow up unless questions    Bev Anderson MS, RD, LD, CDE      Any diabetes medication dose changes were made via the CDE Protocol and Collaborative Practice Agreement with the patient's OB/GYN provider. A copy of this encounter was shared with the provider.

## 2022-03-18 ENCOUNTER — HOSPITAL ENCOUNTER (OUTPATIENT)
Dept: ULTRASOUND IMAGING | Facility: CLINIC | Age: 37
Discharge: HOME | End: 2022-03-18
Attending: OBSTETRICS & GYNECOLOGY | Admitting: OBSTETRICS & GYNECOLOGY
Payer: COMMERCIAL

## 2022-03-18 ENCOUNTER — PRENATAL OFFICE VISIT (OUTPATIENT)
Dept: OBGYN | Facility: CLINIC | Age: 37
End: 2022-03-18
Payer: COMMERCIAL

## 2022-03-18 VITALS
BODY MASS INDEX: 33.31 KG/M2 | RESPIRATION RATE: 18 BRPM | TEMPERATURE: 98.4 F | WEIGHT: 188 LBS | DIASTOLIC BLOOD PRESSURE: 67 MMHG | HEIGHT: 63 IN | SYSTOLIC BLOOD PRESSURE: 125 MMHG | HEART RATE: 99 BPM

## 2022-03-18 DIAGNOSIS — O10.913 CHRONIC HYPERTENSION WITH EXACERBATION DURING PREGNANCY IN THIRD TRIMESTER: ICD-10-CM

## 2022-03-18 DIAGNOSIS — Z34.90 ENCOUNTER FOR INDUCTION OF LABOR: Primary | ICD-10-CM

## 2022-03-18 PROCEDURE — 99207 PR PRENATAL VISIT: CPT | Performed by: OBSTETRICS & GYNECOLOGY

## 2022-03-18 PROCEDURE — 76819 FETAL BIOPHYS PROFIL W/O NST: CPT

## 2022-03-18 RX ORDER — OXYTOCIN/0.9 % SODIUM CHLORIDE 30/500 ML
100-340 PLASTIC BAG, INJECTION (ML) INTRAVENOUS CONTINUOUS PRN
Status: CANCELLED | OUTPATIENT
Start: 2022-03-18

## 2022-03-18 RX ORDER — NALOXONE HYDROCHLORIDE 0.4 MG/ML
0.2 INJECTION, SOLUTION INTRAMUSCULAR; INTRAVENOUS; SUBCUTANEOUS
Status: CANCELLED | OUTPATIENT
Start: 2022-03-18

## 2022-03-18 RX ORDER — METHYLERGONOVINE MALEATE 0.2 MG/ML
200 INJECTION INTRAVENOUS
Status: CANCELLED | OUTPATIENT
Start: 2022-03-18

## 2022-03-18 RX ORDER — PROCHLORPERAZINE MALEATE 5 MG
10 TABLET ORAL EVERY 6 HOURS PRN
Status: CANCELLED | OUTPATIENT
Start: 2022-03-18

## 2022-03-18 RX ORDER — MISOPROSTOL 100 UG/1
25 TABLET ORAL
Status: CANCELLED | OUTPATIENT
Start: 2022-03-18

## 2022-03-18 RX ORDER — NALOXONE HYDROCHLORIDE 0.4 MG/ML
0.4 INJECTION, SOLUTION INTRAMUSCULAR; INTRAVENOUS; SUBCUTANEOUS
Status: CANCELLED | OUTPATIENT
Start: 2022-03-18

## 2022-03-18 RX ORDER — PROCHLORPERAZINE 25 MG
25 SUPPOSITORY, RECTAL RECTAL EVERY 12 HOURS PRN
Status: CANCELLED | OUTPATIENT
Start: 2022-03-18

## 2022-03-18 RX ORDER — CARBOPROST TROMETHAMINE 250 UG/ML
250 INJECTION, SOLUTION INTRAMUSCULAR
Status: CANCELLED | OUTPATIENT
Start: 2022-03-18

## 2022-03-18 RX ORDER — METOCLOPRAMIDE HYDROCHLORIDE 5 MG/ML
10 INJECTION INTRAMUSCULAR; INTRAVENOUS EVERY 6 HOURS PRN
Status: CANCELLED | OUTPATIENT
Start: 2022-03-18

## 2022-03-18 RX ORDER — MISOPROSTOL 200 UG/1
800 TABLET ORAL
Status: CANCELLED | OUTPATIENT
Start: 2022-03-18

## 2022-03-18 RX ORDER — ONDANSETRON 2 MG/ML
4 INJECTION INTRAMUSCULAR; INTRAVENOUS EVERY 6 HOURS PRN
Status: CANCELLED | OUTPATIENT
Start: 2022-03-18

## 2022-03-18 RX ORDER — ONDANSETRON 4 MG/1
4 TABLET, ORALLY DISINTEGRATING ORAL EVERY 6 HOURS PRN
Status: CANCELLED | OUTPATIENT
Start: 2022-03-18

## 2022-03-18 RX ORDER — OXYTOCIN 10 [USP'U]/ML
10 INJECTION, SOLUTION INTRAMUSCULAR; INTRAVENOUS
Status: CANCELLED | OUTPATIENT
Start: 2022-03-18

## 2022-03-18 RX ORDER — CITRIC ACID/SODIUM CITRATE 334-500MG
30 SOLUTION, ORAL ORAL
Status: CANCELLED | OUTPATIENT
Start: 2022-03-18

## 2022-03-18 RX ORDER — OXYTOCIN/0.9 % SODIUM CHLORIDE 30/500 ML
340 PLASTIC BAG, INJECTION (ML) INTRAVENOUS CONTINUOUS PRN
Status: CANCELLED | OUTPATIENT
Start: 2022-03-18

## 2022-03-18 RX ORDER — KETOROLAC TROMETHAMINE 30 MG/ML
30 INJECTION, SOLUTION INTRAMUSCULAR; INTRAVENOUS
Status: CANCELLED | OUTPATIENT
Start: 2022-03-18 | End: 2022-03-23

## 2022-03-18 RX ORDER — METOCLOPRAMIDE 5 MG/1
10 TABLET ORAL EVERY 6 HOURS PRN
Status: CANCELLED | OUTPATIENT
Start: 2022-03-18

## 2022-03-18 RX ORDER — MISOPROSTOL 100 UG/1
400 TABLET ORAL
Status: CANCELLED | OUTPATIENT
Start: 2022-03-18

## 2022-03-18 RX ORDER — IBUPROFEN 200 MG
800 TABLET ORAL
Status: CANCELLED | OUTPATIENT
Start: 2022-03-18 | End: 2022-03-23

## 2022-03-18 NOTE — NURSING NOTE
"Initial /67 (BP Location: Right arm, Patient Position: Chair, Cuff Size: Adult Regular)   Pulse 99   Temp 98.4  F (36.9  C) (Tympanic)   Resp 18   Ht 1.6 m (5' 3\")   Wt 85.3 kg (188 lb)   LMP 07/04/2021 (Exact Date)   Breastfeeding No   BMI 33.30 kg/m   Estimated body mass index is 33.3 kg/m  as calculated from the following:    Height as of this encounter: 1.6 m (5' 3\").    Weight as of this encounter: 85.3 kg (188 lb). .      "

## 2022-03-18 NOTE — PROGRESS NOTES
"[facility]   OB/GYN Clinic     CC: Return OB      Subjective:     Halima is a 36 year old  at 36w5d by Patient's last menstrual period was 2021 (exact date). who presents for return OB visit. She reports feeling well. Denies uterine cramping, vaginal bleeding or leaking, dysuria. +fetal movement.   Fastings in 80. 1hr , couple of 130s, none above 140. All with diet adjustments.      Objective:  /67 (BP Location: Right arm, Patient Position: Chair, Cuff Size: Adult Regular)   Pulse 99   Temp 98.4  F (36.9  C) (Tympanic)   Resp 18   Ht 1.6 m (5' 3\")   Wt 85.3 kg (188 lb)   LMP 2021 (Exact Date)   Breastfeeding No   BMI 33.30 kg/m       Physical Exam:  Gen: Pleasant, talkative female in no apparent distress   Respiratory: breathing comfortably on room air   Cardiac: Warm and well-perfused.   GI: Abd soft and non-tender, gravid  MSK: Grossly normal movement of all four extremities  Psych: mood and affect bright      See OB flowsheet     Assessment/Plan:   36 year old  at 36w5d by LMP c/w 8w4d US who presents for follow-up OB visit.   1) 3rd tri labs today.   2) PMH/OBHx problems:                -cHTN, hx of pre-e: base line HELLP labs nl, ASA, L2, on labetalol 100mg BID, BP at goal, IOL at 38wks, serial growths and BPPs                - hx of baby with a VSD; plan pp fetal ECHO                - AMA; L2, NIPT nl                -GDMA1; all blood sugars at goal with diet                 -S>D; recent growth at 80%ile > repeat 49%ile                  - WANTS PPTL, private ins                 - planning epidural  3) Immunizations: flu shot s/p, s/p Tdap, s/p COVID      Return to clinic weekly until delivery. Labor and FM precautions reviewed. Scheduled for IOL on 3/25. Knows cervical ripening and then pitocin once favorable.      Meenakshi Swanson MD  OB/GYN   "

## 2022-03-23 ENCOUNTER — LAB (OUTPATIENT)
Dept: LAB | Facility: CLINIC | Age: 37
End: 2022-03-23
Attending: OBSTETRICS & GYNECOLOGY
Payer: COMMERCIAL

## 2022-03-23 DIAGNOSIS — Z34.90 ENCOUNTER FOR INDUCTION OF LABOR: ICD-10-CM

## 2022-03-23 PROCEDURE — U0003 INFECTIOUS AGENT DETECTION BY NUCLEIC ACID (DNA OR RNA); SEVERE ACUTE RESPIRATORY SYNDROME CORONAVIRUS 2 (SARS-COV-2) (CORONAVIRUS DISEASE [COVID-19]), AMPLIFIED PROBE TECHNIQUE, MAKING USE OF HIGH THROUGHPUT TECHNOLOGIES AS DESCRIBED BY CMS-2020-01-R: HCPCS

## 2022-03-23 PROCEDURE — U0005 INFEC AGEN DETEC AMPLI PROBE: HCPCS

## 2022-03-24 LAB — SARS-COV-2 RNA RESP QL NAA+PROBE: NEGATIVE

## 2022-03-27 ENCOUNTER — ANESTHESIA (OUTPATIENT)
Dept: OBGYN | Facility: CLINIC | Age: 37
End: 2022-03-27
Payer: COMMERCIAL

## 2022-03-27 ENCOUNTER — HOSPITAL ENCOUNTER (INPATIENT)
Facility: CLINIC | Age: 37
LOS: 1 days | Discharge: HOME | End: 2022-03-28
Attending: OBSTETRICS & GYNECOLOGY | Admitting: OBSTETRICS & GYNECOLOGY
Payer: COMMERCIAL

## 2022-03-27 ENCOUNTER — ANESTHESIA EVENT (OUTPATIENT)
Dept: OBGYN | Facility: CLINIC | Age: 37
End: 2022-03-27
Payer: COMMERCIAL

## 2022-03-27 DIAGNOSIS — Z30.2 ENCOUNTER FOR STERILIZATION: ICD-10-CM

## 2022-03-27 DIAGNOSIS — Z34.83 PRENATAL CARE, SUBSEQUENT PREGNANCY IN THIRD TRIMESTER: Primary | ICD-10-CM

## 2022-03-27 PROBLEM — O24.410 GDM, CLASS A1: Status: ACTIVE | Noted: 2020-10-04

## 2022-03-27 LAB
ABO/RH(D): NORMAL
ALT SERPL W P-5'-P-CCNC: 12 U/L (ref 0–50)
ANTIBODY SCREEN: NEGATIVE
AST SERPL W P-5'-P-CCNC: 10 U/L (ref 0–45)
BASOPHILS # BLD AUTO: 0 10E3/UL (ref 0–0.2)
BASOPHILS NFR BLD AUTO: 0 %
CREAT SERPL-MCNC: 0.46 MG/DL (ref 0.52–1.04)
EOSINOPHIL # BLD AUTO: 0.1 10E3/UL (ref 0–0.7)
EOSINOPHIL NFR BLD AUTO: 1 %
ERYTHROCYTE [DISTWIDTH] IN BLOOD BY AUTOMATED COUNT: 13.2 % (ref 10–15)
GFR SERPL CREATININE-BSD FRML MDRD: >90 ML/MIN/1.73M2
GLUCOSE BLDC GLUCOMTR-MCNC: 105 MG/DL (ref 70–99)
GLUCOSE BLDC GLUCOMTR-MCNC: 91 MG/DL (ref 70–99)
GLUCOSE BLDC GLUCOMTR-MCNC: 99 MG/DL (ref 70–99)
HCT VFR BLD AUTO: 31.3 % (ref 35–47)
HGB BLD-MCNC: 10.8 G/DL (ref 11.7–15.7)
IMM GRANULOCYTES # BLD: 0.1 10E3/UL
IMM GRANULOCYTES NFR BLD: 1 %
LYMPHOCYTES # BLD AUTO: 2 10E3/UL (ref 0.8–5.3)
LYMPHOCYTES NFR BLD AUTO: 23 %
MCH RBC QN AUTO: 29.2 PG (ref 26.5–33)
MCHC RBC AUTO-ENTMCNC: 34.5 G/DL (ref 31.5–36.5)
MCV RBC AUTO: 85 FL (ref 78–100)
MONOCYTES # BLD AUTO: 0.5 10E3/UL (ref 0–1.3)
MONOCYTES NFR BLD AUTO: 6 %
NEUTROPHILS # BLD AUTO: 6 10E3/UL (ref 1.6–8.3)
NEUTROPHILS NFR BLD AUTO: 69 %
NRBC # BLD AUTO: 0 10E3/UL
NRBC BLD AUTO-RTO: 0 /100
PLATELET # BLD AUTO: 287 10E3/UL (ref 150–450)
RBC # BLD AUTO: 3.7 10E6/UL (ref 3.8–5.2)
SARS-COV-2 RNA RESP QL NAA+PROBE: NEGATIVE
SPECIMEN EXPIRATION DATE: NORMAL
T PALLIDUM AB SER QL: NONREACTIVE
WBC # BLD AUTO: 8.7 10E3/UL (ref 4–11)

## 2022-03-27 PROCEDURE — 00HU33Z INSERTION OF INFUSION DEVICE INTO SPINAL CANAL, PERCUTANEOUS APPROACH: ICD-10-PCS | Performed by: NURSE ANESTHETIST, CERTIFIED REGISTERED

## 2022-03-27 PROCEDURE — 250N000009 HC RX 250: Performed by: OBSTETRICS & GYNECOLOGY

## 2022-03-27 PROCEDURE — 250N000009 HC RX 250: Performed by: NURSE ANESTHETIST, CERTIFIED REGISTERED

## 2022-03-27 PROCEDURE — 84460 ALANINE AMINO (ALT) (SGPT): CPT | Performed by: OBSTETRICS & GYNECOLOGY

## 2022-03-27 PROCEDURE — 370N000003 HC ANESTHESIA WARD SERVICE

## 2022-03-27 PROCEDURE — 59400 OBSTETRICAL CARE: CPT | Performed by: OBSTETRICS & GYNECOLOGY

## 2022-03-27 PROCEDURE — 120N000001 HC R&B MED SURG/OB

## 2022-03-27 PROCEDURE — 722N000001 HC LABOR CARE VAGINAL DELIVERY SINGLE

## 2022-03-27 PROCEDURE — 85025 COMPLETE CBC W/AUTO DIFF WBC: CPT | Performed by: OBSTETRICS & GYNECOLOGY

## 2022-03-27 PROCEDURE — 258N000003 HC RX IP 258 OP 636: Performed by: OBSTETRICS & GYNECOLOGY

## 2022-03-27 PROCEDURE — 3E0R3BZ INTRODUCTION OF ANESTHETIC AGENT INTO SPINAL CANAL, PERCUTANEOUS APPROACH: ICD-10-PCS | Performed by: NURSE ANESTHETIST, CERTIFIED REGISTERED

## 2022-03-27 PROCEDURE — 10907ZC DRAINAGE OF AMNIOTIC FLUID, THERAPEUTIC FROM PRODUCTS OF CONCEPTION, VIA NATURAL OR ARTIFICIAL OPENING: ICD-10-PCS | Performed by: OBSTETRICS & GYNECOLOGY

## 2022-03-27 PROCEDURE — 86780 TREPONEMA PALLIDUM: CPT | Performed by: OBSTETRICS & GYNECOLOGY

## 2022-03-27 PROCEDURE — 250N000013 HC RX MED GY IP 250 OP 250 PS 637: Performed by: OBSTETRICS & GYNECOLOGY

## 2022-03-27 PROCEDURE — 250N000011 HC RX IP 250 OP 636: Performed by: NURSE ANESTHETIST, CERTIFIED REGISTERED

## 2022-03-27 PROCEDURE — 84450 TRANSFERASE (AST) (SGOT): CPT | Performed by: OBSTETRICS & GYNECOLOGY

## 2022-03-27 PROCEDURE — 86901 BLOOD TYPING SEROLOGIC RH(D): CPT | Performed by: OBSTETRICS & GYNECOLOGY

## 2022-03-27 PROCEDURE — 36415 COLL VENOUS BLD VENIPUNCTURE: CPT | Performed by: OBSTETRICS & GYNECOLOGY

## 2022-03-27 PROCEDURE — 86850 RBC ANTIBODY SCREEN: CPT | Performed by: OBSTETRICS & GYNECOLOGY

## 2022-03-27 PROCEDURE — 87635 SARS-COV-2 COVID-19 AMP PRB: CPT | Performed by: OBSTETRICS & GYNECOLOGY

## 2022-03-27 PROCEDURE — 82565 ASSAY OF CREATININE: CPT | Performed by: OBSTETRICS & GYNECOLOGY

## 2022-03-27 RX ORDER — OXYTOCIN 10 [USP'U]/ML
10 INJECTION, SOLUTION INTRAMUSCULAR; INTRAVENOUS
Status: DISCONTINUED | OUTPATIENT
Start: 2022-03-27 | End: 2022-03-27

## 2022-03-27 RX ORDER — DOCUSATE SODIUM 100 MG/1
100 CAPSULE, LIQUID FILLED ORAL DAILY
Status: DISCONTINUED | OUTPATIENT
Start: 2022-03-27 | End: 2022-03-28 | Stop reason: HOSPADM

## 2022-03-27 RX ORDER — IBUPROFEN 800 MG/1
800 TABLET, FILM COATED ORAL EVERY 6 HOURS PRN
Status: DISCONTINUED | OUTPATIENT
Start: 2022-03-27 | End: 2022-03-28 | Stop reason: HOSPADM

## 2022-03-27 RX ORDER — DEXTROSE MONOHYDRATE 25 G/50ML
25-50 INJECTION, SOLUTION INTRAVENOUS
Status: DISCONTINUED | OUTPATIENT
Start: 2022-03-27 | End: 2022-03-27

## 2022-03-27 RX ORDER — OXYTOCIN/0.9 % SODIUM CHLORIDE 30/500 ML
340 PLASTIC BAG, INJECTION (ML) INTRAVENOUS CONTINUOUS PRN
Status: DISCONTINUED | OUTPATIENT
Start: 2022-03-27 | End: 2022-03-27

## 2022-03-27 RX ORDER — METHYLERGONOVINE MALEATE 0.2 MG/ML
200 INJECTION INTRAVENOUS
Status: DISCONTINUED | OUTPATIENT
Start: 2022-03-27 | End: 2022-03-27

## 2022-03-27 RX ORDER — ONDANSETRON 4 MG/1
4 TABLET, ORALLY DISINTEGRATING ORAL EVERY 6 HOURS PRN
Status: DISCONTINUED | OUTPATIENT
Start: 2022-03-27 | End: 2022-03-27

## 2022-03-27 RX ORDER — OXYCODONE HYDROCHLORIDE 5 MG/1
5 TABLET ORAL EVERY 4 HOURS PRN
Status: DISCONTINUED | OUTPATIENT
Start: 2022-03-27 | End: 2022-03-28 | Stop reason: HOSPADM

## 2022-03-27 RX ORDER — LIDOCAINE 40 MG/G
CREAM TOPICAL
Status: DISCONTINUED | OUTPATIENT
Start: 2022-03-27 | End: 2022-03-27

## 2022-03-27 RX ORDER — DEXTROSE MONOHYDRATE 25 G/50ML
25-50 INJECTION, SOLUTION INTRAVENOUS
Status: DISCONTINUED | OUTPATIENT
Start: 2022-03-27 | End: 2022-03-28 | Stop reason: HOSPADM

## 2022-03-27 RX ORDER — OXYTOCIN 10 [USP'U]/ML
10 INJECTION, SOLUTION INTRAMUSCULAR; INTRAVENOUS
Status: DISCONTINUED | OUTPATIENT
Start: 2022-03-27 | End: 2022-03-28 | Stop reason: HOSPADM

## 2022-03-27 RX ORDER — MISOPROSTOL 100 UG/1
25 TABLET ORAL
Status: DISCONTINUED | OUTPATIENT
Start: 2022-03-27 | End: 2022-03-27

## 2022-03-27 RX ORDER — MODIFIED LANOLIN
OINTMENT (GRAM) TOPICAL
Status: DISCONTINUED | OUTPATIENT
Start: 2022-03-27 | End: 2022-03-28 | Stop reason: HOSPADM

## 2022-03-27 RX ORDER — CARBOPROST TROMETHAMINE 250 UG/ML
250 INJECTION, SOLUTION INTRAMUSCULAR
Status: DISCONTINUED | OUTPATIENT
Start: 2022-03-27 | End: 2022-03-28 | Stop reason: HOSPADM

## 2022-03-27 RX ORDER — OXYTOCIN/0.9 % SODIUM CHLORIDE 30/500 ML
340 PLASTIC BAG, INJECTION (ML) INTRAVENOUS CONTINUOUS PRN
Status: DISCONTINUED | OUTPATIENT
Start: 2022-03-27 | End: 2022-03-28 | Stop reason: HOSPADM

## 2022-03-27 RX ORDER — HYDROCORTISONE 2.5 %
CREAM (GRAM) TOPICAL 3 TIMES DAILY PRN
Status: DISCONTINUED | OUTPATIENT
Start: 2022-03-27 | End: 2022-03-28 | Stop reason: HOSPADM

## 2022-03-27 RX ORDER — METHYLERGONOVINE MALEATE 0.2 MG/ML
200 INJECTION INTRAVENOUS
Status: DISCONTINUED | OUTPATIENT
Start: 2022-03-27 | End: 2022-03-28 | Stop reason: HOSPADM

## 2022-03-27 RX ORDER — MISOPROSTOL 200 UG/1
400 TABLET ORAL
Status: DISCONTINUED | OUTPATIENT
Start: 2022-03-27 | End: 2022-03-27

## 2022-03-27 RX ORDER — TRANEXAMIC ACID 10 MG/ML
1 INJECTION, SOLUTION INTRAVENOUS EVERY 30 MIN PRN
Status: DISCONTINUED | OUTPATIENT
Start: 2022-03-27 | End: 2022-03-27

## 2022-03-27 RX ORDER — LIDOCAINE HYDROCHLORIDE AND EPINEPHRINE 15; 5 MG/ML; UG/ML
INJECTION, SOLUTION EPIDURAL PRN
Status: DISCONTINUED | OUTPATIENT
Start: 2022-03-27 | End: 2022-03-27

## 2022-03-27 RX ORDER — MISOPROSTOL 200 UG/1
800 TABLET ORAL
Status: DISCONTINUED | OUTPATIENT
Start: 2022-03-27 | End: 2022-03-27

## 2022-03-27 RX ORDER — BISACODYL 10 MG
10 SUPPOSITORY, RECTAL RECTAL DAILY PRN
Status: DISCONTINUED | OUTPATIENT
Start: 2022-03-27 | End: 2022-03-28 | Stop reason: HOSPADM

## 2022-03-27 RX ORDER — NALOXONE HYDROCHLORIDE 0.4 MG/ML
0.4 INJECTION, SOLUTION INTRAMUSCULAR; INTRAVENOUS; SUBCUTANEOUS
Status: DISCONTINUED | OUTPATIENT
Start: 2022-03-27 | End: 2022-03-27

## 2022-03-27 RX ORDER — PROCHLORPERAZINE MALEATE 10 MG
10 TABLET ORAL EVERY 6 HOURS PRN
Status: DISCONTINUED | OUTPATIENT
Start: 2022-03-27 | End: 2022-03-27

## 2022-03-27 RX ORDER — SODIUM CHLORIDE, SODIUM LACTATE, POTASSIUM CHLORIDE, CALCIUM CHLORIDE 600; 310; 30; 20 MG/100ML; MG/100ML; MG/100ML; MG/100ML
INJECTION, SOLUTION INTRAVENOUS CONTINUOUS
Status: DISCONTINUED | OUTPATIENT
Start: 2022-03-27 | End: 2022-03-28 | Stop reason: HOSPADM

## 2022-03-27 RX ORDER — ACETAMINOPHEN 325 MG/1
975 TABLET ORAL ONCE
Status: DISCONTINUED | OUTPATIENT
Start: 2022-03-27 | End: 2022-03-28

## 2022-03-27 RX ORDER — IBUPROFEN 800 MG/1
800 TABLET, FILM COATED ORAL
Status: DISCONTINUED | OUTPATIENT
Start: 2022-03-27 | End: 2022-03-27

## 2022-03-27 RX ORDER — SCOLOPAMINE TRANSDERMAL SYSTEM 1 MG/1
1 PATCH, EXTENDED RELEASE TRANSDERMAL ONCE
Status: DISCONTINUED | OUTPATIENT
Start: 2022-03-27 | End: 2022-03-27

## 2022-03-27 RX ORDER — NALBUPHINE HYDROCHLORIDE 10 MG/ML
2.5-5 INJECTION, SOLUTION INTRAMUSCULAR; INTRAVENOUS; SUBCUTANEOUS EVERY 6 HOURS PRN
Status: DISCONTINUED | OUTPATIENT
Start: 2022-03-27 | End: 2022-03-27

## 2022-03-27 RX ORDER — NALOXONE HYDROCHLORIDE 0.4 MG/ML
0.2 INJECTION, SOLUTION INTRAMUSCULAR; INTRAVENOUS; SUBCUTANEOUS
Status: DISCONTINUED | OUTPATIENT
Start: 2022-03-27 | End: 2022-03-27

## 2022-03-27 RX ORDER — ONDANSETRON 2 MG/ML
4 INJECTION INTRAMUSCULAR; INTRAVENOUS EVERY 6 HOURS PRN
Status: DISCONTINUED | OUTPATIENT
Start: 2022-03-27 | End: 2022-03-27

## 2022-03-27 RX ORDER — OXYTOCIN/0.9 % SODIUM CHLORIDE 30/500 ML
1-24 PLASTIC BAG, INJECTION (ML) INTRAVENOUS CONTINUOUS
Status: DISCONTINUED | OUTPATIENT
Start: 2022-03-27 | End: 2022-03-27

## 2022-03-27 RX ORDER — NICOTINE POLACRILEX 4 MG
15-30 LOZENGE BUCCAL
Status: DISCONTINUED | OUTPATIENT
Start: 2022-03-27 | End: 2022-03-27

## 2022-03-27 RX ORDER — TRANEXAMIC ACID 10 MG/ML
1 INJECTION, SOLUTION INTRAVENOUS EVERY 30 MIN PRN
Status: DISCONTINUED | OUTPATIENT
Start: 2022-03-27 | End: 2022-03-28 | Stop reason: HOSPADM

## 2022-03-27 RX ORDER — MISOPROSTOL 200 UG/1
800 TABLET ORAL
Status: DISCONTINUED | OUTPATIENT
Start: 2022-03-27 | End: 2022-03-28 | Stop reason: HOSPADM

## 2022-03-27 RX ORDER — EPHEDRINE SULFATE 50 MG/ML
5 INJECTION, SOLUTION INTRAMUSCULAR; INTRAVENOUS; SUBCUTANEOUS
Status: DISCONTINUED | OUTPATIENT
Start: 2022-03-27 | End: 2022-03-27

## 2022-03-27 RX ORDER — MISOPROSTOL 200 UG/1
400 TABLET ORAL
Status: DISCONTINUED | OUTPATIENT
Start: 2022-03-27 | End: 2022-03-28 | Stop reason: HOSPADM

## 2022-03-27 RX ORDER — OXYTOCIN/0.9 % SODIUM CHLORIDE 30/500 ML
100-340 PLASTIC BAG, INJECTION (ML) INTRAVENOUS CONTINUOUS PRN
Status: DISCONTINUED | OUTPATIENT
Start: 2022-03-27 | End: 2022-03-27

## 2022-03-27 RX ORDER — CITRIC ACID/SODIUM CITRATE 334-500MG
30 SOLUTION, ORAL ORAL ONCE
Status: DISCONTINUED | OUTPATIENT
Start: 2022-03-27 | End: 2022-03-28 | Stop reason: HOSPADM

## 2022-03-27 RX ORDER — METOCLOPRAMIDE HYDROCHLORIDE 5 MG/ML
10 INJECTION INTRAMUSCULAR; INTRAVENOUS EVERY 6 HOURS PRN
Status: DISCONTINUED | OUTPATIENT
Start: 2022-03-27 | End: 2022-03-27

## 2022-03-27 RX ORDER — CITRIC ACID/SODIUM CITRATE 334-500MG
30 SOLUTION, ORAL ORAL
Status: DISCONTINUED | OUTPATIENT
Start: 2022-03-27 | End: 2022-03-27

## 2022-03-27 RX ORDER — ACETAMINOPHEN 325 MG/1
650 TABLET ORAL EVERY 4 HOURS PRN
Status: DISCONTINUED | OUTPATIENT
Start: 2022-03-27 | End: 2022-03-28 | Stop reason: HOSPADM

## 2022-03-27 RX ORDER — PROCHLORPERAZINE 25 MG
25 SUPPOSITORY, RECTAL RECTAL EVERY 12 HOURS PRN
Status: DISCONTINUED | OUTPATIENT
Start: 2022-03-27 | End: 2022-03-27

## 2022-03-27 RX ORDER — NICOTINE POLACRILEX 4 MG
15-30 LOZENGE BUCCAL
Status: DISCONTINUED | OUTPATIENT
Start: 2022-03-27 | End: 2022-03-28 | Stop reason: HOSPADM

## 2022-03-27 RX ORDER — KETOROLAC TROMETHAMINE 30 MG/ML
30 INJECTION, SOLUTION INTRAMUSCULAR; INTRAVENOUS
Status: DISCONTINUED | OUTPATIENT
Start: 2022-03-27 | End: 2022-03-27

## 2022-03-27 RX ORDER — METOCLOPRAMIDE 10 MG/1
10 TABLET ORAL EVERY 6 HOURS PRN
Status: DISCONTINUED | OUTPATIENT
Start: 2022-03-27 | End: 2022-03-27

## 2022-03-27 RX ORDER — SODIUM CHLORIDE 9 MG/ML
INJECTION, SOLUTION INTRAVENOUS CONTINUOUS
Status: DISCONTINUED | OUTPATIENT
Start: 2022-03-27 | End: 2022-03-27

## 2022-03-27 RX ORDER — CARBOPROST TROMETHAMINE 250 UG/ML
250 INJECTION, SOLUTION INTRAMUSCULAR
Status: DISCONTINUED | OUTPATIENT
Start: 2022-03-27 | End: 2022-03-27

## 2022-03-27 RX ORDER — SODIUM CHLORIDE, SODIUM LACTATE, POTASSIUM CHLORIDE, CALCIUM CHLORIDE 600; 310; 30; 20 MG/100ML; MG/100ML; MG/100ML; MG/100ML
INJECTION, SOLUTION INTRAVENOUS CONTINUOUS PRN
Status: DISCONTINUED | OUTPATIENT
Start: 2022-03-27 | End: 2022-03-27

## 2022-03-27 RX ORDER — LABETALOL 100 MG/1
100 TABLET, FILM COATED ORAL 2 TIMES DAILY
Status: DISCONTINUED | OUTPATIENT
Start: 2022-03-27 | End: 2022-03-28 | Stop reason: HOSPADM

## 2022-03-27 RX ADMIN — MISOPROSTOL 25 MCG: 100 TABLET ORAL at 08:27

## 2022-03-27 RX ADMIN — Medication 2 MILLI-UNITS/MIN: at 13:58

## 2022-03-27 RX ADMIN — LIDOCAINE HYDROCHLORIDE 5 ML: 10 INJECTION, SOLUTION EPIDURAL; INFILTRATION; INTRACAUDAL; PERINEURAL at 15:03

## 2022-03-27 RX ADMIN — BUPIVACAINE HYDROCHLORIDE 10 ML/HR: 7.5 INJECTION, SOLUTION EPIDURAL; RETROBULBAR at 15:19

## 2022-03-27 RX ADMIN — MISOPROSTOL 25 MCG: 100 TABLET ORAL at 10:46

## 2022-03-27 RX ADMIN — LIDOCAINE HYDROCHLORIDE AND EPINEPHRINE 3 ML: 15; 5 INJECTION, SOLUTION EPIDURAL at 15:17

## 2022-03-27 RX ADMIN — SODIUM CHLORIDE, POTASSIUM CHLORIDE, SODIUM LACTATE AND CALCIUM CHLORIDE: 600; 310; 30; 20 INJECTION, SOLUTION INTRAVENOUS at 13:58

## 2022-03-27 RX ADMIN — IBUPROFEN 800 MG: 800 TABLET ORAL at 19:47

## 2022-03-27 RX ADMIN — DOCUSATE SODIUM 100 MG: 100 CAPSULE, LIQUID FILLED ORAL at 19:49

## 2022-03-27 RX ADMIN — LABETALOL HYDROCHLORIDE 100 MG: 100 TABLET, FILM COATED ORAL at 19:49

## 2022-03-27 RX ADMIN — LIDOCAINE HYDROCHLORIDE AND EPINEPHRINE 2 ML: 15; 5 INJECTION, SOLUTION EPIDURAL at 15:18

## 2022-03-27 NOTE — PLAN OF CARE
Goal Outcome Evaluation:    Pt requests epidural for pain management in labor. Pt rating pain a 10 out of 10 scale. After epidural placed and dosed, pain rates contractions a 1 out of 10. Pt resting comfortably, VSS, cat 1 tracing, with occasional variables. Will continue to assess and monitor. Discussed PPTL. Wants done asap.

## 2022-03-27 NOTE — ANESTHESIA PROCEDURE NOTES
Epidural catheter Procedure Note    Pre-Procedure   Staff -        CRNA: Maru Pérez APRN CRNA       Performed By: CRNA       Location: OB       Procedure Start/Stop Times: 3/27/2022 3:00 PM and 3/27/2022 3:22 PM       Pre-Anesthestic Checklist: patient identified, IV checked, risks and benefits discussed, informed consent, monitors and equipment checked, pre-op evaluation and at physician/surgeon's request  Timeout:       Correct Patient: Yes        Correct Procedure: Yes        Correct Site: Yes        Correct Position: Yes   Procedure Documentation  Procedure: epidural catheter       Patient Position: sitting       Patient Prep/Sterile Barriers: sterile gloves, mask, patient draped       Skin prep: DuraPrep       Local skin infiltrated with 5 mL of 1% lidocaine.        Insertion Site: L4-5. (midline approach).       Technique: LORT saline        SUSIE at 6 cm.       Needle Type: Citus Datay needle       Needle Gauge: 17.        Needle Length (Inches): 3.5        Catheter: 19 G.         Catheter threaded easily.         Threaded 12 cm at skin.        # of attempts: 1 and  # of redirects:     Assessment/Narrative         Paresthesias: No.       Test dose of 5 mL lidocaine 1.5% w/ 1:200,000 epinephrine at 15:17 CDT.         Test dose negative, 3 minutes after injection, for signs of intravascular, subdural, or intrathecal injection.       Insertion/Infusion Method: LORT saline       Aspiration negative for Heme or CSF via Epidural Catheter.     Comments:  VAS pain score prior to epidural:10/10    VAS pain score after epidural:0/10    Pt. Tolerated well, FHR stable.

## 2022-03-27 NOTE — ANESTHESIA PREPROCEDURE EVALUATION
Anesthesia Pre-Procedure Evaluation    Patient: Halima Razo   MRN: [id number] : 1985        Procedure : * No procedures listed *          Past Medical History:   Diagnosis Date     Angioedema     non-specific cause     Depression with anxiety      Gestational diabetes mellitus (GDM), antepartum, gestational diabetes method of control unspecified          Hypertension     on medication for about 6 months in      Infertility, female      Migraines      Obesity 2012     Papanicolaou smear of cervix with low grade squamous intraepithelial lesion (LGSIL)     resolved     Pre-eclampsia in third trimester      Ventricular septal defect (VSD) of fetus in guevara pregnancy, antepartum       Past Surgical History:   Procedure Laterality Date     BIOPSY      Underarm mole,     LAPAROSCOPY DIAGNOSTIC (GYN) N/A 2018    Procedure: LAPAROSCOPY DIAGNOSTIC (GYN);;  Surgeon: Kei Kelley MD;  Location: [facility] OR     OPERATIVE HYSTEROSCOPY WITH MORCELLATOR N/A 2018    Procedure: OPERATIVE HYSTEROSCOPY WITH MORCELLATOR (MYOSURE);  Diagnostic laparoscopy, hysteroscopy with biopsy;  Surgeon: Kei Kelley MD;  Location: [facility] OR      No Known Allergies   Social History     Tobacco Use     Smoking status: Former Smoker     Packs/day: 0.50     Years: 14.00     Pack years: 7.00     Types: Cigarettes     Start date: 2003     Smokeless tobacco: Never Used     Tobacco comment: quit with pregnancy   Substance Use Topics     Alcohol use: Not Currently      Wt Readings from Last 1 Encounters:   22 84 kg (185 lb 1.6 oz)        Anesthesia Evaluation   Pt has had prior anesthetic. Type: General.        ROS/MED HX  ENT/Pulmonary:  - neg pulmonary ROS     Neurologic:  - neg neurologic ROS     Cardiovascular:     (+) --PIH ---    METS/Exercise Tolerance:     Hematologic:  - neg hematologic  ROS     Musculoskeletal:       GI/Hepatic:  - neg GI/hepatic ROS      Renal/Genitourinary:       Endo:     (+) type I DM, Obesity,     Psychiatric/Substance Use:     (+) psychiatric history anxiety and depression     Infectious Disease:       Malignancy:       Other:            Physical Exam    Airway        Mallampati: II   TM distance: > 3 FB   Neck ROM: full   Mouth opening: > 3 cm    Respiratory Devices and Support         Dental  no notable dental history         Cardiovascular   cardiovascular exam normal          Pulmonary   pulmonary exam normal                OUTSIDE LABS:  CBC:   Lab Results   Component Value Date    WBC 10.9 01/14/2022    WBC 8.6 09/02/2021    HGB 11.0 (L) 01/14/2022    HGB 13.8 09/02/2021    HCT 32.3 (L) 01/14/2022    HCT 40.5 09/02/2021     01/14/2022     09/02/2021     BMP:   Lab Results   Component Value Date     12/28/2020     01/08/2014    POTASSIUM 4.1 12/28/2020    POTASSIUM 4.1 01/08/2014    CHLORIDE 107 12/28/2020    CHLORIDE 106 01/08/2014    CO2 24 12/28/2020    CO2 25 01/08/2014    BUN 16 12/28/2020    BUN 18 01/08/2014    CR 0.63 12/28/2020    CR 0.52 11/20/2020    GLC 92 12/28/2020    GLC 97 01/08/2014     COAGS: No results found for: PTT, INR, FIBR  POC:   Lab Results   Component Value Date    HCG Negative 01/08/2014     HEPATIC:   Lab Results   Component Value Date    ALBUMIN 4.2 01/08/2014    PROTTOTAL 7.7 01/08/2014    ALT 19 09/02/2021    AST 14 09/02/2021    ALKPHOS 83 01/08/2014    BILITOTAL 0.6 01/08/2014     OTHER:   Lab Results   Component Value Date    A1C 5.3 09/02/2021    AJ 9.4 12/28/2020    TSH 1.32 12/28/2020    T4 1.19 01/08/2014       Anesthesia Plan    ASA Status:  3      Anesthesia Type: Epidural.              Consents    Anesthesia Plan(s) and associated risks, benefits, and realistic alternatives discussed. Questions answered and patient/representative(s) expressed understanding.    - Discussed:     - Discussed with:  Patient         Postoperative Care            Comments:           neg OB  ROS.       Maru Pérez, CRNA, APRN CRNA

## 2022-03-27 NOTE — PLAN OF CARE
Patient and spouse, Adolfo, arrived to the family birth unit at approximately 0715. They were brought to room 2053 and were orientated to the room and call light.     Admission completed.   Vital signs stable.   IV access obtained.   Labs drawn.   Reactive non-stress test.     Dr. Drapre saw the patient. SVE 1-2/50/-3. Winters score of 3. Plan to do cervical ripening.

## 2022-03-27 NOTE — H&P
[facility] Labor and Delivery History and Physical    Halima Razo MRN# [id number]   Age: 36 year old YOB: 1985     Date of Admission:  3/27/2022    Primary care provider: Rashad Dinh           Chief Complaint:   Halima Razo is a 36 year old female who is 38w0d pregnant and being admitted for cervical ripening and IOL due to chronic hypertension, GDMa1  Pregnancy also remarkable for AMA, with normal male on NIPT  Previous child with VSD, resolved spontaneously; has not had antepartum echo.    Last growth scan  22  2309 gm (49%)    Pt desires PPTL.          Pregnancy history:     OBSTETRIC HISTORY:    OB History    Para Term  AB Living   3 2 2 0 0 2   SAB IAB Ectopic Multiple Live Births   0 0 0 0 2      # Outcome Date GA Lbr Aiden/2nd Weight Sex Delivery Anes PTL Lv   3 Current            2 Term 20 37w6d  2.58 kg (5 lb 11 oz) F Vag-Spont EPI N ERYN      Complications: GDM, class A1, Chronic hypertension, SGA (small for gestational age), Fetal ventricular septal defect affecting antepartum care of mother      Name: Winter      Apgar1: 8  Apgar5: 9   1 Term 10/12/06 40w0d  3.515 kg (7 lb 12 oz) F  EPI  ERYN      Name: Xiomara       EDC: Estimated Date of Delivery: 4/10/22    Prenatal Labs:   Lab Results   Component Value Date    ABO O 2020    RH Pos 2020    AS Negative 2021    HEPBANG Nonreactive 2021    HGB 11.0 (L) 2022       GBS Status:   Lab Results   Component Value Date    GBS Negative 2020       Active Problem List  Patient Active Problem List   Diagnosis     Obesity     Depression with anxiety     Prenatal care, subsequent pregnancy     Chronic hypertension     Encounter for induction of labor       Medication Prior to Admission  Medications Prior to Admission   Medication Sig Dispense Refill Last Dose     acetone urine (KETOSTIX) test strip Use 1 strip/day with first morning urine until you get 7  negatives in a row, then test once/week. 50 strip 3      aspirin (ASA) 81 MG chewable tablet Take 81 mg by mouth daily        blood glucose (ONETOUCH VERIO IQ) test strip Use to test blood sugar 4 times daily or as directed. 200 strip 3      labetalol (NORMODYNE) 100 MG tablet Take 1 tablet (100 mg) by mouth 2 times daily 100 tablet 1      magnesium 250 MG tablet Take 1 tablet by mouth daily 200 mg        OneTouch Delica Lancets 33G MISC 1 each 4 times daily 100 each 3      Prenatal Vit-Fe Fumarate-FA (PRENATAL MULTIVITAMIN W/IRON) 27-0.8 MG tablet Take 1 tablet by mouth daily      .        Maternal Past Medical History:     Past Medical History:   Diagnosis Date     Angioedema 2019    non-specific cause     Depression with anxiety      Gestational diabetes mellitus (GDM), antepartum, gestational diabetes method of control unspecified     2020     Hypertension 2018    on medication for about 6 months in 2018     Infertility, female      Migraines      Obesity 04/18/2012     Papanicolaou smear of cervix with low grade squamous intraepithelial lesion (LGSIL) 2009    resolved     Pre-eclampsia in third trimester 2020     Ventricular septal defect (VSD) of fetus in guevara pregnancy, antepartum                        Family History:     Family History   Problem Relation Age of Onset     Other Cancer Father         Skin Cancer     Thyroid Disease Mother      Depression Mother      Anxiety Disorder Mother      Depression Brother      Anxiety Disorder Brother      Mental Illness Brother      Thyroid Disease Maternal Grandmother      Heart Failure Maternal Grandmother      Depression Maternal Grandmother      Mental Illness Maternal Grandmother      Brain Cancer Maternal Grandfather      Other Cancer Maternal Grandfather         Brain Cancer     Lung Cancer Paternal Grandmother      Other Cancer Paternal Grandmother         Lung Cancer     Cerebrovascular Disease Paternal Grandfather      Other Cancer Paternal Grandfather          Skin Cancer     Other Cancer Cousin         Testicular Cancer     Depression Cousin         Maternal Cousin     Diabetes Other         Maternal Aunt     Hypertension Other      Hyperlipidemia Other      Other Cancer Other         Brain Cancer     Anxiety Disorder Other         Maternal Uncle     Anxiety Disorder Other         Maternal Uncle     Mental Illness Other         Paternal Uncle     Thyroid Disease Other         Maternal Aunt     Obesity Other      Glaucoma No family hx of      Macular Degeneration No family hx of      Family history reviewed and updated in [facility]            Social History:     Social History     Tobacco Use     Smoking status: Former Smoker     Packs/day: 0.50     Years: 14.00     Pack years: 7.00     Types: Cigarettes     Start date: 12/30/2003     Smokeless tobacco: Never Used     Tobacco comment: quit with pregnancy   Substance Use Topics     Alcohol use: Not Currently            Review of Systems:   The Review of Systems is negative other than noted in the HPI          Physical Exam:   Vitals were reviewed  Temp: 97.6  F (36.4  C) Temp src: Oral BP: 117/62 Pulse: 96   Resp: 16   O2 Device: None (Room air)    Constitutional:   awake, alert, cooperative, no apparent distress, and appears stated age     Lungs:   No increased work of breathing, good air exchange, clear to auscultation bilaterally, no crackles or wheezing     Cardiovascular:   Normal apical impulse, regular rate and rhythm, normal S1 and S2, no S3 or S4, and no murmur noted     Abdomen:   Gravid, EFW 8 + lbs.      Cervix:   Membranes: intact   Dilation: 1   Effacement: 50%   Station:FLOATING   Consistency: soft   Position: Posterior  Presentation:Cephalic  Fetal Heart Rate Tracing: reactive and reassuring, Tier 1 (normal)  Tocometer: external monitor                       Assessment:   Halima Razo is a 38w0d pregnant female admitted with induction of labor, indication cHTN and GDMA1  Desires PPTL  AMA.           Plan:   Admit - see IP orders  cervical ripening with misoprostol  Pain medication epidural  Monitor BS in labor  Anticipate     Lila Draper MD

## 2022-03-27 NOTE — PLAN OF CARE
Goal Outcome Evaluation:    , 38 weeks. O+ immune GBS -Covid -Pt SROM at 1255. Clear fluid with SVE 2/60/-2. Pt  gest diabetic, diet controlled, BG checks at home are am and 1 hour after meals. Pt takes asa and labetalol. Run cord blood, hold cord tissue.  Requests epidural for pain management. Pt up in room independently. Voiding in adequate amounts. Questions answered. Paperwork given. Request having a tubal, DO NOT REMOVE EPIDURAL PLEASE.   Hx of IUGR, VSD of NB, and pt has non medicated depression. Cat 1 tracing, theresa 2-3 minutes. Will continue to assess and monitor    pt requests epidural at 1445, IV bolus infusing. ELEN Mahoney notified. SVE 4/75/-2 posterior.

## 2022-03-27 NOTE — L&D DELIVERY NOTE
"Delivery Summary    Halima Razo MRN# [id number]   Age: 36 year old YOB: 1985     ASSESSMENT & PLAN: 37 y/o  admitted at 38w for IOL due to chronic hypertension and GDMa1; her pregnancy was remarkable for AMA, with NIPT c/w normal male; she has a prior child with VSD, was not able to get echocardiogram completed this pregnancy.  On admission, her cervix was not initiallly favorable, so she received Misoprostol, however shortly after she was able to undergo AROM and pitocin augmentation; she received epidural for labor analgesia and had rapid progress through remainder of stage 1; at complete, deep variable decels with rapid descent of fetal head were noted; she pushed x 2 to slow crown; double nuchal cord noted, reduced after delivery of baby boy; spontaneous cry.  Placenta spontaneous and intact  QBL 40cc  6 lb 11 oz  \"Harman\"  Lila Draper MD  [facility]         Dung, Male-Halima [[id number]]    Labor Event Times    Labor onset date: 3/27/22 Onset time:  2:45 PM      Labor Length    3rd Stage (hrs): 0 (min): 3      Labor Events     labor?: No   steroids: None  Labor Type: Induction/Cervical ripening, AROM, Augmentation  Predominate monitoring during 1st stage: continuous electronic fetal monitoring     Antibiotics received during labor?: No     Rupture identifier: Sac 1  Rupture date/time: 3/27/22 1255   Rupture type: Artificial Rupture of Membranes  Fluid color: Clear  Fluid odor: Normal     Induction: Misoprostol  Induction date/time:     Cervical ripening date/time: 3/27/22 0827   Indications for induction: Hypertension     Augmentation: AROM, Oxytocin  1:1 continuous labor support provided by?: RN       Delivery/Placenta Date and Time    Delivery Date: 3/27/22 Delivery Time:  4:42 PM   Placenta Date/Time: 3/27/2022  4:45 PM  Oxytocin given at the time of delivery: after delivery of baby  Delivering clinician: Lila Draper MD   Other " "personnel present at delivery:  Provider Role   Lila Draper MD Obstetrician   Yolanda Mercado, RN    Arlene Wagner RN          Vaginal Counts     Initial count performed by 2 team members:  Two Team Members    renetta wagner       Needles Suture Needles Sponges (RETIRED) Instruments   Initial counts 2  5    Added to count       Relief counts       Final counts 2  5          Placed during labor Accounted for at the end of labor   FSE     IUPC     Cervidil                Final count performed by 2 team members:  Two Team Members   dominique solis         Apgars    Living status: Living   1 Minute 5 Minute 10 Minute 15 Minute 20 Minute   Skin color: 0  1       Heart rate: 2  2       Reflex irritability: 2  2       Muscle tone: 2  2       Respiratory effort: 2  2       Total: 8  9          Cord    Vessels: 3 Vessels    Cord Complications: Nuchal   Nuchal Intervention: delivered through         Nuchal cord description: tight nuchal cord         Cord Blood Disposition: Lab    Gases Sent?: No    Delayed cord clamping?: Yes    Stem cell collection?: No        Measurements    Weight: 6 lb 11 oz Length: 1' 8\"   Head circumference: 34.3 cm       Labor Events and Shoulder Dystocia    Fetal Tracing Prior to Delivery: Category 2  Fetal Tracing Comments: stage 2 deep variable decels     Delivery (Maternal) (Provider to Complete) (440456)    Episiotomy: None  Perineal lacerations: None    Repair suture: None  Genital tract inspection done: Pos     Blood Loss  Mother: Fam Razo #[id number]   Start of Mother's Information    Delivery Blood Loss  22 1445 - 22 1652    None           End of Mother's Information  Mother: Fam Razo #[id number]          Delivery - Provider to Complete ([id number])    Delivering clinician: Lila Draper MD  Attempted Delivery Types (Choose all that apply): Spontaneous Vaginal Delivery  Delivery Type (Choose the 1 that will go to the Birth " History): Vaginal, Spontaneous                   Other personnel:  Provider Role   Lila Draper MD Obstetrician   Yolanda Mercado, Arlene Cortes RN                 Placenta    Date/Time: 3/27/2022  4:45 PM  Removal: Spontaneous  Disposition: Hospital disposal           Anesthesia    Method: Epidural                Presentation and Position    Presentation: Vertex    Position: Right Occiput Anterior                 Lila Draper MD

## 2022-03-27 NOTE — PLAN OF CARE
Goal Outcome Evaluation:  Progressing    S:Delivery  B:Induced  Labor,38w0d    Lab Results   Component Value Date    GBS Negative 2020    with antibiotic treatment not indicated 4 hours prior to delivery.  A: Patient delivered   none, intact at 1642 with Dr. ALFRED Draepr in attendance and baby placed on mother's abdomen for delayed cord clamping. Baby dried and stimulated. Baby placed  skin to skin @ 1700.. Apgars /9. Delivery QBL55 see delivery record .  IV infusion of Oxytocin  infused. Placenta removal spontaneous. MD does not want placenta sent to pathology.  See Flowsheet for VS and PP checks. Labor care plan goals met, transition now to postpartum care. Postpartum QBL  R: Expect routine postpartum care. Anticipate first feeding within the hour or whenever infant displays feeding cues. Continue skin to skin. Prior discussion with mother indicates that feeding plan is Formula feeding. Educated mother on importance of exclusive breastfeeding. Father missed delivery, was out getting dinner. Fetal distress, deep variables, needed to deliver. Explanation of rationale given.

## 2022-03-27 NOTE — PLAN OF CARE
Goal Outcome Evaluation:     Dr Mcneil ordered, solid foods to be stopped at 0000, also, pt to be clear liquids until 0530, will assess when PPTL can be scheduled on 3/28/22.

## 2022-03-28 ENCOUNTER — ANESTHESIA EVENT (OUTPATIENT)
Dept: SURGERY | Facility: CLINIC | Age: 37
End: 2022-03-28
Payer: COMMERCIAL

## 2022-03-28 ENCOUNTER — ANESTHESIA (OUTPATIENT)
Dept: SURGERY | Facility: CLINIC | Age: 37
End: 2022-03-28
Payer: COMMERCIAL

## 2022-03-28 VITALS
WEIGHT: 185.1 LBS | RESPIRATION RATE: 16 BRPM | TEMPERATURE: 98.6 F | DIASTOLIC BLOOD PRESSURE: 66 MMHG | OXYGEN SATURATION: 97 % | HEART RATE: 92 BPM | BODY MASS INDEX: 34.06 KG/M2 | HEIGHT: 62 IN | SYSTOLIC BLOOD PRESSURE: 139 MMHG

## 2022-03-28 LAB
GLUCOSE BLDC GLUCOMTR-MCNC: 81 MG/DL (ref 70–99)
GLUCOSE BLDC GLUCOMTR-MCNC: 86 MG/DL (ref 70–99)
HGB BLD-MCNC: 10.6 G/DL (ref 11.7–15.7)

## 2022-03-28 PROCEDURE — 258N000003 HC RX IP 258 OP 636: Performed by: OBSTETRICS & GYNECOLOGY

## 2022-03-28 PROCEDURE — 85018 HEMOGLOBIN: CPT | Performed by: OBSTETRICS & GYNECOLOGY

## 2022-03-28 PROCEDURE — 360N000075 HC SURGERY LEVEL 2, PER MIN: Performed by: OBSTETRICS & GYNECOLOGY

## 2022-03-28 PROCEDURE — 36415 COLL VENOUS BLD VENIPUNCTURE: CPT | Performed by: OBSTETRICS & GYNECOLOGY

## 2022-03-28 PROCEDURE — 272N000001 HC OR GENERAL SUPPLY STERILE: Performed by: OBSTETRICS & GYNECOLOGY

## 2022-03-28 PROCEDURE — 250N000013 HC RX MED GY IP 250 OP 250 PS 637: Performed by: NURSE ANESTHETIST, CERTIFIED REGISTERED

## 2022-03-28 PROCEDURE — 250N000009 HC RX 250: Performed by: OBSTETRICS & GYNECOLOGY

## 2022-03-28 PROCEDURE — 250N000013 HC RX MED GY IP 250 OP 250 PS 637: Performed by: OBSTETRICS & GYNECOLOGY

## 2022-03-28 PROCEDURE — 58605 DIVISION OF FALLOPIAN TUBE: CPT | Performed by: OBSTETRICS & GYNECOLOGY

## 2022-03-28 PROCEDURE — 250N000011 HC RX IP 250 OP 636: Performed by: OBSTETRICS & GYNECOLOGY

## 2022-03-28 PROCEDURE — 710N000009 HC RECOVERY PHASE 1, LEVEL 1, PER MIN: Performed by: OBSTETRICS & GYNECOLOGY

## 2022-03-28 PROCEDURE — 88302 TISSUE EXAM BY PATHOLOGIST: CPT | Mod: TC | Performed by: OBSTETRICS & GYNECOLOGY

## 2022-03-28 PROCEDURE — 0UT70ZZ RESECTION OF BILATERAL FALLOPIAN TUBES, OPEN APPROACH: ICD-10-PCS | Performed by: OBSTETRICS & GYNECOLOGY

## 2022-03-28 PROCEDURE — 250N000011 HC RX IP 250 OP 636: Performed by: NURSE ANESTHETIST, CERTIFIED REGISTERED

## 2022-03-28 PROCEDURE — 999N000141 HC STATISTIC PRE-PROCEDURE NURSING ASSESSMENT: Performed by: OBSTETRICS & GYNECOLOGY

## 2022-03-28 PROCEDURE — 271N000001 HC OR GENERAL SUPPLY NON-STERILE: Performed by: OBSTETRICS & GYNECOLOGY

## 2022-03-28 PROCEDURE — 250N000009 HC RX 250: Performed by: NURSE ANESTHETIST, CERTIFIED REGISTERED

## 2022-03-28 PROCEDURE — 370N000017 HC ANESTHESIA TECHNICAL FEE, PER MIN: Performed by: OBSTETRICS & GYNECOLOGY

## 2022-03-28 RX ORDER — ACETAMINOPHEN 325 MG/1
975 TABLET ORAL ONCE
Status: COMPLETED | OUTPATIENT
Start: 2022-03-28 | End: 2022-03-28

## 2022-03-28 RX ORDER — SODIUM CHLORIDE, SODIUM LACTATE, POTASSIUM CHLORIDE, CALCIUM CHLORIDE 600; 310; 30; 20 MG/100ML; MG/100ML; MG/100ML; MG/100ML
INJECTION, SOLUTION INTRAVENOUS CONTINUOUS
Status: DISCONTINUED | OUTPATIENT
Start: 2022-03-28 | End: 2022-03-28 | Stop reason: HOSPADM

## 2022-03-28 RX ORDER — ONDANSETRON 2 MG/ML
4 INJECTION INTRAMUSCULAR; INTRAVENOUS EVERY 30 MIN PRN
Status: DISCONTINUED | OUTPATIENT
Start: 2022-03-28 | End: 2022-03-28 | Stop reason: HOSPADM

## 2022-03-28 RX ORDER — GABAPENTIN 300 MG/1
300 CAPSULE ORAL
Status: COMPLETED | OUTPATIENT
Start: 2022-03-28 | End: 2022-03-28

## 2022-03-28 RX ORDER — OXYCODONE HYDROCHLORIDE 5 MG/1
5 TABLET ORAL EVERY 6 HOURS PRN
Qty: 4 TABLET | Refills: 0 | Status: SHIPPED | OUTPATIENT
Start: 2022-03-28 | End: 2022-03-31

## 2022-03-28 RX ORDER — MAGNESIUM SULFATE HEPTAHYDRATE 40 MG/ML
2 INJECTION, SOLUTION INTRAVENOUS ONCE
Status: DISCONTINUED | OUTPATIENT
Start: 2022-03-28 | End: 2022-03-28 | Stop reason: HOSPADM

## 2022-03-28 RX ORDER — HYDROMORPHONE HCL IN WATER/PF 6 MG/30 ML
0.2 PATIENT CONTROLLED ANALGESIA SYRINGE INTRAVENOUS EVERY 5 MIN PRN
Status: DISCONTINUED | OUTPATIENT
Start: 2022-03-28 | End: 2022-03-28 | Stop reason: HOSPADM

## 2022-03-28 RX ORDER — FENTANYL CITRATE 50 UG/ML
INJECTION, SOLUTION INTRAMUSCULAR; INTRAVENOUS PRN
Status: DISCONTINUED | OUTPATIENT
Start: 2022-03-28 | End: 2022-03-28

## 2022-03-28 RX ORDER — LIDOCAINE HYDROCHLORIDE 10 MG/ML
INJECTION, SOLUTION INFILTRATION; PERINEURAL PRN
Status: DISCONTINUED | OUTPATIENT
Start: 2022-03-28 | End: 2022-03-28 | Stop reason: HOSPADM

## 2022-03-28 RX ORDER — KETAMINE HYDROCHLORIDE 10 MG/ML
INJECTION INTRAMUSCULAR; INTRAVENOUS PRN
Status: DISCONTINUED | OUTPATIENT
Start: 2022-03-28 | End: 2022-03-28

## 2022-03-28 RX ORDER — PROPOFOL 10 MG/ML
INJECTION, EMULSION INTRAVENOUS CONTINUOUS PRN
Status: DISCONTINUED | OUTPATIENT
Start: 2022-03-28 | End: 2022-03-28

## 2022-03-28 RX ORDER — LIDOCAINE 40 MG/G
CREAM TOPICAL
Status: DISCONTINUED | OUTPATIENT
Start: 2022-03-28 | End: 2022-03-28 | Stop reason: HOSPADM

## 2022-03-28 RX ORDER — FENTANYL CITRATE 50 UG/ML
25 INJECTION, SOLUTION INTRAMUSCULAR; INTRAVENOUS EVERY 5 MIN PRN
Status: DISCONTINUED | OUTPATIENT
Start: 2022-03-28 | End: 2022-03-28 | Stop reason: HOSPADM

## 2022-03-28 RX ORDER — DEXAMETHASONE SODIUM PHOSPHATE 4 MG/ML
INJECTION, SOLUTION INTRA-ARTICULAR; INTRALESIONAL; INTRAMUSCULAR; INTRAVENOUS; SOFT TISSUE PRN
Status: DISCONTINUED | OUTPATIENT
Start: 2022-03-28 | End: 2022-03-28

## 2022-03-28 RX ORDER — EPHEDRINE SULFATE 50 MG/ML
5 INJECTION, SOLUTION INTRAMUSCULAR; INTRAVENOUS; SUBCUTANEOUS ONCE
Status: DISCONTINUED | OUTPATIENT
Start: 2022-03-28 | End: 2022-03-28 | Stop reason: HOSPADM

## 2022-03-28 RX ORDER — SENNA AND DOCUSATE SODIUM 50; 8.6 MG/1; MG/1
1-2 TABLET, FILM COATED ORAL 2 TIMES DAILY PRN
Qty: 30 TABLET | Refills: 0 | Status: SHIPPED | OUTPATIENT
Start: 2022-03-28 | End: 2022-07-26

## 2022-03-28 RX ORDER — IBUPROFEN 600 MG/1
600 TABLET, FILM COATED ORAL EVERY 6 HOURS PRN
Qty: 30 TABLET | Refills: 0 | Status: SHIPPED | OUTPATIENT
Start: 2022-03-28 | End: 2022-07-26

## 2022-03-28 RX ORDER — OXYCODONE HYDROCHLORIDE 5 MG/1
5 TABLET ORAL EVERY 4 HOURS PRN
Status: DISCONTINUED | OUTPATIENT
Start: 2022-03-28 | End: 2022-03-28 | Stop reason: HOSPADM

## 2022-03-28 RX ORDER — LIDOCAINE HYDROCHLORIDE 20 MG/ML
INJECTION, SOLUTION EPIDURAL; INFILTRATION; INTRACAUDAL; PERINEURAL PRN
Status: DISCONTINUED | OUTPATIENT
Start: 2022-03-28 | End: 2022-03-28

## 2022-03-28 RX ORDER — ACETAMINOPHEN 325 MG/1
325-650 TABLET ORAL EVERY 6 HOURS PRN
Qty: 30 TABLET | Refills: 0 | Status: SHIPPED | OUTPATIENT
Start: 2022-03-28 | End: 2022-07-26

## 2022-03-28 RX ORDER — KETOROLAC TROMETHAMINE 30 MG/ML
INJECTION, SOLUTION INTRAMUSCULAR; INTRAVENOUS PRN
Status: DISCONTINUED | OUTPATIENT
Start: 2022-03-28 | End: 2022-03-28

## 2022-03-28 RX ORDER — ONDANSETRON 4 MG/1
4 TABLET, ORALLY DISINTEGRATING ORAL EVERY 30 MIN PRN
Status: DISCONTINUED | OUTPATIENT
Start: 2022-03-28 | End: 2022-03-28 | Stop reason: HOSPADM

## 2022-03-28 RX ADMIN — MIDAZOLAM 2 MG: 1 INJECTION INTRAMUSCULAR; INTRAVENOUS at 13:05

## 2022-03-28 RX ADMIN — IBUPROFEN 800 MG: 800 TABLET ORAL at 05:39

## 2022-03-28 RX ADMIN — KETOROLAC TROMETHAMINE 30 MG: 30 INJECTION, SOLUTION INTRAMUSCULAR at 13:40

## 2022-03-28 RX ADMIN — ONDANSETRON 4 MG: 2 INJECTION INTRAMUSCULAR; INTRAVENOUS at 13:41

## 2022-03-28 RX ADMIN — DOCUSATE SODIUM 100 MG: 100 CAPSULE, LIQUID FILLED ORAL at 08:05

## 2022-03-28 RX ADMIN — PROPOFOL 100 MCG/KG/MIN: 10 INJECTION, EMULSION INTRAVENOUS at 13:04

## 2022-03-28 RX ADMIN — LIDOCAINE HYDROCHLORIDE 10 ML: 20 INJECTION, SOLUTION EPIDURAL; INFILTRATION; INTRACAUDAL; PERINEURAL at 13:05

## 2022-03-28 RX ADMIN — ACETAMINOPHEN 975 MG: 325 TABLET, FILM COATED ORAL at 11:39

## 2022-03-28 RX ADMIN — GABAPENTIN 300 MG: 300 CAPSULE ORAL at 11:39

## 2022-03-28 RX ADMIN — FENTANYL CITRATE 100 MCG: 50 INJECTION, SOLUTION INTRAMUSCULAR; INTRAVENOUS at 13:26

## 2022-03-28 RX ADMIN — LABETALOL HYDROCHLORIDE 100 MG: 100 TABLET, FILM COATED ORAL at 08:04

## 2022-03-28 RX ADMIN — LIDOCAINE HYDROCHLORIDE 10 ML: 20 INJECTION, SOLUTION EPIDURAL; INFILTRATION; INTRACAUDAL; PERINEURAL at 13:02

## 2022-03-28 RX ADMIN — SODIUM CHLORIDE, POTASSIUM CHLORIDE, SODIUM LACTATE AND CALCIUM CHLORIDE: 600; 310; 30; 20 INJECTION, SOLUTION INTRAVENOUS at 10:58

## 2022-03-28 RX ADMIN — KETAMINE HYDROCHLORIDE 30 MG: 10 INJECTION, SOLUTION INTRAMUSCULAR; INTRAVENOUS at 13:01

## 2022-03-28 RX ADMIN — DEXAMETHASONE SODIUM PHOSPHATE 8 MG: 4 INJECTION, SOLUTION INTRA-ARTICULAR; INTRALESIONAL; INTRAMUSCULAR; INTRAVENOUS; SOFT TISSUE at 13:01

## 2022-03-28 ASSESSMENT — ACTIVITIES OF DAILY LIVING (ADL)
ADLS_ACUITY_SCORE: 4

## 2022-03-28 NOTE — ANESTHESIA PREPROCEDURE EVALUATION
Anesthesia Pre-Procedure Evaluation    Patient: Halima Razo   MRN: [id number] : 1985        Procedure : * No procedures listed *          Past Medical History:   Diagnosis Date     Angioedema     non-specific cause     Depression with anxiety      Gestational diabetes mellitus (GDM), antepartum, gestational diabetes method of control unspecified          Hypertension     on medication for about 6 months in      Infertility, female      Migraines      Obesity 2012     Papanicolaou smear of cervix with low grade squamous intraepithelial lesion (LGSIL)     resolved     Pre-eclampsia in third trimester      Ventricular septal defect (VSD) of fetus in guevara pregnancy, antepartum       Past Surgical History:   Procedure Laterality Date     BIOPSY      Underarm mole,     LAPAROSCOPY DIAGNOSTIC (GYN) N/A 2018    Procedure: LAPAROSCOPY DIAGNOSTIC (GYN);;  Surgeon: Kei Kelley MD;  Location: [facility] OR     OPERATIVE HYSTEROSCOPY WITH MORCELLATOR N/A 2018    Procedure: OPERATIVE HYSTEROSCOPY WITH MORCELLATOR (MYOSURE);  Diagnostic laparoscopy, hysteroscopy with biopsy;  Surgeon: Kei Kelley MD;  Location: [facility] OR      No Known Allergies   Social History     Tobacco Use     Smoking status: Former Smoker     Packs/day: 0.50     Years: 14.00     Pack years: 7.00     Types: Cigarettes     Start date: 2003     Smokeless tobacco: Never Used     Tobacco comment: quit with pregnancy   Substance Use Topics     Alcohol use: Not Currently      Wt Readings from Last 1 Encounters:   22 84 kg (185 lb 1.6 oz)        Anesthesia Evaluation   Pt has had prior anesthetic. Type: General.        ROS/MED HX  ENT/Pulmonary:  - neg pulmonary ROS     Neurologic:     (+) migraines,     Cardiovascular: Comment: Hx angioedema      (+) Dyslipidemia hypertension--PIH ---    METS/Exercise Tolerance:     Hematologic:  - neg hematologic  ROS     Musculoskeletal:        GI/Hepatic:  - neg GI/hepatic ROS     Renal/Genitourinary:       Endo: Comment: Gestational diabetes      (+) type II DM, Obesity,  Thyroid problem: pregn.   Psychiatric/Substance Use:     (+) psychiatric history anxiety and depression     Infectious Disease:       Malignancy:       Other:            Physical Exam    Airway        Mallampati: II   TM distance: > 3 FB   Neck ROM: full   Mouth opening: > 3 cm    Respiratory Devices and Support         Dental  no notable dental history         Cardiovascular   cardiovascular exam normal          Pulmonary   pulmonary exam normal                OUTSIDE LABS:  CBC:   Lab Results   Component Value Date    WBC 8.7 03/27/2022    WBC 10.9 01/14/2022    HGB 10.6 (L) 03/28/2022    HGB 10.8 (L) 03/27/2022    HCT 31.3 (L) 03/27/2022    HCT 32.3 (L) 01/14/2022     03/27/2022     01/14/2022     BMP:   Lab Results   Component Value Date     12/28/2020     01/08/2014    POTASSIUM 4.1 12/28/2020    POTASSIUM 4.1 01/08/2014    CHLORIDE 107 12/28/2020    CHLORIDE 106 01/08/2014    CO2 24 12/28/2020    CO2 25 01/08/2014    BUN 16 12/28/2020    BUN 18 01/08/2014    CR 0.46 (L) 03/27/2022    CR 0.63 12/28/2020    GLC 86 03/28/2022    GLC 91 03/27/2022     COAGS: No results found for: PTT, INR, FIBR  POC:   Lab Results   Component Value Date    HCG Negative 01/08/2014     HEPATIC:   Lab Results   Component Value Date    ALBUMIN 4.2 01/08/2014    PROTTOTAL 7.7 01/08/2014    ALT 12 03/27/2022    AST 10 03/27/2022    ALKPHOS 83 01/08/2014    BILITOTAL 0.6 01/08/2014     OTHER:   Lab Results   Component Value Date    A1C 5.3 09/02/2021    AJ 9.4 12/28/2020    TSH 1.32 12/28/2020    T4 1.19 01/08/2014       Anesthesia Plan    ASA Status:  2      Anesthesia Type: Epidural.   Induction: Propofol.   Maintenance: Balanced.        Consents    Anesthesia Plan(s) and associated risks, benefits, and realistic alternatives discussed. Questions answered and  patient/representative(s) expressed understanding.     - Discussed: Risks, Benefits and Alternatives for BOTH SEDATION and the PROCEDURE were discussed     - Discussed with:  Patient         Postoperative Care    Pain management: IV analgesics, Oral pain medications, Multi-modal analgesia.   PONV prophylaxis: Ondansetron (or other 5HT-3), Dexamethasone or Solumedrol     Comments:           neg OB ROS.         Kala Bhatia APRN CRNA

## 2022-03-28 NOTE — DISCHARGE SUMMARY
"[facility] Vaginal Delivery Discharge Summary    Admit date: 3/27/2022  Discharge date: 3/28/2022     Admit Dx:   - 36 year old y/o  at 38w0d   - cHTN  - GDMA1  - AMA  - Depression  - desires sterility    Discharge Dx:  - Same as above, s/p     Procedures:  -   - PPTL via bilateral salpingectomy    Admit HPI:  Ms. Halima Razo is a 36 year old  at 38w0d admitted for IOL. Please see her admit H&P for full details of her PMH, PSH, Meds, Allergies and exam on admit.    Hospital course:   37 y/o  admitted at 38w for IOL due to chronic hypertension and GDMa1; her pregnancy was remarkable for AMA, with NIPT c/w normal male; she has a prior child with VSD, was not able to get echocardiogram completed this pregnancy.  On admission, her cervix was not initiallly favorable, so she received Misoprostol, however shortly after she was able to undergo AROM and pitocin augmentation; she received epidural for labor analgesia and had rapid progress through remainder of stage 1; at complete, deep variable decels with rapid descent of fetal head were noted; she pushed x 2 to slow crown; double nuchal cord noted, reduced after delivery of baby boy; spontaneous cry.  Placenta spontaneous and intact  QBL 40cc  6 lb 11 oz  \"Harman\"    Her postpartum course was uncomplicated. She had PPTL on PPD#1, see op note for details. On PPD#1, she was meeting all of her postpartum goals and deemed stable for discharge. She was voiding without difficulty, tolerating a regular diet without nausea and vomiting, her pain was well controlled on oral pain medicines and her lochia was appropriate.Her Rh status was + and Rhogam was not indicated. At the time of discharge, she was breastfeeding her infant and had PPTL for contraception.     Physical exam on the day of discharge:  Vitals:    22 2219 22 0300 22 0750 22 0800   BP: 126/51 127/60 124/63    BP Location:       Patient Position:   "     Pulse: 92 76 80 80   Resp: 16 16  18   Temp: 98.5  F (36.9  C) 97.9  F (36.6  C)  97.5  F (36.4  C)   TempSrc: Oral Oral  Oral   SpO2:       Weight:       Height:         General: sitting up, alert and cooperative  Heart: reg rate, well perfused  Lungs: no increased work of breathing  Abd: soft, non-distended, non-tender. Fundus firm, nontender, below umbilicus.   Extremities: calves nontender, trace edema of lower extremities bilaterally    Lab Results   Component Value Date    HGB 10.6 03/28/2022    HGB 10.8 03/27/2022    HGB 12.0 11/25/2020    HGB 12.8 11/20/2020     Blood type:   Lab Results   Component Value Date    RH Pos 06/08/2020       Discharge/Disposition:  Halima Razo was discharged to home in stable condition with the following instructions/medications:  1) Call for temperature > 100.4, foul smelling vaginal discharge, bleeding > 1 pad per hour x 2 hrs, pain not controlled by oral pain meds, severe constipation or severe nausea or vomiting.  2) She received contraceptive counseling.  3) She was instructed to follow-up with her primary OB in 6 weeks for a routine postpartum visit and in 2-3 days for BP check  4) She was instructed to continue her PNV on discharge if she wished to breast feed her infant.  5) She was discharged home with the following medications: senna s, ibuprofen, tylenol, roxicodone    Orquidea Mcneil MD   3/28/2022 11:42 AM

## 2022-03-28 NOTE — PROGRESS NOTES
Pt arrived to OB unit.  Vss,afebrile.  Abd inc CDI.  Scant vaginal bleeding.  Joaquim reg diet.  Up to br to void.  Denies pain.  Pt stable.

## 2022-03-28 NOTE — ANESTHESIA POSTPROCEDURE EVALUATION
Patient: Halima Razo    Procedure: Procedure(s):  LIGATION, FALLOPIAN TUBE, POSTPARTUM       Anesthesia Type:  Epidural    Note:  Disposition: Outpatient   Postop Pain Control: Uneventful            Sign Out: Well controlled pain   PONV: No   Neuro/Psych: Uneventful            Sign Out: Acceptable/Baseline neuro status   Airway/Respiratory: Uneventful            Sign Out: Acceptable/Baseline resp. status   CV/Hemodynamics: Uneventful            Sign Out: Acceptable CV status; No obvious hypovolemia; No obvious fluid overload   Other NRE: NONE   DID A NON-ROUTINE EVENT OCCUR? No           Last vitals:  Vitals Value Taken Time   /66 03/28/22 1600   Temp     Pulse 76 03/28/22 1604   Resp 13 03/28/22 1604   SpO2 96 % 03/28/22 1607   Vitals shown include unvalidated device data.    Electronically Signed By: MIGUEL Ch CRNA  March 28, 2022  6:07 PM

## 2022-03-28 NOTE — ANESTHESIA CARE TRANSFER NOTE
Patient: Halima Razo    Procedure: Procedure(s):  LIGATION, FALLOPIAN TUBE, POSTPARTUM       Diagnosis: Encounter for sterilization [Z30.2]  Diagnosis Additional Information: No value filed.    Anesthesia Type:   Epidural     Note:    Oropharynx: oropharynx clear of all foreign objects  Level of Consciousness: awake  Oxygen Supplementation: face mask    Independent Airway: airway patency satisfactory and stable    Vital Signs Stable: post-procedure vital signs reviewed and stable  Report to RN Given: handoff report given  Patient transferred to: PACU    Handoff Report: Identifed the Patient, Identified the Reponsible Provider, Reviewed the pertinent medical history, Discussed the surgical course, Reviewed Intra-OP anesthesia mangement and issues during anesthesia, Set expectations for post-procedure period and Allowed opportunity for questions and acknowledgement of understanding      Vitals:  Vitals Value Taken Time   BP 88/40 03/28/22 1410   Temp     Pulse 75 03/28/22 1413   Resp 11 03/28/22 1413   SpO2 98 % 03/28/22 1413   Vitals shown include unvalidated device data.    Electronically Signed By: MIGUEL Prajapati CRNA  March 28, 2022  2:14 PM

## 2022-03-28 NOTE — CARE PLAN
"Pt up and independent in room, voiding spontaneously. Fundal assessment WDL. Pain well managed with ibuprofen.  supportive at bedside. Bonding well with baby. VSS.     /51   Pulse 92   Temp 98.5  F (36.9  C) (Oral)   Resp 16   Ht 1.575 m (5' 2\")   Wt 84 kg (185 lb 1.6 oz)   LMP 07/04/2021 (Exact Date)   SpO2 96%   Breastfeeding Unknown   BMI 33.86 kg/m      "

## 2022-03-28 NOTE — OP NOTE
[facility]  Full Operative Note    Date of Service:  3/28/2022    Surgeon: Orquidea Mcneil MD     Preop Dx: 1. Undesired fertility  Postop Dx: 1. Same  Procedure: Postpartum bilateral tubal ligation via bilateral salpingectomy    Anesthesia: epidural  EBL: 5 cc    Specimens: bilateral fallopian tubes  Complications: None apparent    Indications: Ms. Halima Razo is a 36 year old  on PPD#1 after , who desires sterilization. The patient is certain that she wants no future children. The risks of the procedure were discussed, including: Regret, failure rate, increased risk of ectopic gestation should pregnancy occur, bleeding, infection, and injury to other organs. Informed consent signed.    Findings:  - Fundus firm below the umbilicus  - Telescoping and hemostasis noted at the cut tubal ends bilaterally.    Procedure Details:  The patient was brought to the operating room, where adequate epidural anesthesia was administered. She was placed in the dorsal supine position, and prepped and draped in the usual sterile fashion. Surgical time out was performed. A 3 cm infraumbilical incision was made and carried down to the underlying fascia with the scalpel. The fascia was incised in the midline. The peritoneum was identified and entered sharply. The incision in the fascia and peritoneum was extended laterally with Weber scissors. The Vinicius O retractor was placed. The left fallopian tube was identified and grasped with the Haley clamps. The fallopian tube was carried out to the fimbriated end and then regrasped approximately 3 cm from the cornua. Using handheld ligasure, the mesosalpinx was clamped, cauterized and cut from the fimbrae to the level of the cornua. The tube was then cauterized, transected and removed. Telescoping and hemostasis noted at the cut tubal ends. Attention was turned to the right fallopian tube, which was removed in similar fashion.Telescoping and  hemostasis noted at the cut tubal ends. The fascia was closed with a running suture of 0 Vicryl. The skin was closed with 4-0 monocryl and skin glue.    All sponge, needle, and instrument counts were correct. The patient tolerated the procedure well and was transferred to recovery in stable condition.     Orquidea Mcneil MD   3/28/2022 2:06 PM

## 2022-03-28 NOTE — PROGRESS NOTES
"[facility] Daily Postpartum Note    S:Halima Razo is feeling well this morning. She denies any complaints. Her pain is well-controlled on  pain medications. She tolerating a regular diet without nausea or vomiting. Ambulating without difficulty. Lochia is decreasing. Breastfeeding without questions or concerns. She plans to use PPTL for contraception.      O:   VS: /63   Pulse 80   Temp 97.5  F (36.4  C) (Oral)   Resp 18   Ht 1.575 m (5' 2\")   Wt 84 kg (185 lb 1.6 oz)   LMP 2021 (Exact Date)   SpO2 96%   Breastfeeding Unknown   BMI 33.86 kg/m       I/O last 3 completed shifts:  In: -   Out: 280 [Urine:225; Blood:55]    General: resting in bed, in NAD  CV: reg rate, well perfused  Resp: no increased work of breathing  Abdomen: soft, appropriately tender, nondistended  Fundus firm  below the umbilicus  Extremities: non-tender, non-edematous     Recent Labs   Lab 22  0557 22  0815   HGB 10.6* 10.8*       A: Halima Razo is a 36 year old  who is PPD#1 s/p , doing well    P:  Continue routine pp cares  Heme VSS, cont to monitor  GI: tolerating regular diet  Feeding: breast  Contraception: PPTL   - The permanent nature of the procedure was discussed at length. The patient is certain that she wants no future children.    - The risk of the procedure were discussed, including: Regret, failure rate, increased risk of ectopic gestation should pregnancy occur, bleeding, infection, and injury to other organs    - The patient was given time to ask questions and stated that she was certain that she wanted to proceed with the procedure.   - General surgical consent for bilateral tubal ligation was also obtained (private insurance, FTP not signed)   - After discussing the procedure the patient was informed that if she felt uncertain about proceeding she could decline the procedure at any time.     Rh positive, Rubella Immune  Disposition: routine PP " cares, anticipate d/c later today vs tomorrow  cHTN- BPs mild range, no signs/symptoms of worsening, cont home labetalol  GDMA1- glucose checks PP have been normal    Orquidea Mcneil MD, MD  [facility]   3/28/2022 11:27 AM

## 2022-03-30 LAB
PATH REPORT.COMMENTS IMP SPEC: NORMAL
PATH REPORT.COMMENTS IMP SPEC: NORMAL
PATH REPORT.FINAL DX SPEC: NORMAL
PATH REPORT.GROSS SPEC: NORMAL
PATH REPORT.MICROSCOPIC SPEC OTHER STN: NORMAL
PATH REPORT.RELEVANT HX SPEC: NORMAL
PHOTO IMAGE: NORMAL

## 2022-03-30 PROCEDURE — 88302 TISSUE EXAM BY PATHOLOGIST: CPT | Mod: 26 | Performed by: PATHOLOGY

## 2022-04-27 NOTE — PROGRESS NOTES
"Initial /78 (BP Location: Right arm, Patient Position: Chair, Cuff Size: Adult Regular)   Pulse 101   Temp 98.3  F (36.8  C) (Tympanic)   Resp 14   Ht 1.6 m (5' 3\")   Wt 83.6 kg (184 lb 6.4 oz)   LMP 07/04/2021 (Exact Date)   BMI 32.66 kg/m   Estimated body mass index is 32.66 kg/m  as calculated from the following:    Height as of this encounter: 1.6 m (5' 3\").    Weight as of this encounter: 83.6 kg (184 lb 6.4 oz). .      " I called pt and relayed Dr Camacho's message to her regarding recent EMG results.  Pt very thankful for the call

## 2022-07-25 ENCOUNTER — ALLIED HEALTH/NURSE VISIT (OUTPATIENT)
Dept: FAMILY MEDICINE | Facility: CLINIC | Age: 37
End: 2022-07-25

## 2022-07-25 ENCOUNTER — VIRTUAL VISIT (OUTPATIENT)
Dept: FAMILY MEDICINE | Facility: CLINIC | Age: 37
End: 2022-07-25
Payer: COMMERCIAL

## 2022-07-25 DIAGNOSIS — J02.9 SORE THROAT: ICD-10-CM

## 2022-07-25 DIAGNOSIS — J02.9 SORE THROAT: Primary | ICD-10-CM

## 2022-07-25 LAB
DEPRECATED S PYO AG THROAT QL EIA: NEGATIVE
GROUP A STREP BY PCR: NOT DETECTED

## 2022-07-25 PROCEDURE — U0005 INFEC AGEN DETEC AMPLI PROBE: HCPCS

## 2022-07-25 PROCEDURE — 87651 STREP A DNA AMP PROBE: CPT | Performed by: NURSE PRACTITIONER

## 2022-07-25 PROCEDURE — U0003 INFECTIOUS AGENT DETECTION BY NUCLEIC ACID (DNA OR RNA); SEVERE ACUTE RESPIRATORY SYNDROME CORONAVIRUS 2 (SARS-COV-2) (CORONAVIRUS DISEASE [COVID-19]), AMPLIFIED PROBE TECHNIQUE, MAKING USE OF HIGH THROUGHPUT TECHNOLOGIES AS DESCRIBED BY CMS-2020-01-R: HCPCS

## 2022-07-25 PROCEDURE — 99213 OFFICE O/P EST LOW 20 MIN: CPT | Mod: TEL | Performed by: NURSE PRACTITIONER

## 2022-07-25 PROCEDURE — 99207 PR NO CHARGE NURSE ONLY: CPT

## 2022-07-25 NOTE — PATIENT INSTRUCTIONS
"1.  I recommend pushing fluids and using tylenol as needed for headaches.  2.  You can take Vitamin D 5000 international unit(s) daily, Vitamin C 500 mg twice daily, Green Tea Extract 250-500 mg twice daily, Zinc  mg daily, and melatonin up to 3 mg per day can be helpful to you when fighting COVID 19.  3.  Follow-up in ER if any severe breathing issues.  4.  Follow-up in clinic if any persistent symptoms that are not improving for recheck in 1 week.  5.  You will be contacted for testing sites to get tested.  I will notify you by phone of results when they are back.  This can take 1-3 days.  6.  Handout below on guidelines for after your COVID 19 testing.  7.  Feel free to call me if you have any further questions.    After Your COVID-19 (Coronavirus) Test  You have been tested for COVID-19 (coronavirus).   If you'll have surgery in the next few days, we'll let you know ahead of time if you have the virus. Please call your surgeon's office with any questions.  For all other patients: Results are usually available in [facility] within 1 to 3 days.   If you do not have a [facility] account, you'll get a letter in the mail in about 7 to 10 days.   [employer] is often the fastest way to get test results. Please sign up if you do not already have a [facility] account. See the handout Getting COVID-19 Test Results in [facility] for help.  What if my test result is positive?  If your test is positive and you have not viewed your result in [employer], you'll get a phone call with your result. (A positive test means that you have the virus.)   Follow the tips under \"How do I self-isolate?\" below for 10 days (20 days if you have a weak immune system).  You don't need to be retested for COVID-19 before going back to school or work. As long as you're fever-free and feeling better, you can go back to school, work and other activities after waiting the 10 or 20 days.  What if I have questions after I get my results?  If you have questions " "about your results, please visit our testing website at [email or web address].   After 7 to 10 days, if you have not gotten your results:   Call [phone] ([phone]) and ask to speak with our COVID-19 results team.  If you're being treated at an infusion center: Call your infusion center directly.  What are the symptoms of COVID-19?  Cough, fever and trouble breathing are the most common signs of COVID-19.  Other symptoms can include new headaches, new muscle or body aches, new and unexplained fatigue (feeling very tired), chills, sore throat, congestion (stuffy or runny nose), diarrhea (loose poop), loss of taste or smell, belly pain, and nausea or vomiting (feeling sick to your stomach or throwing up).  You may already have symptoms of COVID-19, or they may show up later.  What should I do if I have symptoms?  If you're having surgery: Call your surgeon's office.  For all other patients: Stay home and away from others (self-isolate) until ...  You've had no fever--and no medicine that reduces fever--for 1 full day (24 hours), AND  Other symptoms have gotten better. For example, your cough or breathing has improved, AND  At least 10 days have passed since your symptoms first started.  How do I self-isolate?  Stay in your own room, even for meals. Use your own bathroom if you can.  Stay away from others in your home. No hugging, kissing or shaking hands. No visitors.  Don't go to work, school or anywhere else.  Clean \"high touch\" surfaces often (doorknobs, counters, handles). Use household cleaning spray or wipes. You'll find a full list of  on the EPA website: www.epa.gov/pesticide-registration/list-n-disinfectants-use-against-sars-cov-2.  Cover your mouth and nose with a mask or other face covering to avoid spreading germs.  Wash your hands and face often. Use soap and water.  Caregivers in these groups are at risk for severe illness due to COVID-19:  People 65 years " and older  People who live in a nursing home or long-term care facility  People with chronic disease (lung, heart, cancer, diabetes, kidney, liver, immunologic)  People who have a weakened immune system, including those who:  Are in cancer treatment  Take medicine that weakens the immune system, such as corticosteroids  Had a bone marrow or organ transplant  Have an immune deficiency  Have poorly controlled HIV or AIDS  Are obese (body mass index of 40 or higher)  Smoke regularly  Caregivers should wear gloves while washing dishes, handling laundry and cleaning bedrooms and bathrooms.  Use caution when washing and drying laundry: Don't shake dirty laundry and use the warmest water setting that you can.  For more tips on managing your health at home, go to www.cdc.gov/coronavirus/2019-ncov/downloads/10Things.pdf.  How can I take care of myself at home?  Get lots of rest. Drink extra fluids (unless a doctor has told you not to).  Take Tylenol (acetaminophen) for fever or pain. If you have liver or kidney problems, ask your family doctor if it's OK to take Tylenol.   Adults can take either:  650 mg (two 325 mg pills) every 4 to 6 hours, or   1,000 mg (two 500 mg pills) every 8 hours as needed.  Note: Don't take more than 3,000 mg in one day. Acetaminophen is found in many medicines (both prescribed and over-the-counter medicines). Read all labels to be sure you don't take too much.   For children, check the Tylenol bottle for the right dose. The dose is based on the child's age or weight.  If you have other health problems (like cancer, heart failure, an organ transplant or severe kidney disease): Call your specialty clinic if you don't feel better in the next 2 days.  Know when to call 911. Emergency warning signs include:  Trouble breathing or shortness of breath  Chest pain or pressure that doesn't go away  Feeling confused like you haven't felt before, or not being able to wake up  Bluish-colored lips or face  If  your doctor prescribed a blood thinner medicine: Follow their instructions.  Where can I get more information?   [facility] - About COVID-19:   [email or web address]  CDC - If You're Sick: cdc.gov/coronavirus/2019-ncov/about/steps-when-sick.html  CDC - Ending Home Isolation: www.cdc.gov/coronavirus/2019-ncov/hcp/disposition-in-home-patients.html  CDC - Caring for Someone: www.cdc.gov/coronavirus/2019-ncov/if-you-are-sick/care-for-someone.html  [facility] - Interim Guidance for Hospital Discharge to Home: www.health.[facility].[place]/diseases/coronavirus/hcp/hospdischarge.pdf  [facility] clinical trials (COVID-19 research studies): clinicalaffairs.[facility]-clinical-trials  Below are the COVID-19 hotlines at the [place] Department of Health ([facility]). Interpreters are available.  For health questions: Call 337-574-0620 or [phone] (7 a.m. to 7 p.m.)  For questions about schools and childcare: Call 295-392-7733 or [phone] (7 a.m. to 7 p.m.)    For informational purposes only. Not to replace the advice of your health care provider. Clinically reviewed by Infection Prevention and the [facility] COVID-19 Clinical Team. Copyright   2020 [facility]. All rights reserved. Dheere Bolo [id number] - Rev 11/11/20.

## 2022-07-25 NOTE — LETTER
[facility]  [address] [place] [id number]  Phone: [phone]  Fax: [phone]    July 25, 2022        Halima Razo  [address] [address] [address]          To whom it may concern:    RE: Halima Razo    Patient was seen and treated today at our clinic and missed work.  Please excuse her absence and she can return when she is feeling better.    Please contact me for questions or concerns.      Sincerely,        Ailyn Rahman NP

## 2022-07-25 NOTE — PROGRESS NOTES
Fam is a 37 year old who is being evaluated via a billable telephone visit.      What phone number would you like to be contacted at?   How would you like to obtain your AVS? MyChart    Assessment & Plan     Sore throat  Due to 5 days of symptoms, patient is not eligible for antiviral treatment but would like to know if she has COVID 19.  I have ordered COVID 19 and strep for rule out and patient will get these collected at [facility] and results will be given later today.  Handout given on COVID 19, URI symptoms and sore throat management at home.  - Streptococcus A Rapid Screen w/Reflex to PCR - Clinic Collect  - Symptomatic; Yes; 7/21/2022 COVID-19 Virus (Coronavirus) by PCR Nose; Future    Addendum 7/26/22:  Strep testing is negative.  Patient sent [employer] message.  Still awaiting COVID 19 testing and will inform her of this when completed.    See Patient Instructions    Return in about 1 week (around 8/1/2022), or if symptoms worsen or fail to improve.    Ailyn Rahman NP  [facility]   Fam is a 37 year old, presenting for the following health issues:  Fatigue      HPI     Acute Illness  Acute illness concerns:  Onset/Duration: 5 days   Symptoms:  Fever: YES, does not have a thermometer but going between chills and sweats  Chills/Sweats: YES- intermittent dizziness and chills   Headache (location?): YES  Sinus Pressure: YES  Conjunctivitis:  No  Ear Pain: no  Rhinorrhea: No  Congestion: YES, draining back in the throat, not preventing her from breathing  Sore Throat: YES  Cough: YES, no shortness of breath but cough and scratchy with occasional mucous that is yellow  Wheeze: No  Decreased Appetite: No  Nausea: YES, intermittent   Vomiting: No  Diarrhea: No  Dysuria/Freq.: No  Dysuria or Hematuria: No  Fatigue/Achiness: YES  Sick/Strep Exposure: daughter had covid a month ago   Therapies tried and outcome: Dayquil and Nyquil    Has not done home testing for COVID  19.    Review of Systems   CONSTITUTIONAL:POSITIVE  for chills, fatigue, fever warm but not taking temperature and sweats  ENT/MOUTH: POSITIVE for postnasal drainage, sinus pressure and sore throat  RESP:POSITIVE for cough-non productive and sputum occasional yellow and clumpy  CV: NEGATIVE for chest pain, palpitations or peripheral edema  PSYCHIATRIC: NEGATIVE for changes in mood or affect  ROS otherwise negative      Objective       Vitals:  No vitals were obtained today due to virtual visit.    Physical Exam   healthy, alert and no distress  PSYCH: Alert and oriented times 3; coherent speech, normal   rate and volume, able to articulate logical thoughts, able   to abstract reason, no tangential thoughts, no hallucinations   or delusions  Her affect is normal  RESP: No cough, no audible wheezing, able to talk in full sentences  Remainder of exam unable to be completed due to telephone visits    Results for orders placed or performed in visit on 07/25/22 (from the past 24 hour(s))   Streptococcus A Rapid Screen w/Reflex to PCR - Clinic Collect    Specimen: Throat; Swab   Result Value Ref Range    Group A Strep antigen Negative Negative   Group A Streptococcus PCR Throat Swab    Specimen: Throat; Swab   Result Value Ref Range    Group A strep by PCR Not Detected Not Detected    Narrative    The Xpert Xpress Strep A test, performed on the [employer], is a rapid, qualitative in vitro diagnostic test for the detection of Streptococcus pyogenes (Group A ß-hemolytic Streptococcus, Strep A) in throat swab specimens from patients with signs and symptoms of pharyngitis. The Xpert Xpress Strep A test can be used as an aid in the diagnosis of Group A Streptococcal pharyngitis. The assay is not intended to monitor treatment for Group A Streptococcus infections. The Xpert Xpress Strep A test utilizes an automated real-time polymerase chain reaction (PCR) to detect Streptococcus pyogenes DNA.         Phone  call duration: 8 minutes

## 2022-07-26 ENCOUNTER — MYC MEDICAL ADVICE (OUTPATIENT)
Dept: FAMILY MEDICINE | Facility: CLINIC | Age: 37
End: 2022-07-26

## 2022-07-26 DIAGNOSIS — J02.9 PHARYNGITIS, UNSPECIFIED ETIOLOGY: Primary | ICD-10-CM

## 2022-07-26 DIAGNOSIS — J20.9 ACUTE BRONCHITIS WITH SYMPTOMS > 10 DAYS: ICD-10-CM

## 2022-07-26 LAB — SARS-COV-2 RNA RESP QL NAA+PROBE: NEGATIVE

## 2022-07-26 RX ORDER — PREDNISONE 20 MG/1
60 TABLET ORAL ONCE
Qty: 3 TABLET | Refills: 0 | Status: SHIPPED | OUTPATIENT
Start: 2022-07-26 | End: 2022-07-26

## 2022-07-28 RX ORDER — AZITHROMYCIN 250 MG/1
TABLET, FILM COATED ORAL
Qty: 6 TABLET | Refills: 0 | Status: SHIPPED | OUTPATIENT
Start: 2022-07-28 | End: 2022-08-02

## 2022-07-29 ENCOUNTER — MEDICAL CORRESPONDENCE (OUTPATIENT)
Dept: HEALTH INFORMATION MANAGEMENT | Facility: CLINIC | Age: 37
End: 2022-07-29

## 2022-07-30 ENCOUNTER — E-VISIT (OUTPATIENT)
Dept: URGENT CARE | Facility: CLINIC | Age: 37
End: 2022-07-30
Payer: COMMERCIAL

## 2022-07-30 DIAGNOSIS — H10.30 ACUTE BACTERIAL CONJUNCTIVITIS, UNSPECIFIED LATERALITY: Primary | ICD-10-CM

## 2022-07-30 PROCEDURE — 99421 OL DIG E/M SVC 5-10 MIN: CPT | Performed by: PHYSICIAN ASSISTANT

## 2022-07-30 RX ORDER — POLYMYXIN B SULFATE AND TRIMETHOPRIM 1; 10000 MG/ML; [USP'U]/ML
1-2 SOLUTION OPHTHALMIC EVERY 4 HOURS
Qty: 10 ML | Refills: 0 | Status: SHIPPED | OUTPATIENT
Start: 2022-07-30 | End: 2022-08-06

## 2022-07-30 NOTE — PATIENT INSTRUCTIONS
Thank you for choosing us for your care. I have placed an order for a prescription so that you can start treatment. View your full visit summary for details by clicking on the link below. Your pharmacist will able to address any questions you may have about the medication.     If you re not feeling better within 2-3 days, please schedule an appointment.  You can schedule an appointment right here in [facility], or call [phone]  If the visit is for the same symptoms as your eVisit, we ll refund the cost of your eVisit if seen within seven days.      Bacterial Conjunctivitis    You have an infection in the membranes covering the white part of the eye. This part of the eye is called the conjunctiva. The infection is called conjunctivitis. The most common symptoms of conjunctivitis include a thick, pus-like discharge from the eye, swollen eyelids, redness, eyelids sticking together upon awakening, and a gritty or scratchy feeling in the eye. Your infection was caused by bacteria. It may be treated with medicine. With treatment, the infection takes about 7 to 10 days to resolve.   Home care    Use prescribed antibiotic eye drops or ointment as directed to treat the infection.    Apply a warm compress (towel soaked in warm water) to the affected eye 3 to 4 times a day. Do this just before applying medicine to the eye.    Use a warm, wet cloth to wipe away crusting of the eyelids in the morning. This is caused by mucus drainage during the night. You may also use saline irrigating solution or artificial tears to rinse away mucus in the eye. Do not put a patch over the eye.    Wash your hands before and after touching the infected eye. This is to prevent spreading the infection to the other eye, and to other people. Don't share your towels or washcloths with others.    You may use acetaminophen or ibuprofen to control pain, unless another medicine was prescribed. Talk with your healthcare provider before using these  medicines if you have chronic liver or kidney disease. Also talk with your provider if you have ever had a stomach ulcer or digestive bleeding.    Don't wear contact lenses until your eyes have healed and all symptoms are gone.    Follow-up care  Follow up with your healthcare provider, or as advised.  When to seek medical advice  Call your healthcare provider right away if any of these occur:    Worsening vision    Increasing pain in the eye    Increasing swelling or redness of the eyelid    Redness spreading around the eye  [employer] last reviewed this educational content on 4/1/2020 2000-2021 [employer]. All rights reserved. This information is not intended as a substitute for professional medical care. Always follow your healthcare professional's instructions.

## 2022-09-20 ASSESSMENT — ENCOUNTER SYMPTOMS
COUGH: 0
HEMATURIA: 0
HEMATOCHEZIA: 0
DYSURIA: 0
NERVOUS/ANXIOUS: 1
CONSTIPATION: 0
ABDOMINAL PAIN: 0
FEVER: 0
CHILLS: 0
PARESTHESIAS: 0
DIARRHEA: 0
SHORTNESS OF BREATH: 0
MYALGIAS: 1
ARTHRALGIAS: 1
NAUSEA: 0
JOINT SWELLING: 0
FREQUENCY: 0
BREAST MASS: 0
HEADACHES: 1
HEARTBURN: 0
EYE PAIN: 0
DIZZINESS: 0
PALPITATIONS: 1
SORE THROAT: 0
WEAKNESS: 0

## 2022-09-20 ASSESSMENT — PATIENT HEALTH QUESTIONNAIRE - PHQ9
SUM OF ALL RESPONSES TO PHQ QUESTIONS 1-9: 12
10. IF YOU CHECKED OFF ANY PROBLEMS, HOW DIFFICULT HAVE THESE PROBLEMS MADE IT FOR YOU TO DO YOUR WORK, TAKE CARE OF THINGS AT HOME, OR GET ALONG WITH OTHER PEOPLE: SOMEWHAT DIFFICULT
SUM OF ALL RESPONSES TO PHQ QUESTIONS 1-9: 12

## 2022-09-23 ENCOUNTER — OFFICE VISIT (OUTPATIENT)
Dept: FAMILY MEDICINE | Facility: CLINIC | Age: 37
End: 2022-09-23
Payer: COMMERCIAL

## 2022-09-23 ENCOUNTER — MYC MEDICAL ADVICE (OUTPATIENT)
Dept: FAMILY MEDICINE | Facility: CLINIC | Age: 37
End: 2022-09-23

## 2022-09-23 VITALS
RESPIRATION RATE: 20 BRPM | WEIGHT: 172.6 LBS | OXYGEN SATURATION: 100 % | SYSTOLIC BLOOD PRESSURE: 136 MMHG | BODY MASS INDEX: 29.47 KG/M2 | DIASTOLIC BLOOD PRESSURE: 86 MMHG | HEIGHT: 64 IN | TEMPERATURE: 98.9 F | HEART RATE: 95 BPM

## 2022-09-23 DIAGNOSIS — Z23 NEED FOR VACCINATION: ICD-10-CM

## 2022-09-23 DIAGNOSIS — Z00.00 ROUTINE GENERAL MEDICAL EXAMINATION AT A HEALTH CARE FACILITY: Primary | ICD-10-CM

## 2022-09-23 DIAGNOSIS — Z86.32 HISTORY OF GESTATIONAL DIABETES: ICD-10-CM

## 2022-09-23 DIAGNOSIS — F41.1 GENERALIZED ANXIETY DISORDER: ICD-10-CM

## 2022-09-23 DIAGNOSIS — Z23 HIGH PRIORITY FOR 2019-NCOV VACCINE: ICD-10-CM

## 2022-09-23 PROBLEM — Z34.80 PRENATAL CARE, SUBSEQUENT PREGNANCY: Status: RESOLVED | Noted: 2020-05-11 | Resolved: 2022-09-23

## 2022-09-23 PROBLEM — Z34.90 ENCOUNTER FOR INDUCTION OF LABOR: Status: RESOLVED | Noted: 2022-03-01 | Resolved: 2022-09-23

## 2022-09-23 LAB
ALBUMIN SERPL BCG-MCNC: 4.4 G/DL (ref 3.5–5.2)
ALP SERPL-CCNC: 92 U/L (ref 35–104)
ALT SERPL W P-5'-P-CCNC: 19 U/L (ref 10–35)
ANION GAP SERPL CALCULATED.3IONS-SCNC: 9 MMOL/L (ref 7–15)
AST SERPL W P-5'-P-CCNC: 18 U/L (ref 10–35)
BILIRUB SERPL-MCNC: 0.3 MG/DL
BUN SERPL-MCNC: 17.2 MG/DL (ref 6–20)
CALCIUM SERPL-MCNC: 9.3 MG/DL (ref 8.6–10)
CHLORIDE SERPL-SCNC: 106 MMOL/L (ref 98–107)
CHOLEST SERPL-MCNC: 197 MG/DL
CREAT SERPL-MCNC: 0.67 MG/DL (ref 0.51–0.95)
DEPRECATED HCO3 PLAS-SCNC: 25 MMOL/L (ref 22–29)
GFR SERPL CREATININE-BSD FRML MDRD: >90 ML/MIN/1.73M2
GLUCOSE SERPL-MCNC: 113 MG/DL (ref 70–99)
HBA1C MFR BLD: 5.3 % (ref 0–5.6)
HDLC SERPL-MCNC: 44 MG/DL
LDLC SERPL CALC-MCNC: 138 MG/DL
NONHDLC SERPL-MCNC: 153 MG/DL
POTASSIUM SERPL-SCNC: 4.9 MMOL/L (ref 3.4–5.3)
PROT SERPL-MCNC: 7.8 G/DL (ref 6.4–8.3)
SODIUM SERPL-SCNC: 140 MMOL/L (ref 136–145)
TRIGL SERPL-MCNC: 76 MG/DL
TSH SERPL DL<=0.005 MIU/L-ACNC: 1.36 UIU/ML (ref 0.3–4.2)

## 2022-09-23 PROCEDURE — 90686 IIV4 VACC NO PRSV 0.5 ML IM: CPT | Performed by: FAMILY MEDICINE

## 2022-09-23 PROCEDURE — 80061 LIPID PANEL: CPT | Performed by: FAMILY MEDICINE

## 2022-09-23 PROCEDURE — 99214 OFFICE O/P EST MOD 30 MIN: CPT | Mod: 25 | Performed by: FAMILY MEDICINE

## 2022-09-23 PROCEDURE — 80053 COMPREHEN METABOLIC PANEL: CPT | Performed by: FAMILY MEDICINE

## 2022-09-23 PROCEDURE — 84443 ASSAY THYROID STIM HORMONE: CPT | Performed by: FAMILY MEDICINE

## 2022-09-23 PROCEDURE — 99395 PREV VISIT EST AGE 18-39: CPT | Mod: 25 | Performed by: FAMILY MEDICINE

## 2022-09-23 PROCEDURE — 91312 COVID-19,PF,PFIZER BOOSTER BIVALENT: CPT | Performed by: FAMILY MEDICINE

## 2022-09-23 PROCEDURE — 90471 IMMUNIZATION ADMIN: CPT | Performed by: FAMILY MEDICINE

## 2022-09-23 PROCEDURE — 36415 COLL VENOUS BLD VENIPUNCTURE: CPT | Performed by: FAMILY MEDICINE

## 2022-09-23 PROCEDURE — 0124A COVID-19,PF,PFIZER BOOSTER BIVALENT: CPT | Performed by: FAMILY MEDICINE

## 2022-09-23 PROCEDURE — 83036 HEMOGLOBIN GLYCOSYLATED A1C: CPT | Performed by: FAMILY MEDICINE

## 2022-09-23 RX ORDER — ESCITALOPRAM OXALATE 10 MG/1
10 TABLET ORAL DAILY
Qty: 90 TABLET | Refills: 3 | Status: SHIPPED | OUTPATIENT
Start: 2022-09-23 | End: 2022-10-26

## 2022-09-23 RX ORDER — LORAZEPAM 0.5 MG/1
0.5 TABLET ORAL EVERY 6 HOURS PRN
Qty: 15 TABLET | Refills: 0 | Status: SHIPPED | OUTPATIENT
Start: 2022-09-23 | End: 2023-02-22

## 2022-09-23 ASSESSMENT — ENCOUNTER SYMPTOMS
PALPITATIONS: 1
HEADACHES: 1
HEMATURIA: 0
DIZZINESS: 0
CHILLS: 0
DYSURIA: 0
PARESTHESIAS: 0
ABDOMINAL PAIN: 0
WEAKNESS: 0
BREAST MASS: 0
SHORTNESS OF BREATH: 0
JOINT SWELLING: 0
FREQUENCY: 0
HEARTBURN: 0
EYE PAIN: 0
CONSTIPATION: 0
NERVOUS/ANXIOUS: 1
ARTHRALGIAS: 1
FEVER: 0
SORE THROAT: 0
MYALGIAS: 1
HEMATOCHEZIA: 0
NAUSEA: 0
DIARRHEA: 0
COUGH: 0

## 2022-09-23 ASSESSMENT — PAIN SCALES - GENERAL: PAINLEVEL: NO PAIN (0)

## 2022-09-23 NOTE — PROGRESS NOTES
SUBJECTIVE:   CC: Fam is an 37 year old who presents for preventive health visit.       Patient has been advised of split billing requirements and indicates understanding: Yes     Healthy Habits:     Getting at least 3 servings of Calcium per day:  Yes    Bi-annual eye exam:  Yes    Dental care twice a year:  NO    Sleep apnea or symptoms of sleep apnea:  Daytime drowsiness and Excessive snoring    Diet:  Regular (no restrictions)    Frequency of exercise:  2-3 days/week    Duration of exercise:  30-45 minutes    Taking medications regularly:  Not Applicable    Medication side effects:  Not applicable    PHQ-2 Total Score: 4    Additional concerns today:  Yes    Anxiety Follow-Up    How are you doing with your anxiety since your last visit? Seems to have resurfaced    She is not depressed     Was anxious pre preg and tried many meds     Nothing really worked for her     She now has panic or anxiousness every day     Are you having other symptoms that might be associated with anxiety? Yes:  heart racing    Have you had a significant life event? OTHER: two babies in 2 years      Are you feeling depressed? No    Do you have any concerns with your use of alcohol or other drugs? No    Social History     Tobacco Use     Smoking status: Former Smoker     Packs/day: 0.50     Years: 14.00     Pack years: 7.00     Types: Cigarettes     Start date: 12/30/2003     Smokeless tobacco: Never Used     Tobacco comment: quit with pregnancy   Vaping Use     Vaping Use: Never used   Substance Use Topics     Alcohol use: Not Currently     Drug use: No     ARASH-7 SCORE 7/18/2017 1/25/2021 2/3/2021   Total Score - - -   Total Score 18 11 13     PHQ 1/25/2021 2/3/2021 9/20/2022   PHQ-9 Total Score 8 3 12   Q9: Thoughts of better off dead/self-harm past 2 weeks Not at all Not at all Not at all           Today's PHQ-2 Score:   PHQ-2 ( 1999 Pfizer) 9/20/2022   Q1: Little interest or pleasure in doing things 2   Q2: Feeling down, depressed  or hopeless 2   PHQ-2 Score 4   PHQ-2 Total Score (12-17 Years)- Positive if 3 or more points; Administer PHQ-A if positive -   Q1: Little interest or pleasure in doing things More than half the days   Q2: Feeling down, depressed or hopeless Several days   PHQ-2 Score 3     Abuse: Current or Past (Physical, Sexual or Emotional) - No  Do you feel safe in your environment? Yes    Have you ever done Advance Care Planning? (For example, a Health Directive, POLST, or a discussion with a medical provider or your loved ones about your wishes): Yes, advance care planning is on file.    Social History     Tobacco Use     Smoking status: Former Smoker     Packs/day: 0.50     Years: 14.00     Pack years: 7.00     Types: Cigarettes     Start date: 12/30/2003     Smokeless tobacco: Never Used     Tobacco comment: quit with pregnancy   Substance Use Topics     Alcohol use: Not Currently       Alcohol Use 9/20/2022   Prescreen: >3 drinks/day or >7 drinks/week? Not Applicable       Reviewed orders with patient.  Reviewed health maintenance and updated orders accordingly - Yes  Labs reviewed in EPIC    Breast Cancer Screening:    Breast CA Risk Assessment (FHS-7) 9/20/2022   Do you have a family history of breast, colon, or ovarian cancer? No / Unknown         Patient under 40 years of age: Routine Mammogram Screening not recommended.   Pertinent mammograms are reviewed under the imaging tab.    History of abnormal Pap smear: NO - age 30-65 PAP every 5 years with negative HPV co-testing recommended  PAP / HPV Latest Ref Rng & Units 2/3/2021 1/29/2016   PAP (Historical) - NIL NIL   HPV16 NEG:Negative Negative Negative   HPV18 NEG:Negative Negative Negative   HRHPV NEG:Negative Negative Negative     Reviewed and updated as needed this visit by clinical staff   Tobacco  Allergies  Meds  Problems  Med Hx  Surg Hx  Fam Hx  Soc   Hx          Reviewed and updated as needed this visit by Provider   Tobacco  Allergies  Meds   "Problems  Med Hx  Surg Hx  Fam Hx               Review of Systems   Constitutional: Negative for chills and fever.   HENT: Negative for congestion, ear pain, hearing loss and sore throat.    Eyes: Negative for pain and visual disturbance.   Respiratory: Negative for cough and shortness of breath.    Cardiovascular: Positive for palpitations. Negative for chest pain and peripheral edema.   Gastrointestinal: Negative for abdominal pain, constipation, diarrhea, heartburn, hematochezia and nausea.   Breasts:  Negative for tenderness, breast mass and discharge.   Genitourinary: Negative for dysuria, frequency, genital sores, hematuria, pelvic pain, urgency, vaginal bleeding and vaginal discharge.   Musculoskeletal: Positive for arthralgias and myalgias. Negative for joint swelling.   Skin: Negative for rash.   Neurological: Positive for headaches. Negative for dizziness, weakness and paresthesias.   Psychiatric/Behavioral: Positive for mood changes. The patient is nervous/anxious.      See HPI      OBJECTIVE:   /86   Pulse 95   Temp 98.9  F (37.2  C) (Tympanic)   Resp 20   Ht 1.613 m (5' 3.5\")   Wt 78.3 kg (172 lb 9.6 oz)   LMP 09/03/2022 (Exact Date)   SpO2 100%   Breastfeeding No   BMI 30.10 kg/m    Physical Exam  GENERAL: healthy, alert and no distress  EYES: Eyes grossly normal to inspection, PERRL and conjunctivae and sclerae normal  HENT: ear canals and TM's normal, nose and mouth without ulcers or lesions  NECK: no adenopathy, no asymmetry, masses, or scars and thyroid normal to palpation  RESP: lungs clear to auscultation - no rales, rhonchi or wheezes  BREAST: normal without masses, tenderness or nipple discharge and no palpable axillary masses or adenopathy  CV: regular rate and rhythm, normal S1 S2, no S3 or S4, no murmur, click or rub, no peripheral edema and peripheral pulses strong  ABDOMEN: soft, nontender, no hepatosplenomegaly, no masses and bowel sounds normal  MS: no gross " "musculoskeletal defects noted, no edema  SKIN: no suspicious lesions or rashes  NEURO: Normal strength and tone, mentation intact and speech normal  PSYCH: mentation appears normal, affect normal/bright    Diagnostic Test Results:  Labs reviewed in Epic    ASSESSMENT/PLAN:   (Z00.00) Routine general medical examination at a health care facility  (primary encounter diagnosis)  Comment:   Plan: REVIEW OF HEALTH MAINTENANCE PROTOCOL ORDERS,         Lipid panel reflex to direct LDL Fasting            (F41.1) Generalized anxiety disorder  Comment: recurrent   Trial of selective serotonin reuptake inhibitor   Prn use of ativan     Plan: TSH with free T4 reflex, escitalopram (LEXAPRO)        10 MG tablet, LORazepam (ATIVAN) 0.5 MG tablet            (Z86.32) History of gestational diabetes  Comment: screen for diabetes due to gestational   Plan: Hemoglobin A1c, Comprehensive metabolic panel            (Z23) High priority for 2019-nCoV vaccine  Comment:   Plan:     (Z23) Need for vaccination  Comment:   Plan: INFLUENZA VACCINE IM > 6 MONTHS VALENT IIV4         (AFLURIA/FLUZONE), COVID-19,PF,PFIZER BOOSTER         BIVALENT 12+Yrs              Patient has been advised of split billing requirements and indicates understanding: Yes    COUNSELING:  Reviewed preventive health counseling, as reflected in patient instructions    Estimated body mass index is 30.1 kg/m  as calculated from the following:    Height as of this encounter: 1.613 m (5' 3.5\").    Weight as of this encounter: 78.3 kg (172 lb 9.6 oz).    Weight management plan: Discussed healthy diet and exercise guidelines    She reports that she has quit smoking. Her smoking use included cigarettes. She started smoking about 18 years ago. She has a 7.00 pack-year smoking history. She has never used smokeless tobacco.      Counseling Resources:  ATP IV Guidelines  Pooled Cohorts Equation Calculator  Breast Cancer Risk Calculator  BRCA-Related Cancer Risk Assessment: FHS-7 " Tool  FRAX Risk Assessment  ICSI Preventive Guidelines  Dietary Guidelines for Americans, 2010  USDA's MyPlate  ASA Prophylaxis  Lung CA Screening    Yasmin Wilkinson MD  [facility]  Answers for HPI/ROS submitted by the patient on 9/20/2022  If you checked off any problems, how difficult have these problems made it for you to do your work, take care of things at home, or get along with other people?: Somewhat difficult  PHQ9 TOTAL SCORE: 12

## 2022-09-23 NOTE — PATIENT INSTRUCTIONS

## 2022-09-30 ENCOUNTER — MEDICAL CORRESPONDENCE (OUTPATIENT)
Dept: FAMILY MEDICINE | Facility: CLINIC | Age: 37
End: 2022-09-30

## 2022-10-26 ENCOUNTER — VIRTUAL VISIT (OUTPATIENT)
Dept: FAMILY MEDICINE | Facility: CLINIC | Age: 37
End: 2022-10-26
Payer: COMMERCIAL

## 2022-10-26 DIAGNOSIS — F41.1 GENERALIZED ANXIETY DISORDER: ICD-10-CM

## 2022-10-26 DIAGNOSIS — F33.0 MAJOR DEPRESSIVE DISORDER, RECURRENT EPISODE, MILD (H): Primary | ICD-10-CM

## 2022-10-26 PROCEDURE — 99214 OFFICE O/P EST MOD 30 MIN: CPT | Mod: 95 | Performed by: FAMILY MEDICINE

## 2022-10-26 RX ORDER — BUPROPION HYDROCHLORIDE 150 MG/1
150 TABLET ORAL EVERY MORNING
Qty: 90 TABLET | Refills: 3 | Status: SHIPPED | OUTPATIENT
Start: 2022-10-26 | End: 2022-11-21

## 2022-10-26 ASSESSMENT — ANXIETY QUESTIONNAIRES
6. BECOMING EASILY ANNOYED OR IRRITABLE: SEVERAL DAYS
5. BEING SO RESTLESS THAT IT IS HARD TO SIT STILL: NOT AT ALL
3. WORRYING TOO MUCH ABOUT DIFFERENT THINGS: SEVERAL DAYS
GAD7 TOTAL SCORE: 7
7. FEELING AFRAID AS IF SOMETHING AWFUL MIGHT HAPPEN: NOT AT ALL
2. NOT BEING ABLE TO STOP OR CONTROL WORRYING: SEVERAL DAYS
GAD7 TOTAL SCORE: 7
1. FEELING NERVOUS, ANXIOUS, OR ON EDGE: NEARLY EVERY DAY

## 2022-10-26 ASSESSMENT — PATIENT HEALTH QUESTIONNAIRE - PHQ9
SUM OF ALL RESPONSES TO PHQ QUESTIONS 1-9: 10
5. POOR APPETITE OR OVEREATING: SEVERAL DAYS

## 2022-10-26 NOTE — PROGRESS NOTES
Fam is a 37 year old who is being evaluated via a billable video visit.      How would you like to obtain your AVS? [employer]  If the video visit is dropped, the invitation should be resent by: Send to e-mail at: [email or web address]  Will anyone else be joining your video visit? No    [email or web address]    Assessment & Plan     Major depressive disorder, recurrent episode, mild (H)  Not well controlled   lexapro did not help at all   - buPROPion (WELLBUTRIN XL) 150 MG 24 hr tablet; Take 1 tablet (150 mg) by mouth every morning    Generalized anxiety disorder   as above   - buPROPion (WELLBUTRIN XL) 150 MG 24 hr tablet; Take 1 tablet (150 mg) by mouth every morning             Patient Instructions   Stop the lexapro     See how you feel over the next one week paying attention to symptoms of anxiety or really more distraction and inattention    Would start wellbutrin next if anxiety symptoms are more prevalent and if the distraction symptoms are more would consider alternative       Return in about 1 week (around 11/2/2022) for via [employer] update, with me.    Yasmin Wilkinson MD  [facility]    Subjective   Fam is a 37 year old, presenting for the following health issues:  Anxiety      HPI     Depression and Anxiety Follow-Up - started lexapro    How are you doing with your depression since your last visit? Feeling foggy, hazy - hard to focus    How are you doing with your anxiety since your last visit?  Hard to tell - thinks it maybe helping    Are you having other symptoms that might be associated with depression or anxiety? Yes:  hard to focus - lot going on at home, working from home and have kids at home    Have you had a significant life event? No     Do you have any concerns with your use of alcohol or other drugs? No    Social History     Tobacco Use     Smoking status: Former     Packs/day: 0.50     Years: 14.00     Pack years: 7.00     Types: Cigarettes     Start date:  12/30/2003     Smokeless tobacco: Never     Tobacco comments:     quit with pregnancy   Vaping Use     Vaping Use: Never used   Substance Use Topics     Alcohol use: Not Currently     Drug use: No     PHQ 2/3/2021 9/20/2022 10/26/2022   PHQ-9 Total Score 3 12 10   Q9: Thoughts of better off dead/self-harm past 2 weeks Not at all Not at all Not at all     ARASH-7 SCORE 1/25/2021 2/3/2021 10/26/2022   Total Score - - -   Total Score 11 13 7     Last PHQ-9 10/26/2022   1.  Little interest or pleasure in doing things 1   2.  Feeling down, depressed, or hopeless 1   3.  Trouble falling or staying asleep, or sleeping too much 0   4.  Feeling tired or having little energy 3   5.  Poor appetite or overeating 2   6.  Feeling bad about yourself 0   7.  Trouble concentrating 3   8.  Moving slowly or restless 0   Q9: Thoughts of better off dead/self-harm past 2 weeks 0   PHQ-9 Total Score 10   Difficulty at work, home, or with people -     ARASH-7  10/26/2022   1. Feeling nervous, anxious, or on edge 3   2. Not being able to stop or control worrying 1   3. Worrying too much about different things 1   4. Trouble relaxing 1   5. Being so restless that it is hard to sit still 0   6. Becoming easily annoyed or irritable 1   7. Feeling afraid, as if something awful might happen 0   ARASH-7 Total Score 7   If you checked any problems, how difficult have they made it for you to do your work, take care of things at home, or get along with other people? -       Suicide Assessment Five-step Evaluation and Treatment (SAFE-T)            Review of Systems   Constitutional, HEENT, cardiovascular, pulmonary, gi and gu systems are negative, except as otherwise noted.      Objective           Vitals:  No vitals were obtained today due to virtual visit.    Physical Exam   GENERAL: Healthy, alert and no distress  EYES: Eyes grossly normal to inspection.  No discharge or erythema, or obvious scleral/conjunctival abnormalities.  RESP: No audible wheeze,  cough, or visible cyanosis.  No visible retractions or increased work of breathing.    SKIN: Visible skin clear. No significant rash, abnormal pigmentation or lesions.  NEURO: Cranial nerves grossly intact.  Mentation and speech appropriate for age.  PSYCH: Mentation appears normal, affect normal/bright, judgement and insight intact, normal speech and appearance well-groomed.                Video-Visit Details    Video Start Time: 1145am    Type of service:  Video Visit    Video End Time:12:06 PM    Originating Location (pt. Location): Home    Distant Location (provider location):  On-site    Platform used for Video Visit: [employer]

## 2022-10-26 NOTE — PATIENT INSTRUCTIONS
Stop the lexapro     See how you feel over the next one week paying attention to symptoms of anxiety or really more distraction and inattention    Would start wellbutrin next if anxiety symptoms are more prevalent and if the distraction symptoms are more would consider alternative

## 2022-11-01 ENCOUNTER — MYC MEDICAL ADVICE (OUTPATIENT)
Dept: FAMILY MEDICINE | Facility: CLINIC | Age: 37
End: 2022-11-01

## 2022-11-01 DIAGNOSIS — F41.1 GENERALIZED ANXIETY DISORDER: Primary | ICD-10-CM

## 2022-11-01 RX ORDER — HYDROXYZINE HYDROCHLORIDE 25 MG/1
TABLET, FILM COATED ORAL
Qty: 50 TABLET | Refills: 1 | Status: SHIPPED | OUTPATIENT
Start: 2022-11-01 | End: 2023-05-07

## 2022-11-21 ENCOUNTER — MYC MEDICAL ADVICE (OUTPATIENT)
Dept: FAMILY MEDICINE | Facility: CLINIC | Age: 37
End: 2022-11-21

## 2022-11-21 DIAGNOSIS — F41.1 GENERALIZED ANXIETY DISORDER: ICD-10-CM

## 2022-11-21 DIAGNOSIS — F33.0 MAJOR DEPRESSIVE DISORDER, RECURRENT EPISODE, MILD (H): ICD-10-CM

## 2022-11-21 RX ORDER — BUPROPION HYDROCHLORIDE 150 MG/1
300 TABLET ORAL EVERY MORNING
Qty: 180 TABLET | Refills: 3 | Status: SHIPPED | OUTPATIENT
Start: 2022-11-21 | End: 2022-12-30

## 2022-12-03 ENCOUNTER — MYC MEDICAL ADVICE (OUTPATIENT)
Dept: FAMILY MEDICINE | Facility: CLINIC | Age: 37
End: 2022-12-03

## 2022-12-30 ENCOUNTER — OFFICE VISIT (OUTPATIENT)
Dept: FAMILY MEDICINE | Facility: CLINIC | Age: 37
End: 2022-12-30
Payer: COMMERCIAL

## 2022-12-30 VITALS
RESPIRATION RATE: 16 BRPM | TEMPERATURE: 97.6 F | OXYGEN SATURATION: 98 % | DIASTOLIC BLOOD PRESSURE: 72 MMHG | HEIGHT: 63 IN | WEIGHT: 182 LBS | SYSTOLIC BLOOD PRESSURE: 138 MMHG | HEART RATE: 84 BPM | BODY MASS INDEX: 32.25 KG/M2

## 2022-12-30 DIAGNOSIS — F33.0 MAJOR DEPRESSIVE DISORDER, RECURRENT EPISODE, MILD (H): ICD-10-CM

## 2022-12-30 DIAGNOSIS — F41.1 GENERALIZED ANXIETY DISORDER: Primary | ICD-10-CM

## 2022-12-30 DIAGNOSIS — E66.9 OBESITY WITH SERIOUS COMORBIDITY, UNSPECIFIED CLASSIFICATION, UNSPECIFIED OBESITY TYPE: ICD-10-CM

## 2022-12-30 PROCEDURE — 99214 OFFICE O/P EST MOD 30 MIN: CPT | Performed by: FAMILY MEDICINE

## 2022-12-30 RX ORDER — PHENTERMINE HYDROCHLORIDE 15 MG/1
15 CAPSULE ORAL EVERY MORNING
Qty: 30 CAPSULE | Refills: 0 | Status: SHIPPED | OUTPATIENT
Start: 2022-12-30 | End: 2023-02-22

## 2022-12-30 RX ORDER — BUPROPION HYDROCHLORIDE 150 MG/1
300 TABLET ORAL EVERY MORNING
Qty: 180 TABLET | Refills: 3 | Status: SHIPPED | OUTPATIENT
Start: 2022-12-30 | End: 2023-01-30

## 2022-12-30 RX ORDER — DULOXETIN HYDROCHLORIDE 20 MG/1
20 CAPSULE, DELAYED RELEASE ORAL DAILY
Qty: 90 CAPSULE | Refills: 3 | Status: SHIPPED | OUTPATIENT
Start: 2022-12-30 | End: 2023-01-30

## 2022-12-30 ASSESSMENT — PATIENT HEALTH QUESTIONNAIRE - PHQ9
SUM OF ALL RESPONSES TO PHQ QUESTIONS 1-9: 7
SUM OF ALL RESPONSES TO PHQ QUESTIONS 1-9: 7
10. IF YOU CHECKED OFF ANY PROBLEMS, HOW DIFFICULT HAVE THESE PROBLEMS MADE IT FOR YOU TO DO YOUR WORK, TAKE CARE OF THINGS AT HOME, OR GET ALONG WITH OTHER PEOPLE: NOT DIFFICULT AT ALL

## 2022-12-30 ASSESSMENT — ANXIETY QUESTIONNAIRES
IF YOU CHECKED OFF ANY PROBLEMS ON THIS QUESTIONNAIRE, HOW DIFFICULT HAVE THESE PROBLEMS MADE IT FOR YOU TO DO YOUR WORK, TAKE CARE OF THINGS AT HOME, OR GET ALONG WITH OTHER PEOPLE: NOT DIFFICULT AT ALL
5. BEING SO RESTLESS THAT IT IS HARD TO SIT STILL: NOT AT ALL
7. FEELING AFRAID AS IF SOMETHING AWFUL MIGHT HAPPEN: NOT AT ALL
7. FEELING AFRAID AS IF SOMETHING AWFUL MIGHT HAPPEN: NOT AT ALL
GAD7 TOTAL SCORE: 5
3. WORRYING TOO MUCH ABOUT DIFFERENT THINGS: SEVERAL DAYS
GAD7 TOTAL SCORE: 5
6. BECOMING EASILY ANNOYED OR IRRITABLE: SEVERAL DAYS
2. NOT BEING ABLE TO STOP OR CONTROL WORRYING: SEVERAL DAYS
8. IF YOU CHECKED OFF ANY PROBLEMS, HOW DIFFICULT HAVE THESE MADE IT FOR YOU TO DO YOUR WORK, TAKE CARE OF THINGS AT HOME, OR GET ALONG WITH OTHER PEOPLE?: NOT DIFFICULT AT ALL
GAD7 TOTAL SCORE: 5
4. TROUBLE RELAXING: SEVERAL DAYS
1. FEELING NERVOUS, ANXIOUS, OR ON EDGE: SEVERAL DAYS

## 2022-12-30 ASSESSMENT — PAIN SCALES - GENERAL: PAINLEVEL: NO PAIN (0)

## 2022-12-30 NOTE — PROGRESS NOTES
Assessment & Plan     Generalized anxiety disorder  Anxiety still causing issues   Better but still has trouble settling her mind   - DULoxetine (CYMBALTA) 20 MG capsule; Take 1 capsule (20 mg) by mouth daily  - buPROPion (WELLBUTRIN XL) 150 MG 24 hr tablet; Take 2 tablets (300 mg) by mouth every morning    Major depressive disorder, recurrent episode, mild (H)  Stable no change in treatment plan.   - buPROPion (WELLBUTRIN XL) 150 MG 24 hr tablet; Take 2 tablets (300 mg) by mouth every morning    Obesity with serious comorbidity, unspecified classification, unspecified obesity type  Will try meds   Life style changes discussed   - phentermine (ADIPEX-P) 15 MG capsule; Take 1 capsule (15 mg) by mouth every morning             Patient Instructions   Start cymbalta and take daily in the am     Start the phentermine for weight loss daily     Start one a week before the other     Stay on all other meds    Let me know in 2-3 weeks how you are doing via my chart        Return in about 2 weeks (around 1/13/2023).    Yasmin Wilkinson MD  [facility]    Justino Otto is a 37 year old, presenting for the following health issues:  Weight Problem      HPI     Concern - weight gain   Onset: ongoing   Description: has been exercising and dieting and has gained weight, did stop smoking a month ago and did gain some weight   Has a calorie deficit       Depression and Anxiety Follow-Up    How are you doing with your depression since your last visit? Improved on wellbutrin     How are you doing with your anxiety since your last visit?  No change    Are you having other symptoms that might be associated with depression or anxiety? No    Have you had a significant life event? No     Do you have any concerns with your use of alcohol or other drugs? No    Social History     Tobacco Use     Smoking status: Former     Packs/day: 0.50     Years: 14.00     Pack years: 7.00     Types: Cigarettes     Start  "date: 12/30/2003     Smokeless tobacco: Never     Tobacco comments:     Quit about a month ago 11/20/2022    Vaping Use     Vaping Use: Never used   Substance Use Topics     Alcohol use: Not Currently     Drug use: No     PHQ 9/20/2022 10/26/2022 12/30/2022   PHQ-9 Total Score 12 10 7   Q9: Thoughts of better off dead/self-harm past 2 weeks Not at all Not at all Not at all     ARASH-7 SCORE 2/3/2021 10/26/2022 12/30/2022   Total Score - - -   Total Score - - 5 (mild anxiety)   Total Score 13 7 5         Suicide Assessment Five-step Evaluation and Treatment (SAFE-T)      Review of Systems   Constitutional, HEENT, cardiovascular, pulmonary, gi and gu systems are negative, except as otherwise noted.      Objective    /72   Pulse 84   Temp 97.6  F (36.4  C) (Tympanic)   Resp 16   Ht 1.6 m (5' 3\")   Wt 82.6 kg (182 lb)   LMP 12/30/2022 (Approximate)   SpO2 98%   BMI 32.24 kg/m    Body mass index is 32.24 kg/m .  Physical Exam   GENERAL APPEARANCE: healthy, alert and no distress  RESP: lungs clear to auscultation - no rales, rhonchi or wheezes  CV: regular rates and rhythm, normal S1 S2, no S3 or S4 and no murmur, click or rub  PSYCH: mentation appears normal and affect normal/bright                        "

## 2022-12-30 NOTE — PATIENT INSTRUCTIONS
Start cymbalta and take daily in the am     Start the phentermine for weight loss daily     Start one a week before the other     Stay on all other meds    Let me know in 2-3 weeks how you are doing via my chart

## 2023-01-02 ENCOUNTER — TELEPHONE (OUTPATIENT)
Dept: FAMILY MEDICINE | Facility: CLINIC | Age: 38
End: 2023-01-02

## 2023-01-09 ENCOUNTER — MYC MEDICAL ADVICE (OUTPATIENT)
Dept: FAMILY MEDICINE | Facility: CLINIC | Age: 38
End: 2023-01-09

## 2023-01-09 DIAGNOSIS — E66.9 OBESITY WITH SERIOUS COMORBIDITY, UNSPECIFIED CLASSIFICATION, UNSPECIFIED OBESITY TYPE: Primary | ICD-10-CM

## 2023-01-09 RX ORDER — PHENTERMINE HYDROCHLORIDE 30 MG/1
30 CAPSULE ORAL EVERY MORNING
Qty: 30 CAPSULE | Refills: 5 | Status: SHIPPED | OUTPATIENT
Start: 2023-01-09 | End: 2023-02-22

## 2023-01-29 ENCOUNTER — MYC MEDICAL ADVICE (OUTPATIENT)
Dept: FAMILY MEDICINE | Facility: CLINIC | Age: 38
End: 2023-01-29
Payer: COMMERCIAL

## 2023-01-29 DIAGNOSIS — F33.0 MAJOR DEPRESSIVE DISORDER, RECURRENT EPISODE, MILD (H): ICD-10-CM

## 2023-01-29 DIAGNOSIS — F41.1 GENERALIZED ANXIETY DISORDER: ICD-10-CM

## 2023-01-30 RX ORDER — DULOXETIN HYDROCHLORIDE 20 MG/1
20 CAPSULE, DELAYED RELEASE ORAL 2 TIMES DAILY
Qty: 180 CAPSULE | Refills: 3 | Status: SHIPPED | OUTPATIENT
Start: 2023-01-30 | End: 2023-04-11

## 2023-01-30 RX ORDER — BUPROPION HYDROCHLORIDE 150 MG/1
150 TABLET ORAL EVERY MORNING
Qty: 90 TABLET | Refills: 3 | Status: SHIPPED | OUTPATIENT
Start: 2023-01-30 | End: 2023-04-11

## 2023-02-08 ENCOUNTER — MYC MEDICAL ADVICE (OUTPATIENT)
Dept: FAMILY MEDICINE | Facility: CLINIC | Age: 38
End: 2023-02-08
Payer: COMMERCIAL

## 2023-02-16 ENCOUNTER — MYC MEDICAL ADVICE (OUTPATIENT)
Dept: FAMILY MEDICINE | Facility: CLINIC | Age: 38
End: 2023-02-16
Payer: COMMERCIAL

## 2023-02-21 ENCOUNTER — MYC MEDICAL ADVICE (OUTPATIENT)
Dept: FAMILY MEDICINE | Facility: CLINIC | Age: 38
End: 2023-02-21
Payer: COMMERCIAL

## 2023-02-22 ENCOUNTER — VIRTUAL VISIT (OUTPATIENT)
Dept: FAMILY MEDICINE | Facility: CLINIC | Age: 38
End: 2023-02-22
Payer: COMMERCIAL

## 2023-02-22 DIAGNOSIS — F33.0 MAJOR DEPRESSIVE DISORDER, RECURRENT EPISODE, MILD (H): ICD-10-CM

## 2023-02-22 DIAGNOSIS — K21.00 GASTROESOPHAGEAL REFLUX DISEASE WITH ESOPHAGITIS, UNSPECIFIED WHETHER HEMORRHAGE: ICD-10-CM

## 2023-02-22 DIAGNOSIS — E66.9 OBESITY WITH SERIOUS COMORBIDITY, UNSPECIFIED CLASSIFICATION, UNSPECIFIED OBESITY TYPE: Primary | ICD-10-CM

## 2023-02-22 DIAGNOSIS — F41.1 GENERALIZED ANXIETY DISORDER: ICD-10-CM

## 2023-02-22 PROCEDURE — 99214 OFFICE O/P EST MOD 30 MIN: CPT | Mod: VID | Performed by: FAMILY MEDICINE

## 2023-02-22 RX ORDER — METFORMIN HCL 500 MG
500 TABLET, EXTENDED RELEASE 24 HR ORAL
Qty: 90 TABLET | Refills: 3 | Status: SHIPPED | OUTPATIENT
Start: 2023-02-22 | End: 2023-07-12

## 2023-02-22 RX ORDER — FAMOTIDINE 20 MG/1
20 TABLET, FILM COATED ORAL 2 TIMES DAILY
Qty: 60 TABLET | Refills: 3 | Status: SHIPPED | OUTPATIENT
Start: 2023-02-22 | End: 2023-05-07

## 2023-02-22 ASSESSMENT — PATIENT HEALTH QUESTIONNAIRE - PHQ9: SUM OF ALL RESPONSES TO PHQ QUESTIONS 1-9: 3

## 2023-02-22 NOTE — PATIENT INSTRUCTIONS
Start metfomin one tab at supper time    Stop phentermine    Start pepcid twice a day for reflux/heartburn     Set up endocrine appt referral is in     Labs to get started

## 2023-02-22 NOTE — PROGRESS NOTES
Fam is a 37 year old who is being evaluated via a billable video visit.      How would you like to obtain your AVS? MyChart  If the video visit is dropped, the invitation should be resent by: Other e-mail: [email or web address]  Will anyone else be joining your video visit? No        Assessment & Plan     Obesity with serious comorbidity, unspecified classification, unspecified obesity type  Pt has had w/u in the past she thinks for PCOS never a formal dx  She has had GDM x2   Blood sugars in 130s-150s but a1c is 5.3  She has some cushings like sxs     - Testosterone Free and Total; Future  - Estradiol; Future  - TSH with free T4 reflex; Future  - Insulin level; Future  - DHEA sulfate; Future  - Prolactin; Future  - CBC with platelets; Future  - Comprehensive metabolic panel; Future  - Hemoglobin A1c; Future  - Lipid panel reflex to direct LDL Fasting; Future  - metFORMIN (GLUCOPHAGE XR) 500 MG 24 hr tablet; Take 1 tablet (500 mg) by mouth daily (with dinner)    Major depressive disorder, recurrent episode, mild (H)  Stable no change in treatment plan.     Generalized anxiety disorder  Stable no change in treatment plan.     Gastroesophageal reflux disease with esophagitis, unspecified whether hemorrhage  New sxs at night   - famotidine (PEPCID) 20 MG tablet; Take 1 tablet (20 mg) by mouth 2 times daily             Patient Instructions   Start metfomin one tab at supper time    Stop phentermine    Start pepcid twice a day for reflux/heartburn     Set up endocrine appt referral is in     Labs to get started       Return in about 1 week (around 3/1/2023) for Lab Work.    Yasmin Wilkinson MD  [facility]    Subjective   Fam is a 37 year old, presenting for the following health issues:  LAB REQUEST      History of Present Illness       Reason for visit:  Asking for blood sugar, cortisol, and hormone labs. Was previously following up on possible PCOS/Cushings before insurance loss. Also  now having possible diabetes concern. Extreme thirst, fatigue, hunger    She eats 2-3 servings of fruits and vegetables daily.She consumes 0 sweetened beverage(s) daily.She exercises with enough effort to increase her heart rate 30 to 60 minutes per day.  She exercises with enough effort to increase her heart rate 6 days per week.   She is taking medications regularly.     * Constant thirst not remedied by increased water intake and eventually introduction of Pedialyte. Continuous feeling of  dryness in throat. Waking at night to drink, completely dry tongue. Tried addition of humidifier to bedroom - did not  decrease thirst.  * Increased urination, waking 1-3 times at night to go to restroom.  * Increased hunger, despite taking Phentermine. Started with 15mg x1 daily. Increased to 30mg x1 daily - no change from  the 15mg and stopped working completely after approx. 1 week of increase. Still taking as directed.  * Fatigue/sleepiness all day regardless of increased night sleep and day naps (when not working), heavy eyes while working.  * Vision problems seem to be worsening, blurrier even with glasses - have eye appointment set for Friday 2/24 at [facility] with Dr. Bulmaro Johnson, OD.  * Darkening patches of skin under arms and on back of left arm (Attached photos).  I still have my glucose meter, some lancets and strips from my pregnancy last year (had GDM with 2/3 pregnancies), so I  started checking my blood sugars (Attached sheet with readings).  New symptoms which I think may be medication related:  * Sexual Dysfunction - I believe this may be a side effect of the Duloxetine which is really frustrating because it works well  for my mental health, but it causes inability to climax.  New symptoms - Other:  * Possibly acid reflux - I m waking once every few nights coughing with chest pain and a vomit taste in my mouth. Not sure  what is causing this. Started maybe a few months ago, but I keep  thinking it will stop and then it happens again. Tums do  nothing to help.    Depression and Anxiety Follow-Up    How are you doing with your depression since your last visit? No change    How are you doing with your anxiety since your last visit?  No change    Are you having other symptoms that might be associated with depression or anxiety? No    Have you had a significant life event? No     Do you have any concerns with your use of alcohol or other drugs? No    Social History     Tobacco Use     Smoking status: Former     Packs/day: 0.50     Years: 14.00     Pack years: 7.00     Types: Cigarettes     Start date: 12/30/2003     Smokeless tobacco: Never     Tobacco comments:     Quit about a month ago 11/20/2022    Vaping Use     Vaping Use: Never used   Substance Use Topics     Alcohol use: Not Currently     Drug use: No     PHQ 10/26/2022 12/30/2022 2/22/2023   PHQ-9 Total Score 10 7 3   Q9: Thoughts of better off dead/self-harm past 2 weeks Not at all Not at all Not at all     ARASH-7 SCORE 2/3/2021 10/26/2022 12/30/2022   Total Score - - -   Total Score - - 5 (mild anxiety)   Total Score 13 7 5         Suicide Assessment Five-step Evaluation and Treatment (SAFE-T)    GERD/Heartburn  Onset/Duration: months  Description: cough and heartburn at night   Intensity: moderate  Progression of Symptoms: intermittent  Accompanying Signs & Symptoms:  Does it feel like food gets stuck or trouble swallowing: No  Nausea: No  Vomiting (bloody?): No  Abdominal Pain: No  Black-Tarry stools: No  Bloody stools: No  History:  Previous similar episodes: No  Previous ulcers: No  Precipitating factors:   Caffeine use: No  Alcohol use: No  NSAID/Aspirin use: No  Tobacco use: No  Worse with no particular food or drink.  Alleviating factors: None  Therapies tried and outcome:             Lifestyle changes: None            Medications:tums helps minimally       Review of Systems   Constitutional, HEENT, cardiovascular, pulmonary, gi and gu  systems are negative, except as otherwise noted.      Objective    Vitals - Patient Reported  Systolic (Patient Reported): 119  Diastolic (Patient Reported): 84  Weight (Patient Reported): 77.5 kg (170 lb 12.8 oz)      Vitals:  No vitals were obtained today due to virtual visit.    Physical Exam   GENERAL: Healthy, alert and no distress  EYES: Eyes grossly normal to inspection.  No discharge or erythema, or obvious scleral/conjunctival abnormalities.  RESP: No audible wheeze, cough, or visible cyanosis.  No visible retractions or increased work of breathing.    SKIN: Visible skin clear. No significant rash, abnormal pigmentation or lesions.  NEURO: Cranial nerves grossly intact.  Mentation and speech appropriate for age.  PSYCH: Mentation appears normal, affect normal/bright, judgement and insight intact, normal speech and appearance well-groomed.                Video-Visit Details    Type of service:  Video Visit   Video Start Time: 845am  Video End Time:9:08 AM    Originating Location (pt. Location): Home  Distant Location (provider location):  On-site  Platform used for Video Visit: [employer]

## 2023-02-24 ENCOUNTER — LAB (OUTPATIENT)
Dept: LAB | Facility: CLINIC | Age: 38
End: 2023-02-24
Payer: COMMERCIAL

## 2023-02-24 DIAGNOSIS — E66.9 OBESITY WITH SERIOUS COMORBIDITY, UNSPECIFIED CLASSIFICATION, UNSPECIFIED OBESITY TYPE: ICD-10-CM

## 2023-02-24 LAB
ALBUMIN SERPL BCG-MCNC: 4.2 G/DL (ref 3.5–5.2)
ALP SERPL-CCNC: 90 U/L (ref 35–104)
ALT SERPL W P-5'-P-CCNC: 22 U/L (ref 10–35)
ANION GAP SERPL CALCULATED.3IONS-SCNC: 12 MMOL/L (ref 7–15)
AST SERPL W P-5'-P-CCNC: 19 U/L (ref 10–35)
BILIRUB SERPL-MCNC: 0.3 MG/DL
BUN SERPL-MCNC: 14.5 MG/DL (ref 6–20)
CALCIUM SERPL-MCNC: 9.2 MG/DL (ref 8.6–10)
CHLORIDE SERPL-SCNC: 105 MMOL/L (ref 98–107)
CHOLEST SERPL-MCNC: 188 MG/DL
CREAT SERPL-MCNC: 0.65 MG/DL (ref 0.51–0.95)
DEPRECATED HCO3 PLAS-SCNC: 21 MMOL/L (ref 22–29)
ERYTHROCYTE [DISTWIDTH] IN BLOOD BY AUTOMATED COUNT: 12.6 % (ref 10–15)
ESTRADIOL SERPL-MCNC: 37 PG/ML
GFR SERPL CREATININE-BSD FRML MDRD: >90 ML/MIN/1.73M2
GLUCOSE SERPL-MCNC: 106 MG/DL (ref 70–99)
HBA1C MFR BLD: 5.2 % (ref 0–5.6)
HCT VFR BLD AUTO: 42.8 % (ref 35–47)
HDLC SERPL-MCNC: 50 MG/DL
HGB BLD-MCNC: 14.2 G/DL (ref 11.7–15.7)
INSULIN SERPL-ACNC: 33.8 UU/ML (ref 2.6–24.9)
LDLC SERPL CALC-MCNC: 127 MG/DL
MCH RBC QN AUTO: 28 PG (ref 26.5–33)
MCHC RBC AUTO-ENTMCNC: 33.2 G/DL (ref 31.5–36.5)
MCV RBC AUTO: 84 FL (ref 78–100)
NONHDLC SERPL-MCNC: 138 MG/DL
PLATELET # BLD AUTO: 317 10E3/UL (ref 150–450)
POTASSIUM SERPL-SCNC: 4.3 MMOL/L (ref 3.4–5.3)
PROLACTIN SERPL 3RD IS-MCNC: 5 NG/ML (ref 5–23)
PROT SERPL-MCNC: 7.8 G/DL (ref 6.4–8.3)
RBC # BLD AUTO: 5.08 10E6/UL (ref 3.8–5.2)
SODIUM SERPL-SCNC: 138 MMOL/L (ref 136–145)
TRIGL SERPL-MCNC: 55 MG/DL
TSH SERPL DL<=0.005 MIU/L-ACNC: 1.59 UIU/ML (ref 0.3–4.2)
WBC # BLD AUTO: 6.8 10E3/UL (ref 4–11)

## 2023-02-24 PROCEDURE — 85027 COMPLETE CBC AUTOMATED: CPT

## 2023-02-24 PROCEDURE — 36415 COLL VENOUS BLD VENIPUNCTURE: CPT

## 2023-02-24 PROCEDURE — 80061 LIPID PANEL: CPT

## 2023-02-24 PROCEDURE — 84270 ASSAY OF SEX HORMONE GLOBUL: CPT

## 2023-02-24 PROCEDURE — 80053 COMPREHEN METABOLIC PANEL: CPT

## 2023-02-24 PROCEDURE — 84403 ASSAY OF TOTAL TESTOSTERONE: CPT

## 2023-02-24 PROCEDURE — 82670 ASSAY OF TOTAL ESTRADIOL: CPT

## 2023-02-24 PROCEDURE — 82627 DEHYDROEPIANDROSTERONE: CPT

## 2023-02-24 PROCEDURE — 84146 ASSAY OF PROLACTIN: CPT

## 2023-02-24 PROCEDURE — 83036 HEMOGLOBIN GLYCOSYLATED A1C: CPT

## 2023-02-24 PROCEDURE — 83525 ASSAY OF INSULIN: CPT

## 2023-02-24 PROCEDURE — 84443 ASSAY THYROID STIM HORMONE: CPT

## 2023-02-25 ENCOUNTER — NURSE TRIAGE (OUTPATIENT)
Dept: NURSING | Facility: CLINIC | Age: 38
End: 2023-02-25
Payer: COMMERCIAL

## 2023-02-25 LAB — SHBG SERPL-SCNC: 33 NMOL/L (ref 30–135)

## 2023-02-25 NOTE — TELEPHONE ENCOUNTER
Nurse Triage SBAR    Is this a 2nd Level Triage? No    Situation: Patient states she thinks she is having a bad reaction to Metformin that she started taking yesterday. Patient states she also started taking famotidine yesterday. Patient states she has vomited at least 10 times, has had multiple episodes of diarrhea, feels like fluid is staying beneath her collar bone and is not going down. Vomiting and diarrhea started about 12:30AM. Patient states she started coughing first, then progressed to vomiting and diarrhea. Patient states she is unable to keep fluids down.    Background:       Assessment:   Vomit at first looked like stomach contents, now is tan liquid/watery looking  Is dry heaving frequently   Patient is describing a pain in stomach area, pain that comes and goes  Nausea is constant    Recommendation: Per disposition, GO to ED Now (or PCP Triage). Patient will go to ED. Patient will call spouse for a ride. Advised patient to call back with any new or worsening symptoms. Patient verbalized understanding and agrees with plan.    Protocol Recommended Disposition: Emergency department    Holly Lind RN on 2/25/2023 at 11:29 AM  [facility]    Reason for Disposition    [1] SEVERE vomiting (e.g., 6 or more times/day) AND [2] present > 8 hours (Exception: patient sounds well, is drinking liquids, does not sound dehydrated, and vomiting has lasted less than 24 hours)    Additional Information    Negative: Shock suspected (e.g., cold/pale/clammy skin, too weak to stand, low BP, rapid pulse)    Negative: Difficult to awaken or acting confused (e.g., disoriented, slurred speech)    Negative: Sounds like a life-threatening emergency to the triager    Negative: Vomiting occurs only while coughing    Negative: [1] Pregnant < 20 Weeks AND [2] nausea/vomiting began in early pregnancy (i.e., 4-8 weeks pregnant)    Negative: Chest pain    Negative: Headache is main symptom    Negative: Vomiting  "(or Nausea) in a cancer patient who is currently (or recently) receiving chemotherapy or radiation therapy, or cancer patient who has metastatic or end-stage cancer and is receiving palliative care    Negative: [1] Vomiting AND [2] contains red blood or black (\"coffee ground\") material  (Exception: few red streaks in vomit that only happened once)    Negative: Severe pain in one eye    Negative: Recent head injury (within last 3 days)    Negative: Recent abdominal injury (within last 3 days)    Negative: [1] Insulin-dependent diabetes (Type I) AND [2] glucose > 400 mg/dl (22 mmol/l)    Negative: [1] Vomiting AND [2] hernia is more painful or swollen than usual    Protocols used: VOMITING-A-AH      "

## 2023-02-27 LAB — DHEA-S SERPL-MCNC: 99 UG/DL (ref 35–430)

## 2023-02-28 LAB
TESTOST FREE SERPL-MCNC: 0.3 NG/DL
TESTOST SERPL-MCNC: 17 NG/DL (ref 8–60)

## 2023-03-01 ENCOUNTER — MYC MEDICAL ADVICE (OUTPATIENT)
Dept: FAMILY MEDICINE | Facility: CLINIC | Age: 38
End: 2023-03-01
Payer: COMMERCIAL

## 2023-03-05 ENCOUNTER — MYC MEDICAL ADVICE (OUTPATIENT)
Dept: FAMILY MEDICINE | Facility: CLINIC | Age: 38
End: 2023-03-05
Payer: COMMERCIAL

## 2023-03-05 DIAGNOSIS — E66.9 OBESITY WITH SERIOUS COMORBIDITY, UNSPECIFIED CLASSIFICATION, UNSPECIFIED OBESITY TYPE: Primary | ICD-10-CM

## 2023-03-06 RX ORDER — PHENTERMINE HYDROCHLORIDE 30 MG/1
30 CAPSULE ORAL EVERY MORNING
Qty: 30 CAPSULE | Refills: 1 | Status: SHIPPED | OUTPATIENT
Start: 2023-03-06 | End: 2023-04-11

## 2023-03-13 ENCOUNTER — MYC MEDICAL ADVICE (OUTPATIENT)
Dept: FAMILY MEDICINE | Facility: CLINIC | Age: 38
End: 2023-03-13
Payer: COMMERCIAL

## 2023-03-13 DIAGNOSIS — K21.00 GASTROESOPHAGEAL REFLUX DISEASE WITH ESOPHAGITIS, UNSPECIFIED WHETHER HEMORRHAGE: Primary | ICD-10-CM

## 2023-03-31 ENCOUNTER — MYC MEDICAL ADVICE (OUTPATIENT)
Dept: FAMILY MEDICINE | Facility: CLINIC | Age: 38
End: 2023-03-31
Payer: COMMERCIAL

## 2023-03-31 DIAGNOSIS — K21.00 GASTROESOPHAGEAL REFLUX DISEASE WITH ESOPHAGITIS, UNSPECIFIED WHETHER HEMORRHAGE: Primary | ICD-10-CM

## 2023-04-06 ENCOUNTER — NURSE TRIAGE (OUTPATIENT)
Dept: NURSING | Facility: CLINIC | Age: 38
End: 2023-04-06
Payer: COMMERCIAL

## 2023-04-06 DIAGNOSIS — U07.1 INFECTION DUE TO 2019 NOVEL CORONAVIRUS: Primary | ICD-10-CM

## 2023-04-06 NOTE — TELEPHONE ENCOUNTER
COVID Positive/Requesting COVID treatment    Patient is positive for COVID and requesting treatment options.    Date of positive COVID test ( home) 4/6/2023  Current COVID symptoms: fever or chills, cough, fatigue, muscle or body aches, headache, sore throat and congestion or runny nose  Date COVID symptoms began: 4/5/2023    Message should be routed to clinic RN pool. Best phone number to use for call back: [phone]    COVID-19 - HOW TO PROTECT OTHERS - WHEN YOU ARE SICK WITH COVID-19:  * STAY HOME A MINIMUM OF 5 DAYS: Home isolation is needed for at least 5 days after the symptoms started. Stay home from school or work if you are sick. Do NOT go to [facility] services,  centers, shopping, or other public places. Do NOT use public transportation (e.g., bus, taxis, ride-sharing). Do NOT allow any visitors to your home. Leave the house only if you need to seek urgent medical care.  * WEAR A MASK FOR 10 DAYS: Wear a well-fitted mask for 10 full days any time you are around others inside your home or in public.     GENERAL CARE ADVICE FOR COVID-19 SYMPTOMS:  * The symptoms are generally treated the same whether you have COVID-19, influenza or some other respiratory virus.  * Cough: Use cough drops.  * Feeling dehydrated: Drink extra liquids. If the air in your home is dry, use a humidifier.  * Fever: For fever over 101 F (38.3 C), take acetaminophen every 4 to 6 hours  * Muscle aches, headache, and other pains: Often this comes and goes with the fever. * Sore throat: Try throat lozenges, hard candy or warm chicken broth.    * POSITIVE VIRAL TEST: After a positive test, repeat tests are generally not recommended for 90 days (3 months). Reason: Even after it is safe to stop isolation (usually 5 days), tests may stay positive. Further, re-infection appears to be rare during the initial 90 days after symptom onset of the preceding infection. However, if you have new symptoms of COVID-19 within 14 days of  exposure to someone with COVID-19, you should stay home (isolate).    Per protocol pt should be able to treat this at home, but pt is interested in Covid treatment, so was transferred to the Clinic pool to get started on Covid.   Pt was advised to:    CALL BACK IF:   * Fever over 103 F (39.4 C)  * Fever lasts over 3 days  * Fever returns after being gone for 24 hour  * Chest pain or difficulty breathing occurs  * You become worse    Pt verbalized understanding.     Tj Meeks RN on 4/6/2023 at 6:02 PM      Reason for Disposition    [1] COVID-19 diagnosed by positive lab test (e.g., PCR, rapid self-test kit) AND [2] mild symptoms (e.g., cough, fever, others) AND [3] no complications or SOB    Additional Information    Negative: SEVERE difficulty breathing (e.g., struggling for each breath, speaks in single words)    Negative: Difficult to awaken or acting confused (e.g., disoriented, slurred speech)    Negative: Bluish (or gray) lips or face now    Negative: Shock suspected (e.g., cold/pale/clammy skin, too weak to stand, low BP, rapid pulse)    Negative: Sounds like a life-threatening emergency to the triager    Negative: [1] Diagnosed or suspected COVID-19 AND [2] symptoms lasting 3 or more weeks    Negative: [1] COVID-19 exposure AND [2] no symptoms    Negative: COVID-19 vaccine reaction suspected (e.g., fever, headache, muscle aches) occurring 1 to 3 days after getting vaccine    Negative: COVID-19 vaccine, questions about    Negative: [1] Lives with someone known to have influenza (flu test positive) AND [2] flu-like symptoms (e.g., cough, runny nose, sore throat, SOB; with or without fever)    Negative: [1] Adult with possible COVID-19 symptoms AND [2] triager concerned about severity of symptoms or other causes    Negative: COVID-19 and breastfeeding, questions about    Negative: SEVERE or constant chest pain or pressure  (Exception: Mild central chest pain, present only when coughing.)    Negative:  MODERATE difficulty breathing (e.g., speaks in phrases, SOB even at rest, pulse 100-120)    Negative: [1] Headache AND [2] stiff neck (can't touch chin to chest)    Negative: Oxygen level (e.g., pulse oximetry) 90 percent or lower    Negative: Chest pain or pressure    Negative: Patient sounds very sick or weak to the triager    Negative: MILD difficulty breathing (e.g., minimal/no SOB at rest, SOB with walking, pulse <100)    Negative: Fever > 103 F (39.4 C)    Negative: [1] Fever > 101 F (38.3 C) AND [2] age > 60 years    Negative: [1] Fever > 100.0 F (37.8 C) AND [2] bedridden (e.g., nursing home patient, CVA, chronic illness, recovering from surgery)    Negative: [1] HIGH RISK for severe COVID complications (e.g., weak immune system, age > 64 years, obesity with BMI > 25, pregnant, chronic lung disease or other chronic medical condition) AND [2] COVID symptoms (e.g., cough, fever)  (Exceptions: Already seen by PCP and no new or worsening symptoms.)    Negative: [1] HIGH RISK patient AND [2] influenza is widespread in the community AND [3] ONE OR MORE respiratory symptoms: cough, sore throat, runny or stuffy nose    Negative: [1] HIGH RISK patient AND [2] influenza exposure within the last 7 days AND [3] ONE OR MORE respiratory symptoms: cough, sore throat, runny or stuffy nose    Negative: Oxygen level (e.g., pulse oximetry) 91 to 94 percent    Negative: Fever present > 3 days (72 hours)    Negative: [1] Fever returns after gone for over 24 hours AND [2] symptoms worse or not improved    Negative: [1] Continuous (nonstop) coughing interferes with work or school AND [2] no improvement using cough treatment per Care Advice    Negative: [1] COVID-19 infection suspected by caller or triager AND [2] mild symptoms (cough, fever, or others) AND [3] negative COVID-19 rapid test    Negative: Cough present > 3 weeks    Negative: [1] COVID-19 diagnosed by positive lab test (e.g., PCR, rapid self-test kit) AND [2] NO  symptoms (e.g., cough, fever, others)    Protocols used: CORONAVIRUS (COVID-19) DIAGNOSED OR [id number]

## 2023-04-07 NOTE — TELEPHONE ENCOUNTER
RN COVID TREATMENT VISIT  04/07/23      The patient has been triaged and does not require a higher level of care. No SOA, CP or sx that needs to be seen in ER at this time    Halima WOODWARD Dung  37 year old  Current weight? 182 lbs    Has the patient been seen by a primary care provider at an [facility] or [facility] within the past two years? Yes.   Have you been in close proximity to/do you have a known exposure to a person with a confirmed case of influenza? No.     General treatment eligibility:  Date of positive COVID test (PCR or at home)?  4/6/23    Are you or have you been hospitalized for this COVID-19 infection? No.   Have you received monoclonal antibodies or antiviral treatment for COVID-19 since this positive test? No.   Do you have any of the following conditions that place you at risk of being very sick from COVID-19?   - Heart conditions such as cardiomyopathies, congenital heart defects, coronary artery disease, heart arrhythmias, heart failure, hypertension, valve disorders   - Mental health disorders including mood disorders, depression, schizophrenia spectrum disorders   - Overweight or Obesity (BMI >85th percentile or BMI 25 or higher)  - Current or former smoker  Yes, patient has at least one high risk condition as noted above.     Current COVID symptoms:   - fever or chills  - cough  - fatigue  - muscle or body aches  - headache  - new loss of taste or smell  - sore throat  - congestion or runny nose  Yes. Patient has at least one symptom as selected.     How many days since symptoms started? 5 days or less. Established patient, 12 years or older weighing at least 88.2 lbs, who has symptoms that started in the past 5 days, has not been hospitalized nor received treatment already, and is at risk for being very sick from COVID-19.     Treatment eligibility by RN:    Are you currently pregnant or nursing? No    Do you have a clinically significant hypersensitivity to  nirmatrelvir or ritonavir, or toxic epidermal necrolysis (TEN) or Edmonds-Bautista Syndrome? No    Do you have a history of hepatitis, any hepatic impairment on the Problem List (such as Child-Gordon Class C, cirrhosis, fatty liver disease, alcoholic liver disease), or was the last liver lab (hepatic panel, ALT, AST, ALK Phos, bilirubin) elevated in the past 6 months? No    Do you have any history of severe renal impairment (eGFR < 30mL/min)? No    Is patient eligible to continue?   Yes, patient meets all eligibility requirements for the RN COVID treatment (as denoted by all no responses above).     Current Outpatient Medications   Medication Sig Dispense Refill     buPROPion (WELLBUTRIN XL) 150 MG 24 hr tablet Take 1 tablet (150 mg) by mouth every morning 90 tablet 3     DULoxetine (CYMBALTA) 20 MG capsule Take 1 capsule (20 mg) by mouth 2 times daily 180 capsule 3     famotidine (PEPCID) 20 MG tablet Take 1 tablet (20 mg) by mouth 2 times daily 60 tablet 3     hydrOXYzine (ATARAX) 25 MG tablet 1-2 tabs at bedtime for sleep and anxiety 50 tablet 1     metFORMIN (GLUCOPHAGE XR) 500 MG 24 hr tablet Take 1 tablet (500 mg) by mouth daily (with dinner) 90 tablet 3     omeprazole (PRILOSEC) 20 MG DR capsule Take 1 capsule (20 mg) by mouth daily 30 capsule 1     phentermine (ADIPEX-P) 30 MG capsule Take 1 capsule (30 mg) by mouth every morning 30 capsule 1       Medications from List 1 of the standing order (on medications that exclude the use of Paxlovid) that patient is taking: NONE. Is patient taking Cheng's Wort? No  Is patient taking Cheng's Wort or any meds from List 1? No.   Medications from List 2 of the standing order (on meds that provider needs to adjust) that patient is taking: NONE. Is patient on any of the meds from List 2? No.   Medications from List 3 of standing order (on meds that a RN needs to adjust) that patient is taking: bupropion (Wellbutrin, Zyban): Instructed patient to continue taking  buproprion as directed but know that it may have less effect while taking Paxlovid.  Is patient on any meds from List 3? Yes. Patient is on meds from list 3. No meds require a provider visit and at least one med required RN to adjust.     Paxlovid has an approximate 90% reduction in hospitalization. Paxlovid can possibly cause altered sense of taste, diarrhea (loose, watery stools), high blood pressure, muscle aches.     Would patient like a Paxlovid prescription?   Yes.   Lab Results   Component Value Date    GFRESTIMATED >90 02/24/2023       Was last eGFR reduced? No, eGFR 60 or greater/ No Result on record. Patient can receive the normal renal function dose. Paxlovid Rx sent to [facility] [phone]    [address] [place] [id number]    Hours:  Mon-Fri: 9:00a - 7:00p  Sat-Sun: 9:00a - 1:00p     Drive Thru available    Temporary change to home medications: pt was told that ok to continue to take bupropion but that may have less effect while taking the paxlovid.    *Noted phentermine on pt's med list. Pt states she does not take this anymore. Last taken around 1 mos ago.    All medication adjustments (holds, etc) were discussed with the patient and patient was asked to repeat back (teachback) their med adjustment.  Did patient understand med adjustment? Yes, patient repeated back and understood correctly.        Reviewed the following instructions with the patient:    Paxlovid (nimatrelvir and ritonavir)    How it works  Two medicines (nirmatrelvir and ritonavir) are taken together. They stop the virus from growing. Less amount of virus is easier for your body to fight.    How to take    Medicine comes in a daily container with both medicine tablets. Take by mouth twice daily (once in the morning, once at night) for 5 days.    The number of tablets to take varies by patient.    Don't chew or break capsules. Swallow whole.    When to take  Take as soon as possible after  positive COVID-19 test result, and within 5 days of your first symptoms.    Possible side effects  Can cause altered sense of taste, diarrhea (loose, watery stools), high blood pressure, muscle aches.    Marissa Britton RN

## 2023-04-07 NOTE — TELEPHONE ENCOUNTER
REFERRAL INFORMATION:    Referring Provider:  Byron Burch MD    Referring Clinic:  [facility]     Reason for Visit/Diagnosis: Gastroesophageal reflux disease with esophagitis, unspecified whether hemorrhage     FUTURE VISIT INFORMATION:    Appointment Date: 4/11/2023    Appointment Time:      NOTES STATUS DETAILS   OFFICE NOTE from Referring Provider Internal 2/22/2023 VV EDUARDO Mitchell   - [facility] Message with EDUARDO Wilkinson on 3/31/2023   OFFICE NOTE from Other Specialist N/A    HOSPITAL DISCHARGE SUMMARY/  ED VISITS N/A    OPERATIVE REPORT N/A    MEDICATION LIST N/A         ENDOSCOPY  N/A    COLONOSCOPY N/A    ERCP N/A    EUS N/A    STOOL TESTING N/A    PERTINENT LABS N/A    PATHOLOGY REPORTS (RELATED) N/A    IMAGING (CT, MRI, EGD, MRCP, Small Bowel Follow Through/SBT, MR/CT Enterography) N/A

## 2023-04-11 ENCOUNTER — PRE VISIT (OUTPATIENT)
Dept: GASTROENTEROLOGY | Facility: CLINIC | Age: 38
End: 2023-04-11

## 2023-04-11 ENCOUNTER — VIRTUAL VISIT (OUTPATIENT)
Dept: GASTROENTEROLOGY | Facility: CLINIC | Age: 38
End: 2023-04-11
Attending: FAMILY MEDICINE
Payer: COMMERCIAL

## 2023-04-11 VITALS — WEIGHT: 170 LBS | BODY MASS INDEX: 30.11 KG/M2

## 2023-04-11 DIAGNOSIS — K21.00 GASTROESOPHAGEAL REFLUX DISEASE WITH ESOPHAGITIS, UNSPECIFIED WHETHER HEMORRHAGE: Primary | ICD-10-CM

## 2023-04-11 DIAGNOSIS — R12 HEARTBURN: ICD-10-CM

## 2023-04-11 PROCEDURE — 99203 OFFICE O/P NEW LOW 30 MIN: CPT | Mod: VID | Performed by: INTERNAL MEDICINE

## 2023-04-11 ASSESSMENT — PAIN SCALES - GENERAL: PAINLEVEL: NO PAIN (0)

## 2023-04-11 NOTE — PROGRESS NOTES
Fam is a 37 year old who is being evaluated via a billable video visit.      How would you like to obtain your AVS? MyChart  If the video visit is dropped, the invitation should be resent by: Text to cell phone: [phone]  Will anyone else be joining your video visit? No              Subjective   Fam is a 37 year old, presenting for the following health issues:  New Patient (Gastroesophageal reflux disease)    HPI     Patient has classic heartburn symptoms roughly 3 days a week.  These have been refractory to famotidine and omeprazole although these were prescribed without regard to meals.  At times she will have regurgitation and acid in her mid chest that can last several hours she has been using water and crackers for relief.    No bright red blood per rectum melena dysphagia or previous history of anemia.  Currently she is finishing her Paxlovid prescription for COVID.  Patient works as a .      Past medical history notable for obesity, depression, anxiety    Medications reviewed on epic    Allergies reviewed on epic    Social: Quit tobacco November 2022.        Review of Systems   Otherwise negative noncontributory      Objective           Vitals:  No vitals were obtained today due to virtual visit.    Physical Exam   GENERAL: Healthy, alert and no distress  EYES: Eyes grossly normal to inspection.  No discharge or erythema, or obvious scleral/conjunctival abnormalities.  RESP: No audible wheeze, cough, or visible cyanosis.  No visible retractions or increased work of breathing.    SKIN: Visible skin clear. No significant rash, abnormal pigmentation or lesions.  NEURO: Cranial nerves grossly intact.  Mentation and speech appropriate for age.  PSYCH: Mentation appears normal, affect normal/bright, judgement and insight intact, normal speech and appearance well-groomed.      Impression: Heartburn and frequency supportive of GERD.  Discussed medical management.  Limited surgical options such  as Nissen or LI NX.  And new acid blocking medications reassurance regarding safety profile and goal of symptom management.  Absence of alarm features.  EGD is optional and may not alter treatment.    Plan: 1.  Omeprazole 20 mg premeals 3060 minutes twice daily.  2.  Famotidine as doing as needed with risk of tachyphylaxis if taken every day, 3.  Change of PPI brand as needed.  4.  Discussed.  Reassurance regarding PPI safety, NEJM, etc.    As needed return.          Video-Visit Details    Type of service:  Video Visit   Video Start Time: 1:37  Video End Time:1:50    Originating Location (pt. Location): Home    Distant Location (provider location):  On-site  Platform used for Video Visit: [employer]

## 2023-04-11 NOTE — PATIENT INSTRUCTIONS
As discussed    1.  Omeprazole 20 to 30 minutes before meals twice a day will be more effective.    2.  Famotidine can be used intermittently for best results.  This type of medicine if used every day loses some of its benefit.    3.  Avoid eating before bed.  Avoid coffee chocolate citrus spicy and peppermint.    4.  Other proton pump inhibitor medications are a future consideration if needed.  No EGD at this point needed.    As needed return.

## 2023-04-25 ENCOUNTER — VIRTUAL VISIT (OUTPATIENT)
Dept: FAMILY MEDICINE | Facility: CLINIC | Age: 38
End: 2023-04-25
Payer: COMMERCIAL

## 2023-04-25 DIAGNOSIS — J01.90 ACUTE SINUSITIS WITH SYMPTOMS > 10 DAYS: Primary | ICD-10-CM

## 2023-04-25 DIAGNOSIS — Z86.16 PERSONAL HISTORY OF COVID-19: ICD-10-CM

## 2023-04-25 PROCEDURE — 99213 OFFICE O/P EST LOW 20 MIN: CPT | Mod: VID | Performed by: FAMILY MEDICINE

## 2023-04-25 RX ORDER — FLUTICASONE PROPIONATE 50 MCG
2 SPRAY, SUSPENSION (ML) NASAL DAILY
Qty: 16 G | Refills: 0 | Status: SHIPPED | OUTPATIENT
Start: 2023-04-25 | End: 2023-06-06

## 2023-04-25 RX ORDER — DOXYCYCLINE HYCLATE 100 MG
100 TABLET ORAL 2 TIMES DAILY
Qty: 20 TABLET | Refills: 0 | Status: SHIPPED | OUTPATIENT
Start: 2023-04-25 | End: 2023-05-05

## 2023-04-25 NOTE — PROGRESS NOTES
Fam is a 37 year old who is being evaluated via a billable video visit.      How would you like to obtain your AVS? MyChart  If the video visit is dropped, the invitation should be resent by: Text to cell phone: [phone]  Will anyone else be joining your video visit? No        Assessment & Plan     Acute sinusitis with symptoms > 10 days  - doxycycline hyclate (VIBRA-TABS) 100 MG tablet  Dispense: 20 tablet; Refill: 0  - fluticasone (FLONASE) 50 MCG/ACT nasal spray  Dispense: 16 g; Refill: 0    Personal history of COVID-19  Recent positive test- 3 weeks ago. Completed a 5 day course of Paxlovid    Patient education and Handout with home care instructions given. See AVS for details.    Return in about 1 week (around 5/2/2023), or if symptoms worsen or fail to improve.- face to face visit.       Tangela Ragland MD  [facility] ROCKY Otto is a 37 year old, presenting for the following health issues:  Covid Concern        4/25/2023     9:06 AM   Additional Questions   Roomed by JULIAN Heard   Accompanied by self         4/25/2023     9:06 AM   Patient Reported Additional Medications   Patient reports taking the following new medications none     History of Present Illness       Reason for visit:  Covid POS 18 days ago, still very sore throat, coughing up neon green phlegm, hoarse, fatigued, sharp L ear pain, diziness    She eats 2-3 servings of fruits and vegetables daily.She consumes 0 sweetened beverage(s) daily.She exercises with enough effort to increase her heart rate 30 to 60 minutes per day.  She exercises with enough effort to increase her heart rate 4 days per week.   She is taking medications regularly.         COVID-19 Symptom Review  How many days ago did these symptoms start? 18 days ago - tested positive at home at this time.     Are any of the following symptoms significant for you?    New or worsening difficulty breathing? No    Worsening cough? Yes, I am coughing up  mucus.    Fever or chills? Yes, I felt feverish or had chills last night.     Headache: YES    Sore throat: YES with sinus congestion    Chest pain: No    Diarrhea: YES- and some vomiting but this has stopped    Body aches? YES    What treatments has patient tried? completed 5 days of  paxlovid about x2 weeks ago .  Taking dayquil, tried affrin, mucinex also   Does patient live in a nursing home, group home, or shelter? No  Does patient have a way to get food/medications during quarantined? Yes, I have a friend or family member who can help me.      Review of Systems   Constitutional, HEENT, cardiovascular, pulmonary, gi and gu systems are negative, except as otherwise noted.      Objective           Vitals:  No vitals were obtained today due to virtual visit.    Physical Exam   GENERAL: Healthy, alert and no distress  EYES: Eyes grossly normal to inspection.  No discharge or erythema, or obvious scleral/conjunctival abnormalities.  RESP: No audible wheeze, cough, or visible cyanosis.  No visible retractions or increased work of breathing.    NEURO: Cranial nerves grossly intact.  Mentation and speech appropriate for age.  PSYCH: Mentation appears normal, affect normal/bright, judgement and insight intact, normal speech and appearance well-groomed.        Video-Visit Details    Type of service:  Video Visit   Video Start Time: 11:50 am   Video End Time:12:10 pm     Originating Location (pt. Location): Home  Distant Location (provider location):      Platform used for Video Visit: [employer]

## 2023-05-03 ENCOUNTER — OFFICE VISIT (OUTPATIENT)
Dept: FAMILY MEDICINE | Facility: CLINIC | Age: 38
End: 2023-05-03
Payer: COMMERCIAL

## 2023-05-03 VITALS
OXYGEN SATURATION: 99 % | BODY MASS INDEX: 30.48 KG/M2 | DIASTOLIC BLOOD PRESSURE: 92 MMHG | WEIGHT: 172 LBS | SYSTOLIC BLOOD PRESSURE: 144 MMHG | HEART RATE: 117 BPM | TEMPERATURE: 99.4 F | RESPIRATION RATE: 16 BRPM | HEIGHT: 63 IN

## 2023-05-03 DIAGNOSIS — U09.9 CHRONIC POST-COVID-19 SYNDROME: ICD-10-CM

## 2023-05-03 DIAGNOSIS — R05.2 SUBACUTE COUGH: Primary | ICD-10-CM

## 2023-05-03 DIAGNOSIS — R00.0 TACHYCARDIA: ICD-10-CM

## 2023-05-03 LAB
C PNEUM DNA SPEC QL NAA+PROBE: NOT DETECTED
FLUAV H1 2009 PAND RNA SPEC QL NAA+PROBE: NOT DETECTED
FLUAV H1 RNA SPEC QL NAA+PROBE: NOT DETECTED
FLUAV H3 RNA SPEC QL NAA+PROBE: NOT DETECTED
FLUAV RNA SPEC QL NAA+PROBE: NOT DETECTED
FLUBV RNA SPEC QL NAA+PROBE: NOT DETECTED
HADV DNA SPEC QL NAA+PROBE: NOT DETECTED
HCOV PNL SPEC NAA+PROBE: NOT DETECTED
HMPV RNA SPEC QL NAA+PROBE: NOT DETECTED
HPIV1 RNA SPEC QL NAA+PROBE: NOT DETECTED
HPIV2 RNA SPEC QL NAA+PROBE: NOT DETECTED
HPIV3 RNA SPEC QL NAA+PROBE: NOT DETECTED
HPIV4 RNA SPEC QL NAA+PROBE: NOT DETECTED
M PNEUMO DNA SPEC QL NAA+PROBE: NOT DETECTED
RSV RNA SPEC QL NAA+PROBE: NOT DETECTED
RSV RNA SPEC QL NAA+PROBE: NOT DETECTED
RV+EV RNA SPEC QL NAA+PROBE: NOT DETECTED

## 2023-05-03 PROCEDURE — 87633 RESP VIRUS 12-25 TARGETS: CPT | Performed by: FAMILY MEDICINE

## 2023-05-03 PROCEDURE — 87486 CHLMYD PNEUM DNA AMP PROBE: CPT | Performed by: FAMILY MEDICINE

## 2023-05-03 PROCEDURE — 99213 OFFICE O/P EST LOW 20 MIN: CPT | Performed by: FAMILY MEDICINE

## 2023-05-03 PROCEDURE — 87581 M.PNEUMON DNA AMP PROBE: CPT | Performed by: FAMILY MEDICINE

## 2023-05-03 ASSESSMENT — PAIN SCALES - GENERAL: PAINLEVEL: MODERATE PAIN (5)

## 2023-05-03 NOTE — PROGRESS NOTES
"  Assessment & Plan     Subacute cough  Likely another illness on top recovering from COVID (works in  and her own kids go to  and tested positive for parainfluenza virus).  No antiobitics or chest xray needed at this point.  If negative viral test then we need to do some further follow up for post covid lung and cardiac issues.  - Respiratory Panel PCR       BMI:   Estimated body mass index is 30.47 kg/m  as calculated from the following:    Height as of this encounter: 1.6 m (5' 3\").    Weight as of this encounter: 78 kg (172 lb).       See Patient Instructions    Doug North MD  [facility] JEREL Otto is a 37 year old, presenting for the following health issues:  URI (Post COVID sx. Cough sore throat, dizziness, and fatigue. Had COVID about a month ago.)        5/3/2023    10:14 AM   Additional Questions   Roomed by Deb Ventura MA   Accompanied by Self         5/3/2023    10:14 AM   Patient Reported Additional Medications   Patient reports taking the following new medications none     URI    History of Present Illness       Reason for visit:  Covid POS 18 days ago, still very sore throat, coughing up neon green phlegm, hoarse, fatigued, sharp L ear pain, diziness    She eats 2-3 servings of fruits and vegetables daily.She consumes 0 sweetened beverage(s) daily.She exercises with enough effort to increase her heart rate 30 to 60 minutes per day.  She exercises with enough effort to increase her heart rate 4 days per week.   She is taking medications regularly.     Started with COVID one month ago, took paxlovid and then ear pain and congestion so treated with doxycycline for 10 days. Improved the ear pain and green drainage.  Ongoing severe cough and headache and dizzy/head pressure, fatigue, feeling like hungover. Dizzy can come on sitting or with movement.  Intermittent vomiting 3 epidsodes and then diarrhea.    Has been working at  but has needed " "to call in.    Acute Illness  Acute illness concerns: Post COVID sx.   Onset/Duration: ongoing for about a month.  Symptoms:  Fever: No  Chills/Sweats: YES- both  Headache (location?): YES  Sinus Pressure: YES  Conjunctivitis:  No  Ear Pain: no. Not anymore.  Rhinorrhea: YES  Congestion: YES  Sore Throat: YES- right side swollen/ tender  Cough: YES-non-productive, productive of yellow sputum, productive of green sputum, with shortness of breath (with activity)  Wheeze: YES- little at night  Decreased Appetite: No  Nausea: YES  Vomiting: YES- vomited yesterday twice.  Diarrhea: YES- intermittent for the last month.  Dysuria/Freq.: No  Dysuria or Hematuria: No  Fatigue/Achiness: YES- both  Sick/Strep Exposure: Work- Children both tested positive for Parainfluenza 3 and Rhinovirus.     Therapies tried and outcome: Afrin, doxycycline for 10 days 4/25/23, had a 5 day antibiotic, dayquil, Tylenol.    No history of lung issues or asthma or needing inhalers in the past.        Review of Systems         Objective    BP (!) 144/92 (BP Location: Right arm, Patient Position: Sitting, Cuff Size: Adult Large)   Pulse 117   Temp 99.4  F (37.4  C) (Tympanic)   Resp 16   Ht 1.6 m (5' 3\")   Wt 78 kg (172 lb)   LMP 04/06/2023   SpO2 99%   BMI 30.47 kg/m    Body mass index is 30.47 kg/m .  Physical Exam   GENERAL: healthy, alert and no distress  HENT: ear canals and TM's normal, nose and mouth without ulcers or lesions  NECK: no adenopathy, no asymmetry, masses, or scars and thyroid normal to palpation  RESP: lungs clear to auscultation - no rales, rhonchi or wheezes  CV: regular rate and rhythm, normal S1 S2, no S3 or S4, no murmur, click or rub, no peripheral edema and peripheral pulses strong  SKIN: no suspicious lesions or rashes                "

## 2023-05-03 NOTE — LETTER
May 3, 2023      Halima Razo  [address] [facility] [address]        To Whom It May Concern:    Halima Razo  was seen on 5/3/23.  Please excuse her  until 5/2-5/3/23 due to illness. We are doing testing to know what illness.  She is able to return to work if fever free for 24 hours and symptoms are improving.        Sincerely,        Doug North MD

## 2023-05-04 ENCOUNTER — MYC MEDICAL ADVICE (OUTPATIENT)
Dept: FAMILY MEDICINE | Facility: CLINIC | Age: 38
End: 2023-05-04
Payer: COMMERCIAL

## 2023-05-05 ENCOUNTER — ALLIED HEALTH/NURSE VISIT (OUTPATIENT)
Dept: FAMILY MEDICINE | Facility: CLINIC | Age: 38
End: 2023-05-05
Payer: COMMERCIAL

## 2023-05-05 DIAGNOSIS — R00.0 TACHYCARDIA: Primary | ICD-10-CM

## 2023-05-05 PROCEDURE — 93000 ELECTROCARDIOGRAM COMPLETE: CPT

## 2023-05-05 PROCEDURE — 99207 PR NO CHARGE NURSE ONLY: CPT

## 2023-05-05 NOTE — PROGRESS NOTES
Ekg done today. Dr. North went home sick so is not able to read EKG today. Huddled with Dr. Zhu. Per Dr. Zhu patient is ok'd to be discharge. Sending chart to Dr. North for for recommendation or follow up if needed.      Deb Ventura MA

## 2023-05-07 ENCOUNTER — OFFICE VISIT (OUTPATIENT)
Dept: URGENT CARE | Facility: URGENT CARE | Age: 38
End: 2023-05-07
Payer: COMMERCIAL

## 2023-05-07 ENCOUNTER — ANCILLARY PROCEDURE (OUTPATIENT)
Dept: GENERAL RADIOLOGY | Facility: CLINIC | Age: 38
End: 2023-05-07
Attending: NURSE PRACTITIONER
Payer: COMMERCIAL

## 2023-05-07 VITALS
DIASTOLIC BLOOD PRESSURE: 72 MMHG | WEIGHT: 171 LBS | OXYGEN SATURATION: 98 % | BODY MASS INDEX: 30.29 KG/M2 | HEART RATE: 86 BPM | SYSTOLIC BLOOD PRESSURE: 125 MMHG | TEMPERATURE: 98.3 F

## 2023-05-07 DIAGNOSIS — R05.1 ACUTE COUGH: ICD-10-CM

## 2023-05-07 DIAGNOSIS — J01.90 ACUTE SINUSITIS WITH SYMPTOMS > 10 DAYS: ICD-10-CM

## 2023-05-07 DIAGNOSIS — F41.1 GENERALIZED ANXIETY DISORDER: ICD-10-CM

## 2023-05-07 DIAGNOSIS — R06.02 SHORTNESS OF BREATH: ICD-10-CM

## 2023-05-07 DIAGNOSIS — R06.02 SHORTNESS OF BREATH: Primary | ICD-10-CM

## 2023-05-07 DIAGNOSIS — K21.00 GASTROESOPHAGEAL REFLUX DISEASE WITH ESOPHAGITIS, UNSPECIFIED WHETHER HEMORRHAGE: ICD-10-CM

## 2023-05-07 PROCEDURE — 99214 OFFICE O/P EST MOD 30 MIN: CPT | Mod: 25 | Performed by: NURSE PRACTITIONER

## 2023-05-07 PROCEDURE — 96372 THER/PROPH/DIAG INJ SC/IM: CPT | Performed by: NURSE PRACTITIONER

## 2023-05-07 PROCEDURE — 71046 X-RAY EXAM CHEST 2 VIEWS: CPT | Mod: TC | Performed by: RADIOLOGY

## 2023-05-07 RX ORDER — DEXAMETHASONE SODIUM PHOSPHATE 10 MG/ML
10 INJECTION, SOLUTION INTRAMUSCULAR; INTRAVENOUS ONCE
Status: COMPLETED | OUTPATIENT
Start: 2023-05-07 | End: 2023-05-07

## 2023-05-07 RX ORDER — HYDROXYZINE HYDROCHLORIDE 25 MG/1
TABLET, FILM COATED ORAL
Qty: 50 TABLET | Refills: 1 | Status: SHIPPED | OUTPATIENT
Start: 2023-05-07

## 2023-05-07 RX ORDER — AZITHROMYCIN 250 MG/1
TABLET, FILM COATED ORAL
Qty: 6 TABLET | Refills: 0 | Status: SHIPPED | OUTPATIENT
Start: 2023-05-07 | End: 2023-05-12

## 2023-05-07 RX ORDER — METHYLPREDNISOLONE 4 MG
TABLET, DOSE PACK ORAL
Qty: 21 TABLET | Refills: 0 | Status: SHIPPED | OUTPATIENT
Start: 2023-05-07 | End: 2023-06-06

## 2023-05-07 RX ORDER — FAMOTIDINE 20 MG/1
20 TABLET, FILM COATED ORAL 2 TIMES DAILY
Qty: 60 TABLET | Refills: 3 | Status: SHIPPED | OUTPATIENT
Start: 2023-05-07 | End: 2024-02-23

## 2023-05-07 RX ORDER — ALBUTEROL SULFATE 90 UG/1
2 AEROSOL, METERED RESPIRATORY (INHALATION) EVERY 4 HOURS PRN
Qty: 18 G | Refills: 1 | Status: SHIPPED | OUTPATIENT
Start: 2023-05-07 | End: 2023-06-06

## 2023-05-07 RX ADMIN — DEXAMETHASONE SODIUM PHOSPHATE 10 MG: 10 INJECTION, SOLUTION INTRAMUSCULAR; INTRAVENOUS at 10:45

## 2023-05-07 NOTE — PROGRESS NOTES
SUBJECTIVE:   Halima Razo is a 37 year old female presenting with a chief complaint of shortness of breath.  Onset of symptoms was 1 month(s) ago. Started with covid. Was treated with doxycycline, and paxlovid. Now has harsh cough, feels short of breath.   Course of illness is worsening.    Severity moderately severe  Current and Associated symptoms: fever, sweats, runny nose, stuffy nose, cough - productive, wheezing, shortness of breath, hoarse voice, headache, body aches and fatigue  Treatment measures tried include Tylenol/Ibuprofen, albuterol inhaler-helped some, dayquil, Afrin  Predisposing factors include recent illness Covid.    Past Medical History:   Diagnosis Date     Angioedema 2019    non-specific cause     Depression with anxiety      Gestational diabetes mellitus (GDM), antepartum, gestational diabetes method of control unspecified     2020     Hypertension 2018    on medication for about 6 months in 2018     Infertility, female      Migraines      Obesity 04/18/2012     Papanicolaou smear of cervix with low grade squamous intraepithelial lesion (LGSIL) 2009    resolved     Pre-eclampsia in third trimester 2020     Ventricular septal defect (VSD) of fetus in guevara pregnancy, antepartum      Current Outpatient Medications   Medication Sig Dispense Refill     famotidine (PEPCID) 20 MG tablet Take 1 tablet (20 mg) by mouth 2 times daily 60 tablet 3     fluticasone (FLONASE) 50 MCG/ACT nasal spray Spray 2 sprays into both nostrils daily 16 g 0     hydrOXYzine (ATARAX) 25 MG tablet 1-2 tabs at bedtime for sleep and anxiety 50 tablet 1     metFORMIN (GLUCOPHAGE XR) 500 MG 24 hr tablet Take 1 tablet (500 mg) by mouth daily (with dinner) 90 tablet 3     omeprazole (PRILOSEC) 20 MG DR capsule Take 1 capsule (20 mg) by mouth daily 30 capsule 1     Social History     Tobacco Use     Smoking status: Former     Packs/day: 0.50     Years: 14.00     Pack years: 7.00     Types: Cigarettes     Start date:  12/30/2003     Smokeless tobacco: Never     Tobacco comments:     Quit about a month ago 11/20/2022    Vaping Use     Vaping status: Never Used   Substance Use Topics     Alcohol use: Not Currently         OBJECTIVE:  /72 (BP Location: Right arm, Patient Position: Sitting, Cuff Size: Adult Large)   Pulse 86   Temp 98.3  F (36.8  C) (Tympanic)   Wt 77.6 kg (171 lb)   LMP 04/06/2023   SpO2 98%   BMI 30.29 kg/m    GENERAL APPEARANCE: healthy, alert and no distress, sounds congested  EYES: EOMI, conjunctiva clear  HENT: ear canals and TM's normal.  Nose and mouth without ulcers, erythema or lesions  NECK: supple, nontender, no lymphadenopathy  RESP: rhonchi bilateral and expiratory wheezes bibasilar  CV: regular rates and rhythm, normal S1 S2, no murmur noted  ABDOMEN:  soft, nontender, no HSM or masses and bowel sounds normal  NEURO: Normal strength and tone, sensory exam grossly normal,  normal speech and mentation  SKIN: no suspicious lesions or rashes    Chest Xray: Negative chest. Lungs are clear. Normal heart size.  Decadron given in UC, sent script for medrol dose pack to be started in 2 days, azithromycin, and albuterol inhaler. Instructed to return if feeling worse, ER if trouble breathing.  Also needs refills on several medications.    ASSESSMENT:  1. Shortness of breath    - XR Chest 2 Views; Future  - methylPREDNISolone (MEDROL DOSEPAK) 4 MG tablet therapy pack; Follow Package Directions  Dispense: 21 tablet; Refill: 0  - albuterol (PROAIR HFA/PROVENTIL HFA/VENTOLIN HFA) 108 (90 Base) MCG/ACT inhaler; Inhale 2 puffs into the lungs every 4 hours as needed for shortness of breath, wheezing or cough  Dispense: 18 g; Refill: 1  - azithromycin (ZITHROMAX) 250 MG tablet; Take 2 tablets (500 mg) by mouth daily for 1 day, THEN 1 tablet (250 mg) daily for 4 days.  Dispense: 6 tablet; Refill: 0    2. Acute cough    - dexamethasone PF (DECADRON) injection 10 mg    3. Gastroesophageal reflux disease with  esophagitis, unspecified whether hemorrhage    - omeprazole (PRILOSEC) 20 MG DR capsule; Take 1 capsule (20 mg) by mouth daily  Dispense: 30 capsule; Refill: 1  - famotidine (PEPCID) 20 MG tablet; Take 1 tablet (20 mg) by mouth 2 times daily  Dispense: 60 tablet; Refill: 3    4. Generalized anxiety disorder    - hydrOXYzine (ATARAX) 25 MG tablet; 1-2 tabs at bedtime for sleep and anxiety  Dispense: 50 tablet; Refill: 1        PLAN:  1) Take antibiotic as prescribed. Take this medication with food to avoid stomach upset.   2) Ibuprofen or Tylenol as needed for fever or pain.  3) Follow up in 7-10 days if not improving, sooner if worsening or other concerns.

## 2023-05-07 NOTE — NURSING NOTE
Chief Complaint   Patient presents with     Cough     Post covid x1 month,and per patient feels as if she has ammonia.Feels like breathing through a straw/hard time breathing/labored breathing/coughing  Declined a fever/sore throat x4 days.       Vitals:    05/07/23 0952   Pulse: 86   SpO2: 98%   Weight: 77.6 kg (171 lb)     Wt Readings from Last 1 Encounters:   05/07/23 77.6 kg (171 lb)       Gladys Contreras MA

## 2023-06-05 ASSESSMENT — ENCOUNTER SYMPTOMS
DECREASED CONCENTRATION: 1
POSTURAL DYSPNEA: 0
HEMOPTYSIS: 0
SNORES LOUDLY: 1
DEPRESSION: 1
INSOMNIA: 0
DYSPNEA ON EXERTION: 0
NERVOUS/ANXIOUS: 1
COUGH DISTURBING SLEEP: 0
SPUTUM PRODUCTION: 1
PANIC: 0
COUGH: 1
SHORTNESS OF BREATH: 0
WHEEZING: 0

## 2023-06-06 ENCOUNTER — VIRTUAL VISIT (OUTPATIENT)
Dept: ENDOCRINOLOGY | Facility: CLINIC | Age: 38
End: 2023-06-06
Attending: FAMILY MEDICINE
Payer: COMMERCIAL

## 2023-06-06 ENCOUNTER — TELEPHONE (OUTPATIENT)
Dept: ENDOCRINOLOGY | Facility: CLINIC | Age: 38
End: 2023-06-06

## 2023-06-06 DIAGNOSIS — E88.819 INSULIN RESISTANCE: ICD-10-CM

## 2023-06-06 DIAGNOSIS — E66.811 CLASS 1 OBESITY WITH SERIOUS COMORBIDITY AND BODY MASS INDEX (BMI) OF 30.0 TO 30.9 IN ADULT, UNSPECIFIED OBESITY TYPE: ICD-10-CM

## 2023-06-06 DIAGNOSIS — E28.2 PCOS (POLYCYSTIC OVARIAN SYNDROME): Primary | ICD-10-CM

## 2023-06-06 PROCEDURE — 99204 OFFICE O/P NEW MOD 45 MIN: CPT | Mod: VID | Performed by: INTERNAL MEDICINE

## 2023-06-06 RX ORDER — DEXAMETHASONE 1 MG
2 TABLET ORAL ONCE
Qty: 1 TABLET | Refills: 0 | Status: SHIPPED | OUTPATIENT
Start: 2023-06-06 | End: 2023-07-12

## 2023-06-06 RX ORDER — SPIRONOLACTONE 25 MG/1
25 TABLET ORAL DAILY
Qty: 90 TABLET | Refills: 4 | Status: SHIPPED | OUTPATIENT
Start: 2023-06-06 | End: 2023-07-12

## 2023-06-06 NOTE — PATIENT INSTRUCTIONS
Start spironolactone 25mg once daily.  Monitor blood pressure at home and stop if you get light headed, dizzy when standing, or low BP.   Start wegovy - 0.25mg weekly for 1 month, then 0.5mg weekly for a month, then 1mg weekly. You can take this taper slower if needed or stop at max tolerable dose if you get side effects.   Complete dexamethasone suppression test:  Schedule 8am lab draw  Take 2mg dexamethasone at 11pm-midnight the night before your lab draw (sent to your pharmacy)  If cortisol comes back 1.8 or less, cushings has been ruled out.  If above that, we may repeat the test or do a different confirmatory test next.   Followup in 3 months

## 2023-06-06 NOTE — TELEPHONE ENCOUNTER
PA Initiation    Medication: WEGOVY 0.25 MG/0.5ML SC SOAJ  Insurance Company: [employer] Call - Phone [phone] Fax [phone]  Pharmacy Filling the Rx:    Filling Pharmacy Phone:    Filling Pharmacy Fax:    Start Date: 6/6/2023

## 2023-06-06 NOTE — PROGRESS NOTES
Fam is a 38 year old who is being evaluated via a billable video visit.      How would you like to obtain your AVS? MyChart  If the video visit is dropped, the invitation should be resent by: Text to cell phone: [phone]  Will anyone else be joining your video visit? No  Endocrine Consult Video visit note    Attending Assessment/Plan :        Class 1 obesity with serious comorbidity and body mass index (BMI) of 30.0 to 30.9 in adult, unspecified obesity type  PCOS (polycystic ovarian syndrome)  Insulin resistance    Assessment:  -likely has PCOS based on hx of irregular menstrual periods and hx of elevated testosterone levels.  Do not need to have identified ovarian cysts if those other criteria have been met.  Of note, periods have normalized and last testosterone level was normal so PCOS may not be causing issues at the moment.  As such, OCPs would not have much benefit until/unless cycle irregularities come back  -can try spironolactone for hyperandrogen sx, discussed with patient the benefit may be limited given normal testosterone level on last check  -would benefit from Wegovy for obesity treatment on top of her current diet and exercise modification plan   -will screen for cushings with DST    Plan:  -start spironolactone 25mg daily.  Monitor BP at home and stop if low and orthostatic  -check bmp 1-2 weeks after starting to monitor K  -complete dex suppression test to rule out cushings.  If AM cortisol is >1.8 w/appropriately elevated dex drug level, will plan to confirm with 24 hour urine.   -start wegovy and titrate up to 1mg over 3 months or max tolerable dose  -continue diet and exercise plan  -can consider OCP if irregular cycles but not indicated at this time    I have independently reviewed and interpreted labs, imaging as indicated.     RTC 3 months    Chief complaint:  Halima is a 38 year old female seen for PCOS, obesity.     HISTORY OF PRESENT ILLNESS    Halima is a 38F referred to  endocrine for PCOS.     She reports having a pelvic ultrasound with no ovarian cysts seen but has met the other 2 diagnostic criteria for PCOS with elevated testosterone levels in the past and hx of irregular menstrual cycles.  Testosterone levels and cycles normalized after the birth of her son in 2022 but previously were abnormal.   She also reports facial hair growth that requires daily shaving.      Had tubal ligation after son was born so no plans for future pregnancies.      She is currently on metformin XR 500mg once daily to help with PCOS sx.  No GI issues.     She also wanted to discuss weight gain and possible cushings today.  She endorses hair thinning, depression/anxiety, weight gain especially central.  Does endorse striae but not necessarily thick and they have faded.  No marked appetite change.  No diabetes.     She would be interested in trying wegovy to help with weight loss efforts.  She has been implementing diet and lifestyle/exercise modifications for at least the last 3 months and has not been able to meet weight loss goals with that alone.  No hx of pancreatitis.  No known family hx of MTC.     Endocrine relevant labs and imaging are as follows:  Component      Latest Ref Rng 6/28/2015  10:46 AM 3/27/2022  8:49 AM 2/24/2023  9:52 AM   Testosterone Total      8 - 60 ng/dL 44   17    Sex Hormone Binding Globulin      30 - 135 nmol/L 30   33    Free Testosterone Calculated      ng/dL 0.83 (H)   0.30    Creatinine      0.52 - 1.04 mg/dL  0.46 (L)     GFR Estimate      >60 mL/min/1.73m2  >90     Hemoglobin A1C      0.0 - 5.6 %   5.2    DHEA Sulfate      35 - 430 ug/dL   99    Insulin      2.6 - 24.9 uU/mL   33.8 (H)       Component      Latest Ref Rng 2/24/2023  9:52 AM   Glucose      70 - 99 mg/dL 106 (H)         REVIEW OF SYSTEMS    10 system ROS otherwise as per the HPI or negative    Past Medical History  Past Medical History:   Diagnosis Date     Angioedema 2019    non-specific cause      Depression with anxiety      Gestational diabetes mellitus (GDM), antepartum, gestational diabetes method of control unspecified     2020     Hypertension 2018    on medication for about 6 months in 2018     Infertility, female      Migraines      Obesity 04/18/2012     Papanicolaou smear of cervix with low grade squamous intraepithelial lesion (LGSIL) 2009    resolved     Pre-eclampsia in third trimester 2020     Ventricular septal defect (VSD) of fetus in guevara pregnancy, antepartum        Medications  Current Outpatient Medications   Medication Sig Dispense Refill     dexamethasone (DECADRON) 1 MG tablet Take 2 tablets (2 mg) by mouth once for 1 dose Take at 11pm the night before coming in at 8am for labs 1 tablet 0     famotidine (PEPCID) 20 MG tablet Take 1 tablet (20 mg) by mouth 2 times daily 60 tablet 3     hydrOXYzine (ATARAX) 25 MG tablet 1-2 tabs at bedtime for sleep and anxiety 50 tablet 1     metFORMIN (GLUCOPHAGE XR) 500 MG 24 hr tablet Take 1 tablet (500 mg) by mouth daily (with dinner) 90 tablet 3     omeprazole (PRILOSEC) 20 MG DR capsule Take 1 capsule (20 mg) by mouth daily 30 capsule 1     Semaglutide-Weight Management (WEGOVY) 0.25 MG/0.5ML pen Inject 0.25 mg Subcutaneous once a week Increase to 0.5mg after 4 weeks if tolerating. 2 mL 2     [START ON 7/4/2023] Semaglutide-Weight Management (WEGOVY) 0.5 MG/0.5ML pen Inject 0.5 mg Subcutaneous once a week Increase to 1mg after 4 weeks if tolerating 2 mL 2     [START ON 8/1/2023] Semaglutide-Weight Management (WEGOVY) 1 MG/0.5ML pen Inject 1 mg Subcutaneous once a week 2 mL 11     spironolactone (ALDACTONE) 25 MG tablet Take 1 tablet (25 mg) by mouth daily 90 tablet 4       Allergies  No Known Allergies    Family History  family history includes Anxiety Disorder in her brother, mother, and other family members; Brain Cancer in her maternal grandfather; Cerebrovascular Disease in her paternal grandfather; Depression in her brother, cousin,  "maternal grandmother, and mother; Diabetes in an other family member; Heart Failure in her maternal grandmother; Hyperlipidemia in an other family member; Hypertension in an other family member; Lung Cancer in her paternal grandmother; Mental Illness in her brother, maternal grandmother, and another family member; Obesity in an other family member; Other Cancer in her cousin, father, maternal grandfather, paternal grandfather, paternal grandmother, and another family member; Thyroid Disease in her maternal grandmother, mother, and another family member.    Social History  Social History     Tobacco Use     Smoking status: Former     Packs/day: 0.50     Years: 14.00     Pack years: 7.00     Types: Cigarettes     Start date: 12/30/2003     Smokeless tobacco: Never     Tobacco comments:     Quit about a month ago 11/20/2022    Vaping Use     Vaping status: Never Used   Substance Use Topics     Alcohol use: Not Currently     Drug use: No       Physical Exam  There is no height or weight on file to calculate BMI.   Pt reports current weight 171, height 5'3\" giving BMI of 30.1  GENERAL: no distress  SKIN: Visible skin clear. No significant rash, abnormal pigmentation or lesions.  EYES: Eyes grossly normal to inspection.  No discharge or erythema, or obvious scleral/conjunctival abnormalities.  NECK: visible goiter is not present; no visible neck masses  RESP: No audible wheeze, cough, or visible cyanosis.  No visible retractions or increased work of breathing.    NEURO: Awake, alert, responds appropriately to questions.  Mentation and speech fluent.  PSYCH:affect normal and appearance well-groomed.    Video-Visit Details    Type of service:  Video Visit    Video Start Time (time video started): 0800    Video End Time (time video stopped): 0843    Originating Location (pt. Location): Home    Distant Location (provider location):  Off-site    Mode of Communication:  Video Conference via [employer]    Physician has " received verbal consent for a Video Visit from the patient? Yes    I spent a total of 53 minutes on the day of this new patient visit on chart review, lab review, coordinating care, exam and counseling of patient           Answers for HPI/ROS submitted by the patient on 6/5/2023  General Symptoms: No  Skin Symptoms: No  HENT Symptoms: No  EYE SYMPTOMS: No  HEART SYMPTOMS: No  LUNG SYMPTOMS: Yes  INTESTINAL SYMPTOMS: No  URINARY SYMPTOMS: No  GYNECOLOGIC SYMPTOMS: No  BREAST SYMPTOMS: No  SKELETAL SYMPTOMS: No  BLOOD SYMPTOMS: No  NERVOUS SYSTEM SYMPTOMS: No  MENTAL HEALTH SYMPTOMS: Yes  Cough: Yes  Sputum or phlegm: Yes  Coughing up blood: No  Difficulty breating or shortness of breath: No  Snoring: Yes  Wheezing: No  Difficulty breathing on exertion: No  Nighttime Cough: No  Difficulty breathing when lying flat: No  Nervous or Anxious: Yes  Depression: Yes  Trouble sleeping: No  Trouble thinking or concentrating: Yes  Mood changes: Yes  Panic attacks: No

## 2023-06-06 NOTE — LETTER
6/6/2023         RE: Halima Razo  [address] [facility] [address]        Dear Colleague,    Thank you for referring your patient, Halima Razo, to the [facility] END[facility]. Please see a copy of my visit note below.    Fam is a 38 year old who is being evaluated via a billable video visit.      How would you like to obtain your AVS? MyChart  If the video visit is dropped, the invitation should be resent by: Text to cell phone: [phone]  Will anyone else be joining your video visit? No  Endocrine Consult Video visit note    Attending Assessment/Plan :        Class 1 obesity with serious comorbidity and body mass index (BMI) of 30.0 to 30.9 in adult, unspecified obesity type  PCOS (polycystic ovarian syndrome)  Insulin resistance    Assessment:  -likely has PCOS based on hx of irregular menstrual periods and hx of elevated testosterone levels.  Do not need to have identified ovarian cysts if those other criteria have been met.  Of note, periods have normalized and last testosterone level was normal so PCOS may not be causing issues at the moment.  As such, OCPs would not have much benefit until/unless cycle irregularities come back  -can try spironolactone for hyperandrogen sx, discussed with patient the benefit may be limited given normal testosterone level on last check  -would benefit from Wegovy for obesity treatment on top of her current diet and exercise modification plan   -will screen for cushings with DST    Plan:  -start spironolactone 25mg daily.  Monitor BP at home and stop if low and orthostatic  -check bmp 1-2 weeks after starting to monitor K  -complete dex suppression test to rule out cushings.  If AM cortisol is >1.8 w/appropriately elevated dex drug level, will plan to confirm with 24 hour urine.   -start wegovy and titrate up to 1mg over 3 months or max tolerable dose  -continue diet and exercise plan  -can consider OCP if irregular cycles but not indicated  at this time    I have independently reviewed and interpreted labs, imaging as indicated.     RTC 3 months    Chief complaint:  Halima is a 38 year old female seen for PCOS, obesity.     HISTORY OF PRESENT ILLNESS    Halima is a 38F referred to endocrine for PCOS.     She reports having a pelvic ultrasound with no ovarian cysts seen but has met the other 2 diagnostic criteria for PCOS with elevated testosterone levels in the past and hx of irregular menstrual cycles.  Testosterone levels and cycles normalized after the birth of her son in 2022 but previously were abnormal.   She also reports facial hair growth that requires daily shaving.      Had tubal ligation after son was born so no plans for future pregnancies.      She is currently on metformin XR 500mg once daily to help with PCOS sx.  No GI issues.     She also wanted to discuss weight gain and possible cushings today.  She endorses hair thinning, depression/anxiety, weight gain especially central.  Does endorse striae but not necessarily thick and they have faded.  No marked appetite change.  No diabetes.     She would be interested in trying wegovy to help with weight loss efforts.  She has been implementing diet and lifestyle/exercise modifications for at least the last 3 months and has not been able to meet weight loss goals with that alone.  No hx of pancreatitis.  No known family hx of MTC.     Endocrine relevant labs and imaging are as follows:  Component      Latest Ref Rng 6/28/2015  10:46 AM 3/27/2022  8:49 AM 2/24/2023  9:52 AM   Testosterone Total      8 - 60 ng/dL 44   17    Sex Hormone Binding Globulin      30 - 135 nmol/L 30   33    Free Testosterone Calculated      ng/dL 0.83 (H)   0.30    Creatinine      0.52 - 1.04 mg/dL  0.46 (L)     GFR Estimate      >60 mL/min/1.73m2  >90     Hemoglobin A1C      0.0 - 5.6 %   5.2    DHEA Sulfate      35 - 430 ug/dL   99    Insulin      2.6 - 24.9 uU/mL   33.8 (H)       Component      Latest Ref Rng  2/24/2023  9:52 AM   Glucose      70 - 99 mg/dL 106 (H)         REVIEW OF SYSTEMS    10 system ROS otherwise as per the HPI or negative    Past Medical History  Past Medical History:   Diagnosis Date     Angioedema 2019    non-specific cause     Depression with anxiety      Gestational diabetes mellitus (GDM), antepartum, gestational diabetes method of control unspecified     2020     Hypertension 2018    on medication for about 6 months in 2018     Infertility, female      Migraines      Obesity 04/18/2012     Papanicolaou smear of cervix with low grade squamous intraepithelial lesion (LGSIL) 2009    resolved     Pre-eclampsia in third trimester 2020     Ventricular septal defect (VSD) of fetus in guevara pregnancy, antepartum        Medications  Current Outpatient Medications   Medication Sig Dispense Refill     dexamethasone (DECADRON) 1 MG tablet Take 2 tablets (2 mg) by mouth once for 1 dose Take at 11pm the night before coming in at 8am for labs 1 tablet 0     famotidine (PEPCID) 20 MG tablet Take 1 tablet (20 mg) by mouth 2 times daily 60 tablet 3     hydrOXYzine (ATARAX) 25 MG tablet 1-2 tabs at bedtime for sleep and anxiety 50 tablet 1     metFORMIN (GLUCOPHAGE XR) 500 MG 24 hr tablet Take 1 tablet (500 mg) by mouth daily (with dinner) 90 tablet 3     omeprazole (PRILOSEC) 20 MG DR capsule Take 1 capsule (20 mg) by mouth daily 30 capsule 1     Semaglutide-Weight Management (WEGOVY) 0.25 MG/0.5ML pen Inject 0.25 mg Subcutaneous once a week Increase to 0.5mg after 4 weeks if tolerating. 2 mL 2     [START ON 7/4/2023] Semaglutide-Weight Management (WEGOVY) 0.5 MG/0.5ML pen Inject 0.5 mg Subcutaneous once a week Increase to 1mg after 4 weeks if tolerating 2 mL 2     [START ON 8/1/2023] Semaglutide-Weight Management (WEGOVY) 1 MG/0.5ML pen Inject 1 mg Subcutaneous once a week 2 mL 11     spironolactone (ALDACTONE) 25 MG tablet Take 1 tablet (25 mg) by mouth daily 90 tablet 4       Allergies  No Known  "Allergies    Family History  family history includes Anxiety Disorder in her brother, mother, and other family members; Brain Cancer in her maternal grandfather; Cerebrovascular Disease in her paternal grandfather; Depression in her brother, cousin, maternal grandmother, and mother; Diabetes in an other family member; Heart Failure in her maternal grandmother; Hyperlipidemia in an other family member; Hypertension in an other family member; Lung Cancer in her paternal grandmother; Mental Illness in her brother, maternal grandmother, and another family member; Obesity in an other family member; Other Cancer in her cousin, father, maternal grandfather, paternal grandfather, paternal grandmother, and another family member; Thyroid Disease in her maternal grandmother, mother, and another family member.    Social History  Social History     Tobacco Use     Smoking status: Former     Packs/day: 0.50     Years: 14.00     Pack years: 7.00     Types: Cigarettes     Start date: 12/30/2003     Smokeless tobacco: Never     Tobacco comments:     Quit about a month ago 11/20/2022    Vaping Use     Vaping status: Never Used   Substance Use Topics     Alcohol use: Not Currently     Drug use: No       Physical Exam  There is no height or weight on file to calculate BMI.   Pt reports current weight 171, height 5'3\" giving BMI of 30.1  GENERAL: no distress  SKIN: Visible skin clear. No significant rash, abnormal pigmentation or lesions.  EYES: Eyes grossly normal to inspection.  No discharge or erythema, or obvious scleral/conjunctival abnormalities.  NECK: visible goiter is not present; no visible neck masses  RESP: No audible wheeze, cough, or visible cyanosis.  No visible retractions or increased work of breathing.    NEURO: Awake, alert, responds appropriately to questions.  Mentation and speech fluent.  PSYCH:affect normal and appearance well-groomed.    Video-Visit Details    Type of service:  Video Visit    Video Start Time " (time video started): 0800    Video End Time (time video stopped): 0843    Originating Location (pt. Location): Home    Distant Location (provider location):  Off-site    Mode of Communication:  Video Conference via [employer]    Physician has received verbal consent for a Video Visit from the patient? Yes    I spent a total of 53 minutes on the day of this new patient visit on chart review, lab review, coordinating care, exam and counseling of patient           Answers for HPI/ROS submitted by the patient on 6/5/2023  General Symptoms: No  Skin Symptoms: No  HENT Symptoms: No  EYE SYMPTOMS: No  HEART SYMPTOMS: No  LUNG SYMPTOMS: Yes  INTESTINAL SYMPTOMS: No  URINARY SYMPTOMS: No  GYNECOLOGIC SYMPTOMS: No  BREAST SYMPTOMS: No  SKELETAL SYMPTOMS: No  BLOOD SYMPTOMS: No  NERVOUS SYSTEM SYMPTOMS: No  MENTAL HEALTH SYMPTOMS: Yes  Cough: Yes  Sputum or phlegm: Yes  Coughing up blood: No  Difficulty breating or shortness of breath: No  Snoring: Yes  Wheezing: No  Difficulty breathing on exertion: No  Nighttime Cough: No  Difficulty breathing when lying flat: No  Nervous or Anxious: Yes  Depression: Yes  Trouble sleeping: No  Trouble thinking or concentrating: Yes  Mood changes: Yes  Panic attacks: No          Again, thank you for allowing me to participate in the care of your patient.        Sincerely,        Phong Garcia MD

## 2023-06-07 NOTE — TELEPHONE ENCOUNTER
Prior Authorization Approval             Rx's sent to pharmacy    Medication: WEGOVY 0.25 MG/0.5ML SC SOAJ  Authorization Effective Date: 3/8/2023  Authorization Expiration Date: 6/6/2024  Approved Dose/Quantity: 2  Reference #: [id number]   Insurance Company: [employer] - Phone [phone] Fax [phone]  Expected CoPay:       CoPay Card Available: No    Financial Assistance Needed: no  Which Pharmacy is filling the prescription:    Pharmacy Notified:    Patient Notified:

## 2023-06-15 ENCOUNTER — LAB (OUTPATIENT)
Dept: LAB | Facility: CLINIC | Age: 38
End: 2023-06-15
Payer: COMMERCIAL

## 2023-06-15 DIAGNOSIS — E66.811 CLASS 1 OBESITY WITH SERIOUS COMORBIDITY AND BODY MASS INDEX (BMI) OF 30.0 TO 30.9 IN ADULT, UNSPECIFIED OBESITY TYPE: ICD-10-CM

## 2023-06-15 DIAGNOSIS — E28.2 PCOS (POLYCYSTIC OVARIAN SYNDROME): ICD-10-CM

## 2023-06-15 LAB
ANION GAP SERPL CALCULATED.3IONS-SCNC: 11 MMOL/L (ref 7–15)
BUN SERPL-MCNC: 18.4 MG/DL (ref 6–20)
CALCIUM SERPL-MCNC: 9.8 MG/DL (ref 8.6–10)
CHLORIDE SERPL-SCNC: 102 MMOL/L (ref 98–107)
CORTIS 8H P 1 MG DEX SERPL-MCNC: 1 UG/DL
CREAT SERPL-MCNC: 0.63 MG/DL (ref 0.51–0.95)
DEPRECATED HCO3 PLAS-SCNC: 25 MMOL/L (ref 22–29)
GFR SERPL CREATININE-BSD FRML MDRD: >90 ML/MIN/1.73M2
GLUCOSE SERPL-MCNC: 109 MG/DL (ref 70–99)
Lab: NORMAL
PERFORMING LABORATORY: NORMAL
POTASSIUM SERPL-SCNC: 4.8 MMOL/L (ref 3.4–5.3)
SODIUM SERPL-SCNC: 138 MMOL/L (ref 136–145)
TEST NAME: NORMAL

## 2023-06-15 PROCEDURE — 80048 BASIC METABOLIC PNL TOTAL CA: CPT

## 2023-06-15 PROCEDURE — 36415 COLL VENOUS BLD VENIPUNCTURE: CPT

## 2023-06-15 PROCEDURE — 82533 TOTAL CORTISOL: CPT

## 2023-06-15 PROCEDURE — 80299 QUANTITATIVE ASSAY DRUG: CPT

## 2023-06-16 ENCOUNTER — MYC MEDICAL ADVICE (OUTPATIENT)
Dept: ENDOCRINOLOGY | Facility: CLINIC | Age: 38
End: 2023-06-16
Payer: COMMERCIAL

## 2023-06-16 DIAGNOSIS — E28.2 PCOS (POLYCYSTIC OVARIAN SYNDROME): ICD-10-CM

## 2023-06-16 DIAGNOSIS — E66.9 OBESITY WITH SERIOUS COMORBIDITY, UNSPECIFIED CLASSIFICATION, UNSPECIFIED OBESITY TYPE: ICD-10-CM

## 2023-06-21 LAB — MISCELLANEOUS TEST 1 (ARUP): NORMAL

## 2023-06-21 SDOH — SOCIAL STABILITY: SOCIAL NETWORK: I HAVE TROUBLE DOING ALL OF MY USUAL WORK (INCLUDE WORK AT HOME): SOMETIMES

## 2023-06-21 SDOH — SOCIAL STABILITY: SOCIAL NETWORK: I HAVE TROUBLE DOING ALL OF MY REGULAR LEISURE ACTIVITIES WITH OTHERS: SOMETIMES

## 2023-06-21 SDOH — SOCIAL STABILITY: SOCIAL NETWORK: PROMIS ABILITY TO PARTICIPATE IN SOCIAL ROLES & ACTIVITIES T-SCORE: 43

## 2023-06-21 SDOH — SOCIAL STABILITY: SOCIAL NETWORK

## 2023-06-21 SDOH — SOCIAL STABILITY: SOCIAL NETWORK: I HAVE TROUBLE DOING ALL OF THE FAMILY ACTIVITIES THAT I WANT TO DO: SOMETIMES

## 2023-06-21 SDOH — SOCIAL STABILITY: SOCIAL NETWORK: I HAVE TROUBLE DOING ALL OF THE ACTIVITIES WITH FRIENDS THAT I WANT TO DO: USUALLY

## 2023-06-21 ASSESSMENT — ENCOUNTER SYMPTOMS
MYALGIAS: 1
LEG PAIN: 0
SPEECH CHANGE: 0
HYPOTENSION: 0
STIFFNESS: 0
DECREASED CONCENTRATION: 1
BACK PAIN: 1
ARTHRALGIAS: 0
NUMBNESS: 0
DIZZINESS: 0
PANIC: 0
LIGHT-HEADEDNESS: 1
SLEEP DISTURBANCES DUE TO BREATHING: 0
EXERCISE INTOLERANCE: 1
MUSCLE CRAMPS: 1
HEADACHES: 1
HYPERTENSION: 1
NERVOUS/ANXIOUS: 1
LOSS OF CONSCIOUSNESS: 0
MEMORY LOSS: 1
NECK PAIN: 1
MUSCLE WEAKNESS: 1
DEPRESSION: 0
ORTHOPNEA: 0
SEIZURES: 0
PALPITATIONS: 1
TINGLING: 1
DISTURBANCES IN COORDINATION: 1
SYNCOPE: 0
WEAKNESS: 1
PARALYSIS: 0
INSOMNIA: 0
JOINT SWELLING: 0
TREMORS: 0

## 2023-06-21 ASSESSMENT — ANXIETY QUESTIONNAIRES
IF YOU CHECKED OFF ANY PROBLEMS ON THIS QUESTIONNAIRE, HOW DIFFICULT HAVE THESE PROBLEMS MADE IT FOR YOU TO DO YOUR WORK, TAKE CARE OF THINGS AT HOME, OR GET ALONG WITH OTHER PEOPLE: SOMEWHAT DIFFICULT
7. FEELING AFRAID AS IF SOMETHING AWFUL MIGHT HAPPEN: SEVERAL DAYS
1. FEELING NERVOUS, ANXIOUS, OR ON EDGE: SEVERAL DAYS
2. NOT BEING ABLE TO STOP OR CONTROL WORRYING: SEVERAL DAYS
6. BECOMING EASILY ANNOYED OR IRRITABLE: SEVERAL DAYS
GAD7 TOTAL SCORE: 6
GAD7 TOTAL SCORE: 6
5. BEING SO RESTLESS THAT IT IS HARD TO SIT STILL: NOT AT ALL
4. TROUBLE RELAXING: SEVERAL DAYS
7. FEELING AFRAID AS IF SOMETHING AWFUL MIGHT HAPPEN: SEVERAL DAYS
8. IF YOU CHECKED OFF ANY PROBLEMS, HOW DIFFICULT HAVE THESE MADE IT FOR YOU TO DO YOUR WORK, TAKE CARE OF THINGS AT HOME, OR GET ALONG WITH OTHER PEOPLE?: SOMEWHAT DIFFICULT
GAD7 TOTAL SCORE: 6
3. WORRYING TOO MUCH ABOUT DIFFERENT THINGS: SEVERAL DAYS

## 2023-06-21 ASSESSMENT — PATIENT HEALTH QUESTIONNAIRE - PHQ9
10. IF YOU CHECKED OFF ANY PROBLEMS, HOW DIFFICULT HAVE THESE PROBLEMS MADE IT FOR YOU TO DO YOUR WORK, TAKE CARE OF THINGS AT HOME, OR GET ALONG WITH OTHER PEOPLE: NOT DIFFICULT AT ALL
SUM OF ALL RESPONSES TO PHQ QUESTIONS 1-9: 6
SUM OF ALL RESPONSES TO PHQ QUESTIONS 1-9: 6

## 2023-06-21 NOTE — TELEPHONE ENCOUNTER
Last OV 6/6/23:    Plan:  -start spironolactone 25mg daily.  Monitor BP at home and stop if low and orthostatic  -check bmp 1-2 weeks after starting to monitor K  -complete dex suppression test to rule out cushings.  If AM cortisol is >1.8 w/appropriately elevated dex drug level, will plan to confirm with 24 hour urine.   -start wegovy and titrate up to 1mg over 3 months or max tolerable dose  -continue diet and exercise plan  -can consider OCP if irregular cycles but not indicated at this time     RTC 3 months    From 6/15 labs, fasting glucose was 109. Patient wants to know if she should increase her Metformin dose?    See patient's update regarding BP's. Ok to increase spironolactone?  Please advise.    Sandra Lynn RN on 6/21/2023 at 1:25 PM

## 2023-06-28 ENCOUNTER — VIRTUAL VISIT (OUTPATIENT)
Dept: PHYSICAL MEDICINE AND REHAB | Facility: CLINIC | Age: 38
End: 2023-06-28
Attending: FAMILY MEDICINE
Payer: COMMERCIAL

## 2023-06-28 DIAGNOSIS — U09.9 POST-COVID CHRONIC FATIGUE: ICD-10-CM

## 2023-06-28 DIAGNOSIS — R05.3 POST-COVID CHRONIC COUGH: ICD-10-CM

## 2023-06-28 DIAGNOSIS — U09.9 POST-COVID CHRONIC PALPITATIONS: Primary | ICD-10-CM

## 2023-06-28 DIAGNOSIS — U09.9 POST-COVID CHRONIC COUGH: ICD-10-CM

## 2023-06-28 DIAGNOSIS — R00.2 POST-COVID CHRONIC PALPITATIONS: Primary | ICD-10-CM

## 2023-06-28 DIAGNOSIS — G93.32 POST-COVID CHRONIC FATIGUE: ICD-10-CM

## 2023-06-28 DIAGNOSIS — R05.2 SUBACUTE COUGH: ICD-10-CM

## 2023-06-28 DIAGNOSIS — R00.0 TACHYCARDIA: ICD-10-CM

## 2023-06-28 DIAGNOSIS — U09.9 CHRONIC POST-COVID-19 SYNDROME: ICD-10-CM

## 2023-06-28 PROCEDURE — 99204 OFFICE O/P NEW MOD 45 MIN: CPT | Mod: VID | Performed by: INTERNAL MEDICINE

## 2023-06-28 NOTE — PROGRESS NOTES
Fam is a 38 year old who is being evaluated via a billable video visit.      How would you like to obtain your AVS? MyChart  If the video visit is dropped, the invitation should be resent by: Text to cell phone: [phone]  Will anyone else be joining your video visit? No        Assessment & Plan         Post-COVID chronic palpitations  Discussed possible causes of rapid heartbeat following COVID-19.  Recommend further evaluation.  Echocardiogram as well as leadless EKG monitor were ordered today.  Referral to cardiology was placed.  Patient was advised that tachycardia is not uncommon following COVID-19 and typically improves over time.  - Adult Cardiology Eval  Referral; Future  - Echocardiogram Complete; Future  - Adult Leadless EKG Monitor 8 to 14 Days; Future    Post-COVID chronic fatigue  Discussed possible causes as well as contributing etiologies to chronic fatigue in the setting of prior COVID-19 infection.  Recommend screening for common metabolic and endocrine abnormalities.  Discussed the need for sleep evaluation.  Patient was also advised on rehab based approach to symptoms following COVID-19, recommend occupational and physical therapy management.  - TSH with free T4 reflex; Future  - CBC with platelets; Future  - Adult Sleep Eval & Management Referral; Future  - Vitamin B1 whole blood; Future  - Vitamin B6; Future  - Vitamin B12; Future  - Physical Therapy Referral; Future  - Occupational Therapy Referral; Future    Post-COVID chronic cough  Discussed possible causes of cough following COVID-19 infection with patient.  Recommend further evaluation with pulmonary function tests as well as 6-minute walk.  May consider additional evaluation with pulmonary medicine ordered with chest imaging based on results of pulmonary function testing.  Patient will be advised on test results accordingly.  - General PFT Lab (Please always keep checked); Future  - Pulmonary Function Test; Future  - 6 minute  "walk test; Future      I spent a total of 45 minutes on the day of the visit.   Time spent by me doing chart review, history and exam, documentation and further activities per the note       BMI:   Estimated body mass index is 30.29 kg/m  as calculated from the following:    Height as of 5/3/23: 1.6 m (5' 3\").    Weight as of 5/7/23: 77.6 kg (171 lb).           Return in about 3 months (around 9/28/2023).    Keila Dotson MD  [facility] ANTONIETTA Otto is a 38 year old, presenting for the following health issues:  No chief complaint on file.  No chief complaint on file.    HPI     Patient was referred by her primary care provider for evaluation of post-COVID issues.     History of COVID-19 infection: Patient reports that she had COVID in March of 2023. She had cough, fatigue, chills. She also had sore throat and nasal congestion. She was treated with Paxlovid, states that her symptoms have not changed after Paxlovid. She was seen by her primary care provider about 2 weeks after the initial diagnosis. She still had nasal congestion. She was prescribed a 10 day course of doxycycline. She states that it helped with nasal congestion, but her cough did not improve. She was also seen on 5/3 and had a respiratory panel done which was negative.  Patient reports that she made some improvement in her symptoms over time. She still has cough, states that she has cough with physical activity. She is still having a lot of fatigue. She also reports that she has had elevated heart rate most of the time. She states that she has some palpitations, however, that does not happen very often, about 3 times a week.   Patient reports that she is able to fall asleep without any issues. However, she is waking up not feeling rested. She reports that she snores. She states that she has started to exercise for 30 minutes on the exercise bike, reports that she feels " exhausted.       Current concerns: Health Concerns        6/21/2023    11:00 AM   PHQ Assesment Total Score(s)   PHQ-9 Score 6         6/21/2023    11:00 AM   ARASH-7 Results   ARASH 7 TOTAL SCORE 6 (mild anxiety)   ARASH-7 Total Score 6         6/21/2023    11:00 AM   PTSD Screen Score   Have you ever experienced this kind of event? No         6/21/2023    11:09 AM   PROMIS-29   PROMIS Physical Function T-Score 43 (mild dysfunction)   PROMIS Anxiety T-Score 61 (moderate)   PROMIS Depression T-Score 52 (within normal limits)   PROMIS Fatigue T-Score 72 (severe)   PROMIS Sleep Disturbance T-Score 46 (within normal limits)   PROMIS Ability to Participate in Social Roles & Activities T-Score 43 (mild dysfunction)   PROMIS Pain Interference T-Score 54 (within normal limits)   PROMIS Pain Intensity 3       Past Medical History:   Diagnosis Date     Angioedema 2019    non-specific cause     Depression with anxiety      Gestational diabetes mellitus (GDM), antepartum, gestational diabetes method of control unspecified     2020     Hypertension 2018    on medication for about 6 months in 2018     Infertility, female      Migraines      Obesity 04/18/2012     Papanicolaou smear of cervix with low grade squamous intraepithelial lesion (LGSIL) 2009    resolved     Pre-eclampsia in third trimester 2020     Ventricular septal defect (VSD) of fetus in guevara pregnancy, antepartum        Past Surgical History:   Procedure Laterality Date     BIOPSY  2010    Underarm mole,     LAPAROSCOPY DIAGNOSTIC (GYN) N/A 01/26/2018    Procedure: LAPAROSCOPY DIAGNOSTIC (GYN);;  Surgeon: Kei Kelley MD;  Location: [facility] OR     LAPAROTOMY MINI, TUBAL LIGATION (POST PARTUM), COMBINED Bilateral 3/28/2022    Procedure: LIGATION, FALLOPIAN TUBE, POSTPARTUM;  Surgeon: Orquidea Mcneil MD;  Location: [facility]     OPERATIVE HYSTEROSCOPY WITH MORCELLATOR N/A 01/26/2018    Procedure: OPERATIVE HYSTEROSCOPY WITH MORCELLATOR (MYOSURE);  Diagnostic  laparoscopy, hysteroscopy with biopsy;  Surgeon: Kei Kelley MD;  Location: [facility] OR       Family History   Problem Relation Age of Onset     Other Cancer Father         Skin Cancer     Thyroid Disease Mother      Depression Mother      Anxiety Disorder Mother      Depression Brother      Anxiety Disorder Brother      Mental Illness Brother      Thyroid Disease Maternal Grandmother      Heart Failure Maternal Grandmother      Depression Maternal Grandmother      Mental Illness Maternal Grandmother      Brain Cancer Maternal Grandfather      Other Cancer Maternal Grandfather         Brain Cancer     Lung Cancer Paternal Grandmother      Other Cancer Paternal Grandmother         Lung Cancer     Cerebrovascular Disease Paternal Grandfather      Other Cancer Paternal Grandfather         Skin Cancer     Other Cancer Cousin         Testicular Cancer     Depression Cousin         Maternal Cousin     Diabetes Other         Maternal Aunt     Hypertension Other      Hyperlipidemia Other      Other Cancer Other         Brain Cancer     Anxiety Disorder Other         Maternal Uncle     Anxiety Disorder Other         Maternal Uncle     Mental Illness Other         Paternal Uncle     Thyroid Disease Other         Maternal Aunt     Obesity Other      Glaucoma No family hx of      Macular Degeneration No family hx of        Social History     Tobacco Use     Smoking status: Former     Packs/day: 0.50     Years: 14.00     Pack years: 7.00     Types: Cigarettes     Start date: 12/30/2003     Smokeless tobacco: Never   Vaping Use     Vaping Use: Never used   Substance Use Topics     Alcohol use: Not Currently     Drug use: No         Current Outpatient Medications:      famotidine (PEPCID) 20 MG tablet, Take 1 tablet (20 mg) by mouth 2 times daily, Disp: 60 tablet, Rfl: 3     hydrOXYzine (ATARAX) 25 MG tablet, 1-2 tabs at bedtime for sleep and anxiety, Disp: 50 tablet, Rfl: 1     metFORMIN (GLUCOPHAGE XR) 500 MG 24 hr tablet, Take  1 tablet (500 mg) by mouth daily (with dinner), Disp: 90 tablet, Rfl: 3     omeprazole (PRILOSEC) 20 MG DR capsule, Take 1 capsule (20 mg) by mouth daily, Disp: 30 capsule, Rfl: 1     Semaglutide-Weight Management (WEGOVY) 0.25 MG/0.5ML pen, Inject 0.25 mg Subcutaneous once a week Increase to 0.5mg after 4 weeks if tolerating., Disp: 2 mL, Rfl: 2     spironolactone (ALDACTONE) 25 MG tablet, Take 1 tablet (25 mg) by mouth daily, Disp: 90 tablet, Rfl: 4     dexamethasone (DECADRON) 1 MG tablet, Take 2 tablets (2 mg) by mouth once for 1 dose Take at 11pm the night before coming in at 8am for labs, Disp: 1 tablet, Rfl: 0     [START ON 7/4/2023] Semaglutide-Weight Management (WEGOVY) 0.5 MG/0.5ML pen, Inject 0.5 mg Subcutaneous once a week Increase to 1mg after 4 weeks if tolerating (Patient not taking: Reported on 6/28/2023), Disp: 2 mL, Rfl: 2     [START ON 8/1/2023] Semaglutide-Weight Management (WEGOVY) 1 MG/0.5ML pen, Inject 1 mg Subcutaneous once a week (Patient not taking: Reported on 6/28/2023), Disp: 2 mL, Rfl: 11      Review of Systems   Constitutional: Negative for chills, decreased appetite, fatigue, fever, night sweats, weight gain and weight loss.   HENT: Negative for ear discharge, ear pain, hearing loss, hoarse voice, mouth sores, nosebleeds, sinus pain, sore throat, tinnitus and trouble swallowing.    Eyes: Negative for double vision, pain and redness.   Respiratory: Negative for cough, hemoptysis, sputum production, shortness of breath, wheezing and sleep disturbances due to breathing.    Cardiovascular: Positive for palpitations. Negative for chest pain, dyspnea on exertion, orthopnea and syncope.   Gastrointestinal: Negative for abdominal pain, bloating, blood in stool, bowel incontinence, constipation, diarrhea, heartburn, hemorrhoids, jaundice, melena, nausea, rectal pain and vomiting.   Endocrine: Negative for polydipsia and polyphagia.   Breasts:  Negative for breast mass and discharge.    Genitourinary: Negative for bladder incontinence, decreased libido, difficulty urinating, dyspareunia, dysuria, flank pain, genital sores, hematuria, nocturia, urgency and vaginal discharge.   Musculoskeletal: Positive for back pain, myalgias, muscle cramps and neck pain. Negative for arthralgias, joint swelling and stiffness.   Skin: Negative for itching, nail changes, poor wound healing and rash.   Neurological: Positive for tingling, weakness, light-headedness, headaches and disturbances in coordination. Negative for dizziness, tremors, speech change, seizures, loss of consciousness and numbness.   Hematological: Does not bruise/bleed easily.   Psychiatric/Behavioral: Positive for decreased concentration and memory loss. Negative for depression and hallucinations. The patient is nervous/anxious. The patient does not have insomnia.       Review of Systems     Constitutional:  Negative for fever, chills, weight loss, weight gain, fatigue, decreased appetite, night sweats, recent stressors, height gain, height loss, post-operative complications, incisional pain, hallucinations, increased energy, hyperactivity and confused.   HENT:  Negative for ear pain, hearing loss, tinnitus, nosebleeds, trouble swallowing, hoarse voice, mouth sores, sore throat, ear discharge, tooth pain, gum tenderness, taste disturbance, smell disturbance, hearing aid, bleeding gums, dry mouth, sinus pain, sinus congestion and neck mass.    Eyes:  Negative for double vision, pain, redness, eye pain, decreased vision, eye watering, eye bulging, eye dryness, flashing lights, spots, floaters, strabismus, tunnel vision, jaundice and eye irritation.   Respiratory:   Negative for cough, hemoptysis, sputum production, shortness of breath, wheezing, sleep disturbances due to breathing, snores loudly, respiratory pain, dyspnea on exertion, cough disturbing sleep and postural dyspnea.    Cardiovascular:  Positive for palpitations, hypertension, few  scattered varicosities, light-headedness and exercise intolerance. Negative for chest pain, dyspnea on exertion, orthopnea, fingers/toes turn blue, hypotension, syncope, history of heart murmur, pacemaker, leg pain, sleep disturbances due to breathing and edema.   Gastrointestinal:  Negative for heartburn, nausea, vomiting, abdominal pain, diarrhea, constipation, blood in stool, melena, rectal pain, bloating, hemorrhoids, bowel incontinence, jaundice, rectal bleeding, coffee ground emesis and change in stool.   Genitourinary:  Negative for bladder incontinence, dysuria, urgency, hematuria, flank pain, vaginal discharge, difficulty urinating, genital sores, dyspareunia, decreased libido, nocturia, voiding less frequently, arousal difficulty, abnormal vaginal bleeding, excessive menstruation, menstrual changes, hot flashes, vaginal dryness and postmenopausal bleeding.   Musculoskeletal:  Positive for myalgias, back pain, muscle cramps, neck pain and muscle weakness. Negative for joint swelling, arthralgias, stiffness, bone pain and fracture.   Skin:  Negative for nail changes, itching, poor wound healing, rash, hair changes, skin changes, acne, warts, poor wound healing, scarring, flaky skin, Raynaud's phenomenon, sensitivity to sunlight and skin thickening.   Neurological:  Positive for tingling, weakness, light-headedness, headaches, disturbances in coordination and memory loss. Negative for dizziness, tremors, speech change, seizures, loss of consciousness, numbness, difficulty walking and paralysis.   Endo/Heme:  Negative for anemia, swollen glands and bruises/bleeds easily.   Psychiatric/Behavioral:  Positive for memory loss and decreased concentration. Negative for depression, hallucinations, mood swings and panic attacks.    Breast:  Negative for breast discharge, breast mass, breast pain and nipple retraction.   Endocrine:  Negative for altered temperature regulation, polyphagia, polydipsia, unwanted hair  growth and change in facial hair.          Objective           Vitals:  No vitals were obtained today due to virtual visit.       GENERAL: Healthy, alert and no distress  EYES: Eyes grossly normal to inspection.  No discharge or erythema, or obvious scleral/conjunctival abnormalities.  RESP: No audible wheeze, cough, or visible cyanosis.  No visible retractions or increased work of breathing.    SKIN: Visible skin clear. No significant rash, abnormal pigmentation or lesions.  NEURO: Cranial nerves grossly intact.  Mentation and speech appropriate for age.  PSYCH: Mentation appears normal, affect normal/bright, judgement and insight intact, normal speech and appearance well-groomed.                Video-Visit Details    Type of service:  Video Visit   Video Start Time: 2:30 PM  Video End Time:3:10 PM    Originating Location (pt. Location): Home  Distant Location (provider location):  Off-site  Platform used for Video Visit: [employer]  Answers for HPI/ROS submitted by the patient on 6/21/2023  If you checked off any problems, how difficult have these problems made it for you to do your work, take care of things at home, or get along with other people?: Not difficult at all  PHQ9 TOTAL SCORE: 6  ARASH 7 TOTAL SCORE: 6  General Symptoms: No  Skin Symptoms: No  HENT Symptoms: No  EYE SYMPTOMS: No  HEART SYMPTOMS: Yes  LUNG SYMPTOMS: No  INTESTINAL SYMPTOMS: No  URINARY SYMPTOMS: No  GYNECOLOGIC SYMPTOMS: No  BREAST SYMPTOMS: No  SKELETAL SYMPTOMS: Yes  BLOOD SYMPTOMS: No  NERVOUS SYSTEM SYMPTOMS: Yes  MENTAL HEALTH SYMPTOMS: Yes  Pain in legs with walking: No  Trouble breathing while lying down: No  Fingers or toes appear blue: No  High blood pressure: Yes  Low blood pressure: No  Fainting: No  Murmurs: No  Pacemaker: No  Varicose veins: Yes  Edema or swelling: No  Wake up at night with shortness of breath: No  Exercise intolerance: Yes  Bone pain: No  Muscle cramps: Yes  Muscle weakness: Yes  Joint stiffness: No  Bone  fracture: No  Trouble with coordination: Yes  Fainting or black-out spells: No  Memory loss: Yes  Speech problems: No  Tingling: Yes  Difficulty walking: No  Paralysis: No  Depression: No  Trouble sleeping: No  Mood changes: No  Panic attacks: No

## 2023-06-28 NOTE — LETTER
6/28/2023       RE: Halima Razo  [address] [address]     Dear Colleague,    Thank you for referring your patient, Halima Razo, to the [facility] PHYSICAL MEDICINE AND REHABILITATION CLIN[facility] [place] at [facility]. Please see a copy of my visit note below.    Fam is a 38 year old who is being evaluated via a billable video visit.      How would you like to obtain your AVS? MyChart  If the video visit is dropped, the invitation should be resent by: Text to cell phone: [phone]  Will anyone else be joining your video visit? No        Assessment & Plan         Post-COVID chronic palpitations  Discussed possible causes of rapid heartbeat following COVID-19.  Recommend further evaluation.  Echocardiogram as well as leadless EKG monitor were ordered today.  Referral to cardiology was placed.  Patient was advised that tachycardia is not uncommon following COVID-19 and typically improves over time.  - Adult Cardiology Eval  Referral; Future  - Echocardiogram Complete; Future  - Adult Leadless EKG Monitor 8 to 14 Days; Future    Post-COVID chronic fatigue  Discussed possible causes as well as contributing etiologies to chronic fatigue in the setting of prior COVID-19 infection.  Recommend screening for common metabolic and endocrine abnormalities.  Discussed the need for sleep evaluation.  Patient was also advised on rehab based approach to symptoms following COVID-19, recommend occupational and physical therapy management.  - TSH with free T4 reflex; Future  - CBC with platelets; Future  - Adult Sleep Eval & Management Referral; Future  - Vitamin B1 whole blood; Future  - Vitamin B6; Future  - Vitamin B12; Future  - Physical Therapy Referral; Future  - Occupational Therapy Referral; Future    Post-COVID chronic cough  Discussed possible causes of cough following COVID-19 infection with patient.  Recommend further evaluation with  "pulmonary function tests as well as 6-minute walk.  May consider additional evaluation with pulmonary medicine ordered with chest imaging based on results of pulmonary function testing.  Patient will be advised on test results accordingly.  - General PFT Lab (Please always keep checked); Future  - Pulmonary Function Test; Future  - 6 minute walk test; Future      I spent a total of 45 minutes on the day of the visit.   Time spent by me doing chart review, history and exam, documentation and further activities per the note       BMI:   Estimated body mass index is 30.29 kg/m  as calculated from the following:    Height as of 5/3/23: 1.6 m (5' 3\").    Weight as of 5/7/23: 77.6 kg (171 lb).           Return in about 3 months (around 9/28/2023).    Keila Dotson MD  [facility] PHYSICAL MEDICINE AND REHABILITATION CLINIC ANTONIETTA Otto is a 38 year old, presenting for the following health issues:  No chief complaint on file.  No chief complaint on file.    HPI     Patient was referred by her primary care provider for evaluation of post-COVID issues.     History of COVID-19 infection: Patient reports that she had COVID in March of 2023. She had cough, fatigue, chills. She also had sore throat and nasal congestion. She was treated with Paxlovid, states that her symptoms have not changed after Paxlovid. She was seen by her primary care provider about 2 weeks after the initial diagnosis. She still had nasal congestion. She was prescribed a 10 day course of doxycycline. She states that it helped with nasal congestion, but her cough did not improve. She was also seen on 5/3 and had a respiratory panel done which was negative.  Patient reports that she made some improvement in her symptoms over time. She still has cough, states that she has cough with physical activity. She is still having a lot of fatigue. She also reports that she has had elevated heart rate most of the time. She states that " she has some palpitations, however, that does not happen very often, about 3 times a week.   Patient reports that she is able to fall asleep without any issues. However, she is waking up not feeling rested. She reports that she snores. She states that she has started to exercise for 30 minutes on the exercise bike, reports that she feels exhausted.       Current concerns: Health Concerns        6/21/2023    11:00 AM   PHQ Assesment Total Score(s)   PHQ-9 Score 6         6/21/2023    11:00 AM   ARASH-7 Results   ARASH 7 TOTAL SCORE 6 (mild anxiety)   ARASH-7 Total Score 6         6/21/2023    11:00 AM   PTSD Screen Score   Have you ever experienced this kind of event? No         6/21/2023    11:09 AM   PROMIS-29   PROMIS Physical Function T-Score 43 (mild dysfunction)   PROMIS Anxiety T-Score 61 (moderate)   PROMIS Depression T-Score 52 (within normal limits)   PROMIS Fatigue T-Score 72 (severe)   PROMIS Sleep Disturbance T-Score 46 (within normal limits)   PROMIS Ability to Participate in Social Roles & Activities T-Score 43 (mild dysfunction)   PROMIS Pain Interference T-Score 54 (within normal limits)   PROMIS Pain Intensity 3       Past Medical History:   Diagnosis Date    Angioedema 2019    non-specific cause    Depression with anxiety     Gestational diabetes mellitus (GDM), antepartum, gestational diabetes method of control unspecified     2020    Hypertension 2018    on medication for about 6 months in 2018    Infertility, female     Migraines     Obesity 04/18/2012    Papanicolaou smear of cervix with low grade squamous intraepithelial lesion (LGSIL) 2009    resolved    Pre-eclampsia in third trimester 2020    Ventricular septal defect (VSD) of fetus in guevara pregnancy, antepartum        Past Surgical History:   Procedure Laterality Date    BIOPSY  2010    Underarm mole,    LAPAROSCOPY DIAGNOSTIC (GYN) N/A 01/26/2018    Procedure: LAPAROSCOPY DIAGNOSTIC (GYN);;  Surgeon: Kei Kelley MD;  Location:  OR     LAPAROTOMY MINI, TUBAL LIGATION (POST PARTUM), COMBINED Bilateral 3/28/2022    Procedure: LIGATION, FALLOPIAN TUBE, POSTPARTUM;  Surgeon: Orquidea Mcneil MD;  Location: [facility] OR    OPERATIVE HYSTEROSCOPY WITH MORCELLATOR N/A 01/26/2018    Procedure: OPERATIVE HYSTEROSCOPY WITH MORCELLATOR (MYOSURE);  Diagnostic laparoscopy, hysteroscopy with biopsy;  Surgeon: Kei Kelley MD;  Location: [facility] OR       Family History   Problem Relation Age of Onset    Other Cancer Father         Skin Cancer    Thyroid Disease Mother     Depression Mother     Anxiety Disorder Mother     Depression Brother     Anxiety Disorder Brother     Mental Illness Brother     Thyroid Disease Maternal Grandmother     Heart Failure Maternal Grandmother     Depression Maternal Grandmother     Mental Illness Maternal Grandmother     Brain Cancer Maternal Grandfather     Other Cancer Maternal Grandfather         Brain Cancer    Lung Cancer Paternal Grandmother     Other Cancer Paternal Grandmother         Lung Cancer    Cerebrovascular Disease Paternal Grandfather     Other Cancer Paternal Grandfather         Skin Cancer    Other Cancer Cousin         Testicular Cancer    Depression Cousin         Maternal Cousin    Diabetes Other         Maternal Aunt    Hypertension Other     Hyperlipidemia Other     Other Cancer Other         Brain Cancer    Anxiety Disorder Other         Maternal Uncle    Anxiety Disorder Other         Maternal Uncle    Mental Illness Other         Paternal Uncle    Thyroid Disease Other         Maternal Aunt    Obesity Other     Glaucoma No family hx of     Macular Degeneration No family hx of        Social History     Tobacco Use    Smoking status: Former     Packs/day: 0.50     Years: 14.00     Pack years: 7.00     Types: Cigarettes     Start date: 12/30/2003    Smokeless tobacco: Never   Vaping Use    Vaping Use: Never used   Substance Use Topics    Alcohol use: Not Currently    Drug use: No         Current  Outpatient Medications:     famotidine (PEPCID) 20 MG tablet, Take 1 tablet (20 mg) by mouth 2 times daily, Disp: 60 tablet, Rfl: 3    hydrOXYzine (ATARAX) 25 MG tablet, 1-2 tabs at bedtime for sleep and anxiety, Disp: 50 tablet, Rfl: 1    metFORMIN (GLUCOPHAGE XR) 500 MG 24 hr tablet, Take 1 tablet (500 mg) by mouth daily (with dinner), Disp: 90 tablet, Rfl: 3    omeprazole (PRILOSEC) 20 MG DR capsule, Take 1 capsule (20 mg) by mouth daily, Disp: 30 capsule, Rfl: 1    Semaglutide-Weight Management (WEGOVY) 0.25 MG/0.5ML pen, Inject 0.25 mg Subcutaneous once a week Increase to 0.5mg after 4 weeks if tolerating., Disp: 2 mL, Rfl: 2    spironolactone (ALDACTONE) 25 MG tablet, Take 1 tablet (25 mg) by mouth daily, Disp: 90 tablet, Rfl: 4    dexamethasone (DECADRON) 1 MG tablet, Take 2 tablets (2 mg) by mouth once for 1 dose Take at 11pm the night before coming in at 8am for labs, Disp: 1 tablet, Rfl: 0    [START ON 7/4/2023] Semaglutide-Weight Management (WEGOVY) 0.5 MG/0.5ML pen, Inject 0.5 mg Subcutaneous once a week Increase to 1mg after 4 weeks if tolerating (Patient not taking: Reported on 6/28/2023), Disp: 2 mL, Rfl: 2    [START ON 8/1/2023] Semaglutide-Weight Management (WEGOVY) 1 MG/0.5ML pen, Inject 1 mg Subcutaneous once a week (Patient not taking: Reported on 6/28/2023), Disp: 2 mL, Rfl: 11      Review of Systems   Constitutional: Negative for chills, decreased appetite, fatigue, fever, night sweats, weight gain and weight loss.   HENT: Negative for ear discharge, ear pain, hearing loss, hoarse voice, mouth sores, nosebleeds, sinus pain, sore throat, tinnitus and trouble swallowing.    Eyes: Negative for double vision, pain and redness.   Respiratory: Negative for cough, hemoptysis, sputum production, shortness of breath, wheezing and sleep disturbances due to breathing.    Cardiovascular: Positive for palpitations. Negative for chest pain, dyspnea on exertion, orthopnea and syncope.   Gastrointestinal:  Negative for abdominal pain, bloating, blood in stool, bowel incontinence, constipation, diarrhea, heartburn, hemorrhoids, jaundice, melena, nausea, rectal pain and vomiting.   Endocrine: Negative for polydipsia and polyphagia.   Breasts:  Negative for breast mass and discharge.   Genitourinary: Negative for bladder incontinence, decreased libido, difficulty urinating, dyspareunia, dysuria, flank pain, genital sores, hematuria, nocturia, urgency and vaginal discharge.   Musculoskeletal: Positive for back pain, myalgias, muscle cramps and neck pain. Negative for arthralgias, joint swelling and stiffness.   Skin: Negative for itching, nail changes, poor wound healing and rash.   Neurological: Positive for tingling, weakness, light-headedness, headaches and disturbances in coordination. Negative for dizziness, tremors, speech change, seizures, loss of consciousness and numbness.   Hematological: Does not bruise/bleed easily.   Psychiatric/Behavioral: Positive for decreased concentration and memory loss. Negative for depression and hallucinations. The patient is nervous/anxious. The patient does not have insomnia.       Review of Systems     Constitutional:  Negative for fever, chills, weight loss, weight gain, fatigue, decreased appetite, night sweats, recent stressors, height gain, height loss, post-operative complications, incisional pain, hallucinations, increased energy, hyperactivity and confused.   HENT:  Negative for ear pain, hearing loss, tinnitus, nosebleeds, trouble swallowing, hoarse voice, mouth sores, sore throat, ear discharge, tooth pain, gum tenderness, taste disturbance, smell disturbance, hearing aid, bleeding gums, dry mouth, sinus pain, sinus congestion and neck mass.    Eyes:  Negative for double vision, pain, redness, eye pain, decreased vision, eye watering, eye bulging, eye dryness, flashing lights, spots, floaters, strabismus, tunnel vision, jaundice and eye irritation.   Respiratory:    Negative for cough, hemoptysis, sputum production, shortness of breath, wheezing, sleep disturbances due to breathing, snores loudly, respiratory pain, dyspnea on exertion, cough disturbing sleep and postural dyspnea.    Cardiovascular:  Positive for palpitations, hypertension, few scattered varicosities, light-headedness and exercise intolerance. Negative for chest pain, dyspnea on exertion, orthopnea, fingers/toes turn blue, hypotension, syncope, history of heart murmur, pacemaker, leg pain, sleep disturbances due to breathing and edema.   Gastrointestinal:  Negative for heartburn, nausea, vomiting, abdominal pain, diarrhea, constipation, blood in stool, melena, rectal pain, bloating, hemorrhoids, bowel incontinence, jaundice, rectal bleeding, coffee ground emesis and change in stool.   Genitourinary:  Negative for bladder incontinence, dysuria, urgency, hematuria, flank pain, vaginal discharge, difficulty urinating, genital sores, dyspareunia, decreased libido, nocturia, voiding less frequently, arousal difficulty, abnormal vaginal bleeding, excessive menstruation, menstrual changes, hot flashes, vaginal dryness and postmenopausal bleeding.   Musculoskeletal:  Positive for myalgias, back pain, muscle cramps, neck pain and muscle weakness. Negative for joint swelling, arthralgias, stiffness, bone pain and fracture.   Skin:  Negative for nail changes, itching, poor wound healing, rash, hair changes, skin changes, acne, warts, poor wound healing, scarring, flaky skin, Raynaud's phenomenon, sensitivity to sunlight and skin thickening.   Neurological:  Positive for tingling, weakness, light-headedness, headaches, disturbances in coordination and memory loss. Negative for dizziness, tremors, speech change, seizures, loss of consciousness, numbness, difficulty walking and paralysis.   Endo/Heme:  Negative for anemia, swollen glands and bruises/bleeds easily.   Psychiatric/Behavioral:  Positive for memory loss and  decreased concentration. Negative for depression, hallucinations, mood swings and panic attacks.    Breast:  Negative for breast discharge, breast mass, breast pain and nipple retraction.   Endocrine:  Negative for altered temperature regulation, polyphagia, polydipsia, unwanted hair growth and change in facial hair.         Objective           Vitals:  No vitals were obtained today due to virtual visit.       GENERAL: Healthy, alert and no distress  EYES: Eyes grossly normal to inspection.  No discharge or erythema, or obvious scleral/conjunctival abnormalities.  RESP: No audible wheeze, cough, or visible cyanosis.  No visible retractions or increased work of breathing.    SKIN: Visible skin clear. No significant rash, abnormal pigmentation or lesions.  NEURO: Cranial nerves grossly intact.  Mentation and speech appropriate for age.  PSYCH: Mentation appears normal, affect normal/bright, judgement and insight intact, normal speech and appearance well-groomed.               Video-Visit Details    Type of service:  Video Visit   Video Start Time: 2:30 PM  Video End Time:3:10 PM    Originating Location (pt. Location): Home  Distant Location (provider location):  Off-site  Platform used for Video Visit: CloudMade  Answers for HPI/ROS submitted by the patient on 6/21/2023  If you checked off any problems, how difficult have these problems made it for you to do your work, take care of things at home, or get along with other people?: Not difficult at all  PHQ9 TOTAL SCORE: 6  ARASH 7 TOTAL SCORE: 6  General Symptoms: No  Skin Symptoms: No  HENT Symptoms: No  EYE SYMPTOMS: No  HEART SYMPTOMS: Yes  LUNG SYMPTOMS: No  INTESTINAL SYMPTOMS: No  URINARY SYMPTOMS: No  GYNECOLOGIC SYMPTOMS: No  BREAST SYMPTOMS: No  SKELETAL SYMPTOMS: Yes  BLOOD SYMPTOMS: No  NERVOUS SYSTEM SYMPTOMS: Yes  MENTAL HEALTH SYMPTOMS: Yes  Pain in legs with walking: No  Trouble breathing while lying down: No  Fingers or toes appear blue: No  High blood  pressure: Yes  Low blood pressure: No  Fainting: No  Murmurs: No  Pacemaker: No  Varicose veins: Yes  Edema or swelling: No  Wake up at night with shortness of breath: No  Exercise intolerance: Yes  Bone pain: No  Muscle cramps: Yes  Muscle weakness: Yes  Joint stiffness: No  Bone fracture: No  Trouble with coordination: Yes  Fainting or black-out spells: No  Memory loss: Yes  Speech problems: No  Tingling: Yes  Difficulty walking: No  Paralysis: No  Depression: No  Trouble sleeping: No  Mood changes: No  Panic attacks: No          Again, thank you for allowing me to participate in the care of your patient.      Sincerely,    Keila Dotson MD

## 2023-06-28 NOTE — NURSING NOTE
Is the patient currently in the state of [place]? YES    Visit mode:VIDEO    If the visit is dropped, the patient can be reconnected by: VIDEO VISIT: Text to cell phone: [phone]    Will anyone else be joining the visit? NO      How would you like to obtain your AVS? MyChart    Are changes needed to the allergy or medication list? Yes - Pt states she takes a multi vitamin and a collagen hair, skin and nail vitamin.     Pt states she starts the Wegovy dosage 0.5mg next week, she just finished the 0.25  dose next week.    Reason for visit: Consult    Pt has chronic muscle aches.     Lorrie Pozo on 6/28/2023 at 2:22 PM

## 2023-06-28 NOTE — PATIENT INSTRUCTIONS
Patient resources:    Fatigue management: https://www.archives-pmr.org/action/showPdf?uhj=-1695%2819%0704127-3  Self-care guide (fibromyalgia): [email or web address].[email or web address]/pain-[email or web address]/self-care/  Recovery WHO:  https://apps.who.int/iris/bitstream/handle/01625/867387/VQD-VYSQ-5051-588-05151-46192-eng.pdf

## 2023-06-29 ASSESSMENT — ENCOUNTER SYMPTOMS
BACK PAIN: 1
NIGHT SWEATS: 0
POSTURAL DYSPNEA: 0
SHORTNESS OF BREATH: 0
ABDOMINAL PAIN: 0
NECK PAIN: 1
DIZZINESS: 0
HEMATURIA: 0
PALPITATIONS: 1
DYSURIA: 0
JAUNDICE: 0
DYSPNEA ON EXERTION: 0
POLYDIPSIA: 0
DECREASED APPETITE: 0
DISTURBANCES IN COORDINATION: 1
CHILLS: 0
BREAST MASS: 0
HEMOPTYSIS: 0
RECTAL BLEEDING: 0
INSOMNIA: 0
LIGHT-HEADEDNESS: 1
ARTHRALGIAS: 0
HYPERTENSION: 1
SLEEP DISTURBANCES DUE TO BREATHING: 0
SKIN CHANGES: 0
BLOATING: 0
WEAKNESS: 1
HEADACHES: 1
TROUBLE SWALLOWING: 0
FATIGUE: 0
DECREASED CONCENTRATION: 1
SORE THROAT: 0
NUMBNESS: 0
FLANK PAIN: 0
ALTERED TEMPERATURE REGULATION: 0
EYE IRRITATION: 0
BREAST PAIN: 0
SINUS PAIN: 0
SWOLLEN GLANDS: 0
EYE PAIN: 0
SYNCOPE: 0
NAUSEA: 0
MEMORY LOSS: 1
SEIZURES: 0
HYPOTENSION: 0
NECK MASS: 0
DIFFICULTY URINATING: 0
MUSCLE CRAMPS: 1
HEARTBURN: 0
DECREASED LIBIDO: 0
RECTAL PAIN: 0
EXERCISE INTOLERANCE: 1
SPEECH CHANGE: 0
PARALYSIS: 0
SMELL DISTURBANCE: 0
TASTE DISTURBANCE: 0
SINUS CONGESTION: 0
HOARSE VOICE: 0
CONSTIPATION: 0
HOT FLASHES: 0
DIARRHEA: 0
WEIGHT LOSS: 0
VOMITING: 0
LEG PAIN: 0
SNORES LOUDLY: 0
WEIGHT GAIN: 0
LOSS OF CONSCIOUSNESS: 0
DOUBLE VISION: 0
JOINT SWELLING: 0
TINGLING: 1
SPUTUM PRODUCTION: 0
RESPIRATORY PAIN: 0
POOR WOUND HEALING: 0
INCREASED ENERGY: 0
PANIC: 0
BOWEL INCONTINENCE: 0
BLOOD IN STOOL: 0
EYE WATERING: 0
EYE REDNESS: 0
COUGH DISTURBING SLEEP: 0
TREMORS: 0
NERVOUS/ANXIOUS: 1
BRUISES/BLEEDS EASILY: 0
STIFFNESS: 0
DEPRESSION: 0
NAIL CHANGES: 0
ORTHOPNEA: 0
MYALGIAS: 1
COUGH: 0
MUSCLE WEAKNESS: 1
HALLUCINATIONS: 0
POLYPHAGIA: 0
FEVER: 0
WHEEZING: 0

## 2023-07-02 ENCOUNTER — LAB (OUTPATIENT)
Dept: LAB | Facility: CLINIC | Age: 38
End: 2023-07-02
Payer: COMMERCIAL

## 2023-07-02 DIAGNOSIS — G93.32 POST-COVID CHRONIC FATIGUE: ICD-10-CM

## 2023-07-02 DIAGNOSIS — U09.9 POST-COVID CHRONIC FATIGUE: ICD-10-CM

## 2023-07-02 LAB
ERYTHROCYTE [DISTWIDTH] IN BLOOD BY AUTOMATED COUNT: 12.8 % (ref 10–15)
HCT VFR BLD AUTO: 42.8 % (ref 35–47)
HGB BLD-MCNC: 14.4 G/DL (ref 11.7–15.7)
MCH RBC QN AUTO: 27.5 PG (ref 26.5–33)
MCHC RBC AUTO-ENTMCNC: 33.6 G/DL (ref 31.5–36.5)
MCV RBC AUTO: 82 FL (ref 78–100)
PLATELET # BLD AUTO: 274 10E3/UL (ref 150–450)
RBC # BLD AUTO: 5.23 10E6/UL (ref 3.8–5.2)
TSH SERPL DL<=0.005 MIU/L-ACNC: 1.43 UIU/ML (ref 0.3–4.2)
VIT B12 SERPL-MCNC: 338 PG/ML (ref 232–1245)
WBC # BLD AUTO: 5.3 10E3/UL (ref 4–11)

## 2023-07-02 PROCEDURE — 99000 SPECIMEN HANDLING OFFICE-LAB: CPT

## 2023-07-02 PROCEDURE — 82607 VITAMIN B-12: CPT

## 2023-07-02 PROCEDURE — 85027 COMPLETE CBC AUTOMATED: CPT

## 2023-07-02 PROCEDURE — 84443 ASSAY THYROID STIM HORMONE: CPT

## 2023-07-02 PROCEDURE — 84425 ASSAY OF VITAMIN B-1: CPT | Mod: 90

## 2023-07-02 PROCEDURE — 36415 COLL VENOUS BLD VENIPUNCTURE: CPT

## 2023-07-02 PROCEDURE — 84207 ASSAY OF VITAMIN B-6: CPT | Mod: 90

## 2023-07-04 LAB — PYRIDOXAL PHOS SERPL-SCNC: 159.8 NMOL/L

## 2023-07-05 ENCOUNTER — MYC MEDICAL ADVICE (OUTPATIENT)
Dept: FAMILY MEDICINE | Facility: CLINIC | Age: 38
End: 2023-07-05
Payer: COMMERCIAL

## 2023-07-05 DIAGNOSIS — K21.00 GASTROESOPHAGEAL REFLUX DISEASE WITH ESOPHAGITIS, UNSPECIFIED WHETHER HEMORRHAGE: ICD-10-CM

## 2023-07-05 LAB — VIT B1 PYROPHOSHATE BLD-SCNC: 159 NMOL/L

## 2023-07-06 NOTE — TELEPHONE ENCOUNTER
Dr. Wilkinson:    Patient requesting Omeprazole refill, this was placed by [facility] provider BARBARA Ortiz NP. Have the medication cued up, please review and sign script, thank you.      COLLINS Vasquez

## 2023-07-12 RX ORDER — SPIRONOLACTONE 25 MG/1
25 TABLET ORAL 2 TIMES DAILY
Qty: 180 TABLET | Refills: 4 | Status: SHIPPED | OUTPATIENT
Start: 2023-07-12

## 2023-07-12 RX ORDER — METFORMIN HCL 500 MG
500 TABLET, EXTENDED RELEASE 24 HR ORAL 2 TIMES DAILY WITH MEALS
Qty: 180 TABLET | Refills: 4 | Status: SHIPPED | OUTPATIENT
Start: 2023-07-12

## 2023-07-13 NOTE — TELEPHONE ENCOUNTER
New Rx's sent yesterday to reflect dosing changes.  Note complete.  Sandra Lynn RN on 7/13/2023 at 8:02 AM

## 2023-08-01 ENCOUNTER — VIRTUAL VISIT (OUTPATIENT)
Dept: ENDOCRINOLOGY | Facility: CLINIC | Age: 38
End: 2023-08-01
Payer: COMMERCIAL

## 2023-08-01 VITALS — WEIGHT: 155 LBS | BODY MASS INDEX: 27.46 KG/M2 | HEIGHT: 63 IN

## 2023-08-01 DIAGNOSIS — E88.819 INSULIN RESISTANCE: ICD-10-CM

## 2023-08-01 DIAGNOSIS — E66.811 CLASS 1 OBESITY WITH SERIOUS COMORBIDITY AND BODY MASS INDEX (BMI) OF 30.0 TO 30.9 IN ADULT, UNSPECIFIED OBESITY TYPE: Primary | ICD-10-CM

## 2023-08-01 PROCEDURE — 99214 OFFICE O/P EST MOD 30 MIN: CPT | Mod: VID | Performed by: INTERNAL MEDICINE

## 2023-08-01 ASSESSMENT — PAIN SCALES - GENERAL: PAINLEVEL: NO PAIN (0)

## 2023-08-01 NOTE — LETTER
8/1/2023         RE: Halima Razo  [address] [facility] [address]        Dear Colleague,    Thank you for referring your patient, Halima Razo, to the [facility] ENDOCRINOLOGY. Please see a copy of my visit note below.    Halima Razo is a 38 year old who is being evaluated via a billable video visit.      How would you like to obtain your AVS? MyChart  If the video visit is dropped, the invitation should be resent by: Text to cell phone: [phone]  Will anyone else be joining your video visit? No  {If patient encounters technical issues they should call [phone]    Endocrine Consult Video visit note    Attending Assessment/Plan :        Class 1 obesity with serious comorbidity and body mass index (BMI) of 30.0 to 30.9 in adult, unspecified obesity type  Insulin resistance    Assessment:  -likely has PCOS based on hx of irregular menstrual periods and hx of elevated testosterone levels.  Do not need to have identified ovarian cysts if those other criteria have been met.  Of note, periods have normalized and last testosterone level was normal so PCOS may not be causing issues at the moment.  As such, OCPs would not have much benefit until/unless cycle irregularities come back  -tolerating wegovy well and having weight loss success    Plan:  -continue to titrate wegovy monthly (or longer if needed for tolerability).  Will order 1.7mg and 2.4mg doses.    -continue lifestyle modifications to additional aid in weight loss  -continue spironolactone at current dose but hold for any low BP  -if not noting much improvement in facial hair growth after a few months, can stop spironolactone as her testosterone wasn't elevated so may not be of a lot of benefit.  Should stop izzy for any hypotension.     I have independently reviewed and interpreted labs, imaging as indicated.     RTC 4 months    Chief complaint:  Halima is a 38 year old female seen for PCOS, obesity.     HISTORY OF  PRESENT ILLNESS    Fam is a 38F referred to endocrine for PCOS. She comes to clinic today for followup after starting wegovy for weight loss and spironolactone for androgenic hair growth.      Initial HPI:  She reports having a pelvic ultrasound with no ovarian cysts seen but has met the other 2 diagnostic criteria for PCOS with elevated testosterone levels in the past and hx of irregular menstrual cycles.  Testosterone levels and cycles normalized after the birth of her son in 2022 but previously were abnormal.   She also reports facial hair growth that requires daily shaving.      Had tubal ligation after son was born so no plans for future pregnancies.      She is currently on metformin XR 500mg once daily to help with PCOS sx.  No GI issues.     She also wanted to discuss weight gain and possible cushings today.  She endorses hair thinning, depression/anxiety, weight gain especially central.  Does endorse striae but not necessarily thick and they have faded.  No marked appetite change.  No diabetes.     She would be interested in trying wegovy to help with weight loss efforts.  She has been implementing diet and lifestyle/exercise modifications for at least the last 3 months and has not been able to meet weight loss goals with that alone.  No hx of pancreatitis.  No known family hx of MTC.     Today:  Tolerating wegovy well.  States that she didn't notice a ton of appetite suppression at first on starting doses but did start to notice effects on hunger cravings and unintentional overeating.  Started 1mg dose this week and is now noting some appetite suppression.  Denies GI sx, worsened GERD, nausea, or vomiting. Initial weight pre-wegovy 172, current down to 155      Regarding spironolactone, she did notice an effect on BP.  Typically down to low 100s systolic now.  Did have one episode with some soft BP and was mildly sx so she held a dose of izzy which resolved issue.  Back on BID and tolerating well.  Maria  have been normal.  Discussed improvements in BP with weight loss and how she may need to stop izzy if any low BP.  She also hasn't yet noticed a ton of change in androgenic hair issues (testosterone wasn't elevated so this was started as a trial).    Metformin increased after last visit due to some mildly elevated fasting sugar on labs - tolerating this well.      Endocrine relevant labs and imaging are as follows:  Component      Latest Ref Rng 6/28/2015  10:46 AM 3/27/2022  8:49 AM 2/24/2023  9:52 AM   Testosterone Total      8 - 60 ng/dL 44   17    Sex Hormone Binding Globulin      30 - 135 nmol/L 30   33    Free Testosterone Calculated      ng/dL 0.83 (H)   0.30    Creatinine      0.52 - 1.04 mg/dL  0.46 (L)     GFR Estimate      >60 mL/min/1.73m2  >90     Hemoglobin A1C      0.0 - 5.6 %   5.2    DHEA Sulfate      35 - 430 ug/dL   99    Insulin      2.6 - 24.9 uU/mL   33.8 (H)       Component      Latest Ref Rng 2/24/2023  9:52 AM   Glucose      70 - 99 mg/dL 106 (H)         REVIEW OF SYSTEMS    10 system ROS otherwise as per the HPI or negative    Past Medical History  Past Medical History:   Diagnosis Date     Angioedema 2019    non-specific cause     Depression with anxiety      Gestational diabetes mellitus (GDM), antepartum, gestational diabetes method of control unspecified     2020     Hypertension 2018    on medication for about 6 months in 2018     Infertility, female      Migraines      Obesity 04/18/2012     Papanicolaou smear of cervix with low grade squamous intraepithelial lesion (LGSIL) 2009    resolved     Pre-eclampsia in third trimester 2020     Ventricular septal defect (VSD) of fetus in guevara pregnancy, antepartum        Medications  Current Outpatient Medications   Medication Sig Dispense Refill     famotidine (PEPCID) 20 MG tablet Take 1 tablet (20 mg) by mouth 2 times daily 60 tablet 3     hydrOXYzine (ATARAX) 25 MG tablet 1-2 tabs at bedtime for sleep and anxiety 50 tablet 1      metFORMIN (GLUCOPHAGE XR) 500 MG 24 hr tablet Take 1 tablet (500 mg) by mouth 2 times daily (with meals) 180 tablet 4     omeprazole (PRILOSEC) 20 MG DR capsule Take 1 capsule (20 mg) by mouth daily 30 capsule 1     Semaglutide-Weight Management (WEGOVY) 0.25 MG/0.5ML pen Inject 0.25 mg Subcutaneous once a week Increase to 0.5mg after 4 weeks if tolerating. 2 mL 2     Semaglutide-Weight Management (WEGOVY) 1 MG/0.5ML pen Inject 1 mg Subcutaneous once a week 2 mL 11     [START ON 2023] Semaglutide-Weight Management (WEGOVY) 1.7 MG/0.75ML pen Inject 1.7 mg Subcutaneous once a week 3 mL 4     [START ON 2023] Semaglutide-Weight Management (WEGOVY) 2.4 MG/0.75ML pen Inject 2.4 mg Subcutaneous once a week 3 mL 11     spironolactone (ALDACTONE) 25 MG tablet Take 1 tablet (25 mg) by mouth 2 times daily 180 tablet 4       Allergies  No Known Allergies    Family History  family history includes Anxiety Disorder in her brother, mother, and other family members; Brain Cancer in her maternal grandfather; Cerebrovascular Disease in her paternal grandfather; Depression in her brother, cousin, maternal grandmother, and mother; Diabetes in an other family member; Heart Failure in her maternal grandmother; Hyperlipidemia in an other family member; Hypertension in an other family member; Lung Cancer in her paternal grandmother; Mental Illness in her brother, maternal grandmother, and another family member; Obesity in an other family member; Other Cancer in her cousin, father, maternal grandfather, paternal grandfather, paternal grandmother, and another family member; Thyroid Disease in her maternal grandmother, mother, and another family member.    Social History  Social History     Tobacco Use     Smoking status: Former     Packs/day: 0.50     Years: 14.00     Pack years: 7.00     Types: Cigarettes     Start date: 2003     Quit date: 2022     Years since quittin.6     Smokeless tobacco: Never   Vaping Use      "Vaping Use: Never used   Substance Use Topics     Alcohol use: Not Currently     Drug use: No       Physical Exam  Body mass index is 27.46 kg/m .   Pt reports current weight 171, height 5'3\" giving BMI of 30.1  GENERAL: no distress  SKIN: Visible skin clear. No significant rash, abnormal pigmentation or lesions.  EYES: Eyes grossly normal to inspection.  No discharge or erythema, or obvious scleral/conjunctival abnormalities.  NECK: visible goiter is not present; no visible neck masses  RESP: No audible wheeze, cough, or visible cyanosis.  No visible retractions or increased work of breathing.    NEURO: Awake, alert, responds appropriately to questions.  Mentation and speech fluent.  PSYCH:affect normal and appearance well-groomed.    Video-Visit Details    Type of service:  Video Visit    Video Start Time (time video started): 0800    Video End Time (time video stopped): 0843    Originating Location (pt. Location): Home    Distant Location (provider location):  Off-site    Mode of Communication:  Video Conference via [employer]    Physician has received verbal consent for a Video Visit from the patient? Yes    I spent a total of 32 minutes on the day of this established patient visit on chart review, lab review, coordinating care, exam and counseling of patient           Answers for HPI/ROS submitted by the patient on 6/5/2023  General Symptoms: No  Skin Symptoms: No  HENT Symptoms: No  EYE SYMPTOMS: No  HEART SYMPTOMS: No  LUNG SYMPTOMS: Yes  INTESTINAL SYMPTOMS: No  URINARY SYMPTOMS: No  GYNECOLOGIC SYMPTOMS: No  BREAST SYMPTOMS: No  SKELETAL SYMPTOMS: No  BLOOD SYMPTOMS: No  NERVOUS SYSTEM SYMPTOMS: No  MENTAL HEALTH SYMPTOMS: Yes  Cough: Yes  Sputum or phlegm: Yes  Coughing up blood: No  Difficulty breating or shortness of breath: No  Snoring: Yes  Wheezing: No  Difficulty breathing on exertion: No  Nighttime Cough: No  Difficulty breathing when lying flat: No  Nervous or Anxious: Yes  Depression: " Yes  Trouble sleeping: No  Trouble thinking or concentrating: Yes  Mood changes: Yes  Panic attacks: No  Answers submitted by the patient for this visit:  Symptoms you have experienced in the last 30 days (Submitted on 7/25/2023)  General Symptoms: No  Skin Symptoms: No  HENT Symptoms: No  EYE SYMPTOMS: No  HEART SYMPTOMS: No  LUNG SYMPTOMS: No  INTESTINAL SYMPTOMS: No  URINARY SYMPTOMS: No  GYNECOLOGIC SYMPTOMS: No  BREAST SYMPTOMS: No  SKELETAL SYMPTOMS: No  BLOOD SYMPTOMS: No  NERVOUS SYSTEM SYMPTOMS: No  MENTAL HEALTH SYMPTOMS: No      Again, thank you for allowing me to participate in the care of your patient.        Sincerely,        Phogn Garcia MD

## 2023-08-01 NOTE — PROGRESS NOTES
Halima Razo is a 38 year old who is being evaluated via a billable video visit.      How would you like to obtain your AVS? MyChart  If the video visit is dropped, the invitation should be resent by: Text to cell phone: [phone]  Will anyone else be joining your video visit? No  {If patient encounters technical issues they should call [phone]    Endocrine Consult Video visit note    Attending Assessment/Plan :        Class 1 obesity with serious comorbidity and body mass index (BMI) of 30.0 to 30.9 in adult, unspecified obesity type  Insulin resistance    Assessment:  -likely has PCOS based on hx of irregular menstrual periods and hx of elevated testosterone levels.  Do not need to have identified ovarian cysts if those other criteria have been met.  Of note, periods have normalized and last testosterone level was normal so PCOS may not be causing issues at the moment.  As such, OCPs would not have much benefit until/unless cycle irregularities come back  -tolerating wegovy well and having weight loss success    Plan:  -continue to titrate wegovy monthly (or longer if needed for tolerability).  Will order 1.7mg and 2.4mg doses.    -continue lifestyle modifications to additional aid in weight loss  -continue spironolactone at current dose but hold for any low BP  -if not noting much improvement in facial hair growth after a few months, can stop spironolactone as her testosterone wasn't elevated so may not be of a lot of benefit.  Should stop izzy for any hypotension.     I have independently reviewed and interpreted labs, imaging as indicated.     RTC 4 months    Chief complaint:  Halima is a 38 year old female seen for PCOS, obesity.     HISTORY OF PRESENT ILLNESS    Fam is a 38F referred to endocrine for PCOS. She comes to clinic today for followup after starting wegovy for weight loss and spironolactone for androgenic hair growth.      Initial HPI:  She reports having a pelvic ultrasound with no  ovarian cysts seen but has met the other 2 diagnostic criteria for PCOS with elevated testosterone levels in the past and hx of irregular menstrual cycles.  Testosterone levels and cycles normalized after the birth of her son in 2022 but previously were abnormal.   She also reports facial hair growth that requires daily shaving.      Had tubal ligation after son was born so no plans for future pregnancies.      She is currently on metformin XR 500mg once daily to help with PCOS sx.  No GI issues.     She also wanted to discuss weight gain and possible cushings today.  She endorses hair thinning, depression/anxiety, weight gain especially central.  Does endorse striae but not necessarily thick and they have faded.  No marked appetite change.  No diabetes.     She would be interested in trying wegovy to help with weight loss efforts.  She has been implementing diet and lifestyle/exercise modifications for at least the last 3 months and has not been able to meet weight loss goals with that alone.  No hx of pancreatitis.  No known family hx of MTC.     Today:  Tolerating wegovy well.  States that she didn't notice a ton of appetite suppression at first on starting doses but did start to notice effects on hunger cravings and unintentional overeating.  Started 1mg dose this week and is now noting some appetite suppression.  Denies GI sx, worsened GERD, nausea, or vomiting. Initial weight pre-wegovy 172, current down to 155      Regarding spironolactone, she did notice an effect on BP.  Typically down to low 100s systolic now.  Did have one episode with some soft BP and was mildly sx so she held a dose of izzy which resolved issue.  Back on BID and tolerating well.  Lytes have been normal.  Discussed improvements in BP with weight loss and how she may need to stop izzy if any low BP.  She also hasn't yet noticed a ton of change in androgenic hair issues (testosterone wasn't elevated so this was started as a  trial).    Metformin increased after last visit due to some mildly elevated fasting sugar on labs - tolerating this well.      Endocrine relevant labs and imaging are as follows:  Component      Latest Ref Rng 6/28/2015  10:46 AM 3/27/2022  8:49 AM 2/24/2023  9:52 AM   Testosterone Total      8 - 60 ng/dL 44   17    Sex Hormone Binding Globulin      30 - 135 nmol/L 30   33    Free Testosterone Calculated      ng/dL 0.83 (H)   0.30    Creatinine      0.52 - 1.04 mg/dL  0.46 (L)     GFR Estimate      >60 mL/min/1.73m2  >90     Hemoglobin A1C      0.0 - 5.6 %   5.2    DHEA Sulfate      35 - 430 ug/dL   99    Insulin      2.6 - 24.9 uU/mL   33.8 (H)       Component      Latest Ref Rng 2/24/2023  9:52 AM   Glucose      70 - 99 mg/dL 106 (H)         REVIEW OF SYSTEMS    10 system ROS otherwise as per the HPI or negative    Past Medical History  Past Medical History:   Diagnosis Date    Angioedema 2019    non-specific cause    Depression with anxiety     Gestational diabetes mellitus (GDM), antepartum, gestational diabetes method of control unspecified     2020    Hypertension 2018    on medication for about 6 months in 2018    Infertility, female     Migraines     Obesity 04/18/2012    Papanicolaou smear of cervix with low grade squamous intraepithelial lesion (LGSIL) 2009    resolved    Pre-eclampsia in third trimester 2020    Ventricular septal defect (VSD) of fetus in guevara pregnancy, antepartum        Medications  Current Outpatient Medications   Medication Sig Dispense Refill    famotidine (PEPCID) 20 MG tablet Take 1 tablet (20 mg) by mouth 2 times daily 60 tablet 3    hydrOXYzine (ATARAX) 25 MG tablet 1-2 tabs at bedtime for sleep and anxiety 50 tablet 1    metFORMIN (GLUCOPHAGE XR) 500 MG 24 hr tablet Take 1 tablet (500 mg) by mouth 2 times daily (with meals) 180 tablet 4    omeprazole (PRILOSEC) 20 MG DR capsule Take 1 capsule (20 mg) by mouth daily 30 capsule 1    Semaglutide-Weight Management (WEGOVY) 0.25  "MG/0.5ML pen Inject 0.25 mg Subcutaneous once a week Increase to 0.5mg after 4 weeks if tolerating. 2 mL 2    Semaglutide-Weight Management (WEGOVY) 1 MG/0.5ML pen Inject 1 mg Subcutaneous once a week 2 mL 11    [START ON 2023] Semaglutide-Weight Management (WEGOVY) 1.7 MG/0.75ML pen Inject 1.7 mg Subcutaneous once a week 3 mL 4    [START ON 2023] Semaglutide-Weight Management (WEGOVY) 2.4 MG/0.75ML pen Inject 2.4 mg Subcutaneous once a week 3 mL 11    spironolactone (ALDACTONE) 25 MG tablet Take 1 tablet (25 mg) by mouth 2 times daily 180 tablet 4       Allergies  No Known Allergies    Family History  family history includes Anxiety Disorder in her brother, mother, and other family members; Brain Cancer in her maternal grandfather; Cerebrovascular Disease in her paternal grandfather; Depression in her brother, cousin, maternal grandmother, and mother; Diabetes in an other family member; Heart Failure in her maternal grandmother; Hyperlipidemia in an other family member; Hypertension in an other family member; Lung Cancer in her paternal grandmother; Mental Illness in her brother, maternal grandmother, and another family member; Obesity in an other family member; Other Cancer in her cousin, father, maternal grandfather, paternal grandfather, paternal grandmother, and another family member; Thyroid Disease in her maternal grandmother, mother, and another family member.    Social History  Social History     Tobacco Use    Smoking status: Former     Packs/day: 0.50     Years: 14.00     Pack years: 7.00     Types: Cigarettes     Start date: 2003     Quit date: 2022     Years since quittin.6    Smokeless tobacco: Never   Vaping Use    Vaping Use: Never used   Substance Use Topics    Alcohol use: Not Currently    Drug use: No       Physical Exam  Body mass index is 27.46 kg/m .   Pt reports current weight 171, height 5'3\" giving BMI of 30.1  GENERAL: no distress  SKIN: Visible skin clear. No " significant rash, abnormal pigmentation or lesions.  EYES: Eyes grossly normal to inspection.  No discharge or erythema, or obvious scleral/conjunctival abnormalities.  NECK: visible goiter is not present; no visible neck masses  RESP: No audible wheeze, cough, or visible cyanosis.  No visible retractions or increased work of breathing.    NEURO: Awake, alert, responds appropriately to questions.  Mentation and speech fluent.  PSYCH:affect normal and appearance well-groomed.    Video-Visit Details    Type of service:  Video Visit    Video Start Time (time video started): 0800    Video End Time (time video stopped): 0843    Originating Location (pt. Location): Home    Distant Location (provider location):  Off-site    Mode of Communication:  Video Conference via [employer] mobile    Physician has received verbal consent for a Video Visit from the patient? Yes    I spent a total of 32 minutes on the day of this established patient visit on chart review, lab review, coordinating care, exam and counseling of patient           Answers for HPI/ROS submitted by the patient on 6/5/2023  General Symptoms: No  Skin Symptoms: No  HENT Symptoms: No  EYE SYMPTOMS: No  HEART SYMPTOMS: No  LUNG SYMPTOMS: Yes  INTESTINAL SYMPTOMS: No  URINARY SYMPTOMS: No  GYNECOLOGIC SYMPTOMS: No  BREAST SYMPTOMS: No  SKELETAL SYMPTOMS: No  BLOOD SYMPTOMS: No  NERVOUS SYSTEM SYMPTOMS: No  MENTAL HEALTH SYMPTOMS: Yes  Cough: Yes  Sputum or phlegm: Yes  Coughing up blood: No  Difficulty breating or shortness of breath: No  Snoring: Yes  Wheezing: No  Difficulty breathing on exertion: No  Nighttime Cough: No  Difficulty breathing when lying flat: No  Nervous or Anxious: Yes  Depression: Yes  Trouble sleeping: No  Trouble thinking or concentrating: Yes  Mood changes: Yes  Panic attacks: No  Answers submitted by the patient for this visit:  Symptoms you have experienced in the last 30 days (Submitted on 7/25/2023)  General Symptoms: No  Skin Symptoms:  No  HENT Symptoms: No  EYE SYMPTOMS: No  HEART SYMPTOMS: No  LUNG SYMPTOMS: No  INTESTINAL SYMPTOMS: No  URINARY SYMPTOMS: No  GYNECOLOGIC SYMPTOMS: No  BREAST SYMPTOMS: No  SKELETAL SYMPTOMS: No  BLOOD SYMPTOMS: No  NERVOUS SYSTEM SYMPTOMS: No  MENTAL HEALTH SYMPTOMS: No

## 2023-08-01 NOTE — PATIENT INSTRUCTIONS
Continue wegovy 1mg for four doses total.  After that you can continue to titrate dose up monthly (or longer if needed).  Next dose if 1.7mg weekly followed by 2.4.  Refills available if you need to remain on a lower dose or go back down due to side effects.     Followup in November to check in on weight loss and how you are tolerating the medication.  Hold spironolactone for any low blood pressures.  If you aren't noticing major difference in facial hair on spironolactone in the next few months you may stop it.

## 2023-08-06 ENCOUNTER — TELEPHONE (OUTPATIENT)
Dept: FAMILY MEDICINE | Facility: CLINIC | Age: 38
End: 2023-08-06
Payer: COMMERCIAL

## 2023-08-06 NOTE — TELEPHONE ENCOUNTER
.Prior Authorization Retail Medication Request    Medication/Dose: Omeprazole 20mg  ICD code (if different than what is on RX):    Previously Tried and Failed:    Rationale:      Insurance Name:[facility]     Insurance ID: [id number]        Pharmacy Information (if different than what is on RX)  Name:    Phone:      Per ins:  DAYS SUPPLY  DAYS    .Thank You,   Lindsey Manriquez, [facility]

## 2023-08-09 NOTE — TELEPHONE ENCOUNTER
Central Prior Authorization Team   Phone: [phone]    PA Initiation    Medication: Omeprazole 20mg  Insurance Company: [employer] [place] - Phone [phone] Fax [phone]  Pharmacy Filling the Rx: [place], [place] - [address] [place] [address]  Filling Pharmacy Phone: [phone]  Filling Pharmacy Fax:    Start Date: 8/9/2023

## 2023-08-09 NOTE — TELEPHONE ENCOUNTER
PRIOR AUTHORIZATION DENIED    Medication: Omeprazole 20mg    Denial Date: 8/9/2023    Denial Rational:  Requested quantity is larger/greater than allowed by insurance.  All PPIs are covered up to 1 per day for 120 days within 365 days.  If an appeal is desired please provide the PA team with a letter of medical necessity explaining why the patient needs more than the 120 per 365. Thank you.                Appeal Information:    If you would like to appeal, please supply P/A team with a letter of medical necessity with clinical reason.

## 2023-08-24 ENCOUNTER — PATIENT OUTREACH (OUTPATIENT)
Dept: CARE COORDINATION | Facility: CLINIC | Age: 38
End: 2023-08-24
Payer: COMMERCIAL

## 2023-10-26 ENCOUNTER — HOSPITAL ENCOUNTER (OUTPATIENT)
Dept: CARDIOLOGY | Facility: CLINIC | Age: 38
Discharge: HOME | End: 2023-10-26
Attending: INTERNAL MEDICINE
Payer: COMMERCIAL

## 2023-10-26 ENCOUNTER — MYC MEDICAL ADVICE (OUTPATIENT)
Dept: PHYSICAL MEDICINE AND REHAB | Facility: CLINIC | Age: 38
End: 2023-10-26

## 2023-10-26 DIAGNOSIS — R00.2 POST-COVID CHRONIC PALPITATIONS: ICD-10-CM

## 2023-10-26 DIAGNOSIS — U09.9 POST-COVID CHRONIC PALPITATIONS: ICD-10-CM

## 2023-10-26 LAB — LVEF ECHO: NORMAL

## 2023-10-26 PROCEDURE — 93306 TTE W/DOPPLER COMPLETE: CPT | Mod: 26 | Performed by: INTERNAL MEDICINE

## 2023-10-26 PROCEDURE — 93306 TTE W/DOPPLER COMPLETE: CPT

## 2023-10-31 ENCOUNTER — MYC MEDICAL ADVICE (OUTPATIENT)
Dept: FAMILY MEDICINE | Facility: CLINIC | Age: 38
End: 2023-10-31
Payer: COMMERCIAL

## 2023-10-31 ENCOUNTER — TELEPHONE (OUTPATIENT)
Dept: FAMILY MEDICINE | Facility: CLINIC | Age: 38
End: 2023-10-31
Payer: COMMERCIAL

## 2023-10-31 ASSESSMENT — ANXIETY QUESTIONNAIRES
GAD7 TOTAL SCORE: 4
4. TROUBLE RELAXING: NOT AT ALL
8. IF YOU CHECKED OFF ANY PROBLEMS, HOW DIFFICULT HAVE THESE MADE IT FOR YOU TO DO YOUR WORK, TAKE CARE OF THINGS AT HOME, OR GET ALONG WITH OTHER PEOPLE?: NOT DIFFICULT AT ALL
7. FEELING AFRAID AS IF SOMETHING AWFUL MIGHT HAPPEN: SEVERAL DAYS
7. FEELING AFRAID AS IF SOMETHING AWFUL MIGHT HAPPEN: SEVERAL DAYS
6. BECOMING EASILY ANNOYED OR IRRITABLE: SEVERAL DAYS
2. NOT BEING ABLE TO STOP OR CONTROL WORRYING: NOT AT ALL
GAD7 TOTAL SCORE: 4
IF YOU CHECKED OFF ANY PROBLEMS ON THIS QUESTIONNAIRE, HOW DIFFICULT HAVE THESE PROBLEMS MADE IT FOR YOU TO DO YOUR WORK, TAKE CARE OF THINGS AT HOME, OR GET ALONG WITH OTHER PEOPLE: NOT DIFFICULT AT ALL
3. WORRYING TOO MUCH ABOUT DIFFERENT THINGS: SEVERAL DAYS
GAD7 TOTAL SCORE: 4
5. BEING SO RESTLESS THAT IT IS HARD TO SIT STILL: NOT AT ALL
1. FEELING NERVOUS, ANXIOUS, OR ON EDGE: SEVERAL DAYS

## 2023-10-31 ASSESSMENT — PATIENT HEALTH QUESTIONNAIRE - PHQ9
10. IF YOU CHECKED OFF ANY PROBLEMS, HOW DIFFICULT HAVE THESE PROBLEMS MADE IT FOR YOU TO DO YOUR WORK, TAKE CARE OF THINGS AT HOME, OR GET ALONG WITH OTHER PEOPLE: NOT DIFFICULT AT ALL
SUM OF ALL RESPONSES TO PHQ QUESTIONS 1-9: 5

## 2023-11-17 ENCOUNTER — APPOINTMENT (OUTPATIENT)
Dept: MRI IMAGING | Facility: CLINIC | Age: 38
End: 2023-11-17
Attending: EMERGENCY MEDICINE
Payer: COMMERCIAL

## 2023-11-17 ENCOUNTER — TELEPHONE (OUTPATIENT)
Dept: NEUROLOGY | Facility: CLINIC | Age: 38
End: 2023-11-17

## 2023-11-17 ENCOUNTER — HOSPITAL ENCOUNTER (EMERGENCY)
Facility: CLINIC | Age: 38
Discharge: HOME | End: 2023-11-17
Attending: EMERGENCY MEDICINE | Admitting: EMERGENCY MEDICINE
Payer: COMMERCIAL

## 2023-11-17 VITALS
SYSTOLIC BLOOD PRESSURE: 118 MMHG | DIASTOLIC BLOOD PRESSURE: 80 MMHG | OXYGEN SATURATION: 98 % | BODY MASS INDEX: 23.57 KG/M2 | HEIGHT: 63 IN | HEART RATE: 86 BPM | RESPIRATION RATE: 18 BRPM | TEMPERATURE: 98.1 F | WEIGHT: 133 LBS

## 2023-11-17 DIAGNOSIS — R20.0 RIGHT SIDED NUMBNESS: ICD-10-CM

## 2023-11-17 DIAGNOSIS — R51.9 ACUTE NONINTRACTABLE HEADACHE, UNSPECIFIED HEADACHE TYPE: ICD-10-CM

## 2023-11-17 LAB
ANION GAP SERPL CALCULATED.3IONS-SCNC: 9 MMOL/L (ref 7–15)
BASOPHILS # BLD AUTO: 0 10E3/UL (ref 0–0.2)
BASOPHILS NFR BLD AUTO: 0 %
BUN SERPL-MCNC: 17.4 MG/DL (ref 6–20)
CALCIUM SERPL-MCNC: 9.8 MG/DL (ref 8.6–10)
CHLORIDE SERPL-SCNC: 104 MMOL/L (ref 98–107)
CREAT SERPL-MCNC: 0.59 MG/DL (ref 0.51–0.95)
DEPRECATED HCO3 PLAS-SCNC: 25 MMOL/L (ref 22–29)
EGFRCR SERPLBLD CKD-EPI 2021: >90 ML/MIN/1.73M2
EOSINOPHIL # BLD AUTO: 0.1 10E3/UL (ref 0–0.7)
EOSINOPHIL NFR BLD AUTO: 1 %
ERYTHROCYTE [DISTWIDTH] IN BLOOD BY AUTOMATED COUNT: 13.2 % (ref 10–15)
GLUCOSE SERPL-MCNC: 93 MG/DL (ref 70–99)
HCG UR QL: NEGATIVE
HCT VFR BLD AUTO: 44 % (ref 35–47)
HGB BLD-MCNC: 14.8 G/DL (ref 11.7–15.7)
IMM GRANULOCYTES # BLD: 0 10E3/UL
IMM GRANULOCYTES NFR BLD: 0 %
LYMPHOCYTES # BLD AUTO: 2.3 10E3/UL (ref 0.8–5.3)
LYMPHOCYTES NFR BLD AUTO: 31 %
MCH RBC QN AUTO: 29 PG (ref 26.5–33)
MCHC RBC AUTO-ENTMCNC: 33.6 G/DL (ref 31.5–36.5)
MCV RBC AUTO: 86 FL (ref 78–100)
MONOCYTES # BLD AUTO: 0.4 10E3/UL (ref 0–1.3)
MONOCYTES NFR BLD AUTO: 5 %
NEUTROPHILS # BLD AUTO: 4.7 10E3/UL (ref 1.6–8.3)
NEUTROPHILS NFR BLD AUTO: 63 %
NRBC # BLD AUTO: 0 10E3/UL
NRBC BLD AUTO-RTO: 0 /100
PLATELET # BLD AUTO: 296 10E3/UL (ref 150–450)
POTASSIUM SERPL-SCNC: 4.5 MMOL/L (ref 3.4–5.3)
RBC # BLD AUTO: 5.1 10E6/UL (ref 3.8–5.2)
SODIUM SERPL-SCNC: 138 MMOL/L (ref 135–145)
WBC # BLD AUTO: 7.6 10E3/UL (ref 4–11)

## 2023-11-17 PROCEDURE — 70553 MRI BRAIN STEM W/O & W/DYE: CPT

## 2023-11-17 PROCEDURE — 81025 URINE PREGNANCY TEST: CPT | Performed by: EMERGENCY MEDICINE

## 2023-11-17 PROCEDURE — 99284 EMERGENCY DEPT VISIT MOD MDM: CPT | Performed by: EMERGENCY MEDICINE

## 2023-11-17 PROCEDURE — A9585 GADOBUTROL INJECTION: HCPCS | Performed by: EMERGENCY MEDICINE

## 2023-11-17 PROCEDURE — 99285 EMERGENCY DEPT VISIT HI MDM: CPT | Mod: 25 | Performed by: EMERGENCY MEDICINE

## 2023-11-17 PROCEDURE — 85025 COMPLETE CBC W/AUTO DIFF WBC: CPT | Performed by: EMERGENCY MEDICINE

## 2023-11-17 PROCEDURE — 80048 BASIC METABOLIC PNL TOTAL CA: CPT | Performed by: EMERGENCY MEDICINE

## 2023-11-17 PROCEDURE — 255N000002 HC RX 255 OP 636: Performed by: EMERGENCY MEDICINE

## 2023-11-17 PROCEDURE — 36415 COLL VENOUS BLD VENIPUNCTURE: CPT | Performed by: EMERGENCY MEDICINE

## 2023-11-17 RX ORDER — LORAZEPAM 2 MG/ML
0.5 INJECTION INTRAMUSCULAR
Status: DISCONTINUED | OUTPATIENT
Start: 2023-11-17 | End: 2023-11-17 | Stop reason: HOSPADM

## 2023-11-17 RX ORDER — GADOBUTROL 604.72 MG/ML
6 INJECTION INTRAVENOUS ONCE
Status: COMPLETED | OUTPATIENT
Start: 2023-11-17 | End: 2023-11-17

## 2023-11-17 RX ADMIN — GADOBUTROL 6 ML: 604.72 INJECTION INTRAVENOUS at 13:12

## 2023-11-17 ASSESSMENT — ACTIVITIES OF DAILY LIVING (ADL)
ADLS_ACUITY_SCORE: 35
ADLS_ACUITY_SCORE: 35

## 2023-11-17 NOTE — ED PROVIDER NOTES
History     Chief Complaint   Patient presents with    Numbness     HPI  Halima Razo is a 38 year old female who presents for right-sided numbness.  The patient says that this has been getting worse over the past week.  It initially started in her foot and then her cheek and she feels like it is now over the whole right side of her body.  She says she does not feel cold on that side of the body when she holds her hand up to her skin but she does feel it on the left side of her body.  No vision changes or double vision.  She does have a mild headache on the right side.  No chest pain, difficulty breathing, abdominal pain, nausea, vomiting, diarrhea, dysuria, or rash.  She is felt somewhat unsteady when she is walking because she feels like she is having some trouble using her right leg.    Allergies:  No Known Allergies    Problem List:    Patient Active Problem List    Diagnosis Date Noted    PCOS (polycystic ovarian syndrome) 06/06/2023     Priority: Medium    Gastroesophageal reflux disease with esophagitis, unspecified whether hemorrhage 02/22/2023     Priority: Medium    History of gestational diabetes 09/23/2022     Priority: Medium    Generalized anxiety disorder 03/17/2016     Priority: Medium    Major depressive disorder, recurrent episode, mild (H24) 01/21/2016     Priority: Medium    Obesity 04/18/2012     Priority: Medium        Past Medical History:    Past Medical History:   Diagnosis Date    Angioedema 2019    Depression with anxiety     Gestational diabetes mellitus (GDM), antepartum, gestational diabetes method of control unspecified     Hypertension 2018    Infertility, female     Migraines     Obesity 04/18/2012    Papanicolaou smear of cervix with low grade squamous intraepithelial lesion (LGSIL) 2009    Pre-eclampsia in third trimester 2020    Ventricular septal defect (VSD) of fetus in guevara pregnancy, antepartum        Past Surgical History:    Past Surgical History:   Procedure  Laterality Date    BIOPSY  2010    Underarm mole,    LAPAROSCOPY DIAGNOSTIC (GYN) N/A 01/26/2018    Procedure: LAPAROSCOPY DIAGNOSTIC (GYN);;  Surgeon: Kei Kelley MD;  Location: [facility]    LAPAROTOMY MINI, TUBAL LIGATION (POST PARTUM), COMBINED Bilateral 3/28/2022    Procedure: LIGATION, FALLOPIAN TUBE, POSTPARTUM;  Surgeon: Orquidea Mcneil MD;  Location: [facility]    OPERATIVE HYSTEROSCOPY WITH MORCELLATOR N/A 01/26/2018    Procedure: OPERATIVE HYSTEROSCOPY WITH MORCELLATOR (MYOSURE);  Diagnostic laparoscopy, hysteroscopy with biopsy;  Surgeon: Kei Kelley MD;  Location: [facility] OR       Family History:    Family History   Problem Relation Age of Onset    Other Cancer Father         Skin Cancer    Thyroid Disease Mother     Depression Mother     Anxiety Disorder Mother     Depression Brother     Anxiety Disorder Brother     Mental Illness Brother     Thyroid Disease Maternal Grandmother     Heart Failure Maternal Grandmother     Depression Maternal Grandmother     Mental Illness Maternal Grandmother     Brain Cancer Maternal Grandfather     Other Cancer Maternal Grandfather         Brain Cancer    Lung Cancer Paternal Grandmother     Other Cancer Paternal Grandmother         Lung Cancer    Cerebrovascular Disease Paternal Grandfather     Other Cancer Paternal Grandfather         Skin Cancer    Other Cancer Cousin         Testicular Cancer    Depression Cousin         Maternal Cousin    Diabetes Other         Maternal Aunt    Hypertension Other     Hyperlipidemia Other     Other Cancer Other         Brain Cancer    Anxiety Disorder Other         Maternal Uncle    Anxiety Disorder Other         Maternal Uncle    Mental Illness Other         Paternal Uncle    Thyroid Disease Other         Maternal Aunt    Obesity Other     Glaucoma No family hx of     Macular Degeneration No family hx of        Social History:  Marital Status:   [2]  Social History     Tobacco Use    Smoking status: Former      "Packs/day: 0.50     Years: 14.00     Additional pack years: 0.00     Total pack years: 7.00     Types: Cigarettes     Start date: 2003     Quit date: 2022     Years since quittin.9    Smokeless tobacco: Never   Vaping Use    Vaping Use: Never used   Substance Use Topics    Alcohol use: Not Currently    Drug use: No        Medications:    famotidine (PEPCID) 20 MG tablet  hydrOXYzine (ATARAX) 25 MG tablet  metFORMIN (GLUCOPHAGE XR) 500 MG 24 hr tablet  omeprazole (PRILOSEC) 20 MG DR capsule  Semaglutide-Weight Management (WEGOVY) 0.25 MG/0.5ML pen  Semaglutide-Weight Management (WEGOVY) 1 MG/0.5ML pen  Semaglutide-Weight Management (WEGOVY) 1.7 MG/0.75ML pen  Semaglutide-Weight Management (WEGOVY) 2.4 MG/0.75ML pen  spironolactone (ALDACTONE) 25 MG tablet          Review of Systems    Physical Exam   BP: 136/89  Pulse: 104  Temp: 98.1  F (36.7  C)  Resp: 18  Height: 160 cm (5' 3\")  Weight: 60.3 kg (133 lb)  SpO2: 98 %      Physical Exam  Vitals and nursing note reviewed.   Constitutional:       General: She is in acute distress.      Appearance: She is well-developed. She is not diaphoretic.   HENT:      Head: Normocephalic and atraumatic.      Right Ear: External ear normal.      Left Ear: External ear normal.      Nose: Nose normal.   Eyes:      General: No scleral icterus.     Conjunctiva/sclera: Conjunctivae normal.   Cardiovascular:      Rate and Rhythm: Normal rate and regular rhythm.   Pulmonary:      Effort: Pulmonary effort is normal. No respiratory distress.      Breath sounds: No stridor.   Musculoskeletal:      Cervical back: Normal range of motion.   Skin:     General: Skin is warm and dry.   Neurological:      Mental Status: She is alert and oriented to person, place, and time.      GCS: GCS eye subscore is 4. GCS verbal subscore is 5. GCS motor subscore is 6.      Cranial Nerves: Cranial nerves 2-12 are intact.      Motor: No abnormal muscle tone or pronator drift.      Coordination: " Finger-Nose-Finger Test and Heel to Shin Test normal. Rapid alternating movements normal.      Gait: Gait and tandem walk normal.      Comments: Normal proprioception   Psychiatric:         Behavior: Behavior normal.         ED Course                 Procedures              Critical Care time:  none               Results for orders placed or performed during the hospital encounter of 11/17/23 (from the past 24 hour(s))   HCG qualitative urine   Result Value Ref Range    hCG Urine Qualitative Negative Negative   Basic metabolic panel   Result Value Ref Range    Sodium 138 135 - 145 mmol/L    Potassium 4.5 3.4 - 5.3 mmol/L    Chloride 104 98 - 107 mmol/L    Carbon Dioxide (CO2) 25 22 - 29 mmol/L    Anion Gap 9 7 - 15 mmol/L    Urea Nitrogen 17.4 6.0 - 20.0 mg/dL    Creatinine 0.59 0.51 - 0.95 mg/dL    GFR Estimate >90 >60 mL/min/1.73m2    Calcium 9.8 8.6 - 10.0 mg/dL    Glucose 93 70 - 99 mg/dL   CBC with Platelets & Differential    Narrative    The following orders were created for panel order CBC with Platelets & Differential.  Procedure                               Abnormality         Status                     ---------                               -----------         ------                     CBC with platelets and d...[[id number]]                      Final result                 Please view results for these tests on the individual orders.   CBC with platelets and differential   Result Value Ref Range    WBC Count 7.6 4.0 - 11.0 10e3/uL    RBC Count 5.10 3.80 - 5.20 10e6/uL    Hemoglobin 14.8 11.7 - 15.7 g/dL    Hematocrit 44.0 35.0 - 47.0 %    MCV 86 78 - 100 fL    MCH 29.0 26.5 - 33.0 pg    MCHC 33.6 31.5 - 36.5 g/dL    RDW 13.2 10.0 - 15.0 %    Platelet Count 296 150 - 450 10e3/uL    % Neutrophils 63 %    % Lymphocytes 31 %    % Monocytes 5 %    % Eosinophils 1 %    % Basophils 0 %    % Immature Granulocytes 0 %    NRBCs per 100 WBC 0 <1 /100    Absolute Neutrophils 4.7 1.6 - 8.3 10e3/uL    Absolute  Lymphocytes 2.3 0.8 - 5.3 10e3/uL    Absolute Monocytes 0.4 0.0 - 1.3 10e3/uL    Absolute Eosinophils 0.1 0.0 - 0.7 10e3/uL    Absolute Basophils 0.0 0.0 - 0.2 10e3/uL    Absolute Immature Granulocytes 0.0 <=0.4 10e3/uL    Absolute NRBCs 0.0 10e3/uL   MR Brain w/o & w Contrast    Narrative    EXAM: MR BRAIN W/O & W CONTRAST  11/17/2023 1:45 PM     HISTORY: right sided numbness and hot/cold differentiation loss       COMPARISON: None.    TECHNIQUE: Multiplanar, multisequence MR imaging of the head obtained  prior to, and after, intravenous contrast administration    CONTRAST: 6 mL Gadavist.    FINDINGS:    Calvarium/skull base: No focal marrow replacing lesion.     Orbits: Within normal limits accounting for technique.     Paranasal sinuses: No substantial paranasal sinus disease.    Brain: No restricted diffusion.  No evidence of acute intracranial  hemorrhage.  No parenchymal edema. Scattered tiny foci of T2/FLAIR  hyperintense signal abnormality within the periventricular and  subcortical white matter of both frontal lobes. No hydrocephalus.   Normal positioning and morphology of the cerebellar tonsils.  Normal  flow related signal within the major intracranial arteries and venous  structures. No pathologic intracranial enhancement.      Impression    IMPRESSION:    1.  No acute intracranial abnormality. Specifically, no acute infarct.  2.  Scattered tiny foci of T2/FLAIR hyperintense signal abnormality in  the cerebral white matter. Findings are nonspecific, though in the  appropriate clinical setting, sequela of migraine headaches would be a  diagnostic consideration given bifrontal predominance. Changes of  early chronic microvascular ischemic disease could be considered if  risk factors exist. Appearance would be atypical for demyelinating  plaques.  3.  No pathologic intracranial enhancement.    MARYANN AGEE MD         SYSTEM ID:  [id number]       Medications   LORazepam (ATIVAN) injection 0.5 mg (has no  administration in time range)   gadobutrol (GADAVIST) injection 6 mL (6 mLs Intravenous $Given 11/17/23 1312)       Assessments & Plan (with Medical Decision Making)   38-year-old female who presents for right-sided numbness and tingling with hot/cold differentiation loss over the past week.  Her neurologic examination is reassuring here.  Pregnancy test is negative.  Electrolytes are within normal limits.  White blood cell count is 7.6 and her hemoglobin is 14.8, no signs of anemia.  MRI of the brain obtained, images interpreted independently as well as radiology read reviewed, no signs of stroke, no signs of multiple sclerosis, does have some nonspecific changes.  This is very reassuring and my plan at this time is for discharge home with outpatient follow-up with neurology.  I did discuss the case with the on-call neurologist at the [facility], Dr. Walter.  We discussed ongoing management the patient and he was able to review the images and thought this was a very reasonable plan.  The patient is in agreement with this and is told to return for worsening symptoms or other concerns, otherwise follow-up in clinic.    I have reviewed the nursing notes.    I have reviewed the findings, diagnosis, plan and need for follow up with the patient.       New Prescriptions    No medications on file       Final diagnoses:   Right sided numbness   Acute nonintractable headache, unspecified headache type       11/17/2023   [facility] EMERGENCY DEPT       Delonte Holloway MD  11/17/23 1830

## 2023-11-17 NOTE — TELEPHONE ENCOUNTER
Called by ED about this patint with 1 week of isolated R hemibody sensory changes without any weakness or bulbar signs.  This does include R face.  MRI with a few tiny foci of T2 hyperintnsity mostly in frontal lobes.  They are very small and do not appear abutting cortex for most so agree with interpretation that not suggestive of demyelination.  Migraine certainly a possiblity.   Per them besides sensory to hot/cold exam is normal.     Do not think additional emergent work up is needed at this time.  Would discuss red flag signs including new or progressing symptoms and ensure is safe from ambulation standpoint.  ED would like to refer to neurology which I think is reasonable.      Kartik Walter, DO  Neurology

## 2023-11-17 NOTE — DISCHARGE INSTRUCTIONS
I am not sure what is causing your symptoms so far the tests are reassuring.  Use acetaminophen and ibuprofen as needed for headaches.  Return to the emergency department for weakness, repeated vomiting, fevers, or other concerns.  Otherwise follow-up in neurology clinic and with your primary doctor.

## 2023-11-17 NOTE — ED TRIAGE NOTES
Pt here with numbness and tingling in various places along the right side of her body that started about a week ago. Pt drove herself here today.   Pt denies any back or neck injury/trauma. Reports history of migraines, but nothing recent.      Triage Assessment (Adult)       Row Name 11/17/23 0939          Triage Assessment    Airway WDL WDL        Respiratory WDL    Respiratory WDL WDL        Skin Circulation/Temperature WDL    Skin Circulation/Temperature WDL WDL        Cardiac WDL    Cardiac WDL WDL        Peripheral/Neurovascular WDL    Peripheral Neurovascular WDL WDL        Cognitive/Neuro/Behavioral WDL    Cognitive/Neuro/Behavioral WDL WDL

## 2023-11-20 ENCOUNTER — PATIENT OUTREACH (OUTPATIENT)
Dept: FAMILY MEDICINE | Facility: CLINIC | Age: 38
End: 2023-11-20
Payer: COMMERCIAL

## 2023-11-20 NOTE — TELEPHONE ENCOUNTER
"ED/Discharge Protocol    \"Hi, my name is Leann Brown RN, a registered nurse, and I am calling on behalf of Dr. Wilkinson's office at [place].  I am calling to follow up and see how things are going for you after your recent visit.\"    \"I see that you were in the (ER/UC/IP) on 11/17/23.    How are you doing now that you are home?\" Still some numbness right side.      Is patient experiencing symptoms that may require a hospital visit?  no    Discharge Instructions    \"Let's review your discharge instructions.  What is/are the follow-up recommendations?  Pt. Response: follow up with neurologist    \"Were you instructed to make a follow-up appointment?\"  Pt. Response: Yes.  Has appointment been made?   Yes      \"When you see the provider, I would recommend that you bring your discharge instructions with you.    Medications    \"How many new medications are you on since your hospitalization/ED visit?\"    0-1  \"How many of your current medicines changed (dose, timing, name, etc.) while you were in the hospital/ED visit?\"   0-1  \"Do you have questions about your medications?\"   No  \"Were you newly diagnosed with heart failure, COPD, diabetes or did you have a heart attack?\"   No  For patients on insulin: \"Did you start on insulin in the hospital or did you have your insulin dose changed?\"   No  Post Discharge Medication Reconciliation Status: discharge medications reconciled, continue medications without change.    Was MTM referral placed (*Make sure to put transitions as reason for referral)?   No    Call Summary    \"Do you have any questions or concerns about your condition or care plan at the moment?\"    No  Triage nurse advice given: call if questions    Patient was in ER 1 in the past year (assess appropriateness of ER visits.)      \"If you have questions or things don't continue to improve, we encourage you contact us through the main clinic number,  [phone].  Even if the clinic is not open, triage nurses " "are available 24/7 to help you.     We would like you to know that our clinic has extended hours (provide information).  We also have urgent care (provide details on closest location and hours/contact info)\"      \"Thank you for your time and take care!\"  Leann Brown RN      "

## 2023-11-28 ENCOUNTER — VIRTUAL VISIT (OUTPATIENT)
Dept: ENDOCRINOLOGY | Facility: CLINIC | Age: 38
End: 2023-11-28
Payer: COMMERCIAL

## 2023-11-28 DIAGNOSIS — L98.7 EXCESS SKIN: Primary | ICD-10-CM

## 2023-11-28 DIAGNOSIS — E66.811 CLASS 1 OBESITY WITH SERIOUS COMORBIDITY AND BODY MASS INDEX (BMI) OF 30.0 TO 30.9 IN ADULT, UNSPECIFIED OBESITY TYPE: ICD-10-CM

## 2023-11-28 DIAGNOSIS — E88.819 INSULIN RESISTANCE: ICD-10-CM

## 2023-11-28 PROCEDURE — 99213 OFFICE O/P EST LOW 20 MIN: CPT | Mod: VID | Performed by: INTERNAL MEDICINE

## 2023-11-28 NOTE — PATIENT INSTRUCTIONS
Schedule plastic surgery when contacted.     Continue wegovy at current dose.     Followup in 6 months with new endo provider (schedulers will call)

## 2023-11-28 NOTE — Clinical Note
Please schedule for followup with any endo provider who sees obesity and PCOS in 6 months ( if available)

## 2023-11-28 NOTE — PROGRESS NOTES
In person evaluation      \[facility]\""  11/29/2023, in person consultation      38-year-old been evaluated neurologically for:  Migraine  Hemibody numbness  Hypertension  Abnormal MRI head nonspecific T2 flair changes        A.  Right sided hemiparesthesias        Onset around November 17, 2023 seen in the ER        Came on over about a week's time        Involved whole right side of body  Symptom onset history  Sometime around November 10 developed clumsiness of her right foot trouble with dorsiflexion weakness and some numbness in the right foot over a week time had numbness and went up the leg and then up the right arm  Later she became concerned and had some numbness of her right face but she gets face numbness on the right when she is anxious  Symptoms peaked sometime around November 16 about a week later  She had temperature appreciation changes where she could not feel the temperature on her right side  Things have gotten better now and she has residual weakness of the right foot that is clumsy specially on stairs  She has decreased patch of numbness on the lateral right foot and ankle    No diplopia dysarthria dysphagia no trouble with visual changes  No bowel or bladder incontinence    Does have some chronic neck pain going on for 5 years but not new during this.    Question whether this was more of a myelitis involving the neck and there is a symptoms are secondary to anxiety  Did have temperature appreciation changes/right foot drop/paresthesias right arm and leg    MRI cervical spine rule out myelitis  EMG right arm and leg rule out AIDP      B.  Nonspecific T2 flair changes on MRI 11/17/2023       Past history of preeclampsia/migraine/gestational diabetes      C.  COVID infection March 2023        Post COVID symptoms    Past medical history  Past hypertension  Preeclampsia during third trimester  Possible polycystic ovary syndrome (seeing endocrinology irregular menses)  Tubal  ligation  Angioedema  Migraines  GERD  Anxiety    Habits  Smokes half a pack per day quit  Alcohol occasionally/rare in the past        Family history  Father alcohol abuse/skin cancer  Mother depression/anxiety/hypertension/diabetes/thyroid/migraine  Maternal grandfather brain cancer  Maternal grandmother heart failure/thyroid  Paternal grandfather CVA  Paternal grandmother lung cancer  Brother depression/anxiety  Cousin testicular cancer          Work-up  Echo 10/26/2023, ejection fraction 55-60%, normal left atrium, see official report  MRI brain 11/17/2023 with and without contrast  1.  No acute intracranial abnormality. Specifically, no acute infarct.  2.  Scattered tiny foci of T2/FLAIR hyperintense signal abnormality in the cerebral white matter.        Findings are nonspecific, though in the appropriate clinical setting,        sequela of migraine headaches would be a diagnostic consideration given bifrontal predominance.       Changes of early chronic microvascular ischemic disease could be considered if risk factors exist.       Appearance would be atypical for demyelinating plaques.  3.  No pathologic intracranial enhancement.  MRI scan brain 12/8/2023 with and without contrast  1.  No cord signal abnormality or pathologic enhancement.  2.  Minimal degenerative disc disease at a few levels        Without substantial spinal canal or neural foraminal stenosis.    Laboratory data review               7/2023 11/2023  NA/K                  138/4.5  BUN/Cr              17.4/0.59  GLU                   93  WBC/HGB          7.6/14.8  PLTs                    296,000  TSH       1.4 3  B1          159  B6          159.8  B12        338              Exam    Review of systems pertinent positives and negatives  No diplopia no dysarthria no dysphagia  No vision loss    Had clumsy right foot with a foot drop on the right  Lack of sensation right lateral foot previously was the right leg and right arm  No bowel or  bladder difficulty    Does have difficulty with some back and neck pain myalgias and muscle cramps  Previously was the right leg and right arm  Signs gets right face numbness with anxiety      Has had difficulty with tingling nonspecific weakness lightheadedness/coordination diffic but this is more chronic for 5 yearsulties since COVID infection March 2023    Some difficulty with concentration/memory  Has some history of anxiety    Otherwise review systems negative    General exam  Blood pressure 119/92, pulse 85  Alert orient x3  HEENT normal  Lungs clear  Heart rate regular  Abdomen soft  Symmetrical pulses  No edema in the feet    Neurologic exam  Alert orient x3  Prosody speech normal  Naming normal  Comprehension normal  Repetition normal  No aphasia  No neglect  Memory normal    Cranial nerves II through XII  No ophthalmoplegia  No nystagmus  Visual fields intact  Face symmetrical  Tongue twisters good    Upper extremities  Reported right over left  Deltoid 5/5  Biceps 5/5  Triceps 5/5  Wrist/finger extensors 5/5  Wrist/finger flexors 5/5  Intrinsics 5/5    No drift  No tremor  Finger-nose okay    Lower extremities  Reported right over left  Iliopsoas 5/5  Quadriceps 5/5  Hamstring 5/5  Anterior tibial 4-/5-  EHL 3/4+  Posterior tibial 4/5-  Gastrocnemius 5/5    Reflexes reported right over left  Biceps 2/2  Triceps 2/2  Knees 2/2 ankles 2/2  Ankles 2/2  Toes downgoing  Negative Leblanc reflex  Straight leg raising negative    Sensory exam  Decreased cold appreciation and decreased pinprick right foot and lower ankle more so laterally than medially  Decreased light touch right foot on the lateral aspect  Vibration intact in the upper and lower extremities      Gait  Normal  Romberg negative  Tandem okay          Assessment/plan    Right hemibody paresthesias (November 2023)        MRI November 2023 nonspecific T2 flair changes nondiagnostic        Came on over a week with some residual right foot drop and  clumsy right foot    2.  Post COVID fatigue/chronic palpitations (patient with COVID March 2023)    3.  Nonspecific small T2 flair changes without enhancement       History of gestational diabetes/preeclampsia/probable migraines      Differential diagnosis  Myelitis versus peripheral sensory acute neuropathy    Patient states that sometimes she gets numbness on the right face when she is anxious I wonder if that symptom or component was separate from the new symptom that came on    To rule out cervical myelitis we will get an MRI scan cervical spine with and without contrast  MRI scan is not diagnostic of MS  Depending on the above we will decide whether a lumbar puncture would be appropriate or not    We will check an EMG of her right arm and right leg due to the fact that she does have foot drop and lateral right ankle sensory change to rule out peripheral demyelinating disease such as AIDP    She will follow-up and we will decide whether we tracked her exam or do further testing.    Discussed with patient and     Total care time today is 68 minutes    As part of visit today  Reviewed endocrinology note  Reviewed MRI scan  Reviewed laboratory data  Reviewed ER note 11/17/2023  Reviewed primary MD note    Addendum 12/11/2023  MRI scan brain 12/8/2023 with and without contrast  1.  No cord signal abnormality or pathologic enhancement.  2.  Minimal degenerative disc disease at a few levels        Without substantial spinal canal or neural foraminal stenosis.

## 2023-11-28 NOTE — PROGRESS NOTES
Fam is a 38 year old who is being evaluated via a billable video visit.      How would you like to obtain your AVS? MyChart  If the video visit is dropped, the invitation should be resent by: Text to cell phone: [phone]  Will anyone else be joining your video visit? No      Endocrine Consult Video visit note    Attending Assessment/Plan :        Class 1 obesity with serious comorbidity and body mass index (BMI) of 30.0 to 30.9 in adult, unspecified obesity type  Insulin resistance  Excess skin    Assessment:  -likely has PCOS based on hx of irregular menstrual periods and hx of elevated testosterone levels.  On spironolactone and tolerating well  -tolerating wegovy well and having weight loss success - on 2.4mg weekly dose at this time and has so far lost 44#  -would benefit from plastic surgery eval of excess skin    Plan:  -continue wegovy 2.4mg weekly  -referral to plastic surgery to discuss possible surgical options for excess skin  -will get pt scheduled for endocrine followup in 6 months    I have independently reviewed and interpreted labs, imaging as indicated.     RTC 6 months    Chief complaint:  Halima is a 38 year old female seen for PCOS, obesity.     HISTORY OF PRESENT ILLNESS    Fam is a 38F referred to endocrine for PCOS. She comes to clinic today for followup after starting wegovy for weight loss and spironolactone for androgenic hair growth.      Initial HPI:  She reports having a pelvic ultrasound with no ovarian cysts seen but has met the other 2 diagnostic criteria for PCOS with elevated testosterone levels in the past and hx of irregular menstrual cycles.  Testosterone levels and cycles normalized after the birth of her son in 2022 but previously were abnormal.   She also reports facial hair growth that requires daily shaving.      Had tubal ligation after son was born so no plans for future pregnancies.      She is currently on metformin XR 500mg once daily to help with PCOS sx.  No  GI issues.     She also wanted to discuss weight gain and possible cushings today.  She endorses hair thinning, depression/anxiety, weight gain especially central.  Does endorse striae but not necessarily thick and they have faded.  No marked appetite change.  No diabetes.     She would be interested in trying wegovy to help with weight loss efforts.  She has been implementing diet and lifestyle/exercise modifications for at least the last 3 months and has not been able to meet weight loss goals with that alone.  No hx of pancreatitis.  No known family hx of MTC.     Last visit:  Tolerating wegovy well.  States that she didn't notice a ton of appetite suppression at first on starting doses but did start to notice effects on hunger cravings and unintentional overeating.  Started 1mg dose this week and is now noting some appetite suppression.  Denies GI sx, worsened GERD, nausea, or vomiting. Initial weight pre-wegovy 172, current down to 155      Regarding spironolactone, she did notice an effect on BP.  Typically down to low 100s systolic now.  Did have one episode with some soft BP and was mildly sx so she held a dose of izzy which resolved issue.  Back on BID and tolerating well.  Lytes have been normal.  Discussed improvements in BP with weight loss and how she may need to stop izzy if any low BP.  She also hasn't yet noticed a ton of change in androgenic hair issues (testosterone wasn't elevated so this was started as a trial).    Metformin increased after last visit due to some mildly elevated fasting sugar on labs - tolerating this well.      TODAY: Doing well.  Current weight 128 (down from 172).  Currently up to the 2.4mg dose of wegovy.  Still on spironolactone.  Had a temporary period of softer BP w/some lightheadedness.  This has since resolved.   Now that she has lost a significant amount of weight, having some issues with excess skin.  Interesting in surgical evaluation for treatment options.      Endocrine relevant labs and imaging are as follows:  Component      Latest Ref Rng 6/28/2015  10:46 AM 3/27/2022  8:49 AM 2/24/2023  9:52 AM   Testosterone Total      8 - 60 ng/dL 44   17    Sex Hormone Binding Globulin      30 - 135 nmol/L 30   33    Free Testosterone Calculated      ng/dL 0.83 (H)   0.30    Creatinine      0.52 - 1.04 mg/dL  0.46 (L)     GFR Estimate      >60 mL/min/1.73m2  >90     Hemoglobin A1C      0.0 - 5.6 %   5.2    DHEA Sulfate      35 - 430 ug/dL   99    Insulin      2.6 - 24.9 uU/mL   33.8 (H)       Component      Latest Ref Rng 2/24/2023  9:52 AM   Glucose      70 - 99 mg/dL 106 (H)         REVIEW OF SYSTEMS    10 system ROS otherwise as per the HPI or negative    Past Medical History  Past Medical History:   Diagnosis Date    Angioedema 2019    non-specific cause    Depression with anxiety     Gestational diabetes mellitus (GDM), antepartum, gestational diabetes method of control unspecified     2020    Hypertension 2018    on medication for about 6 months in 2018    Infertility, female     Migraines     Obesity 04/18/2012    Papanicolaou smear of cervix with low grade squamous intraepithelial lesion (LGSIL) 2009    resolved    Pre-eclampsia in third trimester 2020    Ventricular septal defect (VSD) of fetus in guevara pregnancy, antepartum        Medications  Current Outpatient Medications   Medication Sig Dispense Refill    famotidine (PEPCID) 20 MG tablet Take 1 tablet (20 mg) by mouth 2 times daily 60 tablet 3    hydrOXYzine (ATARAX) 25 MG tablet 1-2 tabs at bedtime for sleep and anxiety (Patient taking differently: as needed 1-2 tabs at bedtime for sleep and anxiety) 50 tablet 1    metFORMIN (GLUCOPHAGE XR) 500 MG 24 hr tablet Take 1 tablet (500 mg) by mouth 2 times daily (with meals) 180 tablet 4    Multiple Vitamin (MULTIVITAMIN PO)       omeprazole (PRILOSEC) 20 MG DR capsule Take 1 capsule (20 mg) by mouth daily 30 capsule 1    Semaglutide-Weight Management (WEGOVY) 2.4  "MG/0.75ML pen Inject 2.4 mg Subcutaneous once a week 3 mL 11    spironolactone (ALDACTONE) 25 MG tablet Take 1 tablet (25 mg) by mouth 2 times daily 180 tablet 4       Allergies  No Known Allergies    Family History  family history includes Anxiety Disorder in her brother, mother, and other family members; Brain Cancer in her maternal grandfather; Cerebrovascular Disease in her paternal grandfather; Depression in her brother, cousin, maternal grandmother, and mother; Diabetes in an other family member; Heart Failure in her maternal grandmother; Hyperlipidemia in an other family member; Hypertension in an other family member; Lung Cancer in her paternal grandmother; Mental Illness in her brother, maternal grandmother, and another family member; Obesity in an other family member; Other Cancer in her cousin, father, maternal grandfather, paternal grandfather, paternal grandmother, and another family member; Thyroid Disease in her maternal grandmother, mother, and another family member.    Social History  Social History     Tobacco Use    Smoking status: Former     Packs/day: 0.50     Years: 14.00     Additional pack years: 0.00     Total pack years: 7.00     Types: Cigarettes     Start date: 2003     Quit date: 2022     Years since quittin.9    Smokeless tobacco: Never   Vaping Use    Vaping Use: Never used   Substance Use Topics    Alcohol use: Not Currently    Drug use: No       Physical Exam  There is no height or weight on file to calculate BMI.   Pt reports current weight 171, height 5'3\" giving BMI of 30.1  GENERAL: no distress  SKIN: Visible skin clear. No significant rash, abnormal pigmentation or lesions.  EYES: Eyes grossly normal to inspection.  No discharge or erythema, or obvious scleral/conjunctival abnormalities.  NECK: visible goiter is not present; no visible neck masses  RESP: No audible wheeze, cough, or visible cyanosis.  No visible retractions or increased work of breathing.    NEURO: " Awake, alert, responds appropriately to questions.  Mentation and speech fluent.  PSYCH:affect normal and appearance well-groomed.    Video-Visit Details    Type of service:  Video Visit    Video Start Time (time video started): 1500    Video End Time (time video stopped): 1517    Originating Location (pt. Location): Home    Distant Location (provider location):  Off-site    Mode of Communication:  Video Conference via [facility]    Physician has received verbal consent for a Video Visit from the patient? Yes    I spent a total of 24 minutes on the day of this established patient visit on chart review, lab review, coordinating care, exam and counseling of patient

## 2023-11-28 NOTE — LETTER
11/28/2023         RE: Halima Razo  [address] [address]        Dear Colleague,    Thank you for referring your patient, Halima Razo, to the [facility] ENDOCRINOLOGY. Please see a copy of my visit note below.    Fam is a 38 year old who is being evaluated via a billable video visit.      How would you like to obtain your AVS? MyChart  If the video visit is dropped, the invitation should be resent by: Text to cell phone: [phone]  Will anyone else be joining your video visit? No      Endocrine Consult Video visit note    Attending Assessment/Plan :        Class 1 obesity with serious comorbidity and body mass index (BMI) of 30.0 to 30.9 in adult, unspecified obesity type  Insulin resistance  Excess skin    Assessment:  -likely has PCOS based on hx of irregular menstrual periods and hx of elevated testosterone levels.  On spironolactone and tolerating well  -tolerating wegovy well and having weight loss success - on 2.4mg weekly dose at this time and has so far lost 44#  -would benefit from plastic surgery eval of excess skin    Plan:  -continue wegovy 2.4mg weekly  -referral to plastic surgery to discuss possible surgical options for excess skin  -will get pt scheduled for endocrine followup in 6 months    I have independently reviewed and interpreted labs, imaging as indicated.     RTC 6 months    Chief complaint:  Halima is a 38 year old female seen for PCOS, obesity.     HISTORY OF PRESENT ILLNESS    Fam is a 38F referred to endocrine for PCOS. She comes to clinic today for followup after starting wegovy for weight loss and spironolactone for androgenic hair growth.      Initial HPI:  She reports having a pelvic ultrasound with no ovarian cysts seen but has met the other 2 diagnostic criteria for PCOS with elevated testosterone levels in the past and hx of irregular menstrual cycles.  Testosterone levels and cycles normalized after the birth of her son in 2022  but previously were abnormal.   She also reports facial hair growth that requires daily shaving.      Had tubal ligation after son was born so no plans for future pregnancies.      She is currently on metformin XR 500mg once daily to help with PCOS sx.  No GI issues.     She also wanted to discuss weight gain and possible cushings today.  She endorses hair thinning, depression/anxiety, weight gain especially central.  Does endorse striae but not necessarily thick and they have faded.  No marked appetite change.  No diabetes.     She would be interested in trying wegovy to help with weight loss efforts.  She has been implementing diet and lifestyle/exercise modifications for at least the last 3 months and has not been able to meet weight loss goals with that alone.  No hx of pancreatitis.  No known family hx of MTC.     Last visit:  Tolerating wegovy well.  States that she didn't notice a ton of appetite suppression at first on starting doses but did start to notice effects on hunger cravings and unintentional overeating.  Started 1mg dose this week and is now noting some appetite suppression.  Denies GI sx, worsened GERD, nausea, or vomiting. Initial weight pre-wegovy 172, current down to 155      Regarding spironolactone, she did notice an effect on BP.  Typically down to low 100s systolic now.  Did have one episode with some soft BP and was mildly sx so she held a dose of izzy which resolved issue.  Back on BID and tolerating well.  Lytes have been normal.  Discussed improvements in BP with weight loss and how she may need to stop izzy if any low BP.  She also hasn't yet noticed a ton of change in androgenic hair issues (testosterone wasn't elevated so this was started as a trial).    Metformin increased after last visit due to some mildly elevated fasting sugar on labs - tolerating this well.      TODAY: Doing well.  Current weight 128 (down from 172).  Currently up to the 2.4mg dose of wegovy.  Still on  spironolactone.  Had a temporary period of softer BP w/some lightheadedness.  This has since resolved.   Now that she has lost a significant amount of weight, having some issues with excess skin.  Interesting in surgical evaluation for treatment options.     Endocrine relevant labs and imaging are as follows:  Component      Latest Ref Rng 6/28/2015  10:46 AM 3/27/2022  8:49 AM 2/24/2023  9:52 AM   Testosterone Total      8 - 60 ng/dL 44   17    Sex Hormone Binding Globulin      30 - 135 nmol/L 30   33    Free Testosterone Calculated      ng/dL 0.83 (H)   0.30    Creatinine      0.52 - 1.04 mg/dL  0.46 (L)     GFR Estimate      >60 mL/min/1.73m2  >90     Hemoglobin A1C      0.0 - 5.6 %   5.2    DHEA Sulfate      35 - 430 ug/dL   99    Insulin      2.6 - 24.9 uU/mL   33.8 (H)       Component      Latest Ref Rng 2/24/2023  9:52 AM   Glucose      70 - 99 mg/dL 106 (H)         REVIEW OF SYSTEMS    10 system ROS otherwise as per the HPI or negative    Past Medical History  Past Medical History:   Diagnosis Date     Angioedema 2019    non-specific cause     Depression with anxiety      Gestational diabetes mellitus (GDM), antepartum, gestational diabetes method of control unspecified     2020     Hypertension 2018    on medication for about 6 months in 2018     Infertility, female      Migraines      Obesity 04/18/2012     Papanicolaou smear of cervix with low grade squamous intraepithelial lesion (LGSIL) 2009    resolved     Pre-eclampsia in third trimester 2020     Ventricular septal defect (VSD) of fetus in guevara pregnancy, antepartum        Medications  Current Outpatient Medications   Medication Sig Dispense Refill     famotidine (PEPCID) 20 MG tablet Take 1 tablet (20 mg) by mouth 2 times daily 60 tablet 3     hydrOXYzine (ATARAX) 25 MG tablet 1-2 tabs at bedtime for sleep and anxiety (Patient taking differently: as needed 1-2 tabs at bedtime for sleep and anxiety) 50 tablet 1     metFORMIN (GLUCOPHAGE XR) 500  "MG 24 hr tablet Take 1 tablet (500 mg) by mouth 2 times daily (with meals) 180 tablet 4     Multiple Vitamin (MULTIVITAMIN PO)        omeprazole (PRILOSEC) 20 MG DR capsule Take 1 capsule (20 mg) by mouth daily 30 capsule 1     Semaglutide-Weight Management (WEGOVY) 2.4 MG/0.75ML pen Inject 2.4 mg Subcutaneous once a week 3 mL 11     spironolactone (ALDACTONE) 25 MG tablet Take 1 tablet (25 mg) by mouth 2 times daily 180 tablet 4       Allergies  No Known Allergies    Family History  family history includes Anxiety Disorder in her brother, mother, and other family members; Brain Cancer in her maternal grandfather; Cerebrovascular Disease in her paternal grandfather; Depression in her brother, cousin, maternal grandmother, and mother; Diabetes in an other family member; Heart Failure in her maternal grandmother; Hyperlipidemia in an other family member; Hypertension in an other family member; Lung Cancer in her paternal grandmother; Mental Illness in her brother, maternal grandmother, and another family member; Obesity in an other family member; Other Cancer in her cousin, father, maternal grandfather, paternal grandfather, paternal grandmother, and another family member; Thyroid Disease in her maternal grandmother, mother, and another family member.    Social History  Social History     Tobacco Use     Smoking status: Former     Packs/day: 0.50     Years: 14.00     Additional pack years: 0.00     Total pack years: 7.00     Types: Cigarettes     Start date: 2003     Quit date: 2022     Years since quittin.9     Smokeless tobacco: Never   Vaping Use     Vaping Use: Never used   Substance Use Topics     Alcohol use: Not Currently     Drug use: No       Physical Exam  There is no height or weight on file to calculate BMI.   Pt reports current weight 171, height 5'3\" giving BMI of 30.1  GENERAL: no distress  SKIN: Visible skin clear. No significant rash, abnormal pigmentation or lesions.  EYES: Eyes grossly " normal to inspection.  No discharge or erythema, or obvious scleral/conjunctival abnormalities.  NECK: visible goiter is not present; no visible neck masses  RESP: No audible wheeze, cough, or visible cyanosis.  No visible retractions or increased work of breathing.    NEURO: Awake, alert, responds appropriately to questions.  Mentation and speech fluent.  PSYCH:affect normal and appearance well-groomed.    Video-Visit Details    Type of service:  Video Visit    Video Start Time (time video started): 1500    Video End Time (time video stopped): 1517    Originating Location (pt. Location): Home    Distant Location (provider location):  Off-site    Mode of Communication:  Video Conference via [employer]    Physician has received verbal consent for a Video Visit from the patient? Yes    I spent a total of 24 minutes on the day of this established patient visit on chart review, lab review, coordinating care, exam and counseling of patient         Again, thank you for allowing me to participate in the care of your patient.        Sincerely,        Phong Garcia MD

## 2023-11-29 ENCOUNTER — OFFICE VISIT (OUTPATIENT)
Dept: NEUROLOGY | Facility: CLINIC | Age: 38
End: 2023-11-29
Attending: EMERGENCY MEDICINE
Payer: COMMERCIAL

## 2023-11-29 VITALS
BODY MASS INDEX: 22.68 KG/M2 | DIASTOLIC BLOOD PRESSURE: 92 MMHG | HEIGHT: 63 IN | WEIGHT: 128 LBS | HEART RATE: 85 BPM | SYSTOLIC BLOOD PRESSURE: 119 MMHG

## 2023-11-29 DIAGNOSIS — G04.91 MYELITIS (H): Primary | ICD-10-CM

## 2023-11-29 DIAGNOSIS — M62.81 MUSCLE WEAKNESS (GENERALIZED): ICD-10-CM

## 2023-11-29 DIAGNOSIS — R20.0 RIGHT SIDED NUMBNESS: ICD-10-CM

## 2023-11-29 DIAGNOSIS — G43.009 MIGRAINE WITHOUT AURA AND WITHOUT STATUS MIGRAINOSUS, NOT INTRACTABLE: ICD-10-CM

## 2023-11-29 DIAGNOSIS — R51.9 ACUTE NONINTRACTABLE HEADACHE, UNSPECIFIED HEADACHE TYPE: ICD-10-CM

## 2023-11-29 PROCEDURE — 99205 OFFICE O/P NEW HI 60 MIN: CPT | Performed by: PSYCHIATRY & NEUROLOGY

## 2023-11-29 NOTE — NURSING NOTE
Chief Complaint   Patient presents with    Numbness     On November 10th noticed numbness in right foot and unable to bend foot upwards. Numbness and tingling started on right sided of body and between shoulder blades. Loss of hot/cold differentiation lasted only 1-2 days.     Niru Black LPN on 11/29/2023 at 10:53 AM

## 2023-11-29 NOTE — LETTER
11/29/2023         RE: Halima Razo  [address] [facility] [address]        Dear Colleague,    Thank you for referring your patient, Halima Razo, to the [facility] [place]. Please see a copy of my visit note below.    In person evaluation      HPI  11/29/2023, in person consultation      38-year-old been evaluated neurologically for:  Migraine  Hemibody numbness  Hypertension  Abnormal MRI head nonspecific T2 flair changes        A.  Right sided hemiparesthesias        Onset around November 17, 2023 seen in the ER        Came on over about a week's time        Involved whole right side of body  Symptom onset history  Sometime around November 10 developed clumsiness of her right foot trouble with dorsiflexion weakness and some numbness in the right foot over a week time had numbness and went up the leg and then up the right arm  Later she became concerned and had some numbness of her right face but she gets face numbness on the right when she is anxious  Symptoms peaked sometime around November 16 about a week later  She had temperature appreciation changes where she could not feel the temperature on her right side  Things have gotten better now and she has residual weakness of the right foot that is clumsy specially on stairs  She has decreased patch of numbness on the lateral right foot and ankle    No diplopia dysarthria dysphagia no trouble with visual changes  No bowel or bladder incontinence    Does have some chronic neck pain going on for 5 years but not new during this.    Question whether this was more of a myelitis involving the neck and there is a symptoms are secondary to anxiety  Did have temperature appreciation changes/right foot drop/paresthesias right arm and leg    MRI cervical spine rule out myelitis  EMG right arm and leg rule out AIDP      B.  Nonspecific T2 flair changes on MRI 11/17/2023       Past history of preeclampsia/migraine/gestational  diabetes      C.  COVID infection March 2023        Post COVID symptoms    Past medical history  Past hypertension  Preeclampsia during third trimester  Possible polycystic ovary syndrome (seeing endocrinology irregular menses)  Tubal ligation  Angioedema  Migraines  GERD  Anxiety    Habits  Smokes half a pack per day quit  Alcohol occasionally/rare in the past        Family history  Father alcohol abuse/skin cancer  Mother depression/anxiety/hypertension/diabetes/thyroid/migraine  Maternal grandfather brain cancer  Maternal grandmother heart failure/thyroid  Paternal grandfather CVA  Paternal grandmother lung cancer  Brother depression/anxiety  Cousin testicular cancer          Work-up  Echo 10/26/2023, ejection fraction 55-60%, normal left atrium, see official report  MRI brain 11/17/2023 with and without contrast  1.  No acute intracranial abnormality. Specifically, no acute infarct.  2.  Scattered tiny foci of T2/FLAIR hyperintense signal abnormality in the cerebral white matter.        Findings are nonspecific, though in the appropriate clinical setting,        sequela of migraine headaches would be a diagnostic consideration given bifrontal predominance.       Changes of early chronic microvascular ischemic disease could be considered if risk factors exist.       Appearance would be atypical for demyelinating plaques.  3.  No pathologic intracranial enhancement.    Laboratory data review               7/2023 11/2023  NA/K                  138/4.5  BUN/Cr              17.4/0.59  GLU                   93  WBC/HGB          7.6/14.8  PLTs                    296,000  TSH       1.4 3  B1          159  B6          159.8  B12        338              Exam    Review of systems pertinent positives and negatives  No diplopia no dysarthria no dysphagia  No vision loss    Had clumsy right foot with a foot drop on the right  Lack of sensation right lateral foot previously was the right leg and right arm  No bowel or  bladder difficulty    Does have difficulty with some back and neck pain myalgias and muscle cramps  Previously was the right leg and right arm  Signs gets right face numbness with anxiety      Has had difficulty with tingling nonspecific weakness lightheadedness/coordination diffic but this is more chronic for 5 yearsulties since COVID infection March 2023    Some difficulty with concentration/memory  Has some history of anxiety    Otherwise review systems negative    General exam  Blood pressure 119/92, pulse 85  Alert orient x3  HEENT normal  Lungs clear  Heart rate regular  Abdomen soft  Symmetrical pulses  No edema in the feet    Neurologic exam  Alert orient x3  Prosody speech normal  Naming normal  Comprehension normal  Repetition normal  No aphasia  No neglect  Memory normal    Cranial nerves II through XII  No ophthalmoplegia  No nystagmus  Visual fields intact  Face symmetrical  Tongue twisters good    Upper extremities  Reported right over left  Deltoid 5/5  Biceps 5/5  Triceps 5/5  Wrist/finger extensors 5/5  Wrist/finger flexors 5/5  Intrinsics 5/5    No drift  No tremor  Finger-nose okay    Lower extremities  Reported right over left  Iliopsoas 5/5  Quadriceps 5/5  Hamstring 5/5  Anterior tibial 4-/5-  EHL 3/4+  Posterior tibial 4/5-  Gastrocnemius 5/5    Reflexes reported right over left  Biceps 2/2  Triceps 2/2  Knees 2/2 ankles 2/2  Ankles 2/2  Toes downgoing  Negative Leblanc reflex  Straight leg raising negative    Sensory exam  Decreased cold appreciation and decreased pinprick right foot and lower ankle more so laterally than medially  Decreased light touch right foot on the lateral aspect  Vibration intact in the upper and lower extremities      Gait  Normal  Romberg negative  Tandem okay          Assessment/plan    Right hemibody paresthesias (November 2023)        MRI November 2023 nonspecific T2 flair changes nondiagnostic        Came on over a week with some residual right foot drop and  clumsy right foot    2.  Post COVID fatigue/chronic palpitations (patient with COVID March 2023)    3.  Nonspecific small T2 flair changes without enhancement       History of gestational diabetes/preeclampsia/probable migraines      Differential diagnosis  Myelitis versus peripheral sensory acute neuropathy    Patient states that sometimes she gets numbness on the right face when she is anxious I wonder if that symptom or component was separate from the new symptom that came on    To rule out cervical myelitis we will get an MRI scan cervical spine with and without contrast  MRI scan is not diagnostic of MS  Depending on the above we will decide whether a lumbar puncture would be appropriate or not    We will check an EMG of her right arm and right leg due to the fact that she does have foot drop and lateral right ankle sensory change to rule out peripheral demyelinating disease such as AIDP    She will follow-up and we will decide whether we tracked her exam or do further testing.    Discussed with patient and     Total care time today is 68 minutes    As part of visit today  Reviewed endocrinology note  Reviewed MRI scan  Reviewed laboratory data  Reviewed ER note 11/17/2023  Reviewed primary MD note      Again, thank you for allowing me to participate in the care of your patient.        Sincerely,        kamlesh Parisi MD

## 2023-12-01 ENCOUNTER — TELEPHONE (OUTPATIENT)
Dept: ENDOCRINOLOGY | Facility: CLINIC | Age: 38
End: 2023-12-01
Payer: COMMERCIAL

## 2023-12-01 ASSESSMENT — ENCOUNTER SYMPTOMS
HEMATURIA: 0
SHORTNESS OF BREATH: 0
HEMATOCHEZIA: 0
CONSTIPATION: 0
CHILLS: 0
PALPITATIONS: 0
JOINT SWELLING: 0
NERVOUS/ANXIOUS: 1
DIZZINESS: 1
ARTHRALGIAS: 1
ABDOMINAL PAIN: 0
HEARTBURN: 0
COUGH: 0
EYE PAIN: 1
HEADACHES: 1
BREAST MASS: 0
MYALGIAS: 1
FEVER: 0
SORE THROAT: 0
PARESTHESIAS: 1
WEAKNESS: 1
DIARRHEA: 0
NAUSEA: 0
DYSURIA: 0
FREQUENCY: 0

## 2023-12-01 NOTE — TELEPHONE ENCOUNTER
Patient call:     Appointment type: New endocrine   Provider: Cristin  Return date: 5/13/24   Speciality phone number: [phone]  Additional appointment(s) needed: N/A   Additional notes: Spoke to pt and scheduled with Dr. Amaral instead of reccommended provider from Dr. Garcia, Dr. Haines since Zaki was booked out till July.    Marissa Canada on 12/1/2023 at 12:52 PM

## 2023-12-02 ENCOUNTER — HEALTH MAINTENANCE LETTER (OUTPATIENT)
Age: 38
End: 2023-12-02

## 2023-12-07 ASSESSMENT — PATIENT HEALTH QUESTIONNAIRE - PHQ9
SUM OF ALL RESPONSES TO PHQ QUESTIONS 1-9: 6
10. IF YOU CHECKED OFF ANY PROBLEMS, HOW DIFFICULT HAVE THESE PROBLEMS MADE IT FOR YOU TO DO YOUR WORK, TAKE CARE OF THINGS AT HOME, OR GET ALONG WITH OTHER PEOPLE: NOT DIFFICULT AT ALL
SUM OF ALL RESPONSES TO PHQ QUESTIONS 1-9: 6

## 2023-12-08 ENCOUNTER — HOSPITAL ENCOUNTER (OUTPATIENT)
Dept: MRI IMAGING | Facility: CLINIC | Age: 38
Discharge: HOME | End: 2023-12-08
Attending: PSYCHIATRY & NEUROLOGY | Admitting: PSYCHIATRY & NEUROLOGY
Payer: COMMERCIAL

## 2023-12-08 ENCOUNTER — OFFICE VISIT (OUTPATIENT)
Dept: FAMILY MEDICINE | Facility: CLINIC | Age: 38
End: 2023-12-08
Payer: COMMERCIAL

## 2023-12-08 VITALS
RESPIRATION RATE: 20 BRPM | SYSTOLIC BLOOD PRESSURE: 124 MMHG | TEMPERATURE: 97.6 F | HEIGHT: 63 IN | WEIGHT: 126.4 LBS | OXYGEN SATURATION: 97 % | DIASTOLIC BLOOD PRESSURE: 82 MMHG | BODY MASS INDEX: 22.39 KG/M2 | HEART RATE: 105 BPM

## 2023-12-08 DIAGNOSIS — Z00.00 ROUTINE GENERAL MEDICAL EXAMINATION AT A HEALTH CARE FACILITY: ICD-10-CM

## 2023-12-08 DIAGNOSIS — M62.81 MUSCLE WEAKNESS (GENERALIZED): ICD-10-CM

## 2023-12-08 DIAGNOSIS — K21.00 GASTROESOPHAGEAL REFLUX DISEASE WITH ESOPHAGITIS, UNSPECIFIED WHETHER HEMORRHAGE: ICD-10-CM

## 2023-12-08 DIAGNOSIS — G04.91 MYELITIS (H): Primary | ICD-10-CM

## 2023-12-08 DIAGNOSIS — G43.009 MIGRAINE WITHOUT AURA AND WITHOUT STATUS MIGRAINOSUS, NOT INTRACTABLE: ICD-10-CM

## 2023-12-08 DIAGNOSIS — G04.91 MYELITIS (H): ICD-10-CM

## 2023-12-08 DIAGNOSIS — R20.0 RIGHT SIDED NUMBNESS: ICD-10-CM

## 2023-12-08 PROCEDURE — 255N000002 HC RX 255 OP 636: Performed by: PSYCHIATRY & NEUROLOGY

## 2023-12-08 PROCEDURE — 72156 MRI NECK SPINE W/O & W/DYE: CPT

## 2023-12-08 PROCEDURE — 99213 OFFICE O/P EST LOW 20 MIN: CPT | Mod: 25 | Performed by: FAMILY MEDICINE

## 2023-12-08 PROCEDURE — 99395 PREV VISIT EST AGE 18-39: CPT | Performed by: FAMILY MEDICINE

## 2023-12-08 PROCEDURE — A9585 GADOBUTROL INJECTION: HCPCS | Performed by: PSYCHIATRY & NEUROLOGY

## 2023-12-08 RX ORDER — FAMOTIDINE 20 MG/1
20 TABLET, FILM COATED ORAL 2 TIMES DAILY
Qty: 60 TABLET | Refills: 3 | Status: CANCELLED | OUTPATIENT
Start: 2023-12-08

## 2023-12-08 RX ORDER — GADOBUTROL 604.72 MG/ML
5.5 INJECTION INTRAVENOUS ONCE
Status: COMPLETED | OUTPATIENT
Start: 2023-12-08 | End: 2023-12-08

## 2023-12-08 RX ADMIN — GADOBUTROL 5.5 ML: 604.72 INJECTION INTRAVENOUS at 12:44

## 2023-12-08 ASSESSMENT — ENCOUNTER SYMPTOMS
EYE PAIN: 1
PALPITATIONS: 0
NERVOUS/ANXIOUS: 1
NAUSEA: 0
CONSTIPATION: 0
BREAST MASS: 0
JOINT SWELLING: 0
WEAKNESS: 1
SHORTNESS OF BREATH: 0
ARTHRALGIAS: 1
COUGH: 0
HEMATOCHEZIA: 0
CHILLS: 0
HEARTBURN: 0
ABDOMINAL PAIN: 0
FREQUENCY: 0
MYALGIAS: 1
PARESTHESIAS: 1
DIARRHEA: 0
DIZZINESS: 1
SORE THROAT: 0
FEVER: 0
HEADACHES: 1
HEMATURIA: 0
DYSURIA: 0

## 2023-12-08 ASSESSMENT — PAIN SCALES - GENERAL: PAINLEVEL: NO PAIN (0)

## 2023-12-08 NOTE — NURSING NOTE
"No chief complaint on file.      Initial LMP 11/03/2023 (Exact Date)  Estimated body mass index is 22.67 kg/m  as calculated from the following:    Height as of 11/29/23: 1.6 m (5' 3\").    Weight as of 11/29/23: 58.1 kg (128 lb).    Patient presents to the clinic using No DME    Is there anyone who you would like to be able to receive your results? No  If yes have patient fill out ZAHIDA      "

## 2023-12-08 NOTE — PROGRESS NOTES
SUBJECTIVE:   Fam is a 38 year old, presenting for the following:  No chief complaint on file.        2023     9:07 AM   Additional Questions   Roomed by carlin castano cma       Healthy Habits:     Getting at least 3 servings of Calcium per day:  Yes    Bi-annual eye exam:  Yes    Dental care twice a year:  Yes    Sleep apnea or symptoms of sleep apnea:  Daytime drowsiness    Diet:  Other    Frequency of exercise:  4-5 days/week    Duration of exercise:  30-45 minutes    Taking medications regularly:  Yes    Medication side effects:  None    Additional concerns today:  Yes      Today's PHQ-9 Score:       2023    10:05 AM   PHQ-9 SCORE   PHQ-9 Total Score MyChart 6 (Mild depression)   PHQ-9 Total Score 6                 Gerd/Heartburn  Duration of complaint: 2 years    Omeprazole does take care of all the symptoms   ED/UC Followup:    Facility:  [facility]      Date of visit: 2023 (4 hours)   Reason for visit: Right sided numbness    Acute nonintractable headache, unspecified headache type  Current Status: worse     Has seen neuro and work up has started   Things are stable  Notes reviewed       Social History     Tobacco Use    Smoking status: Former     Packs/day: 0.50     Years: 14.00     Additional pack years: 0.00     Total pack years: 7.00     Types: Cigarettes     Start date: 2003     Quit date: 2022     Years since quittin.0    Smokeless tobacco: Never   Substance Use Topics    Alcohol use: Not Currently             2023     9:05 AM   Alcohol Use   Prescreen: >3 drinks/day or >7 drinks/week? Not Applicable     Reviewed orders with patient.  Reviewed health maintenance and updated orders accordingly - Yes  Labs reviewed in EPIC    Breast Cancer Screenin/20/2022     9:39 AM   Breast CA Risk Assessment (FHS-7)   Do you have a family history of breast, colon, or ovarian cancer? No / Unknown         Patient under 40 years of age: Routine  Mammogram Screening not recommended.   Pertinent mammograms are reviewed under the imaging tab.    History of abnormal Pap smear: NO - age 30-65 PAP every 5 years with negative HPV co-testing recommended      Latest Ref Rng & Units 2/3/2021     5:17 PM 2/3/2021     4:45 PM 1/29/2016    11:48 AM   PAP / HPV   PAP (Historical)  NIL      HPV 16 DNA NEG^Negative  Negative  Negative    HPV 18 DNA NEG^Negative  Negative  Negative    Other HR HPV NEG^Negative  Negative  Negative      Reviewed and updated as needed this visit by clinical staff   Tobacco  Allergies  Meds  Problems  Med Hx  Surg Hx  Fam Hx          Reviewed and updated as needed this visit by Provider   Tobacco  Allergies  Meds  Problems  Med Hx  Surg Hx  Fam Hx             Review of Systems   Constitutional:  Negative for chills and fever.   HENT:  Negative for congestion, ear pain, hearing loss and sore throat.    Eyes:  Positive for pain and visual disturbance.   Respiratory:  Negative for cough and shortness of breath.    Cardiovascular:  Negative for chest pain, palpitations and peripheral edema.   Gastrointestinal:  Negative for abdominal pain, constipation, diarrhea, heartburn, hematochezia and nausea.   Breasts:  Negative for tenderness, breast mass and discharge.   Genitourinary:  Negative for dysuria, frequency, genital sores, hematuria, pelvic pain, urgency, vaginal bleeding and vaginal discharge.   Musculoskeletal:  Positive for arthralgias and myalgias. Negative for joint swelling.   Skin:  Negative for rash.   Neurological:  Positive for dizziness, weakness, headaches and paresthesias.   Psychiatric/Behavioral:  Negative for mood changes. The patient is nervous/anxious.      All the above related to recent neuro event and is following with neurology for this has MRI today      OBJECTIVE:   /82 (BP Location: Right arm, Patient Position: Sitting, Cuff Size: Adult Regular)   Pulse 105   Temp 97.6  F (36.4  C) (Tympanic)    "Resp 20   Ht 1.6 m (5' 2.99\")   Wt 57.3 kg (126 lb 6.4 oz)   LMP 12/01/2023 (Approximate)   SpO2 97%   BMI 22.40 kg/m    Physical Exam  GENERAL: healthy, alert and no distress  EYES: Eyes grossly normal to inspection, PERRL and conjunctivae and sclerae normal  HENT: ear canals and TM's normal, nose and mouth without ulcers or lesions  NECK: no adenopathy, no asymmetry, masses, or scars and thyroid normal to palpation  BREAST: normal without masses, tenderness or nipple discharge and no palpable axillary masses or adenopathy  CV: regular rate and rhythm, normal S1 S2, no S3 or S4, no murmur, click or rub, no peripheral edema and peripheral pulses strong  ABDOMEN: soft, nontender, no hepatosplenomegaly, no masses and bowel sounds normal  MS: no gross musculoskeletal defects noted, no edema  SKIN: no suspicious lesions or rashes  PSYCH: mentation appears normal, affect normal/bright    Diagnostic Test Results:  Labs reviewed in Epic    ASSESSMENT/PLAN:   (G04.91) Myelitis (H)  (primary encounter diagnosis)  Comment: per neurology notes reviewed   Plan:     (K21.00) Gastroesophageal reflux disease with esophagitis, unspecified whether hemorrhage  Comment: Stable no change in treatment plan. On PPI   Plan:     (Z00.00) Routine general medical examination at a health care facility  Comment:   Plan:     Patient has been advised of split billing requirements and indicates understanding: Yes      COUNSELING:  Reviewed preventive health counseling, as reflected in patient instructions        She reports that she quit smoking about a year ago. Her smoking use included cigarettes. She started smoking about 19 years ago. She has a 7.00 pack-year smoking history. She has never used smokeless tobacco.          Yasmin Wilkinson MD  [facility]  Answers submitted by the patient for this visit:  Patient Health Questionnaire (Submitted on 12/7/2023)  If you checked off any problems, how difficult " have these problems made it for you to do your work, take care of things at home, or get along with other people?: Not difficult at all  PHQ9 TOTAL SCORE: 6

## 2023-12-11 ENCOUNTER — MYC MEDICAL ADVICE (OUTPATIENT)
Dept: NEUROLOGY | Facility: CLINIC | Age: 38
End: 2023-12-11
Payer: COMMERCIAL

## 2023-12-11 NOTE — TELEPHONE ENCOUNTER
With symptoms that also involve your right cheek and right arm lower spinal testing may not be helpful.  MRI scan cervical spine 12/8/2023 does not show any specific cord impingement, by itself would not totally rule out mild myelitis.  Symptoms can fluctuate with fatigue or tiredness.    Concerned that if we do the MRI scan each time there is some fluctuations that may have not given enough time to see the changes.    I will see if nursing can place you on a cancellation list for the EMG and move that testing outboard    Sometimes if there has been central demyelination caused by inflammation symptoms can fluctuate with fatigue tiredness or any illness.    12/11/2023 reported new or fluctuating symptoms  Constant tiredness  Right foot still weak and clumsy  Tingling right side ankle/leg/gluteal area/arm/hand/face/upper back/shoulder blades  Some morning headaches  Great eye blurred vision on and off mild  Mental fog  Some right-sided eye and thumb twitchiness off-and-on  Random shooting pains in the hand  White flashes of vision at night when eyes are closed  Dizzy spells  Eyes vibrate    (To nursing staff see if her EMG can be moved up thank you)

## 2023-12-15 NOTE — PROGRESS NOTES
In person evaluation      [facility]  11/29/2023, in person consultation  12/18/2023, in person visit      38-year-old been evaluated neurologically for:  Migraine  Hemibody numbness  Hypertension  Abnormal MRI head nonspecific T2 flair changes    Since last seen for initial visit November 29, 2023  Fluctuation of symptoms    12/11/2023 reported new or fluctuating symptoms  Constant tiredness  Right foot still weak and clumsy  Tingling right side ankle/leg/gluteal area/arm/hand/face/upper back/shoulder blades  Some morning headaches  Great eye blurred vision on and off mild  Mental fog  Some right-sided eye and thumb twitchiness off-and-on  Random shooting pains in the hand  White flashes of vision at night when eyes are closed  Dizzy spells  Eyes vibrate   .  Symptoms can fluctuate with fatigue or tiredness.  Concerned that if we do the MRI scan each time there is some fluctuations that may have not given enough time to see the changes.  Sometimes if there has been central demyelination caused by inflammation symptoms can fluctuate with fatigue tiredness or any illness.    EMG 12/18/2023 right arm and right leg        A.  Right sided hemiparesthesias        Onset around November 10-17, 2023 seen in the ER        Came on over about a week's time        Involved whole right side of body  Symptom onset history  Sometime around November 10 developed clumsiness of her right foot trouble with dorsiflexion weakness and some numbness in the right foot over a week time had numbness and went up the leg and then up the right arm  Later she became concerned and had some numbness of her right face but she gets face numbness on the right when she is anxious  Symptoms peaked sometime around November 16 about a week later  She had temperature appreciation changes where she could not feel the temperature on her right side  Things have gotten better now and she has residual weakness of the right foot that is clumsy specially on stairs  She  has decreased patch of numbness on the lateral right foot and ankle    No diplopia dysarthria dysphagia no trouble with visual changes  No bowel or bladder incontinence    Does have some chronic neck pain going on for 5 years but not new during this.    Question whether this was more of a myelitis involving the neck and there is a symptoms are secondary to anxiety  Did have temperature appreciation changes/right foot drop/paresthesias right arm and leg    MRI cervical spine rule out myelitis  EMG right arm and leg rule out AIDP      B.  Nonspecific T2 flair changes on MRI 11/17/2023       Past history of preeclampsia/migraine/gestational diabetes      C.  COVID infection March 2023        Post COVID symptoms    Past medical history  Past hypertension  Preeclampsia during third trimester  Possible polycystic ovary syndrome (seeing endocrinology irregular menses)  Tubal ligation  Angioedema  Migraines  GERD  Anxiety    Habits  Smokes half a pack per day quit  Alcohol occasionally/rare in the past        Family history  Father alcohol abuse/skin cancer  Mother depression/anxiety/hypertension/diabetes/thyroid/migraine  Maternal grandfather brain cancer  Maternal grandmother heart failure/thyroid  Paternal grandfather CVA  Paternal grandmother lung cancer  Brother depression/anxiety  Cousin testicular cancer          Work-up    Hemoglobin A1c 5.2%, (2/2023)    Echo 10/26/2023, ejection fraction 55-60%, normal left atrium, see official report  MRI brain 11/17/2023 with and without contrast  1.  No acute intracranial abnormality. Specifically, no acute infarct.  2.  Scattered tiny foci of T2/FLAIR hyperintense signal abnormality in the cerebral white matter.        Findings are nonspecific, though in the appropriate clinical setting,        sequela of migraine headaches would be a diagnostic consideration given bifrontal predominance.       Changes of early chronic microvascular ischemic disease could be considered if risk  factors exist.       Appearance would be atypical for demyelinating plaques.  3.  No pathologic intracranial enhancement.  MRI scan cervical spine, 12/8/2023 with and without contrast  1.  No cord signal abnormality or pathologic enhancement.  2.  Minimal degenerative disc disease at a few levels        Without substantial spinal canal or neural foraminal stenosis.    EMG 12/18/2023 right upper and right lower extremity.  Impression:  1.  Right peroneal neuropathy with predominantly motor changes in the anterior tibial/peroneal's longus, no focal slowing seen on conduction study across the fibular head.  2.  No changes to suggest radiculopathy  3.  Right upper extremity EMG is normal    Laboratory data review               7/2023 11/2023  NA/K                  138/4.5  BUN/Cr              17.4/0.59  GLU                   93  WBC/HGB          7.6/14.8  PLTs                    296,000  TSH       1.43  B1          159  B6          159.8  B12        338              Exam    Review of systems pertinent positives and negatives  No diplopia no dysarthria no dysphagia  No vision loss    Had clumsy right foot with a foot drop on the right  Lack of sensation right lateral foot previously was the right leg and right arm  No bowel or bladder difficulty    Does have difficulty with some back and neck pain myalgias and muscle cramps  Previously was the right leg and right arm  Signs gets right face numbness with anxiety      Has had difficulty with tingling nonspecific weakness lightheadedness/coordination diffic but this is more chronic for 5 yearsulties since COVID infection March 2023    Some difficulty with concentration/memory  Has some history of anxiety    Otherwise review systems negative    General exam  Blood pressure 98/67, pulse 78  Alert orient x3  HEENT normal  Lungs clear  Heart rate regular  Abdomen soft  Symmetrical pulses  No edema in the feet    Neurologic exam  Alert orient x3  Prosody speech normal  Naming  normal  Comprehension normal  Repetition normal  No aphasia  No neglect  Memory normal    Cranial nerves II through XII  No ophthalmoplegia  No nystagmus  Visual fields intact  Face symmetrical  Tongue twisters good    Upper extremities  Reported right over left  Deltoid 5/5  Biceps 5/5  Triceps 5/5  Wrist/finger extensors 5/5  Wrist/finger flexors 5/5  Intrinsics 5/5    No drift  No tremor  Finger-nose okay    Lower extremities  Reported right over left  Iliopsoas 5/5  Quadriceps 5/5  Hamstring 5/5  Anterior tibial 4/5  EHL 3/5  Eversion 4+/5  Posterior tibial 5/5  Gastrocnemius 5/5    Reflexes reported right over left  Biceps 2/2  Triceps 2/2  Knees 2/2 ankles 2/2  Ankles 2/2  Toes downgoing  Negative Leblanc reflex  Straight leg raising negative    Sensory exam  Decreased cold appreciation and decreased pinprick right foot and lower ankle more so laterally than medially  Decreased light touch right foot on the lateral aspect  Vibration intact in the upper and lower extremities      Gait  Normal except with slight high steppage due to mild right foot drop  Romberg negative  Tandem okay          Assessment/plan    Right hemibody paresthesias (onset November 10-17th, 2023)        MRI November 2023 nonspecific T2 flair changes nondiagnostic        Came on over a week with some residual right foot drop and clumsy right foot    2.  Post COVID fatigue/chronic palpitations (patient with COVID March 2023)    3.  Nonspecific small T2 flair changes without enhancement       History of gestational diabetes/preeclampsia/probable migraines    4.  EMG 12/18/2023 consistent with right peroneal neuropathy       Patient does cross her legs a lot.      Differential diagnosis  Myelitis versus peripheral sensory acute neuropathy  Right peroneal neuropathy      Patient states that sometimes she gets numbness on the right face when she is anxious   I wonder if that symptom or component was separate from the new symptom that came  on    With the EMG being positive for right peroneal neuropathy explaining her right weak foot and the rest of her exam looking normal today.  I wonder if the paresthesias of the right arm and face are something different  Question whether it is from migraine  She does get a headache every day    She is somewhat anxious  She does not actually have diabetes    We talked about trying a short trial of prednisone 20 mg once per day x 7 days  Recommended that if that causes her to be too well and up or anxious she could split the tablet in half and spread it out so that it is more than 7 days    She we will try not to cross her legs at all her  will help try to remind her    She will follow-up in February 23, 2024 to track her symptoms  With her MRI scan being negative of the neck nonspecific of the head and I do not feel that a lumbar puncture would be helpful now that we have confirmed that the weakness in the right foot is probably a localized right peroneal neuropathy.    I did spend extra time reviewing her workup both the cervical MRI scan/EMG and exam and discussing the above with her and her     Total care time today 32 minutes      As part of visit today  Reviewed MRI cervical spine 12/11/2023 no signs of spinal cord inflammation or compression

## 2023-12-18 ENCOUNTER — OFFICE VISIT (OUTPATIENT)
Dept: NEUROLOGY | Facility: CLINIC | Age: 38
End: 2023-12-18
Payer: COMMERCIAL

## 2023-12-18 ENCOUNTER — OFFICE VISIT (OUTPATIENT)
Dept: NEUROLOGY | Facility: CLINIC | Age: 38
End: 2023-12-18
Attending: PSYCHIATRY & NEUROLOGY
Payer: COMMERCIAL

## 2023-12-18 VITALS
BODY MASS INDEX: 23.19 KG/M2 | HEIGHT: 62 IN | SYSTOLIC BLOOD PRESSURE: 98 MMHG | HEART RATE: 78 BPM | DIASTOLIC BLOOD PRESSURE: 67 MMHG | WEIGHT: 126 LBS

## 2023-12-18 DIAGNOSIS — G04.91 MYELITIS (H): ICD-10-CM

## 2023-12-18 DIAGNOSIS — G43.009 MIGRAINE WITHOUT AURA AND WITHOUT STATUS MIGRAINOSUS, NOT INTRACTABLE: ICD-10-CM

## 2023-12-18 DIAGNOSIS — R20.0 RIGHT SIDED NUMBNESS: ICD-10-CM

## 2023-12-18 DIAGNOSIS — G57.31 NEUROPATHY OF RIGHT PERONEAL NERVE: Primary | ICD-10-CM

## 2023-12-18 DIAGNOSIS — M62.81 MUSCLE WEAKNESS (GENERALIZED): ICD-10-CM

## 2023-12-18 PROCEDURE — 95911 NRV CNDJ TEST 9-10 STUDIES: CPT | Performed by: PSYCHIATRY & NEUROLOGY

## 2023-12-18 PROCEDURE — 99214 OFFICE O/P EST MOD 30 MIN: CPT | Mod: 25 | Performed by: PSYCHIATRY & NEUROLOGY

## 2023-12-18 PROCEDURE — 95886 MUSC TEST DONE W/N TEST COMP: CPT | Mod: RT | Performed by: PSYCHIATRY & NEUROLOGY

## 2023-12-18 RX ORDER — PREDNISONE 20 MG/1
TABLET ORAL
Qty: 7 TABLET | Refills: 0 | Status: SHIPPED | OUTPATIENT
Start: 2023-12-18 | End: 2024-02-23

## 2023-12-18 NOTE — NURSING NOTE
Chief Complaint   Patient presents with    Results     Discuss EMG and MRI results     Niru Black LPN on 12/18/2023 at 2:49 PM

## 2023-12-18 NOTE — LETTER
12/18/2023         RE: Halima Razo  [address] [address]        Dear Colleague,    Thank you for referring your patient, Hlaima Razo, to the [facility] [place]. Please see a copy of my visit note below.    See EMG report      Again, thank you for allowing me to participate in the care of your patient.        Sincerely,        kamlesh Parisi MD

## 2023-12-18 NOTE — LETTER
12/18/2023         RE: Halima Razo  [address] [facility] [address]        Dear Colleague,    Thank you for referring your patient, Halima Razo, to the [facility] [place]. Please see a copy of my visit note below.    In person evaluation      HPI  11/29/2023, in person consultation  12/18/2023, in person visit      38-year-old been evaluated neurologically for:  Migraine  Hemibody numbness  Hypertension  Abnormal MRI head nonspecific T2 flair changes    Since last seen for initial visit November 29, 2023  Fluctuation of symptoms    12/11/2023 reported new or fluctuating symptoms  Constant tiredness  Right foot still weak and clumsy  Tingling right side ankle/leg/gluteal area/arm/hand/face/upper back/shoulder blades  Some morning headaches  Great eye blurred vision on and off mild  Mental fog  Some right-sided eye and thumb twitchiness off-and-on  Random shooting pains in the hand  White flashes of vision at night when eyes are closed  Dizzy spells  Eyes vibrate   .  Symptoms can fluctuate with fatigue or tiredness.  Concerned that if we do the MRI scan each time there is some fluctuations that may have not given enough time to see the changes.  Sometimes if there has been central demyelination caused by inflammation symptoms can fluctuate with fatigue tiredness or any illness.    EMG 12/18/2023 right arm and right leg        A.  Right sided hemiparesthesias        Onset around November 10-17, 2023 seen in the ER        Came on over about a week's time        Involved whole right side of body  Symptom onset history  Sometime around November 10 developed clumsiness of her right foot trouble with dorsiflexion weakness and some numbness in the right foot over a week time had numbness and went up the leg and then up the right arm  Later she became concerned and had some numbness of her right face but she gets face numbness on the right when she is anxious  Symptoms peaked  sometime around November 16 about a week later  She had temperature appreciation changes where she could not feel the temperature on her right side  Things have gotten better now and she has residual weakness of the right foot that is clumsy specially on stairs  She has decreased patch of numbness on the lateral right foot and ankle    No diplopia dysarthria dysphagia no trouble with visual changes  No bowel or bladder incontinence    Does have some chronic neck pain going on for 5 years but not new during this.    Question whether this was more of a myelitis involving the neck and there is a symptoms are secondary to anxiety  Did have temperature appreciation changes/right foot drop/paresthesias right arm and leg    MRI cervical spine rule out myelitis  EMG right arm and leg rule out AIDP      B.  Nonspecific T2 flair changes on MRI 11/17/2023       Past history of preeclampsia/migraine/gestational diabetes      C.  COVID infection March 2023        Post COVID symptoms    Past medical history  Past hypertension  Preeclampsia during third trimester  Possible polycystic ovary syndrome (seeing endocrinology irregular menses)  Tubal ligation  Angioedema  Migraines  GERD  Anxiety    Habits  Smokes half a pack per day quit  Alcohol occasionally/rare in the past        Family history  Father alcohol abuse/skin cancer  Mother depression/anxiety/hypertension/diabetes/thyroid/migraine  Maternal grandfather brain cancer  Maternal grandmother heart failure/thyroid  Paternal grandfather CVA  Paternal grandmother lung cancer  Brother depression/anxiety  Cousin testicular cancer          Work-up    Hemoglobin A1c 5.2%, (2/2023)    Echo 10/26/2023, ejection fraction 55-60%, normal left atrium, see official report  MRI brain 11/17/2023 with and without contrast  1.  No acute intracranial abnormality. Specifically, no acute infarct.  2.  Scattered tiny foci of T2/FLAIR hyperintense signal abnormality in the cerebral white matter.         Findings are nonspecific, though in the appropriate clinical setting,        sequela of migraine headaches would be a diagnostic consideration given bifrontal predominance.       Changes of early chronic microvascular ischemic disease could be considered if risk factors exist.       Appearance would be atypical for demyelinating plaques.  3.  No pathologic intracranial enhancement.  MRI scan cervical spine, 12/8/2023 with and without contrast  1.  No cord signal abnormality or pathologic enhancement.  2.  Minimal degenerative disc disease at a few levels        Without substantial spinal canal or neural foraminal stenosis.    EMG 12/18/2023 right upper and right lower extremity.  Impression:  1.  Right peroneal neuropathy with predominantly motor changes in the anterior tibial/peroneal's longus, no focal slowing seen on conduction study across the fibular head.  2.  No changes to suggest radiculopathy  3.  Right upper extremity EMG is normal    Laboratory data review               7/2023 11/2023  NA/K                  138/4.5  BUN/Cr              17.4/0.59  GLU                   93  WBC/HGB          7.6/14.8  PLTs                    296,000  TSH       1.43  B1          159  B6          159.8  B12        338              Exam    Review of systems pertinent positives and negatives  No diplopia no dysarthria no dysphagia  No vision loss    Had clumsy right foot with a foot drop on the right  Lack of sensation right lateral foot previously was the right leg and right arm  No bowel or bladder difficulty    Does have difficulty with some back and neck pain myalgias and muscle cramps  Previously was the right leg and right arm  Signs gets right face numbness with anxiety      Has had difficulty with tingling nonspecific weakness lightheadedness/coordination diffic but this is more chronic for 5 yearsulties since COVID infection March 2023    Some difficulty with concentration/memory  Has some history of  anxiety    Otherwise review systems negative    General exam  Blood pressure 98/67, pulse 78  Alert orient x3  HEENT normal  Lungs clear  Heart rate regular  Abdomen soft  Symmetrical pulses  No edema in the feet    Neurologic exam  Alert orient x3  Prosody speech normal  Naming normal  Comprehension normal  Repetition normal  No aphasia  No neglect  Memory normal    Cranial nerves II through XII  No ophthalmoplegia  No nystagmus  Visual fields intact  Face symmetrical  Tongue twisters good    Upper extremities  Reported right over left  Deltoid 5/5  Biceps 5/5  Triceps 5/5  Wrist/finger extensors 5/5  Wrist/finger flexors 5/5  Intrinsics 5/5    No drift  No tremor  Finger-nose okay    Lower extremities  Reported right over left  Iliopsoas 5/5  Quadriceps 5/5  Hamstring 5/5  Anterior tibial 4/5  EHL 3/5  Eversion 4+/5  Posterior tibial 5/5  Gastrocnemius 5/5    Reflexes reported right over left  Biceps 2/2  Triceps 2/2  Knees 2/2 ankles 2/2  Ankles 2/2  Toes downgoing  Negative Leblanc reflex  Straight leg raising negative    Sensory exam  Decreased cold appreciation and decreased pinprick right foot and lower ankle more so laterally than medially  Decreased light touch right foot on the lateral aspect  Vibration intact in the upper and lower extremities      Gait  Normal except with slight high steppage due to mild right foot drop  Romberg negative  Tandem okay          Assessment/plan    Right hemibody paresthesias (onset November 10-17th, 2023)        MRI November 2023 nonspecific T2 flair changes nondiagnostic        Came on over a week with some residual right foot drop and clumsy right foot    2.  Post COVID fatigue/chronic palpitations (patient with COVID March 2023)    3.  Nonspecific small T2 flair changes without enhancement       History of gestational diabetes/preeclampsia/probable migraines    4.  EMG 12/18/2023 consistent with right peroneal neuropathy       Patient does cross her legs a  lot.      Differential diagnosis  Myelitis versus peripheral sensory acute neuropathy  Right peroneal neuropathy      Patient states that sometimes she gets numbness on the right face when she is anxious   I wonder if that symptom or component was separate from the new symptom that came on    With the EMG being positive for right peroneal neuropathy explaining her right weak foot and the rest of her exam looking normal today.  I wonder if the paresthesias of the right arm and face are something different  Question whether it is from migraine  She does get a headache every day    She is somewhat anxious  She does not actually have diabetes    We talked about trying a short trial of prednisone 20 mg once per day x 7 days  Recommended that if that causes her to be too well and up or anxious she could split the tablet in half and spread it out so that it is more than 7 days    She we will try not to cross her legs at all her  will help try to remind her    She will follow-up in February 23, 2024 to track her symptoms  With her MRI scan being negative of the neck nonspecific of the head and I do not feel that a lumbar puncture would be helpful now that we have confirmed that the weakness in the right foot is probably a localized right peroneal neuropathy.    I did spend extra time reviewing her workup both the cervical MRI scan/EMG and exam and discussing the above with her and her     Total care time today 32 minutes      As part of visit today  Reviewed MRI cervical spine 12/11/2023 no signs of spinal cord inflammation or compression            Again, thank you for allowing me to participate in the care of your patient.        Sincerely,        kamlesh Parisi MD

## 2023-12-18 NOTE — PROCEDURES
[facility]    [facility]ly [facility]  [address], [address]  [place] [address]  Tel: [phone]  Fax: [phone]          Full Name: Fam Razo Gender: Female  Patient ID: [id number] YOB: 1985      Visit Date: 12/18/2023 14:07  Age: 38 Years 7 Months Old  Interpreted By: Kaden Parisi MD   Ref Dr.: Yasmin Wilkinson MD  Tech: ST   Height: 5 feet 3 inch  Reason for referral: Evaluate right upper/right lower. c/o tingling, tightness in arms/leg/foot > 1 month. Random twitching in both thumbs. Right > Left.      Motor NCS      Nerve / Sites Lat Amp Dist Cameron    ms mV cm m/s   R Median - APB      Wrist 3.33 11.8 7       Elbow 7.55 11.3 25 59   R Ulnar - ADM      Wrist 2.55 13.7 7       B.Elbow 5.68 13.7 20 64      A.Elbow 7.50 13.4 12 66   R Peroneal - EDB      Ankle 3.54 7.6 8       Fib head 9.58 6.9 29 48      Pop fossa 12.45 6.9 12 42   R Tibial - AH      Ankle 4.22 23.0 8       Pop fossa 11.72 22.3 38 51       F  Wave      Nerve Fmin    ms   R Ulnar - ADM 26.88   R Tibial - AH 48.02       Sensory NCS      Nerve / Sites Pk Lat NP Amp Dist    ms  V cm   R Median - II (Antidr)      Wrist 3.13 53.0 13   R Ulnar - V (Antidr)      Wrist 2.71 38.3 11   R Median, Ulnar - Transcarpal comparison      Median Palm 2.03 80.8 8      Ulnar Palm 1.77 38.0 8   R Sural - Ankle (Calf)      Calf 3.75 10.1 14   R Superficial peroneal - Ankle      Lat leg 3.28 21.8 12       EMG Summary Table     Spontaneous MUAP Rcmt Note   Muscle Fib PSW Fasc IA # Amp Dur PPP Rate Type   R. Gluteus medius None None None N N N N N N N   R. Gluteus kylee None None None N N N N N N N   R. L3 paraspinal None None None N N N N N N N   R. L4 paraspinal None None None N N N N N N N   R. L5 paraspinal None None None N N N N N N N   R. S1 paraspinal None None None N N N N N N N   R. Adductor albina None None None N N N N N N N   R. Quadriceps None None None N N N N N N N   R.  Tibialis anterior 1+ 1+ None N Sl Red Incr Incr N N N   R. Peroneus longus 1+ 1+ None N Sl Red Incr Incr N N N   R. Gastrocnemius (Medial head) None None None N N N N N N N   R. Tibialis posterior None None None N N N N N N N   R. Brachioradialis None None None N N N N N N N   R. Pronator teres None None None N N N N N N N   R. Biceps brachii None None None N N N N N N N   R. Deltoid None None None N N N N N N N   R. Triceps brachii None None None N N N N N N N   R. Flexor carpi ulnaris None None None N N N N N N N   R. First dorsal interosseous None None None N N N N N N N   R. Abductor pollicis brevis None None None N N N N N N N     Right median motor conduction study normal  Right ulnar motor conduction study normal  Right ulnar F wave latency normal    Right median snap potential normal  Right ulnar snap potential normal  Right median palmar latency normal  Right ulnar palmar 90 normal    Right peroneal motor conduction study normal  Right tibial motor nerve study normal  Right F wave latency normal    Right sural snap potential normal  Right superficial peroneal snap potential normal    Needle examination right upper extremity no active or chronic denervation, normal    Needle examination right lower extremity, 1+ positives and fibrillation potentials with mild chronic motor unit changes right anterior tibial and right peroneus longus.  The rest the needle examination of the right lower extremity is normal    Impression    1.  Right peroneal neuropathy with predominantly motor changes in the anterior tibial/peroneal's longus, no focal slowing seen on conduction study across the fibular head.    2.  No changes to suggest radiculopathy    3.  Right upper extremity EMG is normal

## 2024-02-09 ENCOUNTER — OFFICE VISIT (OUTPATIENT)
Dept: SURGERY | Facility: CLINIC | Age: 39
End: 2024-02-09
Payer: COMMERCIAL

## 2024-02-09 VITALS — HEIGHT: 62 IN | BODY MASS INDEX: 21.81 KG/M2 | WEIGHT: 118.5 LBS

## 2024-02-09 DIAGNOSIS — L98.7 EXCESS SKIN: ICD-10-CM

## 2024-02-09 PROCEDURE — 99205 OFFICE O/P NEW HI 60 MIN: CPT | Performed by: PLASTIC SURGERY

## 2024-02-09 RX ORDER — HEPARIN SODIUM 10000 [USP'U]/ML
5000 INJECTION, SOLUTION INTRAVENOUS; SUBCUTANEOUS
Status: CANCELLED | OUTPATIENT
Start: 2024-02-09

## 2024-02-09 NOTE — LETTER
2024         RE: Halima Razo  [address] [facility] [address]        Dear Colleague,    Thank you for referring your patient, Halima Razo, to the [facility]. Please see a copy of my visit note below.    Referring Provider:  Phong Garcia MD  [address] [place] [id number]     Primary Care Provider:  Yasmin Wilkinson      RE: Halima Razo.  : 1985.  AGUSTIN: 2024.    Reason for visit: Abdominoplasty/panniculectomy s/p weight loss    HPI: Patient has lost about 80 pounds over the last couple of years from diet and exercise along with Wegovy.  Patient has a lot of redundant skin in the abdomen causing rashes as well as back pain.  She would like this removed.  She uses over-the-counter and prescribed medications.  This interferes with her day-to-day life including exercising running jumping toileting and wearing clothes.    Medical history:  Past Medical History:   Diagnosis Date     Angioedema     non-specific cause     Depression with anxiety      Gestational diabetes mellitus (GDM), antepartum, gestational diabetes method of control unspecified          Hypertension     on medication for about 6 months in 2018     Infertility, female      Migraines      Obesity 2012     Papanicolaou smear of cervix with low grade squamous intraepithelial lesion (LGSIL)     resolved     Pre-eclampsia in third trimester      Ventricular septal defect (VSD) of fetus in guevara pregnancy, antepartum        Surgical history:  Past Surgical History:   Procedure Laterality Date     BIOPSY      Underarm mole,     LAPAROSCOPY DIAGNOSTIC (GYN) N/A 2018    Procedure: LAPAROSCOPY DIAGNOSTIC (GYN);;  Surgeon: Kei Kelley MD;  Location: [facility] OR     LAPAROTOMY MINI, TUBAL LIGATION (POST PARTUM), COMBINED Bilateral 3/28/2022    Procedure: LIGATION, FALLOPIAN TUBE, POSTPARTUM;  Surgeon: Orquidea Mcneil MD;  Location:  [facility]     OPERATIVE HYSTEROSCOPY WITH MORCELLATOR N/A 01/26/2018    Procedure: OPERATIVE HYSTEROSCOPY WITH MORCELLATOR (MYOSURE);  Diagnostic laparoscopy, hysteroscopy with biopsy;  Surgeon: Kei Kelley MD;  Location: [facility] OR       Family history:  Family History   Problem Relation Age of Onset     Other Cancer Father         Skin Cancer     Thyroid Disease Mother      Depression Mother      Anxiety Disorder Mother      Depression Brother      Anxiety Disorder Brother      Mental Illness Brother      Thyroid Disease Maternal Grandmother      Heart Failure Maternal Grandmother      Depression Maternal Grandmother      Mental Illness Maternal Grandmother      Brain Cancer Maternal Grandfather      Other Cancer Maternal Grandfather         Brain Cancer     Lung Cancer Paternal Grandmother      Other Cancer Paternal Grandmother         Lung Cancer     Cerebrovascular Disease Paternal Grandfather      Other Cancer Paternal Grandfather         Skin Cancer     Other Cancer Cousin         Testicular Cancer     Depression Cousin         Maternal Cousin     Diabetes Other         Maternal Aunt     Hypertension Other      Hyperlipidemia Other      Other Cancer Other         Brain Cancer     Anxiety Disorder Other         Maternal Uncle     Anxiety Disorder Other         Maternal Uncle     Mental Illness Other         Paternal Uncle     Thyroid Disease Other         Maternal Aunt     Obesity Other      Glaucoma No family hx of      Macular Degeneration No family hx of        Medications:  Current Outpatient Medications   Medication Sig Dispense Refill     hydrOXYzine (ATARAX) 25 MG tablet 1-2 tabs at bedtime for sleep and anxiety (Patient taking differently: as needed 1-2 tabs at bedtime for sleep and anxiety) 50 tablet 1     metFORMIN (GLUCOPHAGE XR) 500 MG 24 hr tablet Take 1 tablet (500 mg) by mouth 2 times daily (with meals) 180 tablet 4     Multiple Vitamin (MULTIVITAMIN PO)        omeprazole (PRILOSEC) 20 MG   "capsule Take 1 capsule (20 mg) by mouth daily 30 capsule 1     Semaglutide-Weight Management (WEGOVY) 2.4 MG/0.75ML pen Inject 2.4 mg Subcutaneous once a week 3 mL 11     spironolactone (ALDACTONE) 25 MG tablet Take 1 tablet (25 mg) by mouth 2 times daily 180 tablet 4     famotidine (PEPCID) 20 MG tablet Take 1 tablet (20 mg) by mouth 2 times daily 60 tablet 3     predniSONE (DELTASONE) 20 MG tablet One tab po qam x 7 days 7 tablet 0       Allergies:  No Known Allergies    Social history:   Social History     Tobacco Use     Smoking status: Former     Packs/day: 0.50     Years: 14.00     Additional pack years: 0.00     Total pack years: 7.00     Types: Cigarettes     Start date: 2003     Quit date: 2022     Years since quittin.1     Smokeless tobacco: Never   Substance Use Topics     Alcohol use: Not Currently         Physical Examination:  Ht 1.575 m (5' 2\")   Wt 53.8 kg (118 lb 8 oz)   BMI 21.67 kg/m    Body mass index is 21.67 kg/m .    General: No acute distress.    ABDOMEN: Lower abdominal panniculus that hangs to the pubic symphysis.  Intertrigo.  Redundancy above the umbilicus as well.  Skin pinch thickness in the lower abdomen 6 to 8 cm.  In the upper abdomen about 4 to 6 cm.  Some weakness in the abdominal wall as well.  No hernias.        ASSESMENT and PLAN:    Based on above findings, a diagnosis of massive weight loss with symptomatic excess skin of the abdomen.  I had a galo, detailed discussion with the patient in the presence of my nurse (who was present from beginning to end) about the proposed surgery. I was very clear and detailed about overview of surgery, perioperative plans, and expectations. I showed the patient where the scars would be on the patient's body and with pictures, and what the concepts of the procedure. The major differences of the different types of abdominal contouring procedure (panniculectomy and Abdominoplasty) were explained in detail including the scars, " umbilical position/presence/relocation/loss, area of the abdomen affected, and aesthetic outcome.  All risks, benefits and alternatives of the surgery including but not limited to pain, infection, bleeding, scarring, asymmetry, seromas, hematomas, wound breakdown, wound dehiscence, numbness, nerve and vessel injury, prominent scar, hypertrophic scar, keloid scar, inability to remove all of the excess tissue, reloosening over time, injury to deeper structures, swelling of the mons, DVT, PE, MI, CVA, pneumonia, renal failure and death. The patient agreed that they understood them all and had all their questions answered to their satisfaction. The realities of insurance companies and requirement of prior authorization were also explained. We will send the quotes for the procedure(s) if not covered if they want as discussed.  We will get prior authorization for the procedure(s) and then proceed as indicated.      Our plan will be to try and get prior authorization for abdominoplasty and panniculectomy, also send her quotes for the procedures, and then proceed as indicated.       All questions were answered. The patient was happy with the visit. I look forward to helping the patient out in the near future as indicated.       Total time spent in the encounter today including chart review, visit itself, and post-visit paperwork was 60 minutes.       Юлия Fletcher MD    Chief, Division of Plastic Surgery  Department of Surgery  [facility]      CC: Phong Garcia MD  [address] [place] [id number]  CC: Yasmin Wilkinson      Again, thank you for allowing me to participate in the care of your patient.        Sincerely,        ANABELA Fletcher MD

## 2024-02-09 NOTE — NURSING NOTE
"Halima Razo's chief complaint for this visit includes:  Chief Complaint   Patient presents with    New Patient     Post weightloss skin removal, per pt, recs in epic, referred by MD Phong Garcia     PCP: Yasmin Wilkinson    Referring Provider:  Phong Garcia MD  [address] [place] [id number]    Ht 1.575 m (5' 2\")   Wt 53.8 kg (118 lb 8 oz)   BMI 21.67 kg/m    Data Unavailable      No Known Allergies      Do you need any medication refills at today's visit? Mahsa chicas Clinic Assistant- Surgical Specialties         "

## 2024-02-10 NOTE — PROGRESS NOTES
Referring Provider:  Phong Garcia MD  [address] [place] [id number]     Primary Care Provider:  Yasmin Wilkinson      RE: Halima Razo.  : 1985.  AGUSTIN: 2024.    Reason for visit: Abdominoplasty/panniculectomy s/p weight loss    HPI: Patient has lost about 80 pounds over the last couple of years from diet and exercise along with Wegovy.  Patient has a lot of redundant skin in the abdomen causing rashes as well as back pain.  She would like this removed.  She uses over-the-counter and prescribed medications.  This interferes with her day-to-day life including exercising running jumping toileting and wearing clothes.    Medical history:  Past Medical History:   Diagnosis Date    Angioedema     non-specific cause    Depression with anxiety     Gestational diabetes mellitus (GDM), antepartum, gestational diabetes method of control unspecified         Hypertension     on medication for about 6 months in     Infertility, female     Migraines     Obesity 2012    Papanicolaou smear of cervix with low grade squamous intraepithelial lesion (LGSIL)     resolved    Pre-eclampsia in third trimester     Ventricular septal defect (VSD) of fetus in guevara pregnancy, antepartum        Surgical history:  Past Surgical History:   Procedure Laterality Date    BIOPSY      Underarm mole,    LAPAROSCOPY DIAGNOSTIC (GYN) N/A 2018    Procedure: LAPAROSCOPY DIAGNOSTIC (GYN);;  Surgeon: Kei Kelley MD;  Location:  OR    LAPAROTOMY MINI, TUBAL LIGATION (POST PARTUM), COMBINED Bilateral 3/28/2022    Procedure: LIGATION, FALLOPIAN TUBE, POSTPARTUM;  Surgeon: Orquidea Mcneil MD;  Location: [facility]    OPERATIVE HYSTEROSCOPY WITH MORCELLATOR N/A 2018    Procedure: OPERATIVE HYSTEROSCOPY WITH MORCELLATOR (MYOSURE);  Diagnostic laparoscopy, hysteroscopy with biopsy;  Surgeon: Kei Kelley MD;  Location:  OR       Family history:  Family  History   Problem Relation Age of Onset    Other Cancer Father         Skin Cancer    Thyroid Disease Mother     Depression Mother     Anxiety Disorder Mother     Depression Brother     Anxiety Disorder Brother     Mental Illness Brother     Thyroid Disease Maternal Grandmother     Heart Failure Maternal Grandmother     Depression Maternal Grandmother     Mental Illness Maternal Grandmother     Brain Cancer Maternal Grandfather     Other Cancer Maternal Grandfather         Brain Cancer    Lung Cancer Paternal Grandmother     Other Cancer Paternal Grandmother         Lung Cancer    Cerebrovascular Disease Paternal Grandfather     Other Cancer Paternal Grandfather         Skin Cancer    Other Cancer Cousin         Testicular Cancer    Depression Cousin         Maternal Cousin    Diabetes Other         Maternal Aunt    Hypertension Other     Hyperlipidemia Other     Other Cancer Other         Brain Cancer    Anxiety Disorder Other         Maternal Uncle    Anxiety Disorder Other         Maternal Uncle    Mental Illness Other         Paternal Uncle    Thyroid Disease Other         Maternal Aunt    Obesity Other     Glaucoma No family hx of     Macular Degeneration No family hx of        Medications:  Current Outpatient Medications   Medication Sig Dispense Refill    hydrOXYzine (ATARAX) 25 MG tablet 1-2 tabs at bedtime for sleep and anxiety (Patient taking differently: as needed 1-2 tabs at bedtime for sleep and anxiety) 50 tablet 1    metFORMIN (GLUCOPHAGE XR) 500 MG 24 hr tablet Take 1 tablet (500 mg) by mouth 2 times daily (with meals) 180 tablet 4    Multiple Vitamin (MULTIVITAMIN PO)       omeprazole (PRILOSEC) 20 MG DR capsule Take 1 capsule (20 mg) by mouth daily 30 capsule 1    Semaglutide-Weight Management (WEGOVY) 2.4 MG/0.75ML pen Inject 2.4 mg Subcutaneous once a week 3 mL 11    spironolactone (ALDACTONE) 25 MG tablet Take 1 tablet (25 mg) by mouth 2 times daily 180 tablet 4    famotidine (PEPCID) 20 MG  "tablet Take 1 tablet (20 mg) by mouth 2 times daily 60 tablet 3    predniSONE (DELTASONE) 20 MG tablet One tab po qam x 7 days 7 tablet 0       Allergies:  No Known Allergies    Social history:   Social History     Tobacco Use    Smoking status: Former     Packs/day: 0.50     Years: 14.00     Additional pack years: 0.00     Total pack years: 7.00     Types: Cigarettes     Start date: 2003     Quit date: 2022     Years since quittin.1    Smokeless tobacco: Never   Substance Use Topics    Alcohol use: Not Currently         Physical Examination:  Ht 1.575 m (5' 2\")   Wt 53.8 kg (118 lb 8 oz)   BMI 21.67 kg/m    Body mass index is 21.67 kg/m .    General: No acute distress.    ABDOMEN: Lower abdominal panniculus that hangs to the pubic symphysis.  Intertrigo.  Redundancy above the umbilicus as well.  Skin pinch thickness in the lower abdomen 6 to 8 cm.  In the upper abdomen about 4 to 6 cm.  Some weakness in the abdominal wall as well.  No hernias.        ASSESMENT and PLAN:    Based on above findings, a diagnosis of massive weight loss with symptomatic excess skin of the abdomen.  I had a galo, detailed discussion with the patient in the presence of my nurse (who was present from beginning to end) about the proposed surgery. I was very clear and detailed about overview of surgery, perioperative plans, and expectations. I showed the patient where the scars would be on the patient's body and with pictures, and what the concepts of the procedure. The major differences of the different types of abdominal contouring procedure (panniculectomy and Abdominoplasty) were explained in detail including the scars, umbilical position/presence/relocation/loss, area of the abdomen affected, and aesthetic outcome.  All risks, benefits and alternatives of the surgery including but not limited to pain, infection, bleeding, scarring, asymmetry, seromas, hematomas, wound breakdown, wound dehiscence, numbness, nerve and " vessel injury, prominent scar, hypertrophic scar, keloid scar, inability to remove all of the excess tissue, reloosening over time, injury to deeper structures, swelling of the mons, DVT, PE, MI, CVA, pneumonia, renal failure and death. The patient agreed that they understood them all and had all their questions answered to their satisfaction. The realities of insurance companies and requirement of prior authorization were also explained. We will send the quotes for the procedure(s) if not covered if they want as discussed.  We will get prior authorization for the procedure(s) and then proceed as indicated.      Our plan will be to try and get prior authorization for abdominoplasty and panniculectomy, also send her quotes for the procedures, and then proceed as indicated.       All questions were answered. The patient was happy with the visit. I look forward to helping the patient out in the near future as indicated.       Total time spent in the encounter today including chart review, visit itself, and post-visit paperwork was 60 minutes.       Юлия Fletcher MD    Chief, Division of Plastic Surgery  Department of Surgery  [facility]      CC: Phong Garcia MD  [address] [place] [phone]  CC: Yasmin Wilkinson

## 2024-02-13 ENCOUNTER — TELEPHONE (OUTPATIENT)
Dept: SURGERY | Facility: CLINIC | Age: 39
End: 2024-02-13
Payer: COMMERCIAL

## 2024-02-13 ENCOUNTER — PREP FOR PROCEDURE (OUTPATIENT)
Dept: PLASTIC SURGERY | Facility: CLINIC | Age: 39
End: 2024-02-13
Payer: COMMERCIAL

## 2024-02-13 PROBLEM — L98.7 EXCESS SKIN: Status: ACTIVE | Noted: 2024-02-09

## 2024-02-13 NOTE — TELEPHONE ENCOUNTER
RN noted surgery scheduling message below. Pre-surgery instructions/information sent to pt via [employer]..Tali Dixon RN     Ketoconazole Pregnancy And Lactation Text: This medication is Pregnancy Category C and it isn't know if it is safe during pregnancy. It is also excreted in breast milk and breast feeding isn't recommended.

## 2024-02-13 NOTE — TELEPHONE ENCOUNTER
RN Care Coordinator: Tali Dixon    Surgery is scheduled with Dr. Fletcher  Date: 5/10   Location: [facility]    H&P to be completed by: Primary Care team - patient instructed to schedule   per patient, this will be scheduled with Yasmin Wilkinson MD        Surgical consult: 4/19 with Julia Vale [facility]    Post-op: 5/17 with Julia Vale [facility]    Patient will receive a phone call from pre-admission nurses 3-5 days prior to surgery with arrival time and NPO instructions.       Spoke with the patient and was able to confirm all scheduled information. Will send staff message to Dr. Fletcher and Bandar Cedillo regarding providing cosmetic quote. Surgery will be submitted to insurance for authorization.      Patient questions/concerns:  Explained prior authorization/financial securing process in detail.         Surgery packet: was sent via [employer]    __    Lorrie Watson on 2/13/2024 at 9:53 AM  P: [phone]

## 2024-02-21 NOTE — PROGRESS NOTES
In person evaluation      HPI  11/29/2023, in person consultation  12/18/2023, in person visit  2/23/2024, in person visit      38-year-old been evaluated neurologically for:  Migraine  Hemibody numbness  Hypertension  Abnormal MRI head nonspecific T2 flair changes      Since last seen December 2023  No longer crossing her legs right foot drop is improved  No further ataxia    Patient has hyperextensible elbows fairly thin and skinny    Gets some intermittent numbness between her shoulder blades    Migraine headaches  Mother with migraine headaches  History of carsickness  History of chronic headaches intermittently                              2/2024  Frequency         4 days/week    Duration             6-48 hours      12/11/2023 reported new or fluctuating symptoms  Constant tiredness  Right foot still weak and clumsy  Tingling right side ankle/leg/gluteal area/arm/hand/face/upper back/shoulder blades  Some morning headaches  Great eye blurred vision on and off mild  Mental fog  Some right-sided eye and thumb twitchiness off-and-on  Random shooting pains in the hand  White flashes of vision at night when eyes are closed  Dizzy spells  Eyes vibrate   .  Symptoms can fluctuate with fatigue or tiredness.  Concerned that if we do the MRI scan each time there is some fluctuations that may have not given enough time to see the changes.  Sometimes if there has been central demyelination caused by inflammation symptoms can fluctuate with fatigue tiredness or any illness.    EMG 12/18/2023 right upper and right lower extremity.  Impression:  1.  Right peroneal neuropathy with predominantly motor changes in the anterior tibial/peroneal's longus, no focal slowing seen on conduction study across the fibular head.  2.  No changes to suggest radiculopathy  3.  Right upper extremity EMG is normal        A.  Right sided hemiparesthesias        Onset around November 10-17, 2023 seen in the ER        Came on over about a week's  time        Involved whole right side of body  Symptom onset history  Sometime around November 10 developed clumsiness of her right foot trouble with dorsiflexion weakness and some numbness in the right foot over a week time had numbness and went up the leg and then up the right arm  Later she became concerned and had some numbness of her right face but she gets face numbness on the right when she is anxious  Symptoms peaked sometime around November 16 about a week later  She had temperature appreciation changes where she could not feel the temperature on her right side  Things have gotten better now and she has residual weakness of the right foot that is clumsy specially on stairs  She has decreased patch of numbness on the lateral right foot and ankle    No diplopia dysarthria dysphagia no trouble with visual changes  No bowel or bladder incontinence    Does have some chronic neck pain going on for 5 years but not new during this.    Question whether this was more of a myelitis involving the neck and there is a symptoms are secondary to anxiety  Did have temperature appreciation changes/right foot drop/paresthesias right arm and leg    EMG 12/18/2023 right upper and right lower extremity.  Impression:  1.  Right peroneal neuropathy with predominantly motor changes in the anterior tibial/peroneal's longus, no focal slowing seen on conduction study across the fibular head.  2.  No changes to suggest radiculopathy  3.  Right upper extremity EMG is normal      B.  Nonspecific T2 flair changes on MRI 11/17/2023       Past history of preeclampsia/migraine/gestational diabetes  MRI scan cervical spine, 12/8/2023 with and without contrast  1.  No cord signal abnormality or pathologic enhancement.  2.  Minimal degenerative disc disease at a few levels        Without substantial spinal canal or neural foraminal stenosis.        C.  COVID infection March 2023        Post COVID symptoms    D.  Migraine headaches         Migraine headaches        Mother with migraine headaches        History of carsickness        History of chronic headaches intermittently                              2/2024  Frequency         4 days/week    Duration             6-48 hours    Associated symptoms  Headache either side or back of the neck  Positive nausea positive vomiting  Positive photophobia            Past medical history  Past hypertension  Preeclampsia during third trimester  Possible polycystic ovary syndrome (seeing endocrinology irregular menses)  Tubal ligation  Angioedema  Migraines  GERD  Anxiety    Habits  Smokes half a pack per day quit  Alcohol occasionally/rare in the past        Family history  Father alcohol abuse/skin cancer  Mother depression/anxiety/hypertension/diabetes/thyroid/migraine  Maternal grandfather brain cancer  Maternal grandmother heart failure/thyroid  Paternal grandfather CVA  Paternal grandmother lung cancer  Brother depression/anxiety  Cousin testicular cancer          Work-up    Hemoglobin A1c 5.2%, (2/2023)    Echo 10/26/2023, ejection fraction 55-60%, normal left atrium, see official report  MRI brain 11/17/2023 with and without contrast  1.  No acute intracranial abnormality. Specifically, no acute infarct.  2.  Scattered tiny foci of T2/FLAIR hyperintense signal abnormality in the cerebral white matter.        Findings are nonspecific, though in the appropriate clinical setting,        sequela of migraine headaches would be a diagnostic consideration given bifrontal predominance.       Changes of early chronic microvascular ischemic disease could be considered if risk factors exist.       Appearance would be atypical for demyelinating plaques.  3.  No pathologic intracranial enhancement.  MRI scan cervical spine, 12/8/2023 with and without contrast  1.  No cord signal abnormality or pathologic enhancement.  2.  Minimal degenerative disc disease at a few levels        Without substantial spinal canal or neural  foraminal stenosis.    EMG 12/18/2023 right upper and right lower extremity.  Impression:  1.  Right peroneal neuropathy with predominantly motor changes in the anterior tibial/peroneal's longus, no focal slowing seen on conduction study across the fibular head.  2.  No changes to suggest radiculopathy  3.  Right upper extremity EMG is normal    Laboratory data review               7/2023 11/2023  NA/K                  138/4.5  BUN/Cr              17.4/0.59  GLU                   93  WBC/HGB          7.6/14.8  PLTs                    296,000  TSH       1.43  B1          159  B6          159.8  B12        338              Exam    Review of systems pertinent positives and negatives  No diplopia no dysarthria no dysphagia  No vision loss    No bowel or bladder difficulty  Right foot weakness better gait is normal      Frequent headaches involving the neck and head  Paresthesias in the interscapular region    No diplopia dysarthria dysphagia    Has some history of anxiety    Otherwise review systems negative    General exam  Blood pressure 115/86, pulse 91  Alert orient x3  HEENT normal  Lungs clear  Heart rate regular  Abdomen soft  Symmetrical pulses  No edema in the feet    Neurologic exam  Alert orient x3  Prosody speech normal  Naming normal  Comprehension normal  Repetition normal  No aphasia  No neglect  Memory normal    Cranial nerves II through XII  No ophthalmoplegia  No nystagmus  Visual fields intact  Face symmetrical  Tongue twisters good    Upper extremities  Reported right over left  Deltoid 5/5  Biceps 5/5  Triceps 5/5  Wrist/finger extensors 5/5  Wrist/finger flexors 5/5  Intrinsics 5/5    No drift  No tremor  Finger-nose okay  Hyper extendable elbows bilaterally    Lower extremities  Reported right over left  Iliopsoas 5/5  Quadriceps 5/5  Hamstring 5/5  Anterior tibial 5/5  EHL 5 -/5  Eversion 5/5  Posterior tibial 5/5  Gastrocnemius 5/5    Reflexes reported right over left  Biceps 2/2  Triceps  2/2  Knees 2/2 ankles 2/2  Ankles 2/2  Toes downgoing  Negative Leblanc reflex  Straight leg raising negative    Sensory exam  Decreased cold appreciation and decreased pinprick right foot and lower ankle more so laterally than medially  Decreased light touch right foot on the lateral aspect  Vibration intact in the upper and lower extremities      Gait  Normal except with slight high steppage due to mild right foot drop  Romberg negative  Tandem okay          Assessment/plan    Right hemibody paresthesias (onset November 10-17th, 2023)        MRI November 2023 nonspecific T2 flair changes nondiagnostic        Came on over a week with some residual right foot drop and clumsy right foot    2.  Post COVID fatigue/chronic palpitations (patient with COVID March 2023)    3.  Nonspecific small T2 flair changes without enhancement       MRI scan cervical spine, 12/8/2023 with and without contrast       1.  No cord signal abnormality or pathologic enhancement.       2.  Minimal degenerative disc disease at a few levels             Without substantial spinal canal or neural foraminal stenosis.         History of gestational diabetes/preeclampsia/probable migraines    4.  EMG 12/18/2023 consistent with right peroneal neuropathy       Patient does cross her legs a lot.      Diagnosis  Hyperextensible joints/thin  Right peroneal neuropathy leg crosser improved  Migraine headaches chronic intractable greater than 15/month for greater than 3 months    In regards to her peroneal neuropathy that is improved she is no longer crossing her legs  No signs of any new demyelinating lesions    Frequent intractable migraine headaches  Add nortriptyline  Nortriptyline 10 mg tablet 2 p.o. nightly can start with 1 and then gradually increase  Stay well-hydrated  Proper sleep    Does have 3 children  Age 2 years/3 years / 17 years  Quite busy and hectic home life    Discussed migraine headaches and abortive versus preventative  treatments    Will track her symptoms and signs and decide whether we would rescan the brain in the future if he had new significant neurologic symptoms or signs  At this time migraines are the most concerning symptom    Will start preventative medication and hopefully abortive therapy will work better    Follow-up in October 2024    Total care time today 30 minutes

## 2024-02-23 ENCOUNTER — MYC MEDICAL ADVICE (OUTPATIENT)
Dept: NEUROLOGY | Facility: CLINIC | Age: 39
End: 2024-02-23

## 2024-02-23 ENCOUNTER — OFFICE VISIT (OUTPATIENT)
Dept: NEUROLOGY | Facility: CLINIC | Age: 39
End: 2024-02-23
Payer: COMMERCIAL

## 2024-02-23 VITALS
WEIGHT: 118 LBS | HEART RATE: 91 BPM | BODY MASS INDEX: 21.71 KG/M2 | HEIGHT: 62 IN | SYSTOLIC BLOOD PRESSURE: 115 MMHG | DIASTOLIC BLOOD PRESSURE: 86 MMHG

## 2024-02-23 DIAGNOSIS — R20.0 RIGHT SIDED NUMBNESS: ICD-10-CM

## 2024-02-23 DIAGNOSIS — G57.31 NEUROPATHY OF RIGHT PERONEAL NERVE: Primary | ICD-10-CM

## 2024-02-23 DIAGNOSIS — G04.91 MYELITIS (H): ICD-10-CM

## 2024-02-23 DIAGNOSIS — G43.009 MIGRAINE WITHOUT AURA AND WITHOUT STATUS MIGRAINOSUS, NOT INTRACTABLE: ICD-10-CM

## 2024-02-23 PROCEDURE — 99214 OFFICE O/P EST MOD 30 MIN: CPT | Performed by: PSYCHIATRY & NEUROLOGY

## 2024-02-23 RX ORDER — NORTRIPTYLINE HCL 10 MG
20 CAPSULE ORAL AT BEDTIME
Qty: 60 CAPSULE | Refills: 11 | Status: SHIPPED | OUTPATIENT
Start: 2024-02-23

## 2024-02-23 NOTE — LETTER
2/23/2024         RE: Halima Razo  [address] [facility] [facility] [id number]        Dear Colleague,    Thank you for referring your patient, Halima Razo, to the [facility] [place]. Please see a copy of my visit note below.    In person evaluation      HPI  11/29/2023, in person consultation  12/18/2023, in person visit  2/23/2024, in person visit      38-year-old been evaluated neurologically for:  Migraine  Hemibody numbness  Hypertension  Abnormal MRI head nonspecific T2 flair changes      Since last seen December 2023  No longer crossing her legs right foot drop is improved  No further ataxia    Patient has hyperextensible elbows fairly thin and skinny    Gets some intermittent numbness between her shoulder blades    Migraine headaches  Mother with migraine headaches  History of carsickness  History of chronic headaches intermittently                              2/2024  Frequency         4 days/week    Duration             6-48 hours      12/11/2023 reported new or fluctuating symptoms  Constant tiredness  Right foot still weak and clumsy  Tingling right side ankle/leg/gluteal area/arm/hand/face/upper back/shoulder blades  Some morning headaches  Great eye blurred vision on and off mild  Mental fog  Some right-sided eye and thumb twitchiness off-and-on  Random shooting pains in the hand  White flashes of vision at night when eyes are closed  Dizzy spells  Eyes vibrate   .  Symptoms can fluctuate with fatigue or tiredness.  Concerned that if we do the MRI scan each time there is some fluctuations that may have not given enough time to see the changes.  Sometimes if there has been central demyelination caused by inflammation symptoms can fluctuate with fatigue tiredness or any illness.    EMG 12/18/2023 right upper and right lower extremity.  Impression:  1.  Right peroneal neuropathy with predominantly motor changes in the anterior tibial/peroneal's longus, no focal slowing seen  on conduction study across the fibular head.  2.  No changes to suggest radiculopathy  3.  Right upper extremity EMG is normal        A.  Right sided hemiparesthesias        Onset around November 10-17, 2023 seen in the ER        Came on over about a week's time        Involved whole right side of body  Symptom onset history  Sometime around November 10 developed clumsiness of her right foot trouble with dorsiflexion weakness and some numbness in the right foot over a week time had numbness and went up the leg and then up the right arm  Later she became concerned and had some numbness of her right face but she gets face numbness on the right when she is anxious  Symptoms peaked sometime around November 16 about a week later  She had temperature appreciation changes where she could not feel the temperature on her right side  Things have gotten better now and she has residual weakness of the right foot that is clumsy specially on stairs  She has decreased patch of numbness on the lateral right foot and ankle    No diplopia dysarthria dysphagia no trouble with visual changes  No bowel or bladder incontinence    Does have some chronic neck pain going on for 5 years but not new during this.    Question whether this was more of a myelitis involving the neck and there is a symptoms are secondary to anxiety  Did have temperature appreciation changes/right foot drop/paresthesias right arm and leg    EMG 12/18/2023 right upper and right lower extremity.  Impression:  1.  Right peroneal neuropathy with predominantly motor changes in the anterior tibial/peroneal's longus, no focal slowing seen on conduction study across the fibular head.  2.  No changes to suggest radiculopathy  3.  Right upper extremity EMG is normal      B.  Nonspecific T2 flair changes on MRI 11/17/2023       Past history of preeclampsia/migraine/gestational diabetes  MRI scan cervical spine, 12/8/2023 with and without contrast  1.  No cord signal  abnormality or pathologic enhancement.  2.  Minimal degenerative disc disease at a few levels        Without substantial spinal canal or neural foraminal stenosis.        C.  COVID infection March 2023        Post COVID symptoms    D.  Migraine headaches        Migraine headaches        Mother with migraine headaches        History of carsickness        History of chronic headaches intermittently                              2/2024  Frequency         4 days/week    Duration             6-48 hours    Associated symptoms  Headache either side or back of the neck  Positive nausea positive vomiting  Positive photophobia            Past medical history  Past hypertension  Preeclampsia during third trimester  Possible polycystic ovary syndrome (seeing endocrinology irregular menses)  Tubal ligation  Angioedema  Migraines  GERD  Anxiety    Habits  Smokes half a pack per day quit  Alcohol occasionally/rare in the past        Family history  Father alcohol abuse/skin cancer  Mother depression/anxiety/hypertension/diabetes/thyroid/migraine  Maternal grandfather brain cancer  Maternal grandmother heart failure/thyroid  Paternal grandfather CVA  Paternal grandmother lung cancer  Brother depression/anxiety  Cousin testicular cancer          Work-up    Hemoglobin A1c 5.2%, (2/2023)    Echo 10/26/2023, ejection fraction 55-60%, normal left atrium, see official report  MRI brain 11/17/2023 with and without contrast  1.  No acute intracranial abnormality. Specifically, no acute infarct.  2.  Scattered tiny foci of T2/FLAIR hyperintense signal abnormality in the cerebral white matter.        Findings are nonspecific, though in the appropriate clinical setting,        sequela of migraine headaches would be a diagnostic consideration given bifrontal predominance.       Changes of early chronic microvascular ischemic disease could be considered if risk factors exist.       Appearance would be atypical for demyelinating plaques.  3.  No  pathologic intracranial enhancement.  MRI scan cervical spine, 12/8/2023 with and without contrast  1.  No cord signal abnormality or pathologic enhancement.  2.  Minimal degenerative disc disease at a few levels        Without substantial spinal canal or neural foraminal stenosis.    EMG 12/18/2023 right upper and right lower extremity.  Impression:  1.  Right peroneal neuropathy with predominantly motor changes in the anterior tibial/peroneal's longus, no focal slowing seen on conduction study across the fibular head.  2.  No changes to suggest radiculopathy  3.  Right upper extremity EMG is normal    Laboratory data review               7/2023 11/2023  NA/K                  138/4.5  BUN/Cr              17.4/0.59  GLU                   93  WBC/HGB          7.6/14.8  PLTs                    296,000  TSH       1.43  B1          159  B6          159.8  B12        338              Exam    Review of systems pertinent positives and negatives  No diplopia no dysarthria no dysphagia  No vision loss    No bowel or bladder difficulty  Right foot weakness better gait is normal      Frequent headaches involving the neck and head  Paresthesias in the interscapular region    No diplopia dysarthria dysphagia    Has some history of anxiety    Otherwise review systems negative    General exam  Blood pressure 115/86, pulse 91  Alert orient x3  HEENT normal  Lungs clear  Heart rate regular  Abdomen soft  Symmetrical pulses  No edema in the feet    Neurologic exam  Alert orient x3  Prosody speech normal  Naming normal  Comprehension normal  Repetition normal  No aphasia  No neglect  Memory normal    Cranial nerves II through XII  No ophthalmoplegia  No nystagmus  Visual fields intact  Face symmetrical  Tongue twisters good    Upper extremities  Reported right over left  Deltoid 5/5  Biceps 5/5  Triceps 5/5  Wrist/finger extensors 5/5  Wrist/finger flexors 5/5  Intrinsics 5/5    No drift  No tremor  Finger-nose okay  Hyper  extendable elbows bilaterally    Lower extremities  Reported right over left  Iliopsoas 5/5  Quadriceps 5/5  Hamstring 5/5  Anterior tibial 5/5  EHL 5 -/5  Eversion 5/5  Posterior tibial 5/5  Gastrocnemius 5/5    Reflexes reported right over left  Biceps 2/2  Triceps 2/2  Knees 2/2 ankles 2/2  Ankles 2/2  Toes downgoing  Negative Leblanc reflex  Straight leg raising negative    Sensory exam  Decreased cold appreciation and decreased pinprick right foot and lower ankle more so laterally than medially  Decreased light touch right foot on the lateral aspect  Vibration intact in the upper and lower extremities      Gait  Normal except with slight high steppage due to mild right foot drop  Romberg negative  Tandem okay          Assessment/plan    Right hemibody paresthesias (onset November 10-17th, 2023)        MRI November 2023 nonspecific T2 flair changes nondiagnostic        Came on over a week with some residual right foot drop and clumsy right foot    2.  Post COVID fatigue/chronic palpitations (patient with COVID March 2023)    3.  Nonspecific small T2 flair changes without enhancement       MRI scan cervical spine, 12/8/2023 with and without contrast       1.  No cord signal abnormality or pathologic enhancement.       2.  Minimal degenerative disc disease at a few levels             Without substantial spinal canal or neural foraminal stenosis.         History of gestational diabetes/preeclampsia/probable migraines    4.  EMG 12/18/2023 consistent with right peroneal neuropathy       Patient does cross her legs a lot.      Diagnosis  Hyperextensible joints/thin  Right peroneal neuropathy leg crosser improved  Migraine headaches chronic intractable greater than 15/month for greater than 3 months    In regards to her peroneal neuropathy that is improved she is no longer crossing her legs  No signs of any new demyelinating lesions    Frequent intractable migraine headaches  Add nortriptyline  Nortriptyline 10 mg  tablet 2 p.o. nightly can start with 1 and then gradually increase  Stay well-hydrated  Proper sleep    Does have 3 children  Age 2 years/3 years / 17 years  Quite busy and hectic home life    Discussed migraine headaches and abortive versus preventative treatments    Will track her symptoms and signs and decide whether we would rescan the brain in the future if he had new significant neurologic symptoms or signs  At this time migraines are the most concerning symptom    Will start preventative medication and hopefully abortive therapy will work better    Follow-up in October 2024    Total care time today 30 minutes                    Again, thank you for allowing me to participate in the care of your patient.        Sincerely,        kamlesh Parisi MD

## 2024-02-23 NOTE — NURSING NOTE
Chief Complaint   Patient presents with    NEUROPATHY     2 month follow up. Pt states after taking the prednisone her foot and leg is much improved. Still has tingling in between shoulder blades.     Niru Black LPN on 2/23/2024 at 2:07 PM

## 2024-04-11 ENCOUNTER — MYC MEDICAL ADVICE (OUTPATIENT)
Dept: SURGERY | Facility: CLINIC | Age: 39
End: 2024-04-11
Payer: COMMERCIAL

## 2024-04-12 ENCOUNTER — TELEPHONE (OUTPATIENT)
Dept: PLASTIC SURGERY | Facility: CLINIC | Age: 39
End: 2024-04-12
Payer: COMMERCIAL

## 2024-04-12 NOTE — TELEPHONE ENCOUNTER
----- Message from Bandar Cedillo sent at 4/10/2024  1:39 PM CDT -----  Regarding: RE: Cosmetic Quote  Hello!    Patient let me know she would not be able to afford any non-covered services due to a plumbing repair that was unexpected and expensive. She'd like to have the panniculectomy as long as that's covered.    Thank you,    Bandar Cedillo  [facility]  Patient   [phone]  ----- Message -----  From: Lorrie Watson  Sent: 2/13/2024  10:00 AM CDT  To: ANABELA Fletcher MD; Bandar Cedillo; #  Subject: Cosmetic Quote                                   Hi LucasDr. Justine jackson will be needing a quote provided to this patient for procedure: ABDOMINOPLASTY, WITH PANNICULECTOMY    [facility]    I told the patient she may not hear from you until Dr. Fletcher is back.    We are attempting to submit to [facility]

## 2024-04-12 NOTE — TELEPHONE ENCOUNTER
Spoke with patient and let her know that the financial securing team noted no approval necessary for both the abdominoplasty and panniculectomy, but this does not mean surgery is covered    Provided her the details from the financial securing note, as well as the cost of care line    She will contact insurance and cost of care to determine if they will cover both, and how much     She will then contact writer with her decision, if she wants to proceed with just the panniculectomy or both the abdominoplasty and panniculectomy     __    Lorrie Watson on 4/12/2024 at 11:32 AM  P: [phone]

## 2024-04-15 ASSESSMENT — PATIENT HEALTH QUESTIONNAIRE - PHQ9
10. IF YOU CHECKED OFF ANY PROBLEMS, HOW DIFFICULT HAVE THESE PROBLEMS MADE IT FOR YOU TO DO YOUR WORK, TAKE CARE OF THINGS AT HOME, OR GET ALONG WITH OTHER PEOPLE: NOT DIFFICULT AT ALL
SUM OF ALL RESPONSES TO PHQ QUESTIONS 1-9: 2
SUM OF ALL RESPONSES TO PHQ QUESTIONS 1-9: 2

## 2024-04-16 ENCOUNTER — OFFICE VISIT (OUTPATIENT)
Dept: FAMILY MEDICINE | Facility: CLINIC | Age: 39
End: 2024-04-16
Payer: COMMERCIAL

## 2024-04-16 VITALS
HEIGHT: 64 IN | BODY MASS INDEX: 19.63 KG/M2 | TEMPERATURE: 98.6 F | SYSTOLIC BLOOD PRESSURE: 102 MMHG | OXYGEN SATURATION: 99 % | RESPIRATION RATE: 20 BRPM | WEIGHT: 115 LBS | DIASTOLIC BLOOD PRESSURE: 76 MMHG | HEART RATE: 84 BPM

## 2024-04-16 DIAGNOSIS — L98.7 EXCESS SKIN: ICD-10-CM

## 2024-04-16 DIAGNOSIS — Z01.818 PREOP GENERAL PHYSICAL EXAM: Primary | ICD-10-CM

## 2024-04-16 DIAGNOSIS — F33.0 MAJOR DEPRESSIVE DISORDER, RECURRENT EPISODE, MILD (H): ICD-10-CM

## 2024-04-16 LAB
ANION GAP SERPL CALCULATED.3IONS-SCNC: 13 MMOL/L (ref 7–15)
BUN SERPL-MCNC: 14.8 MG/DL (ref 6–20)
CALCIUM SERPL-MCNC: 10.1 MG/DL (ref 8.6–10)
CHLORIDE SERPL-SCNC: 103 MMOL/L (ref 98–107)
CREAT SERPL-MCNC: 0.67 MG/DL (ref 0.51–0.95)
DEPRECATED HCO3 PLAS-SCNC: 23 MMOL/L (ref 22–29)
EGFRCR SERPLBLD CKD-EPI 2021: >90 ML/MIN/1.73M2
ERYTHROCYTE [DISTWIDTH] IN BLOOD BY AUTOMATED COUNT: 12 % (ref 10–15)
GLUCOSE SERPL-MCNC: 90 MG/DL (ref 70–99)
HCT VFR BLD AUTO: 45.8 % (ref 35–47)
HGB BLD-MCNC: 15.6 G/DL (ref 11.7–15.7)
MCH RBC QN AUTO: 29.9 PG (ref 26.5–33)
MCHC RBC AUTO-ENTMCNC: 34.1 G/DL (ref 31.5–36.5)
MCV RBC AUTO: 88 FL (ref 78–100)
PLATELET # BLD AUTO: 280 10E3/UL (ref 150–450)
POTASSIUM SERPL-SCNC: 5.1 MMOL/L (ref 3.4–5.3)
RBC # BLD AUTO: 5.22 10E6/UL (ref 3.8–5.2)
SODIUM SERPL-SCNC: 139 MMOL/L (ref 135–145)
WBC # BLD AUTO: 7.3 10E3/UL (ref 4–11)

## 2024-04-16 PROCEDURE — 80048 BASIC METABOLIC PNL TOTAL CA: CPT | Performed by: FAMILY MEDICINE

## 2024-04-16 PROCEDURE — 85027 COMPLETE CBC AUTOMATED: CPT | Performed by: FAMILY MEDICINE

## 2024-04-16 PROCEDURE — 99214 OFFICE O/P EST MOD 30 MIN: CPT | Performed by: FAMILY MEDICINE

## 2024-04-16 PROCEDURE — 36415 COLL VENOUS BLD VENIPUNCTURE: CPT | Performed by: FAMILY MEDICINE

## 2024-04-16 PROCEDURE — G2211 COMPLEX E/M VISIT ADD ON: HCPCS | Performed by: FAMILY MEDICINE

## 2024-04-16 ASSESSMENT — PAIN SCALES - GENERAL: PAINLEVEL: NO PAIN (0)

## 2024-04-16 NOTE — PROGRESS NOTES
Preoperative Evaluation  [facility]  [address] [place] [id number]  Phone: [phone]  Fax: [phone]  Primary Provider: Yasmin Wilkinson  Pre-op Performing Provider: YASMIN WILKINSON  Apr 16, 2024       Fam is a 38 year old, presenting for the following:  Pre-Op Exam        4/16/2024     9:04 AM   Additional Questions   Roomed by [facility]     Surgical Information  Surgery/Procedure: ABDOMINOPLASTY, WITH PANNICULECTOMY   Surgery Location: [facility]  Surgeon: Dr. Villalta  Surgery Date: 5/10/2024  Time of Surgery:   Where patient plans to recover: At home with family  Fax number for surgical facility: Note does not need to be faxed, will be available electronically in Epic.    Assessment & Plan     The proposed surgical procedure is considered INTERMEDIATE risk.    Preop general physical exam    - REVIEW OF HEALTH MAINTENANCE PROTOCOL ORDERS  - CBC with platelets; Future  - Basic metabolic panel; Future  - Basic metabolic panel  - CBC with platelets    Excess skin      Major depressive disorder, recurrent episode, mild (H24)  In remission       The longitudinal plan of care for the diagnosis(es)/condition(s) as documented were addressed during this visit. Due to the added complexity in care, I will continue to support Fam in the subsequent management and with ongoing continuity of care.     Possible Sleep Apnea: patient with mild daytime sleepiness no snoring or apnea events            - No identified additional risk factors other than previously addressed    Antiplatelet or Anticoagulation Medication Instructions   - Patient is on no antiplatelet or anticoagulation medications.    Additional Medication Instructions  Patient is to take all scheduled medications on the day of surgery EXCEPT for modifications listed below:   - GLP-1 Injectable (exenitide, liraglutide, semaglutide, dulaglutide, etc.): HOLD 7 days before surgery    - Topicals: HOLD day of  surgery.    Metformin and spironolactone hold the am of surgery     Recommendation  APPROVAL GIVEN to proceed with proposed procedure, without further diagnostic evaluation.          Subjective       HPI related to upcoming procedure: excess abdominal skin due to weight loss         4/9/2024     9:35 AM   Preop Questions   1. Have you ever had a heart attack or stroke? No   2. Have you ever had surgery on your heart or blood vessels, such as a stent placement, a coronary artery bypass, or surgery on an artery in your head, neck, heart, or legs? No   3. Do you have chest pain with activity? No   4. Do you have a history of  heart failure? No   5. Do you currently have a cold, bronchitis or symptoms of other infection? No   6. Do you have a cough, shortness of breath, or wheezing? No   7. Do you or anyone in your family have previous history of blood clots? No   8. Do you or does anyone in your family have a serious bleeding problem such as prolonged bleeding following surgeries or cuts? No   9. Have you ever had problems with anemia or been told to take iron pills? No   10. Have you had any abnormal blood loss such as black, tarry or bloody stools, or abnormal vaginal bleeding? No   11. Have you ever had a blood transfusion? No   12. Are you willing to have a blood transfusion if it is medically needed before, during, or after your surgery? Yes   13. Have you or any of your relatives ever had problems with anesthesia? No   14. Do you have sleep apnea, excessive snoring or daytime drowsiness? No   14a. Do you have a CPAP machine? No   15. Do you have any artifical heart valves or other implanted medical devices like a pacemaker, defibrillator, or continuous glucose monitor? No   16. Do you have artificial joints? No   17. Are you allergic to latex? No   18. Is there any chance that you may be pregnant? No       Health Care Directive  Patient does not have a Health Care Directive or Living Will: Discussed advance care  planning with patient; information given to patient to review.    Preoperative Review of    reviewed - no record of controlled substances prescribed.          Patient Active Problem List    Diagnosis Date Noted    Excess skin 02/09/2024     Priority: Medium    Myelitis (H) 12/08/2023     Priority: Medium    PCOS (polycystic ovarian syndrome) 06/06/2023     Priority: Medium    Gastroesophageal reflux disease with esophagitis, unspecified whether hemorrhage 02/22/2023     Priority: Medium    History of gestational diabetes 09/23/2022     Priority: Medium    Generalized anxiety disorder 03/17/2016     Priority: Medium    Major depressive disorder, recurrent episode, mild (H24) 01/21/2016     Priority: Medium    Obesity 04/18/2012     Priority: Medium      Past Medical History:   Diagnosis Date    Angioedema 2019    non-specific cause    Depression with anxiety     Depressive disorder 2012?    Intermittent episodes, currently mild    Gestational diabetes mellitus (GDM), antepartum, gestational diabetes method of control unspecified     2020    Hypertension 2018    on medication for about 6 months in 2018    Infertility, female     Migraines     Obesity 04/18/2012    Papanicolaou smear of cervix with low grade squamous intraepithelial lesion (LGSIL) 2009    resolved    Pre-eclampsia in third trimester 2020    Ventricular septal defect (VSD) of fetus in guevara pregnancy, antepartum      Past Surgical History:   Procedure Laterality Date    BIOPSY  2010    Underarm mole,    LAPAROSCOPY DIAGNOSTIC (GYN) N/A 01/26/2018    Procedure: LAPAROSCOPY DIAGNOSTIC (GYN);;  Surgeon: Kei Kelley MD;  Location:  OR    LAPAROTOMY MINI, TUBAL LIGATION (POST PARTUM), COMBINED Bilateral 3/28/2022    Procedure: LIGATION, FALLOPIAN TUBE, POSTPARTUM;  Surgeon: Orquidea Mcneil MD;  Location: [facility] OR    OPERATIVE HYSTEROSCOPY WITH MORCELLATOR N/A 01/26/2018    Procedure: OPERATIVE HYSTEROSCOPY WITH MORCELLATOR  (MYOSURE);  Diagnostic laparoscopy, hysteroscopy with biopsy;  Surgeon: Kei Kelley MD;  Location: [facility] OR     Current Outpatient Medications   Medication Sig Dispense Refill    metFORMIN (GLUCOPHAGE XR) 500 MG 24 hr tablet Take 1 tablet (500 mg) by mouth 2 times daily (with meals) 180 tablet 4    Multiple Vitamin (MULTIVITAMIN PO)       nortriptyline (PAMELOR) 10 MG capsule Take 2 capsules (20 mg) by mouth at bedtime 60 capsule 11    omeprazole (PRILOSEC) 20 MG DR capsule Take 1 capsule (20 mg) by mouth daily 30 capsule 1    Semaglutide-Weight Management (WEGOVY) 2.4 MG/0.75ML pen Inject 2.4 mg Subcutaneous once a week 3 mL 11    spironolactone (ALDACTONE) 25 MG tablet Take 1 tablet (25 mg) by mouth 2 times daily 180 tablet 4    hydrOXYzine (ATARAX) 25 MG tablet 1-2 tabs at bedtime for sleep and anxiety (Patient not taking: Reported on 2024) 50 tablet 1       No Known Allergies     Social History     Tobacco Use    Smoking status: Former     Current packs/day: 0.00     Average packs/day: 0.5 packs/day for 18.9 years (9.5 ttl pk-yrs)     Types: Cigarettes     Start date: 2003     Quit date: 2022     Years since quittin.3    Smokeless tobacco: Never   Substance Use Topics    Alcohol use: Not Currently       History   Drug Use No         Review of Systems    Review of Systems  CONSTITUTIONAL: NEGATIVE for fever, chills, change in weight  INTEGUMENTARY/SKIN: NEGATIVE for worrisome rashes, moles or lesions  EYES: NEGATIVE for vision changes or irritation  ENT/MOUTH: NEGATIVE for ear, mouth and throat problems  RESP: NEGATIVE for significant cough or SOB  BREAST: NEGATIVE for masses, tenderness or discharge  CV: NEGATIVE for chest pain, palpitations or peripheral edema  GI: NEGATIVE for nausea, abdominal pain, heartburn, or change in bowel habits  : NEGATIVE for frequency, dysuria, or hematuria  MUSCULOSKELETAL: NEGATIVE for significant arthralgias or myalgia  NEURO: NEGATIVE for weakness,  "dizziness or paresthesias  ENDOCRINE: NEGATIVE for temperature intolerance, skin/hair changes  HEME: NEGATIVE for bleeding problems  PSYCHIATRIC: NEGATIVE for changes in mood or affect    Objective    /76 (BP Location: Right arm)   Pulse 84   Temp 98.6  F (37  C) (Tympanic)   Resp 20   Ht 1.619 m (5' 3.75\")   Wt 52.2 kg (115 lb)   LMP 03/20/2024   SpO2 99%   BMI 19.89 kg/m     Estimated body mass index is 19.89 kg/m  as calculated from the following:    Height as of this encounter: 1.619 m (5' 3.75\").    Weight as of this encounter: 52.2 kg (115 lb).  Physical Exam  GENERAL: alert and no distress  EYES: Eyes grossly normal to inspection, PERRL and conjunctivae and sclerae normal  HENT: ear canals and TM's normal, nose and mouth without ulcers or lesions  NECK: no adenopathy, no asymmetry, masses, or scars  RESP: lungs clear to auscultation - no rales, rhonchi or wheezes  CV: regular rate and rhythm, normal S1 S2, no S3 or S4, no murmur, click or rub, no peripheral edema  ABDOMEN: soft, nontender, no hepatosplenomegaly, no masses and bowel sounds normal  MS: no gross musculoskeletal defects noted, no edema  SKIN: no suspicious lesions or rashes  NEURO: Normal strength and tone, mentation intact and speech normal  PSYCH: mentation appears normal, affect normal/bright    Recent Labs   Lab Test 11/17/23  1214 07/02/23  0929 06/15/23  0824 02/24/23  0952 09/23/22  0819 09/23/22  0819   HGB 14.8 14.4  --  14.2   < >  --     274  --  317   < >  --      --  138 138  --  140   POTASSIUM 4.5  --  4.8 4.3  --  4.9   CR 0.59  --  0.63 0.65  --  0.67   A1C  --   --   --  5.2  --  5.3    < > = values in this interval not displayed.        Diagnostics  Labs pending at this time.  Results will be reviewed when available.   No EKG required, no history of coronary heart disease, significant arrhythmia, peripheral arterial disease or other structural heart disease.    Revised Cardiac Risk Index (RCRI)  The " patient has the following serious cardiovascular risks for perioperative complications:   - No serious cardiac risks = 0 points     RCRI Interpretation: 0 points: Class I (very low risk - 0.4% complication rate)         Signed Electronically by: Yasmin Wilkinson MD  Copy of this evaluation report is provided to requesting physician.         Answers submitted by the patient for this visit:  Patient Health Questionnaire (Submitted on 4/15/2024)  If you checked off any problems, how difficult have these problems made it for you to do your work, take care of things at home, or get along with other people?: Not difficult at all  PHQ9 TOTAL SCORE: 2

## 2024-04-16 NOTE — PATIENT INSTRUCTIONS
Preparing for Your Surgery  Getting started  A nurse will call you to review your health history and instructions. They will give you an arrival time based on your scheduled surgery time. Please be ready to share:  Your doctor's clinic name and phone number  Your medical, surgical, and anesthesia history  A list of allergies and sensitivities  A list of medicines, including herbal treatments and over-the-counter drugs  Whether the patient has a legal guardian (ask how to send us the papers in advance)  Please tell us if you're pregnant--or if there's any chance you might be pregnant. Some surgeries may injure a fetus (unborn baby), so they require a pregnancy test. Surgeries that are safe for a fetus don't always need a test, and you can choose whether to have one.   If you have a child who's having surgery, please ask for a copy of Preparing for Your Child's Surgery.    Preparing for surgery  Within 10 to 30 days of surgery: Have a pre-op exam (sometimes called an H&P, or History and Physical). This can be done at a clinic or pre-operative center.  If you're having a , you may not need this exam. Talk to your care team.  At your pre-op exam, talk to your care team about all medicines you take. If you need to stop any medicines before surgery, ask when to start taking them again.  We do this for your safety. Many medicines can make you bleed too much during surgery. Some change how well surgery (anesthesia) drugs work.  Call your insurance company to let them know you're having surgery. (If you don't have insurance, call [phone].)  Call your clinic if there's any change in your health. This includes signs of a cold or flu (sore throat, runny nose, cough, rash, fever). It also includes a scrape or scratch near the surgery site.  If you have questions on the day of surgery, call your hospital or surgery center.  Eating and drinking guidelines  For your safety: Unless your surgeon tells you otherwise,  follow the guidelines below.  Eat and drink as usual until 8 hours before you arrive for surgery. After that, no food or milk.  Drink clear liquids until 2 hours before you arrive. These are liquids you can see through, like water, Gatorade, and Propel Water. They also include plain black coffee and tea (no cream or milk), candy, and breath mints. You can spit out gum when you arrive.  If you drink alcohol: Stop drinking it the night before surgery.  If your care team tells you to take medicine on the morning of surgery, it's okay to take it with a sip of water.  Preventing infection  Shower or bathe the night before and morning of your surgery. Follow the instructions your clinic gave you. (If no instructions, use regular soap.)  Don't shave or clip hair near your surgery site. We'll remove the hair if needed.  Don't smoke or vape the morning of surgery. You may chew nicotine gum up to 2 hours before surgery. A nicotine patch is okay.  Note: Some surgeries require you to completely quit smoking and nicotine. Check with your surgeon.  Your care team will make every effort to keep you safe from infection. We will:  Clean our hands often with soap and water (or an alcohol-based hand rub).  Clean the skin at your surgery site with a special soap that kills germs.  Give you a special gown to keep you warm. (Cold raises the risk of infection.)  Wear special hair covers, masks, gowns and gloves during surgery.  Give antibiotic medicine, if prescribed. Not all surgeries need antibiotics.  What to bring on the day of surgery  Photo ID and insurance card  Copy of your health care directive, if you have one  Glasses and hearing aids (bring cases)  You can't wear contacts during surgery  Inhaler and eye drops, if you use them (tell us about these when you arrive)  CPAP machine or breathing device, if you use them  A few personal items, if spending the night  If you have . . .  A pacemaker, ICD (cardiac defibrillator) or other  implant: Bring the ID card.  An implanted stimulator: Bring the remote control.  A legal guardian: Bring a copy of the certified (court-stamped) guardianship papers.  Please remove any jewelry, including body piercings. Leave jewelry and other valuables at home.  If you're going home the day of surgery  You must have a responsible adult drive you home. They should stay with you overnight as well.  If you don't have someone to stay with you, and you aren't safe to go home alone, we may keep you overnight. Insurance often won't pay for this.  After surgery  If it's hard to control your pain or you need more pain medicine, please call your surgeon's office.  Questions?   If you have any questions for your care team, list them here: _________________________________________________________________________________________________________________________________________________________________________ ____________________________________ ____________________________________ ____________________________________  For informational purposes only. Not to replace the advice of your health care provider. Copyright   2003, 2019 [employer] [facility]. All rights reserved. Clinically reviewed by Alejandra Wynn MD. [employer] 383861 - REV 12/22.    How to Take Your Medication Before Surgery  - Take all of your medications before surgery except as noted below    Hold wegovy 2 weeks prior to surgery (per surgeon recommendation) restart the day following surgery     Do not take metformin or spironolactone the am of surgery

## 2024-04-16 NOTE — TELEPHONE ENCOUNTER
Received voicemail from patient, as well as [employer] message on 4/12    Patient states she spoke with her insurance on a recorded line and was told that because she meets the medical policy, both procedures are 100% covered    Writer informed clinic team of this, as well as her decision to proceed with both the panniculectomy and abdominoplasty  __    Lorrie Watson on 4/16/2024 at 9:13 AM  [phone]

## 2024-04-16 NOTE — NURSING NOTE
"Chief Complaint   Patient presents with    Pre-Op Exam       Initial /76 (BP Location: Right arm)   Pulse 84   Temp 98.6  F (37  C) (Tympanic)   Resp 20   Ht 1.619 m (5' 3.75\")   Wt 52.2 kg (115 lb)   LMP 03/20/2024   SpO2 99%   BMI 19.89 kg/m   Estimated body mass index is 19.89 kg/m  as calculated from the following:    Height as of this encounter: 1.619 m (5' 3.75\").    Weight as of this encounter: 52.2 kg (115 lb).    Patient presents to the clinic using No DME    Is there anyone who you would like to be able to receive your results? No  If yes have patient fill out ZAHIDA      "

## 2024-05-03 ENCOUNTER — MYC MEDICAL ADVICE (OUTPATIENT)
Dept: SURGERY | Facility: CLINIC | Age: 39
End: 2024-05-03

## 2024-05-03 ENCOUNTER — OFFICE VISIT (OUTPATIENT)
Dept: SURGERY | Facility: CLINIC | Age: 39
End: 2024-05-03
Payer: COMMERCIAL

## 2024-05-03 DIAGNOSIS — L98.7 EXCESS SKIN: Primary | ICD-10-CM

## 2024-05-03 PROCEDURE — 99214 OFFICE O/P EST MOD 30 MIN: CPT | Performed by: PLASTIC SURGERY

## 2024-05-03 NOTE — LETTER
5/3/2024         RE: Halima Razo  [address] [facility] [address]        Dear Colleague,    Thank you for referring your patient, Halima Razo, to the [facility]. Please see a copy of my visit note below.    PREOPERATIVE VISIT NOTE     PRESENTING COMPLAINT:  Preop visit for upcoming insurance covered abdominoplasty and panniculectomy scheduled for 5/10/2024     HISTORY OF PRESENTING COMPLAINT: The patient is here for a pre-op visit for the patient's surgery.  The patient is being seen in the presence of my nurse. There is no change since the patient's consultation. Please see the consult note for details.     No change in history and physical exam.  Preop done.     ASSESSMENT AND PLAN:  Based upon the above findings, the patient is here for a preop visit for upcoming surgery.  I had a galo, detailed discussion with the patient in the presence of my nurse (who was present from beginning to end) about the proposed surgery. I was very clear and detailed about overview of surgery, perioperative plans, and expectations. All risks, benefits and alternatives of the surgery including but not limited to pain, infection, bleeding, scarring, asymmetry, seromas, hematomas, wound breakdown, wound dehiscence, prominent scar, hypertrophic scar, keloid scar, requirement of further surgeries, skin/tissue necrosis, nerve/muscle/deeper structure injury, DVT, PE, MI, CVA, pneumonia, renal failure and death, were explained in detail. The patient agreed and understood them all and had all the questions answered to the patient's satisfaction, and the patient wants to proceed with surgery. I look forward to helping the patient out in the near future.  All questions were answered.  The patient was happy with the visit.    Total time spent in the encounter today including chart review, visit itself, and post-visit paperwork was 30 minutes.       Again, thank you for allowing me to participate in  the care of your patient.        Sincerely,        ANABELA Fletcher MD

## 2024-05-03 NOTE — PROGRESS NOTES
PREOPERATIVE VISIT NOTE     PRESENTING COMPLAINT:  Preop visit for upcoming insurance covered abdominoplasty and panniculectomy scheduled for 5/10/2024     HISTORY OF PRESENTING COMPLAINT: The patient is here for a pre-op visit for the patient's surgery.  The patient is being seen in the presence of my nurse. There is no change since the patient's consultation. Please see the consult note for details.     No change in history and physical exam.  Preop done.     ASSESSMENT AND PLAN:  Based upon the above findings, the patient is here for a preop visit for upcoming surgery.  I had a galo, detailed discussion with the patient in the presence of my nurse (who was present from beginning to end) about the proposed surgery. I was very clear and detailed about overview of surgery, perioperative plans, and expectations. All risks, benefits and alternatives of the surgery including but not limited to pain, infection, bleeding, scarring, asymmetry, seromas, hematomas, wound breakdown, wound dehiscence, prominent scar, hypertrophic scar, keloid scar, requirement of further surgeries, skin/tissue necrosis, nerve/muscle/deeper structure injury, DVT, PE, MI, CVA, pneumonia, renal failure and death, were explained in detail. The patient agreed and understood them all and had all the questions answered to the patient's satisfaction, and the patient wants to proceed with surgery. I look forward to helping the patient out in the near future.  All questions were answered.  The patient was happy with the visit.    Total time spent in the encounter today including chart review, visit itself, and post-visit paperwork was 30 minutes.

## 2024-05-06 ENCOUNTER — ANESTHESIA EVENT (OUTPATIENT)
Dept: SURGERY | Facility: AMBULATORY SURGERY CENTER | Age: 39
End: 2024-05-06
Payer: COMMERCIAL

## 2024-05-07 ENCOUNTER — CARE COORDINATION (OUTPATIENT)
Dept: SURGERY | Facility: CLINIC | Age: 39
End: 2024-05-07
Payer: COMMERCIAL

## 2024-05-07 NOTE — TELEPHONE ENCOUNTER
ANABELA Fletcher MD   to [facility] [place]   [facility]    5/6/24  6:29 PM  Hi    1. Yes  2. Not sanything specific but we do place a dressing that holds it open    Thanks    Юлия  ________________________________  Gladys Montiel RN

## 2024-05-09 NOTE — CONFIDENTIAL NOTE
RECORDS RECEIVED FROM: internal    DATE RECEIVED: 5.13.24    NOTES (FOR ALL VISITS) STATUS DETAILS   OFFICE NOTES from referring provider internal    Yasmin Wilkinson MD      MEDICATION LIST internal     IMAGING      MRI (BRAIN) internal  11/17/23   XR (Chest) internal  5/7/23    LABS     DIABETES: HBGA1C, CREATININE, FASTING LIPIDS, MICROALBUMIN URINE, POTASSIUM, TSH, T4    THYROID: TSH, T4, CBC, THYRODLONULIN, TOTAL T3, FREE T4, CALCITONIN, CEA internal  Cbc- 4.16.24  Bmp- 4.16.24  TSH/T4- 7/2/23  Cortisol- 6.15.23  Prolactin- 2.24.23  HBA1C- 2.24.23

## 2024-05-10 ENCOUNTER — ANESTHESIA (OUTPATIENT)
Dept: SURGERY | Facility: AMBULATORY SURGERY CENTER | Age: 39
End: 2024-05-10
Payer: COMMERCIAL

## 2024-05-10 ENCOUNTER — HOSPITAL ENCOUNTER (OUTPATIENT)
Facility: AMBULATORY SURGERY CENTER | Age: 39
Discharge: HOME | End: 2024-05-10
Attending: PLASTIC SURGERY | Admitting: PLASTIC SURGERY
Payer: COMMERCIAL

## 2024-05-10 VITALS
HEART RATE: 83 BPM | DIASTOLIC BLOOD PRESSURE: 64 MMHG | BODY MASS INDEX: 19.89 KG/M2 | OXYGEN SATURATION: 98 % | RESPIRATION RATE: 17 BRPM | TEMPERATURE: 97.5 F | WEIGHT: 115 LBS | SYSTOLIC BLOOD PRESSURE: 111 MMHG

## 2024-05-10 DIAGNOSIS — L98.7 EXCESS SKIN: Primary | ICD-10-CM

## 2024-05-10 LAB — HCG UR QL: NEGATIVE

## 2024-05-10 PROCEDURE — 15830 EXC EXCESSIVE SKIN ABDOMEN: CPT | Mod: GC | Performed by: PLASTIC SURGERY

## 2024-05-10 PROCEDURE — 88300 SURGICAL PATH GROSS: CPT | Performed by: PATHOLOGY

## 2024-05-10 PROCEDURE — 15830 EXC EXCESSIVE SKIN ABDOMEN: CPT | Performed by: STUDENT IN AN ORGANIZED HEALTH CARE EDUCATION/TRAINING PROGRAM

## 2024-05-10 PROCEDURE — 64488 TAP BLOCK BI INJECTION: CPT | Mod: 59 | Performed by: STUDENT IN AN ORGANIZED HEALTH CARE EDUCATION/TRAINING PROGRAM

## 2024-05-10 PROCEDURE — G8916 PT W IV AB GIVEN ON TIME: HCPCS

## 2024-05-10 PROCEDURE — 15830 EXC EXCESSIVE SKIN ABDOMEN: CPT | Performed by: NURSE ANESTHETIST, CERTIFIED REGISTERED

## 2024-05-10 PROCEDURE — 15847 EXC SKIN ABD ADD-ON: CPT | Mod: GC | Performed by: PLASTIC SURGERY

## 2024-05-10 PROCEDURE — G8907 PT DOC NO EVENTS ON DISCHARG: HCPCS

## 2024-05-10 PROCEDURE — 81025 URINE PREGNANCY TEST: CPT | Performed by: STUDENT IN AN ORGANIZED HEALTH CARE EDUCATION/TRAINING PROGRAM

## 2024-05-10 PROCEDURE — 15830 EXC EXCESSIVE SKIN ABDOMEN: CPT

## 2024-05-10 RX ORDER — PROPOFOL 10 MG/ML
INJECTION, EMULSION INTRAVENOUS PRN
Status: DISCONTINUED | OUTPATIENT
Start: 2024-05-10 | End: 2024-05-10

## 2024-05-10 RX ORDER — SODIUM CHLORIDE, SODIUM LACTATE, POTASSIUM CHLORIDE, CALCIUM CHLORIDE 600; 310; 30; 20 MG/100ML; MG/100ML; MG/100ML; MG/100ML
INJECTION, SOLUTION INTRAVENOUS CONTINUOUS
Status: DISCONTINUED | OUTPATIENT
Start: 2024-05-10 | End: 2024-05-11 | Stop reason: HOSPADM

## 2024-05-10 RX ORDER — ONDANSETRON 4 MG/1
4 TABLET, ORALLY DISINTEGRATING ORAL EVERY 30 MIN PRN
Status: DISCONTINUED | OUTPATIENT
Start: 2024-05-10 | End: 2024-05-11 | Stop reason: HOSPADM

## 2024-05-10 RX ORDER — ONDANSETRON 2 MG/ML
INJECTION INTRAMUSCULAR; INTRAVENOUS PRN
Status: DISCONTINUED | OUTPATIENT
Start: 2024-05-10 | End: 2024-05-10

## 2024-05-10 RX ORDER — NALOXONE HYDROCHLORIDE 0.4 MG/ML
0.1 INJECTION, SOLUTION INTRAMUSCULAR; INTRAVENOUS; SUBCUTANEOUS
Status: DISCONTINUED | OUTPATIENT
Start: 2024-05-10 | End: 2024-05-11 | Stop reason: HOSPADM

## 2024-05-10 RX ORDER — DEXAMETHASONE SODIUM PHOSPHATE 4 MG/ML
4 INJECTION, SOLUTION INTRA-ARTICULAR; INTRALESIONAL; INTRAMUSCULAR; INTRAVENOUS; SOFT TISSUE
Status: DISCONTINUED | OUTPATIENT
Start: 2024-05-10 | End: 2024-05-11 | Stop reason: HOSPADM

## 2024-05-10 RX ORDER — CEFAZOLIN SODIUM 2 G/50ML
2 SOLUTION INTRAVENOUS SEE ADMIN INSTRUCTIONS
Status: DISCONTINUED | OUTPATIENT
Start: 2024-05-10 | End: 2024-05-11 | Stop reason: HOSPADM

## 2024-05-10 RX ORDER — NALOXONE HYDROCHLORIDE 0.4 MG/ML
0.4 INJECTION, SOLUTION INTRAMUSCULAR; INTRAVENOUS; SUBCUTANEOUS
Status: DISCONTINUED | OUTPATIENT
Start: 2024-05-10 | End: 2024-05-11 | Stop reason: HOSPADM

## 2024-05-10 RX ORDER — BUPIVACAINE HYDROCHLORIDE 2.5 MG/ML
INJECTION, SOLUTION EPIDURAL; INFILTRATION; INTRACAUDAL
Status: COMPLETED | OUTPATIENT
Start: 2024-05-10 | End: 2024-05-10

## 2024-05-10 RX ORDER — ACETAMINOPHEN 325 MG/1
975 TABLET ORAL ONCE
Status: COMPLETED | OUTPATIENT
Start: 2024-05-10 | End: 2024-05-10

## 2024-05-10 RX ORDER — HEPARIN SODIUM 5000 [USP'U]/.5ML
5000 INJECTION, SOLUTION INTRAVENOUS; SUBCUTANEOUS
Status: COMPLETED | OUTPATIENT
Start: 2024-05-10 | End: 2024-05-10

## 2024-05-10 RX ORDER — LIDOCAINE HYDROCHLORIDE 20 MG/ML
INJECTION, SOLUTION INFILTRATION; PERINEURAL PRN
Status: DISCONTINUED | OUTPATIENT
Start: 2024-05-10 | End: 2024-05-10

## 2024-05-10 RX ORDER — DEXAMETHASONE SODIUM PHOSPHATE 4 MG/ML
INJECTION, SOLUTION INTRA-ARTICULAR; INTRALESIONAL; INTRAMUSCULAR; INTRAVENOUS; SOFT TISSUE PRN
Status: DISCONTINUED | OUTPATIENT
Start: 2024-05-10 | End: 2024-05-10

## 2024-05-10 RX ORDER — NALOXONE HYDROCHLORIDE 0.4 MG/ML
0.2 INJECTION, SOLUTION INTRAMUSCULAR; INTRAVENOUS; SUBCUTANEOUS
Status: DISCONTINUED | OUTPATIENT
Start: 2024-05-10 | End: 2024-05-11 | Stop reason: HOSPADM

## 2024-05-10 RX ORDER — FENTANYL CITRATE 50 UG/ML
50 INJECTION, SOLUTION INTRAMUSCULAR; INTRAVENOUS EVERY 5 MIN PRN
Status: DISCONTINUED | OUTPATIENT
Start: 2024-05-10 | End: 2024-05-11 | Stop reason: HOSPADM

## 2024-05-10 RX ORDER — FENTANYL CITRATE 50 UG/ML
25-50 INJECTION, SOLUTION INTRAMUSCULAR; INTRAVENOUS
Status: DISCONTINUED | OUTPATIENT
Start: 2024-05-10 | End: 2024-05-11 | Stop reason: HOSPADM

## 2024-05-10 RX ORDER — LIDOCAINE 40 MG/G
CREAM TOPICAL
Status: DISCONTINUED | OUTPATIENT
Start: 2024-05-10 | End: 2024-05-11 | Stop reason: HOSPADM

## 2024-05-10 RX ORDER — ONDANSETRON 4 MG/1
4 TABLET, ORALLY DISINTEGRATING ORAL EVERY 8 HOURS PRN
Qty: 4 TABLET | Refills: 0 | Status: SHIPPED | OUTPATIENT
Start: 2024-05-10

## 2024-05-10 RX ORDER — CEFAZOLIN SODIUM 2 G/50ML
2 SOLUTION INTRAVENOUS
Status: COMPLETED | OUTPATIENT
Start: 2024-05-10 | End: 2024-05-10

## 2024-05-10 RX ORDER — PROPOFOL 10 MG/ML
INJECTION, EMULSION INTRAVENOUS CONTINUOUS PRN
Status: DISCONTINUED | OUTPATIENT
Start: 2024-05-10 | End: 2024-05-10

## 2024-05-10 RX ORDER — AMOXICILLIN 250 MG
1-2 CAPSULE ORAL 2 TIMES DAILY
Qty: 30 TABLET | Refills: 0 | Status: SHIPPED | OUTPATIENT
Start: 2024-05-10

## 2024-05-10 RX ORDER — FLUMAZENIL 0.1 MG/ML
0.2 INJECTION, SOLUTION INTRAVENOUS
Status: DISCONTINUED | OUTPATIENT
Start: 2024-05-10 | End: 2024-05-11 | Stop reason: HOSPADM

## 2024-05-10 RX ORDER — OXYCODONE HYDROCHLORIDE 5 MG/1
5-10 TABLET ORAL EVERY 6 HOURS PRN
Qty: 20 TABLET | Refills: 0 | Status: SHIPPED | OUTPATIENT
Start: 2024-05-10

## 2024-05-10 RX ORDER — FENTANYL CITRATE 50 UG/ML
25 INJECTION, SOLUTION INTRAMUSCULAR; INTRAVENOUS EVERY 5 MIN PRN
Status: DISCONTINUED | OUTPATIENT
Start: 2024-05-10 | End: 2024-05-11 | Stop reason: HOSPADM

## 2024-05-10 RX ORDER — OXYCODONE HYDROCHLORIDE 5 MG/1
5 TABLET ORAL
Status: COMPLETED | OUTPATIENT
Start: 2024-05-10 | End: 2024-05-10

## 2024-05-10 RX ORDER — ONDANSETRON 2 MG/ML
4 INJECTION INTRAMUSCULAR; INTRAVENOUS EVERY 30 MIN PRN
Status: DISCONTINUED | OUTPATIENT
Start: 2024-05-10 | End: 2024-05-11 | Stop reason: HOSPADM

## 2024-05-10 RX ORDER — OXYCODONE HYDROCHLORIDE 5 MG/1
10 TABLET ORAL
Status: DISCONTINUED | OUTPATIENT
Start: 2024-05-10 | End: 2024-05-11 | Stop reason: HOSPADM

## 2024-05-10 RX ADMIN — Medication 0.3 MG: at 07:14

## 2024-05-10 RX ADMIN — DEXAMETHASONE SODIUM PHOSPHATE 4 MG: 4 INJECTION, SOLUTION INTRA-ARTICULAR; INTRALESIONAL; INTRAMUSCULAR; INTRAVENOUS; SOFT TISSUE at 07:12

## 2024-05-10 RX ADMIN — Medication 10 MG: at 08:08

## 2024-05-10 RX ADMIN — OXYCODONE HYDROCHLORIDE 5 MG: 5 TABLET ORAL at 09:37

## 2024-05-10 RX ADMIN — HEPARIN SODIUM 5000 UNITS: 5000 INJECTION, SOLUTION INTRAVENOUS; SUBCUTANEOUS at 06:53

## 2024-05-10 RX ADMIN — Medication 0.5 MG: at 08:47

## 2024-05-10 RX ADMIN — PROPOFOL 140 MG: 10 INJECTION, EMULSION INTRAVENOUS at 07:03

## 2024-05-10 RX ADMIN — ONDANSETRON 4 MG: 2 INJECTION INTRAMUSCULAR; INTRAVENOUS at 08:48

## 2024-05-10 RX ADMIN — PROPOFOL 50 MCG/KG/MIN: 10 INJECTION, EMULSION INTRAVENOUS at 07:02

## 2024-05-10 RX ADMIN — BUPIVACAINE HYDROCHLORIDE 20 ML: 2.5 INJECTION, SOLUTION EPIDURAL; INFILTRATION; INTRACAUDAL at 06:45

## 2024-05-10 RX ADMIN — FENTANYL CITRATE 50 MCG: 50 INJECTION, SOLUTION INTRAMUSCULAR; INTRAVENOUS at 06:46

## 2024-05-10 RX ADMIN — Medication 0.2 MG: at 07:42

## 2024-05-10 RX ADMIN — Medication 40 MG: at 07:03

## 2024-05-10 RX ADMIN — ACETAMINOPHEN 975 MG: 325 TABLET ORAL at 06:16

## 2024-05-10 RX ADMIN — LIDOCAINE HYDROCHLORIDE 50 MG: 20 INJECTION, SOLUTION INFILTRATION; PERINEURAL at 07:02

## 2024-05-10 RX ADMIN — SODIUM CHLORIDE, SODIUM LACTATE, POTASSIUM CHLORIDE, CALCIUM CHLORIDE: 600; 310; 30; 20 INJECTION, SOLUTION INTRAVENOUS at 06:55

## 2024-05-10 RX ADMIN — CEFAZOLIN SODIUM 2 G: 2 SOLUTION INTRAVENOUS at 06:57

## 2024-05-10 NOTE — ANESTHESIA PREPROCEDURE EVALUATION
Anesthesia Pre-Procedure Evaluation    Patient: Halima Razo   MRN: [id number] : 1985        Procedure : Procedure(s):  ABDOMINOPLASTY, WITH PANNICULECTOMY          Past Medical History:   Diagnosis Date    Angioedema     non-specific cause    Depression with anxiety     Depressive disorder ?    Intermittent episodes, currently mild    Gestational diabetes mellitus (GDM), antepartum, gestational diabetes method of control unspecified         Hypertension     on medication for about 6 months in     Infertility, female     Migraines     Obesity 2012    Papanicolaou smear of cervix with low grade squamous intraepithelial lesion (LGSIL)     resolved    Pre-eclampsia in third trimester     Ventricular septal defect (VSD) of fetus in guevara pregnancy, antepartum       Past Surgical History:   Procedure Laterality Date    BIOPSY      Underarm mole,    LAPAROSCOPY DIAGNOSTIC (GYN) N/A 2018    Procedure: LAPAROSCOPY DIAGNOSTIC (GYN);;  Surgeon: Kei Kelley MD;  Location: [facility] OR    LAPAROTOMY MINI, TUBAL LIGATION (POST PARTUM), COMBINED Bilateral 3/28/2022    Procedure: LIGATION, FALLOPIAN TUBE, POSTPARTUM;  Surgeon: Orquidea Mcneil MD;  Location: [facility]    OPERATIVE HYSTEROSCOPY WITH MORCELLATOR N/A 2018    Procedure: OPERATIVE HYSTEROSCOPY WITH MORCELLATOR (MYOSURE);  Diagnostic laparoscopy, hysteroscopy with biopsy;  Surgeon: Kei Kelley MD;  Location:  OR      No Known Allergies   Social History     Tobacco Use    Smoking status: Former     Current packs/day: 0.00     Average packs/day: 0.5 packs/day for 18.9 years (9.5 ttl pk-yrs)     Types: Cigarettes     Start date: 2003     Quit date: 2022     Years since quittin.4    Smokeless tobacco: Never   Substance Use Topics    Alcohol use: Not Currently      Wt Readings from Last 1 Encounters:   05/10/24 52.2 kg (115 lb)        Anesthesia Evaluation   Pt has had prior  "anesthetic. Type: General.        ROS/MED HX  ENT/Pulmonary:  - neg pulmonary ROS     Neurologic:     (+)      migraines,                          Cardiovascular: Comment: Hx angioedema      (+)  - - PIH  -  - -                                   (-) hypertension and dyslipidemia   METS/Exercise Tolerance:     Hematologic:  - neg hematologic  ROS     Musculoskeletal:       GI/Hepatic:  - neg GI/hepatic ROS     Renal/Genitourinary:       Endo: Comment: Gestational diabetes      (+)               Obesity,    (-) Type II DMThyroid problem: pregn.   Psychiatric/Substance Use:     (+) psychiatric history anxiety and depression       Infectious Disease:    (-) SBE Prophylaxis   Malignancy:       Other:            Physical Exam    Airway        Mallampati: II   TM distance: > 3 FB   Neck ROM: full   Mouth opening: > 3 cm    Respiratory Devices and Support         Dental       (+) Completely normal teeth      Cardiovascular   cardiovascular exam normal          Pulmonary   pulmonary exam normal                OUTSIDE LABS:  CBC:   Lab Results   Component Value Date    WBC 7.3 04/16/2024    WBC 7.6 11/17/2023    HGB 15.6 04/16/2024    HGB 14.8 11/17/2023    HCT 45.8 04/16/2024    HCT 44.0 11/17/2023     04/16/2024     11/17/2023     BMP:   Lab Results   Component Value Date     04/16/2024     11/17/2023    POTASSIUM 5.1 04/16/2024    POTASSIUM 4.5 11/17/2023    CHLORIDE 103 04/16/2024    CHLORIDE 104 11/17/2023    CO2 23 04/16/2024    CO2 25 11/17/2023    BUN 14.8 04/16/2024    BUN 17.4 11/17/2023    CR 0.67 04/16/2024    CR 0.59 11/17/2023    GLC 90 04/16/2024    GLC 93 11/17/2023     COAGS: No results found for: \"PTT\", \"INR\", \"FIBR\"  POC:   Lab Results   Component Value Date    HCG Negative 05/10/2024     HEPATIC:   Lab Results   Component Value Date    ALBUMIN 4.2 02/24/2023    PROTTOTAL 7.8 02/24/2023    ALT 22 02/24/2023    AST 19 02/24/2023    ALKPHOS 90 02/24/2023    BILITOTAL 0.3 02/24/2023 "     OTHER:   Lab Results   Component Value Date    A1C 5.2 02/24/2023    AJ 10.1 (H) 04/16/2024    TSH 1.43 07/02/2023    T4 1.19 01/08/2014       Anesthesia Plan    ASA Status:  2    NPO Status:  NPO Appropriate    Anesthesia Type: General.     - Airway: ETT   Induction: Intravenous.   Maintenance: TIVA.        Consents    Anesthesia Plan(s) and associated risks, benefits, and realistic alternatives discussed. Questions answered and patient/representative(s) expressed understanding.     - Discussed:     - Discussed with:  Patient            Postoperative Care    Pain management: IV analgesics, Oral pain medications, Peripheral nerve block (Single Shot), Multi-modal analgesia.   PONV prophylaxis: Ondansetron (or other 5HT-3), Dexamethasone or Solumedrol, Background Propofol Infusion     Comments:    Other Comments: Discussed risks of general anesthesia, including aspiration pneumonia, sore throat/hoarse voice, abrasions/damage to lips/tongue/teeth, nausea, rare complications (including medication reactions, cardiac, pulmonary, hypoxia/low oxygen, recall). Ensured understanding, invited questions and all questions were answered. Patient wishes to proceed.       Discussed preoperative TAP block. Discussed risks of nerve block, including nerve injury, bleeding, infection, incomplete analgesia. Discussed anticipated areas of sensory and motor block, limb precautions, and fall precautions. Discussed alternative of not performing a nerve block. Ensured understanding, invited questions and all questions were answered. Patient wishes to proceed.             Román Collins MD    I have reviewed the pertinent notes and labs in the chart from the past 30 days and (re)examined the patient.  Any updates or changes from those notes are reflected in this note.

## 2024-05-10 NOTE — ANESTHESIA PROCEDURE NOTES
TAP Procedure Note    Pre-Procedure   Staff -        Anesthesiologist:  Román Collins MD       Performed By: anesthesiologist       Location: pre-op       Procedure Start/Stop Times: 5/10/2024 6:45 AM and 5/10/2024 6:50 AM       Pre-Anesthestic Checklist: patient identified, IV checked, site marked, risks and benefits discussed, informed consent, monitors and equipment checked, pre-op evaluation, at physician/surgeon's request and post-op pain management  Timeout:       Correct Patient: Yes        Correct Procedure: Yes        Correct Site: Yes        Correct Position: Yes        Correct Laterality: Yes        Site Marked: Yes  Procedure Documentation  Procedure: TAP       Laterality: bilateral       Patient Position: supine       Skin prep: Chloraprep       Needle Type: short bevel       Needle Gauge: 17.        Needle Length (millimeters): 100        Ultrasound guided       1. Ultrasound was used to identify targeted nerve, plexus, vascular marker, or fascial plane and place a needle adjacent to it in real-time.       2. Ultrasound was used to visualize the spread of anesthetic in close proximity to the above referenced structure.       3. A permanent image is entered into the patient's record.       4. The visualized anatomic structures appeared normal.       5. There were no apparent abnormal pathologic findings.    Assessment/Narrative         The placement was negative for: blood aspirated, painful injection and site bleeding       Paresthesias: No.       Insertion/Infusion Method: Single Shot       Complications: none    Medication(s) Administered   Bupivacaine 0.25% PF (Infiltration) - Infiltration   20 mL - 5/10/2024 6:45:00 AM  Bupivacaine liposome (Exparel) 1.3% LA inj susp (Infiltration) - Infiltration   20 mL - 5/10/2024 6:45:00 AM  Medication Administration Time: 5/10/2024 6:45 AM     Comments:  Risk benefit alternatives explained. Patient agrees to undergo a block.   Procedure done with no issues,  "no complications, good visualization under US, local anesthetic spread satisfactory. Patient tolerated the procedure well.        FOR [facility] ([facility] [place]) ONLY:   Pain Team Contact information: please page the Pain Team Via [email or web address]. Search \"Pain\". During daytime hours, please page the attending first. At night please page the resident first.      "

## 2024-05-10 NOTE — ANESTHESIA PROCEDURE NOTES
Airway       Patient location during procedure: OR       Procedure Start/Stop Times: 5/10/2024 7:05 AM  Staff -        CRNA: Marcella Black APRN CRNA       Performed By: CRNA  Consent for Airway        Urgency: elective  Indications and Patient Condition       Indications for airway management: tracy-procedural       Induction type:intravenous       Mask difficulty assessment: 1 - vent by mask    Final Airway Details       Final airway type: endotracheal airway       Successful airway: ETT - single  Endotracheal Airway Details        ETT size (mm): 6.5       Cuffed: yes       Successful intubation technique: direct laryngoscopy       DL Blade Type: Ortiz 2       Grade View of Cords: 1       Adjucts: stylet       Position: Right       Measured from: lips       Secured at (cm): 22       Bite block used: None    Post intubation assessment        Placement verified by: capnometry, equal breath sounds and chest rise        Number of attempts at approach: 1       Secured with: tape       Ease of procedure: easy       Dentition: Intact    Medication(s) Administered   Medication Administration Time: 5/10/2024 7:05 AM

## 2024-05-10 NOTE — BRIEF OP NOTE
[facility]    Brief Operative Note    Pre-operative diagnosis: Excess skin [L98.7]  Post-operative diagnosis Same as pre-operative diagnosis    Procedure: ABDOMINOPLASTY, WITH PANNICULECTOMY, N/A - Abdomen    Surgeon: Surgeons and Role:     * ANABELA Fletcher MD - Primary  Anesthesia: General with Block   Estimated Blood Loss: 100 ml    Drains: None  Specimens:   ID Type Source Tests Collected by Time Destination   1 : abdominal skin and fat Tissue Abdomen SURGICAL PATHOLOGY EXAM ANABELA Fletcher MD 5/10/2024  8:18 AM      Findings:   None.Procedure as stated  Complications: None.  Implants: * No implants in log *        GEOVANNY Lima, MS  Plastic Surgery, PGY-4

## 2024-05-10 NOTE — OP NOTE
PREOPERATIVE DIAGNOSIS:  Symptomatic excess abdominal wall skin.        POSTOPERATIVE DIAGNOSIS:  Symptomatic excess abdominal wall skin.        PROCEDURE:  Abdominoplasty and panniculectomy.        SURGEON:  Юлия Fletcher MD      RESIDENT: Kingsley Estrada MD.      ANESTHESIA:  General anesthesia with endotracheal intubation.        COMPLICATIONS:  Nil.        DRAINS:  None      SPECIMENS: Gross only abdominal skin and fat.  Weight 1150 g    BLOOD LOSS: 100 ml.      DESCRIPTION OF PROCEDURE:  After informed consent was taken from the patient, the proper site and procedure was ascertained with the patient, the patient was appropriately marked and taken to the operating room.  She was placed in a supine position with her knees comfortably flexed, pillows underneath them and pneumoboots placed and running prior to induction of anesthesia.  Preoperative antibiotics were given in the OR.  All pressure points were appropriately padded.  General anesthesia was administered without any complications. Her entire lower chest, abdomen, and upper thigh and groin area were prepped and draped in a standard surgical fashion.  She had preoperatively been marked for her abdominoplasty.  I went ahead and remarked the areas.  I then went ahead and made my inferior incision marked out by potential inferior incision by lifting up on the mons as well as on the lateral abdominal region on each side and while doing that made a symmetric marking about 7 cm from the external genitalia and a few centimeters from the pubic tubercle in the mons area.  A horizontal marking was made symmetrically on each side and then within the hair bearing area and then from that lateral aspect of each line marked out passing through the plane of anterior superior iliac spine to join the preop markings of the lateral extent of the excision.  Once this was done, I then made my incision, dissected using electrocautery, dissected through the subcutaneous tissues  down to the Percy's layer, elevated the skin and subcutaneous tissue in a cephalad direction superficial to the Percy's layer until I was outside the groin and then dove deep to the Percy's layer and elevated the skin and subcutaneous tissue in a cephalad direction just anterior to the anterior abdominal wall, leaving a thin layer of loose areolar tissue on the anterior abdominal wall.  All of this was done in an effort to decrease seroma formation.  During this dissection any large perforators were clipped, ligated and controlled.  Dissection was carried out in a cephalad direction elevating the skin and subcutaneous tissues towards the umbilical plane.  Prior to reaching the umbilicus, I then turned my attention to the umbilicus.  I went ahead and placed a 12 o'clock and 6 o'clock suture and then cut around the umbilicus.  I dissected along the stalk down to the anterior abdominal wall, taking care to feel for any obvious hernia and none was felt.  The lower skin flap was then divided and dissection was carried out around the umbilicus cephalad to the umbilicus I continued my dissection towards the xiphoid in the midline and to the costal margins laterally.  The further lateral I went the less dissection I did to keep the zone 3 intact.  Once dissection in a cephalad direction was done to a point where I felt that I could get plication as well as appropriate redraping of the abdominal wall I stopped this dissection.  I ensured hemostasis.  I then plicated the anterior abdominal wall.  I marked the midline then estimated the redundancy.  I made sure that the patient was relaxed and then used multiple 0 Ethibond suture in an interrupted fashion to plicate the anterior abdominal rectus sheath.  Once this was completed, I then turned my attention to the umbilicus.  I trimmed it appropriately, then tacked down the 3 o'clock, 6 o'clock and 9 o'clock portions to the anterior abdominal wall. I then turned my attention  to the excess skin of the anterior abdominal wall.  I flexed the patient to about 30 degrees and then estimated the excess skin and excised it.  Again, hemostasis was ensured.  Once this was completed, I then marked the new position where the umbilicus would come through. I then used 2-0 Strattafix sutures to quilt the abdominal wall.  I then closed the incision using #0 PDS suture in the superficial fascial system followed by 2-0 Monocryl sutures for the deep dermal layer and then 3-0 Monocryl Strattafix in a running intracuticular manner. The umbilical opening was then made and the umbilicus was sewn in with 2-0 Monocryl sutures and 4-0 Monocryl sutures. The umbilicus was covered with antibiotic ointment and a dry dressing. Prineo was placed over the abdominal incision. An abdominal binder was placed.  The patient tolerated the procedure well.  All counts correct at the end of the case.  The patient was extubated and transferred to a hospital bed in a flexed position.  She was then transferred to the PACU in a stable condition.  All counts were correct at the end of the case.

## 2024-05-10 NOTE — DISCHARGE INSTRUCTIONS
ABDOMINOPLASTY and/or PANNICULECTOMY POST-OPERATIVE INSTRUCTIONS    Instructions  Depending on the complexity of your surgery, you may need to be    admitted to the hospital for a few days.   Have someone drive you home after surgery and help you at home for week.   Get plenty of rest.   Follow balanced diet.   Decreased activity may promote constipation, so you may want to add    more raw fruit to your diet, and be sure to increase fluid intake.   Take pain medication as prescribed. Do not take aspirin or any products     containing aspirin.   Do not drink alcohol when taking pain medications.   Even when not taking pain medications, no alcohol for 3 weeks as it     causes fluid retention.   Do not smoke, as smoking delays healing and increases the risk of  complications.   If you are provided with an abdominal binder, wear it as instructed. Do     not wear a compression garment unless approved by your physician.    Activities   Turning on your side and pushing off with your arm when getting out of     bed will reduce stress on your incision.   Start walking as soon as possible, as this helps to reduce swelling and     lowers the chance of blood clots.   Do not drive until you are no longer taking any pain medications    (narcotics).    Do not drive until you have full range of motion in your legs and no    discomfort in your abdomen when lifting your legs.   No lifting greater than 5 lbs. for 6 weeks.   Resume sexual activity as comfort permits, usually 2-3 weeks     postoperatively.   Avoid straining of abdominal muscles. Strenuous exercise and activities     are restricted for 6 weeks.   Return to work in 3- 6 weeks as directed by your surgeon.    Incision Care   Your surgeon may use staples or sutures to close your incision. You may also     have 2 to 4 drains inserted following your surgery.   You may shower 48 hours after removal of the drainage tubes. Drains     may stay in for several weeks.   Avoid  exposing scars to sun for at least 12 months.   Always use a strong sunblock, if sun exposure is unavoidable (SPF 50 or    greater).   Keep the tape on and allow it to peel off over time.   Keep incisions clean and inspect daily for signs of infection.   Start moisturizing your abdomen and scar by day 10 after surgery.   No tub soaking while sutures or drains are in place.   May wear soft support underpants for comfort; may pad incision with     dressings for comfort.   Sleep with pillow under knees and head elevated on 2 pillows.   No tub soaking, bathing, or swimming while sutures or drains are in place.   May wear soft support underpants for comfort; may pad incision with    dressings for comfort.   Sleep with pillow under knees and head elevated on 2 pillows.    What to Expect   You may experience temporary pain, soreness, numbness of abdominal     skin, incision discomfort.   Maximum discomfort will occur the first few days.   You will have bruising and swelling of the abdomen. The majority of     bruising and swelling will subside in 6-8 weeks.   You may feel tired for several weeks or months.   One or more of your drains may remain in for several weeks.    Appearance   This procedure only removes the pannus, it does not narrow your    waistline.   You will walk slightly bent forward and gradually return to normal  posture over next 3 weeks.   Scars will be reddened for 6 months. After that, they will fade and soften.   The scar will extend from near one hipbone to the other, low on the  abdomen.     Follow-Up Care   Abdominal drains removed when less than 30 ml for 24 hours.   Usually, absorbable stitches are used. Surface staples (if used) removed in 2       weeks but may remain in longer if there is concern of incision separation.    Please note my office will call you 1-2 business days after your procedure to check up on how you're doing and to schedule your post-operative appointment.     When to Call    If you have increased swelling or bruising.   If swelling and redness persist after a few days.   If you have increased redness along the incision. If you have severe or                increased pain not relieved by medication.   If you have any side effects to medications; such as, rash, nausea,    headache, vomiting.   If you have an oral temperature over 100.4 degrees.   If you have any yellowish or greenish drainage from the incisions or    notice a foul odor.     If you have bleeding from the incisions that is difficult to control with light              pressure.   If you have loss of feeling or motion.     For Medical Questions, Please Call:   [phone], Monday-Friday, 8 a.m.- 4:30 pm    After hours and on weekends, call Hospital Paging at [phone] and ask        for the Plastic Surgeon on call.

## 2024-05-10 NOTE — ANESTHESIA CARE TRANSFER NOTE
Patient: Halima Razo    Procedure: Procedure(s):  ABDOMINOPLASTY, WITH PANNICULECTOMY       Diagnosis: Excess skin [L98.7]  Diagnosis Additional Information: No value filed.    Anesthesia Type:   No value filed.     Note:    Oropharynx: oropharynx clear of all foreign objects  Level of Consciousness: awake  Oxygen Supplementation: nasal cannula  Level of Supplemental Oxygen (L/min / FiO2): 4  Independent Airway: airway patency satisfactory and stable  Dentition: dentition unchanged  Vital Signs Stable: post-procedure vital signs reviewed and stable  Report to RN Given: handoff report given  Patient transferred to: PACU  Comments: Anesthesia Care Transfer Note    Patient: Halima Razo    Transferred to: PACU with supplemental O2    Patient vital signs: stable    Airway: none    Monitors on, VSS, breathing spontaneously, report to RN      Marcella Arnold CRNA   5/10/2024 9:13 AM   Handoff Report: Identifed the Patient, Identified the Reponsible Provider, Reviewed the pertinent medical history, Discussed the surgical course, Reviewed Intra-OP anesthesia mangement and issues during anesthesia, Set expectations for post-procedure period and Allowed opportunity for questions and acknowledgement of understanding      Vitals:  Vitals Value Taken Time   BP     Temp     Pulse 150 05/10/24 0911   Resp 35 05/10/24 0911   SpO2 99 % 05/10/24 0911   Vitals shown include unfiled device data.    Electronically Signed By: MIGUEL Devi CRNA  May 10, 2024  9:13 AM

## 2024-05-11 ENCOUNTER — MYC MEDICAL ADVICE (OUTPATIENT)
Dept: SURGERY | Facility: CLINIC | Age: 39
End: 2024-05-11
Payer: COMMERCIAL

## 2024-05-13 ENCOUNTER — PRE VISIT (OUTPATIENT)
Dept: ENDOCRINOLOGY | Facility: CLINIC | Age: 39
End: 2024-05-13

## 2024-05-13 ENCOUNTER — VIRTUAL VISIT (OUTPATIENT)
Dept: ENDOCRINOLOGY | Facility: CLINIC | Age: 39
End: 2024-05-13
Payer: COMMERCIAL

## 2024-05-13 DIAGNOSIS — E88.819 INSULIN RESISTANCE: ICD-10-CM

## 2024-05-13 DIAGNOSIS — E66.811 CLASS 1 OBESITY WITH SERIOUS COMORBIDITY AND BODY MASS INDEX (BMI) OF 30.0 TO 30.9 IN ADULT, UNSPECIFIED OBESITY TYPE: ICD-10-CM

## 2024-05-13 PROCEDURE — 99215 OFFICE O/P EST HI 40 MIN: CPT | Mod: 95 | Performed by: STUDENT IN AN ORGANIZED HEALTH CARE EDUCATION/TRAINING PROGRAM

## 2024-05-13 PROCEDURE — G2211 COMPLEX E/M VISIT ADD ON: HCPCS | Mod: 95 | Performed by: STUDENT IN AN ORGANIZED HEALTH CARE EDUCATION/TRAINING PROGRAM

## 2024-05-13 ASSESSMENT — PAIN SCALES - GENERAL: PAINLEVEL: MODERATE PAIN (4)

## 2024-05-13 NOTE — PROGRESS NOTES
Endocrinology Clinic Visit 5/13/2024      Video-Visit Details    Type of service:  Video Visit    Joined the call at 5/13/2024, 8:36:12 am.  Left the call at 5/13/2024, 8:48:22 am.    Originating Location (pt. Location): Home        Distant Location (provider location):  Off-site    Mode of Communication:  Video Conference via [employer]    Physician has received verbal consent for a Video Visit from the patient? Yes    I spent a total of 40 minutes on the date of encounter reviewing medical records, evaluating the patient, coordinating care and documenting in the EHR, as detailed above.      NAME:  Halima Razo  PCP:  Yasmin Wilkinson  MRN:  [id number]  Reason for Consult:  PCOS and obesity  Requesting Provider:  Phong Garcia    Chief Complaint     Chief Complaint   Patient presents with    Consult       History of Present Illness     Halima Razo is a 39 year old female who is seen in video visit for PCOS and obesity.  She was following with Dr. Garcia. Last seen 11/2023. She is new to me.    She was seen by Dr. Garcia for PCOS; met PCOS criteris with elevated testosterone levels in the past and hx of irregular menstrual cycles.  Testosterone levels and cycles normalized after the birth of her son in 2022 but previously were abnormal.   She also reports facial hair growth that requires daily shaving.      She is on metformin, spironolactone and wegovy.  Today she reported meds helped all her symptoms.  Spironolactone 25 mg bid helping with hair loss and hirsutism. No concern for acne.  Menses are every month now. Not interested in future pregnancy, had tubal ligation.  Wt highest  200 lbs, today 170 lbs.  Current dose of wegovy at 2.4 mg weekly since 11/2023. No side effects except for burping.    Had abdominoplasty with panniculectomy yesterday. 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 Youngest is 2 and oldest is 17 y.o. also a 3 y.o son.  Problem List     Patient  Active Problem List   Diagnosis    Obesity    Major depressive disorder, recurrent episode, mild (H24)    Generalized anxiety disorder    History of gestational diabetes    Gastroesophageal reflux disease with esophagitis, unspecified whether hemorrhage    PCOS (polycystic ovarian syndrome)    Myelitis (H)    Excess skin        Medications     Current Outpatient Medications   Medication Sig Dispense Refill    Semaglutide-Weight Management (WEGOVY) 0.5 MG/0.5ML pen Inject 0.5 mg Subcutaneous once a week 1 mL 0    [START ON 5/27/2024] Semaglutide-Weight Management (WEGOVY) 1.7 MG/0.75ML pen Inject 1.7 mg Subcutaneous once a week 1.5 mL 0    hydrOXYzine (ATARAX) 25 MG tablet 1-2 tabs at bedtime for sleep and anxiety 50 tablet 1    metFORMIN (GLUCOPHAGE XR) 500 MG 24 hr tablet Take 1 tablet (500 mg) by mouth 2 times daily (with meals) 180 tablet 4    Multiple Vitamin (MULTIVITAMIN PO)       nortriptyline (PAMELOR) 10 MG capsule Take 2 capsules (20 mg) by mouth at bedtime 60 capsule 11    omeprazole (PRILOSEC) 20 MG DR capsule Take 1 capsule (20 mg) by mouth daily 30 capsule 1    ondansetron (ZOFRAN ODT) 4 MG ODT tab Take 1 tablet (4 mg) by mouth every 8 hours as needed for nausea 4 tablet 0    oxyCODONE (ROXICODONE) 5 MG tablet Take 1-2 tablets (5-10 mg) by mouth every 6 hours as needed for moderate to severe pain 20 tablet 0    Semaglutide-Weight Management (WEGOVY) 2.4 MG/0.75ML pen Inject 2.4 mg Subcutaneous once a week 3 mL 11    senna-docusate (SENOKOT-S/PERICOLACE) 8.6-50 MG tablet Take 1-2 tablets by mouth 2 times daily 30 tablet 0    spironolactone (ALDACTONE) 25 MG tablet Take 1 tablet (25 mg) by mouth 2 times daily 180 tablet 4     No current facility-administered medications for this visit.        Allergies     No Known Allergies    Medical / Surgical History     Past Medical History:   Diagnosis Date    Angioedema 2019    non-specific cause    Depression with anxiety     Depressive disorder 2012?     Intermittent episodes, currently mild    Gestational diabetes mellitus (GDM), antepartum, gestational diabetes method of control unspecified         Hypertension     on medication for about 6 months in     Infertility, female     Migraines     Obesity 2012    Papanicolaou smear of cervix with low grade squamous intraepithelial lesion (LGSIL)     resolved    Pre-eclampsia in third trimester     Ventricular septal defect (VSD) of fetus in guevara pregnancy, antepartum      Past Surgical History:   Procedure Laterality Date    BIOPSY      Underarm mole,    LAPAROSCOPY DIAGNOSTIC (GYN) N/A 2018    Procedure: LAPAROSCOPY DIAGNOSTIC (GYN);;  Surgeon: Kei Kelley MD;  Location: [facility] OR    LAPAROTOMY MINI, TUBAL LIGATION (POST PARTUM), COMBINED Bilateral 3/28/2022    Procedure: LIGATION, FALLOPIAN TUBE, POSTPARTUM;  Surgeon: Orquidea Mcneil MD;  Location: [facility]    OPERATIVE HYSTEROSCOPY WITH MORCELLATOR N/A 2018    Procedure: OPERATIVE HYSTEROSCOPY WITH MORCELLATOR (MYOSURE);  Diagnostic laparoscopy, hysteroscopy with biopsy;  Surgeon: Kei Kelley MD;  Location: [facility] OR       Social History     Social History     Socioeconomic History    Marital status:      Spouse name: Adolfo    Number of children: 2    Years of education: Not on file    Highest education level: Not on file   Occupational History    Occupation: - [employer]   Tobacco Use    Smoking status: Former     Current packs/day: 0.00     Average packs/day: 0.5 packs/day for 18.9 years (9.5 ttl pk-yrs)     Types: Cigarettes     Start date: 2003     Quit date: 2022     Years since quittin.4    Smokeless tobacco: Never   Vaping Use    Vaping status: Never Used   Substance and Sexual Activity    Alcohol use: Not Currently    Drug use: No    Sexual activity: Yes     Partners: Male     Birth control/protection: Female Surgical     Comment: Had tubal 2022 after  baby born   Other Topics Concern    Parent/sibling w/ CABG, MI or angioplasty before 65F 55M? No   Social History Narrative    Not on file     Social Determinants of Health     Financial Resource Strain: Low Risk  (12/1/2023)    Financial Resource Strain     Within the past 12 months, have you or your family members you live with been unable to get utilities (heat, electricity) when it was really needed?: No   Food Insecurity: Low Risk  (12/1/2023)    Food Insecurity     Within the past 12 months, did you worry that your food would run out before you got money to buy more?: No     Within the past 12 months, did the food you bought just not last and you didn t have money to get more?: No   Transportation Needs: Low Risk  (12/1/2023)    Transportation Needs     Within the past 12 months, has lack of transportation kept you from medical appointments, getting your medicines, non-medical meetings or appointments, work, or from getting things that you need?: No   Physical Activity: Not on file   Stress: Not on file   Social Connections: Not on file   Interpersonal Safety: Low Risk  (4/16/2024)    Interpersonal Safety     Do you feel physically and emotionally safe where you currently live?: Yes     Within the past 12 months, have you been hit, slapped, kicked or otherwise physically hurt by someone?: No     Within the past 12 months, have you been humiliated or emotionally abused in other ways by your partner or ex-partner?: No   Housing Stability: Low Risk  (12/1/2023)    Housing Stability     Do you have housing? : Yes     Are you worried about losing your housing?: No       Family History     Family History   Problem Relation Age of Onset    Other Cancer Father         Skin Cancer    Thyroid Disease Mother     Depression Mother     Anxiety Disorder Mother     Depression Brother     Anxiety Disorder Brother     Mental Illness Brother     Thyroid Disease Maternal Grandmother     Heart Failure Maternal Grandmother      Depression Maternal Grandmother     Mental Illness Maternal Grandmother     Brain Cancer Maternal Grandfather     Other Cancer Maternal Grandfather         Brain Cancer    Lung Cancer Paternal Grandmother     Other Cancer Paternal Grandmother         Lung Cancer    Cerebrovascular Disease Paternal Grandfather     Other Cancer Paternal Grandfather         Skin Cancer    Other Cancer Cousin         Testicular Cancer    Depression Cousin         Maternal Cousin    Diabetes Other         Maternal Aunt    Hypertension Other     Hyperlipidemia Other     Other Cancer Other         Brain Cancer    Anxiety Disorder Other         Maternal Uncle    Anxiety Disorder Other         Maternal Uncle    Mental Illness Other         Paternal Uncle    Thyroid Disease Other         Maternal Aunt    Obesity Other     Glaucoma No family hx of     Macular Degeneration No family hx of        ROS     12 ROS completed, pertinent positive and negative in HPI    Physical Exam   LMP 04/20/2024 (Approximate)    GENERAL: alert and no distress  EYES: Eyes grossly normal to inspection.  No discharge or erythema, or obvious scleral/conjunctival abnormalities.  RESP: No audible wheeze, cough, or visible cyanosis.    SKIN: Visible skin clear. No significant rash, abnormal pigmentation or lesions.  NEURO: Cranial nerves grossly intact.  Mentation and speech appropriate for age.  PSYCH: Appropriate affect, tone, and pace of words     Labs/Imaging     Pertinent Labs were reviewed and updated in EPIC and discussed briefly.  Radiology Results were  reviewed and updated in EPIC and discussed briefly.    Summary of recent findings:   Lab Results   Component Value Date    A1C 5.2 02/24/2023    A1C 5.3 09/23/2022    A1C 5.3 09/02/2021    A1C 5.1 06/08/2020       TSH   Date Value Ref Range Status   07/02/2023 1.43 0.30 - 4.20 uIU/mL Final   02/24/2023 1.59 0.30 - 4.20 uIU/mL Final   09/23/2022 1.36 0.30 - 4.20 uIU/mL Final   12/28/2020 1.32 0.40 - 4.00 mU/L  "Final   06/28/2015 1.58 0.40 - 4.00 mU/L Final   01/08/2014 3.08 0.4 - 5.0 mU/L Final   04/18/2012 1.31 0.4 - 5.0 mU/L Final     T4 Free   Date Value Ref Range Status   01/08/2014 1.19 0.70 - 1.85 ng/dL Final   04/18/2012 1.52 0.70 - 1.85 ng/dL Final       Creatinine   Date Value Ref Range Status   04/16/2024 0.67 0.51 - 0.95 mg/dL Final   12/28/2020 0.63 0.52 - 1.04 mg/dL Final       Recent Labs   Lab Test 02/24/23  0952 09/23/22  0819   CHOL 188 197   HDL 50 44*   * 138*   TRIG 55 76       No results found for: \"[id number]\", \"[id number]\", \"[id number]\"    I personally reviewed the patient's outside records from [employer] EMR. Summary of pertinent findings in HPI.    Impression / Plan     1. PCOS  2. Obesity class 1  3. Hyperandrogenism   4. Hirsutism   Significant improvement in her symptoms on current regimen. She has been off wegovy for 3 weeks prior to abdominoplasty. Would recommend short titration up instead of starting high dose wegovy right away to avoid GI side effects.  She is on low dose psiranolactone, last K on 4/16/24 upper limit of normal. Plan to recheck in 6 month.  Of note calcium was upper limit of normal as well, plan to recheck.      Test and/or medications prescribed today:  Orders Placed This Encounter   Procedures    Basic metabolic panel         Follow up: 6 month    The longitudinal plan of care for the diagnosis(es)/condition(s) as documented were addressed during this visit. Due to the added complexity in care, I will continue to support Fam in the subsequent management and with ongoing continuity of care.      Jerzy Amaral MD  Endocrinology, Diabetes and Metabolism  [facility]    "

## 2024-05-13 NOTE — NURSING NOTE
Is the patient currently in the state of [place]? YES    Visit mode:VIDEO    If the visit is dropped, the patient can be reconnected by: VIDEO VISIT: Text to cell phone:   Telephone Information:   Mobile [phone]       Will anyone else be joining the visit? NO  (If patient encounters technical issues they should call [phone]    How would you like to obtain your AVS? MyChart    Are changes needed to the allergy or medication list? No    Are refills needed on medications prescribed by this physician? NO    Reason for visit: Consult    Shelby Kocher VVF

## 2024-05-14 LAB
PATH REPORT.COMMENTS IMP SPEC: NORMAL
PATH REPORT.COMMENTS IMP SPEC: NORMAL
PATH REPORT.GROSS SPEC: NORMAL
PATH REPORT.RELEVANT HX SPEC: NORMAL
PHOTO IMAGE: NORMAL

## 2024-05-17 ENCOUNTER — OFFICE VISIT (OUTPATIENT)
Dept: SURGERY | Facility: CLINIC | Age: 39
End: 2024-05-17
Payer: COMMERCIAL

## 2024-05-17 DIAGNOSIS — L98.7 EXCESS SKIN: Primary | ICD-10-CM

## 2024-05-17 PROCEDURE — 99024 POSTOP FOLLOW-UP VISIT: CPT | Performed by: PLASTIC SURGERY

## 2024-05-17 NOTE — LETTER
5/17/2024         RE: Halima Razo  [address] [facility] [address]        Dear Colleague,    Thank you for referring your patient, Halima Razo, to the [facility]. Please see a copy of my visit note below.    PRESENTING COMPLAINT:  Post-operative visit s/p panniculectomy and abdominoplasty done on 5/10/2024     HISTORY OF PRESENTING COMPLAINT: The patient is here for post-operative visit.  The patient is being seen in the presence of my nurse.     Patient is a week out from surgery.  Very happy with the results.  No major issues.  Minimal pain.    On exam: Vital signs stable afebrile.  Incision healing in well.  No evidence of infection seroma hematoma.     ASSESSMENT AND PLAN:  Based upon the above findings, the patient is here for post-operative visit.     Advised aggressive moisturization.  No heavy lifting or exercising for total of 6 weeks.  I will see her back in 4 to 6 weeks.    All questions were answered.  The patient was happy with the visit.      Again, thank you for allowing me to participate in the care of your patient.        Sincerely,        ANABELA Fletcher MD

## 2024-05-17 NOTE — PROGRESS NOTES
PRESENTING COMPLAINT:  Post-operative visit s/p panniculectomy and abdominoplasty done on 5/10/2024     HISTORY OF PRESENTING COMPLAINT: The patient is here for post-operative visit.  The patient is being seen in the presence of my nurse.     Patient is a week out from surgery.  Very happy with the results.  No major issues.  Minimal pain.    On exam: Vital signs stable afebrile.  Incision healing in well.  No evidence of infection seroma hematoma.     ASSESSMENT AND PLAN:  Based upon the above findings, the patient is here for post-operative visit.     Advised aggressive moisturization.  No heavy lifting or exercising for total of 6 weeks.  I will see her back in 4 to 6 weeks.    All questions were answered.  The patient was happy with the visit.

## 2024-05-17 NOTE — NURSING NOTE
Halima Razo's chief complaint for this visit includes:  Chief Complaint   Patient presents with    RECHECK     2 week post-op: DOS 5/10 Abdominoplasty & panniculectomy  Dr. Justine YE 5/24        PCP: Yasmin Wilkinson    Referring Provider:  Phong Garcia MD  [address] [place] [address]    [facility] 04/20/2024 (Approximate)   Data Unavailable      No Known Allergies      Do you need any medication refills at today's visit? Mahsa chicas Clinic Assistant- Surgical Specialties

## 2024-06-07 ENCOUNTER — TELEPHONE (OUTPATIENT)
Dept: ENDOCRINOLOGY | Facility: CLINIC | Age: 39
End: 2024-06-07
Payer: COMMERCIAL

## 2024-06-07 NOTE — TELEPHONE ENCOUNTER
PA Initiation    Medication: WEGOVY 2.4 MG/0.75ML SC SOAJ  Insurance Company: [employer] - Phone [phone] Fax [phone]  Pharmacy Filling the Rx: [facility] - [place], [place] - [address]  Filling Pharmacy Phone:    Filling Pharmacy Fax:    Start Date: 6/7/2024

## 2024-06-11 NOTE — TELEPHONE ENCOUNTER
Prior Authorization Approval                PA Renewal Approved    Medication: WEGOVY 2.4 MG/0.75ML SC SOAJ  Authorization Effective Date: 3/10/2024  Authorization Expiration Date: 6/8/2025  Approved Dose/Quantity: 3  Reference #: [id number]   Insurance Company: [employer] - Phone [phone] Fax [phone]  Expected CoPay: $    CoPay Card Available:      Financial Assistance Needed:    Which Pharmacy is filling the prescription: [facility] - [place], [place] - [address]  Pharmacy Notified: yes  Patient Notified: yes

## 2024-07-12 ENCOUNTER — MYC MEDICAL ADVICE (OUTPATIENT)
Dept: SURGERY | Facility: CLINIC | Age: 39
End: 2024-07-12

## 2024-07-12 ENCOUNTER — OFFICE VISIT (OUTPATIENT)
Dept: SURGERY | Facility: CLINIC | Age: 39
End: 2024-07-12
Payer: COMMERCIAL

## 2024-07-12 DIAGNOSIS — L98.7 EXCESS SKIN: Primary | ICD-10-CM

## 2024-07-12 PROCEDURE — 99024 POSTOP FOLLOW-UP VISIT: CPT | Performed by: PLASTIC SURGERY

## 2024-07-12 ASSESSMENT — PAIN SCALES - GENERAL: PAINLEVEL: NO PAIN (0)

## 2024-07-12 NOTE — LETTER
7/12/2024      Halima Razo  [address] [facility] [address] [facility] [address]      Dear Colleague,    Thank you for referring your patient, Halima Razo, to the [facility]. Please see a copy of my visit note below.      PRESENTING COMPLAINT:  Post-operative visit s/p panniculectomy and abdominoplasty done on 5/10/2024     HISTORY OF PRESENTING COMPLAINT: The patient is here for post-operative visit.  The patient is being seen in the presence of my nurse.      Patient is about 2 months out from surgery.  She is very happy with the results.  No major issues.  Minimal pain.     On exam: Vital signs stable afebrile.  Fully healed.     ASSESSMENT AND PLAN:  Based upon the above findings, the patient is here for post-operative visit.      Advised aggressive moisturization.  No restrictions.  See us back as needed.     All questions were answered.  The patient was happy with the visit.         Again, thank you for allowing me to participate in the care of your patient.        Sincerely,        ANABELA Fletcher MD

## 2024-07-15 NOTE — PROGRESS NOTES
PRESENTING COMPLAINT:  Post-operative visit s/p panniculectomy and abdominoplasty done on 5/10/2024     HISTORY OF PRESENTING COMPLAINT: The patient is here for post-operative visit.  The patient is being seen in the presence of my nurse.      Patient is about 2 months out from surgery.  She is very happy with the results.  No major issues.  Minimal pain.     On exam: Vital signs stable afebrile.  Fully healed.     ASSESSMENT AND PLAN:  Based upon the above findings, the patient is here for post-operative visit.      Advised aggressive moisturization.  No restrictions.  See us back as needed.     All questions were answered.  The patient was happy with the visit.

## 2024-11-09 ASSESSMENT — PATIENT HEALTH QUESTIONNAIRE - PHQ9: SUM OF ALL RESPONSES TO PHQ QUESTIONS 1-9: 4

## 2024-11-26 ASSESSMENT — PATIENT HEALTH QUESTIONNAIRE - PHQ9: SUM OF ALL RESPONSES TO PHQ QUESTIONS 1-9: 5

## 2024-12-05 ENCOUNTER — TELEPHONE (OUTPATIENT)
Dept: ENDOCRINOLOGY | Facility: CLINIC | Age: 39
End: 2024-12-05
Payer: COMMERCIAL

## 2024-12-05 NOTE — TELEPHONE ENCOUNTER
Left Voicemail (1st Attempt) and Sent Mychart (1st Attempt) for the patient to call back and schedule the following:    Appointment type: RETURN ENDOCRINE  Provider: Jerzy Amaral MD  Return date: next available  Specialty phone number: [phone]  Additional appointment(s) needed: N/A  Additonal Notes: LVM, MyCAydee Pace, RN routed conversation to [id number] minutes ago (12:28 PM)     [facility]am [facility] Endocrinology Adult [facility] (supporting Jerzy Amaral MD)55 minutes ago (12:25 PM)   Hello! I just realized it's been a bit since our last visit and I wondered when I need to schedule to speak with you again! Virtual is fine.      Thank you!    Clarissa Arguelles on 12/5/2024 at 1:21 PM

## 2025-01-31 NOTE — NURSING NOTE
Halima Razo's chief complaint for this visit includes:  Chief Complaint   Patient presents with    RECHECK     discuss abdominoplasty with panniculectomy on 5/10  possibly cosmetic- rescheduled from 4/19 clinic Pre-op H&P completed on 4/16/24        PCP: Yasmin Wilkinson    Referring Provider:  Phong Garcia MD  [address] [place] [address]    [facility] 03/20/2024   Data Unavailable      No Known Allergies      Do you need any medication refills at today's visit? Mahsa chicas Clinic Assistant- Surgical Specialties        
No

## 2025-03-15 ENCOUNTER — MYC REFILL (OUTPATIENT)
Dept: ENDOCRINOLOGY | Facility: CLINIC | Age: 40
End: 2025-03-15
Payer: COMMERCIAL

## 2025-03-15 DIAGNOSIS — E66.811 CLASS 1 OBESITY WITH SERIOUS COMORBIDITY AND BODY MASS INDEX (BMI) OF 30.0 TO 30.9 IN ADULT, UNSPECIFIED OBESITY TYPE: ICD-10-CM

## 2025-03-15 DIAGNOSIS — E88.819 INSULIN RESISTANCE: ICD-10-CM

## 2025-05-11 ENCOUNTER — HEALTH MAINTENANCE LETTER (OUTPATIENT)
Age: 40
End: 2025-05-11

## 2025-06-02 ENCOUNTER — VIRTUAL VISIT (OUTPATIENT)
Dept: ENDOCRINOLOGY | Facility: CLINIC | Age: 40
End: 2025-06-02
Payer: COMMERCIAL

## 2025-06-02 VITALS — BODY MASS INDEX: 19.84 KG/M2 | HEIGHT: 63 IN | WEIGHT: 112 LBS

## 2025-06-02 DIAGNOSIS — E28.2 PCOS (POLYCYSTIC OVARIAN SYNDROME): ICD-10-CM

## 2025-06-02 PROCEDURE — 98006 SYNCH AUDIO-VIDEO EST MOD 30: CPT | Performed by: STUDENT IN AN ORGANIZED HEALTH CARE EDUCATION/TRAINING PROGRAM

## 2025-06-02 PROCEDURE — G2211 COMPLEX E/M VISIT ADD ON: HCPCS | Mod: 95 | Performed by: STUDENT IN AN ORGANIZED HEALTH CARE EDUCATION/TRAINING PROGRAM

## 2025-06-02 RX ORDER — SPIRONOLACTONE 25 MG/1
25 TABLET ORAL 2 TIMES DAILY
Qty: 180 TABLET | Refills: 4 | Status: SHIPPED | OUTPATIENT
Start: 2025-06-02

## 2025-06-02 ASSESSMENT — PAIN SCALES - GENERAL: PAINLEVEL_OUTOF10: NO PAIN (0)

## 2025-06-02 NOTE — LETTER
6/2/2025      Halima Razo  [address] [address]      Dear Colleague,    Thank you for referring your patient, Halima Razo, to the [facility]. Please see a copy of my visit note below.    Endocrinology Clinic Visit        Video-Visit Details    Type of service:  Video Visit     Joined the call at 6/2/2025, 12:32:28 pm.  Left the call at 6/2/2025, 12:43:05 pm.    Originating Location (pt. Location): Home        Distant Location (provider location):  Off-site    Mode of Communication:  Video Conference via [facility]    Physician has received verbal consent for a Video Visit from the patient? Yes    I spent a total of 30 minutes on the date of encounter reviewing medical records, evaluating the patient, coordinating care and documenting in the EHR, as detailed above.      NAME:  Halima Razo  PCP:  Yasmin Wilkinson  MRN:  [id number]  Reason for Consult:  PCOS and obesity  Requesting Provider:  Phong Garcia    Chief Complaint     Chief Complaint   Patient presents with     RECHECK       History of Present Illness     Halima Razo is a 39 year old female who is seen in video visit for PCOS and obesity.  She was following with Dr. Garcia. Last seen 11/2023. She established with me 5/2024.    She was seen by Dr. Garcia for PCOS; met PCOS criteria with elevated testosterone levels in the past and hx of irregular menstrual cycles.  Testosterone levels and cycles normalized after the birth of her son in 2022 but previously were abnormal.   She also reports facial hair growth that requires daily shaving.      She is on metformin, spironolactone and wegovy. Her last K was checked 1/2025 normal.   she reported meds helped all her symptoms.  Spironolactone 25 mg bid helping with hair loss and hirsutism. No concern for acne.  Menses are every month now. Not interested in future pregnancy, had tubal ligation.  Wt highest  200 lbs, today  wt is down to 112  lbs.  Current dose of wegovy at 2.4 mg weekly since 11/2023. No side effects except for burping.    Had abdominoplasty with panniculectomy 5/2024.      Wt is usually around 112-115, stable for the past year.      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 Youngest is 2 and oldest is 18 y.o. also a 3 and 4 y.o.  Problem List     Patient Active Problem List   Diagnosis     Obesity     Major depressive disorder, recurrent episode, mild     Generalized anxiety disorder     History of gestational diabetes     Gastroesophageal reflux disease with esophagitis, unspecified whether hemorrhage     PCOS (polycystic ovarian syndrome)     Myelitis (H)     Excess skin        Medications     Current Outpatient Medications   Medication Sig Dispense Refill     Semaglutide-Weight Management (WEGOVY) 1.7 MG/0.75ML pen Inject 1.7 mg subcutaneously once a week. 3 mL 0     spironolactone (ALDACTONE) 25 MG tablet Take 1 tablet (25 mg) by mouth 2 times daily. 180 tablet 4     hydrOXYzine (ATARAX) 25 MG tablet 1-2 tabs at bedtime for sleep and anxiety 50 tablet 1     Multiple Vitamin (MULTIVITAMIN PO)        omeprazole (PRILOSEC) 20 MG DR capsule Take 1 capsule (20 mg) by mouth daily 30 capsule 1     No current facility-administered medications for this visit.        Allergies     No Known Allergies    Medical / Surgical History     Past Medical History:   Diagnosis Date     Angioedema 2019    non-specific cause     Depression with anxiety      Depressive disorder 2012?    Intermittent episodes, currently mild     Gestational diabetes mellitus (GDM), antepartum, gestational diabetes method of control unspecified     2020     Hypertension 2018    on medication for about 6 months in 2018     Infertility, female      Migraines      Obesity 04/18/2012     Papanicolaou smear of cervix with low grade squamous intraepithelial lesion (LGSIL) 2009    resolved     Pre-eclampsia in third trimester 2020     Ventricular septal defect (VSD) of fetus in guevara  pregnancy, antepartum      Past Surgical History:   Procedure Laterality Date     ABDOMINOPLASTY, PANNICULECTOMY, COMBINED N/A 5/10/2024    Procedure: ABDOMINOPLASTY, WITH PANNICULECTOMY;  Surgeon: ANABELA Fletcher MD;  Location: [facility] OR     BIOPSY  2010    Underarm mole,     LAPAROSCOPY DIAGNOSTIC (GYN) N/A 2018    Procedure: LAPAROSCOPY DIAGNOSTIC (GYN);;  Surgeon: Kei Kelley MD;  Location: [facility] OR     LAPAROTOMY MINI, TUBAL LIGATION (POST PARTUM), COMBINED Bilateral 3/28/2022    Procedure: LIGATION, FALLOPIAN TUBE, POSTPARTUM;  Surgeon: Orquidea Mcneil MD;  Location: [facility]     OPERATIVE HYSTEROSCOPY WITH MORCELLATOR N/A 2018    Procedure: OPERATIVE HYSTEROSCOPY WITH MORCELLATOR (MYOSURE);  Diagnostic laparoscopy, hysteroscopy with biopsy;  Surgeon: Kei Kelley MD;  Location: [facility] OR       Social History     Social History     Socioeconomic History     Marital status:      Spouse name: Adolfo     Number of children: 2     Years of education: Not on file     Highest education level: Not on file   Occupational History     Occupation: - [employer]   Tobacco Use     Smoking status: Former     Current packs/day: 0.00     Average packs/day: 0.5 packs/day for 18.9 years (9.5 ttl pk-yrs)     Types: Cigarettes     Start date: 2003     Quit date: 2022     Years since quittin.5     Smokeless tobacco: Never   Vaping Use     Vaping status: Never Used   Substance and Sexual Activity     Alcohol use: Not Currently     Drug use: No     Sexual activity: Yes     Partners: Male     Birth control/protection: Female Surgical     Comment: Had tubal 2022 after baby born   Other Topics Concern     Parent/sibling w/ CABG, MI or angioplasty before 65F 55M? No   Social History Narrative     Not on file     Social Drivers of Health     Financial Resource Strain: Low Risk  (2025)    Financial Resource Strain      Within the past 12 months, have you or  your family members you live with been unable to get utilities (heat, electricity) when it was really needed?: No   Food Insecurity: Low Risk  (1/29/2025)    Food Insecurity      Within the past 12 months, did you worry that your food would run out before you got money to buy more?: No      Within the past 12 months, did the food you bought just not last and you didn t have money to get more?: No   Transportation Needs: Low Risk  (1/29/2025)    Transportation Needs      Within the past 12 months, has lack of transportation kept you from medical appointments, getting your medicines, non-medical meetings or appointments, work, or from getting things that you need?: No   Physical Activity: Sufficiently Active (1/29/2025)    Exercise Vital Sign      Days of Exercise per Week: 5 days      Minutes of Exercise per Session: 60 min   Stress: Stress Concern Present (1/29/2025)    [employer] [place] - Occupational Stress Questionnaire      Feeling of Stress : To some extent   Social Connections: Unknown (1/29/2025)    Social Connection and Isolation Panel [NHANES]      Frequency of Communication with Friends and Family: Not on file      Frequency of Social Gatherings with Friends and Family: Never      Attends Yazidi Services: Not on file      Active Member of Clubs or Organizations: Not on file      Attends Club or Organization Meetings: Not on file      Marital Status: Not on file   Interpersonal Safety: Low Risk  (1/31/2025)    Interpersonal Safety      Do you feel physically and emotionally safe where you currently live?: Yes      Within the past 12 months, have you been hit, slapped, kicked or otherwise physically hurt by someone?: No      Within the past 12 months, have you been humiliated or emotionally abused in other ways by your partner or ex-partner?: No   Housing Stability: High Risk (1/29/2025)    Housing Stability      Do you have housing? : No      Are you worried about losing your  "housing?: No       Family History     Family History   Problem Relation Age of Onset     Other Cancer Father         Skin Cancer     Thyroid Disease Mother      Depression Mother      Anxiety Disorder Mother      Depression Brother      Anxiety Disorder Brother      Mental Illness Brother      Thyroid Disease Maternal Grandmother      Heart Failure Maternal Grandmother      Depression Maternal Grandmother      Mental Illness Maternal Grandmother      Brain Cancer Maternal Grandfather      Other Cancer Maternal Grandfather         Brain Cancer     Lung Cancer Paternal Grandmother      Other Cancer Paternal Grandmother         Lung Cancer     Cerebrovascular Disease Paternal Grandfather      Other Cancer Paternal Grandfather         Skin Cancer     Other Cancer Cousin         Testicular Cancer     Depression Cousin         Maternal Cousin     Diabetes Other         Maternal Aunt     Hypertension Other      Hyperlipidemia Other      Other Cancer Other         Brain Cancer     Anxiety Disorder Other         Maternal Uncle     Anxiety Disorder Other         Maternal Uncle     Mental Illness Other         Paternal Uncle     Thyroid Disease Other         Maternal Aunt     Obesity Other      Glaucoma No family hx of      Macular Degeneration No family hx of        ROS     12 ROS completed, pertinent positive and negative in HPI    Physical Exam   Ht 1.6 m (5' 3\")   Wt 50.8 kg (112 lb)   BMI 19.84 kg/m     GENERAL: alert and no distress  EYES: Eyes grossly normal to inspection.  No discharge or erythema, or obvious scleral/conjunctival abnormalities.  RESP: No audible wheeze, cough, or visible cyanosis.    SKIN: Visible skin clear. No significant rash, abnormal pigmentation or lesions.  NEURO: Cranial nerves grossly intact.  Mentation and speech appropriate for age.  PSYCH: Appropriate affect, tone, and pace of words     Labs/Imaging     Pertinent Labs were reviewed and updated in EPIC and discussed briefly.  Radiology " "Results were  reviewed and updated in EPIC and discussed briefly.    Summary of recent findings:   Lab Results   Component Value Date    A1C 5.2 02/24/2023    A1C 5.3 09/23/2022    A1C 5.3 09/02/2021    A1C 5.1 06/08/2020       TSH   Date Value Ref Range Status   01/31/2025 1.34 0.30 - 4.20 uIU/mL Final   07/02/2023 1.43 0.30 - 4.20 uIU/mL Final   02/24/2023 1.59 0.30 - 4.20 uIU/mL Final   09/23/2022 1.36 0.30 - 4.20 uIU/mL Final   12/28/2020 1.32 0.40 - 4.00 mU/L Final   06/28/2015 1.58 0.40 - 4.00 mU/L Final   01/08/2014 3.08 0.4 - 5.0 mU/L Final   04/18/2012 1.31 0.4 - 5.0 mU/L Final     T4 Free   Date Value Ref Range Status   01/08/2014 1.19 0.70 - 1.85 ng/dL Final   04/18/2012 1.52 0.70 - 1.85 ng/dL Final       Creatinine   Date Value Ref Range Status   01/31/2025 0.63 0.51 - 0.95 mg/dL Final   12/28/2020 0.63 0.52 - 1.04 mg/dL Final       Recent Labs   Lab Test 02/24/23  0952 09/23/22  0819   CHOL 188 197   HDL 50 44*   * 138*   TRIG 55 76       No results found for: \"[id number]\", \"[id number]\", \"[id number]\"    I personally reviewed the patient's outside records from [facility] EMR. Summary of pertinent findings in HPI.    Impression / Plan     1. PCOS  2. Obesity class 1- resolved  3. Hyperandrogenism   4. Hirsutism   Significant improvement in her symptoms on current regimen. She is on wegovy , spironolactone and metformin. She is s/p tubal ligation not interested in future fertility. Her BMI is now down to 19, she has been taking her wegovy 2.4 mg every 10-12 days in order not to have more wt loss.     Plan:  - decrease wegovy to 1.7 mg weekly  - stop metformin  - refilled her spironolactone      Test and/or medications prescribed today:  No orders of the defined types were placed in this encounter.        Follow up: 4 month    The longitudinal plan of care for the diagnosis(es)/condition(s) as documented were addressed during this visit. Due to the added complexity in care, I will continue to support Fam " in the subsequent management and with ongoing continuity of care.      Jerzy Amaral MD  Endocrinology, Diabetes and Metabolism  [facility]      Again, thank you for allowing me to participate in the care of your patient.        Sincerely,        Jerzy Amaral MD    Electronically signed

## 2025-06-02 NOTE — NURSING NOTE
Current patient location: [address] [place] [id number]    Is the patient currently in the state of [place]? YES    Visit mode: VIDEO    If the visit is dropped, the patient can be reconnected by:VIDEO VISIT: Text to cell phone:   Telephone Information:   Mobile [phone]       Will anyone else be joining the visit? NO  (If patient encounters technical issues they should call [phone]    Are changes needed to the allergy or medication list? Pt stated no med changes    Are refills needed on medications prescribed by this physician? NO    Rooming Documentation:  Questionnaire(s) completed    Reason for visit: MILTON LARSENF

## 2025-06-02 NOTE — PROGRESS NOTES
Endocrinology Clinic Visit        Video-Visit Details    Type of service:  Video Visit     Joined the call at 6/2/2025, 12:32:28 pm.  Left the call at 6/2/2025, 12:43:05 pm.    Originating Location (pt. Location): Home        Distant Location (provider location):  Off-site    Mode of Communication:  Video Conference via [facility]    Physician has received verbal consent for a Video Visit from the patient? Yes    I spent a total of 30 minutes on the date of encounter reviewing medical records, evaluating the patient, coordinating care and documenting in the EHR, as detailed above.      NAME:  Halima Razo  PCP:  Yasmin Wilkinson  MRN:  [id number]  Reason for Consult:  PCOS and obesity  Requesting Provider:  Phong Garcia    Chief Complaint     Chief Complaint   Patient presents with    RECHECK       History of Present Illness     Halima Razo is a 39 year old female who is seen in video visit for PCOS and obesity.  She was following with Dr. Garcia. Last seen 11/2023. She established with me 5/2024.    She was seen by Dr. Garcai for PCOS; met PCOS criteria with elevated testosterone levels in the past and hx of irregular menstrual cycles.  Testosterone levels and cycles normalized after the birth of her son in 2022 but previously were abnormal.   She also reports facial hair growth that requires daily shaving.      She is on metformin, spironolactone and wegovy. Her last K was checked 1/2025 normal.   she reported meds helped all her symptoms.  Spironolactone 25 mg bid helping with hair loss and hirsutism. No concern for acne.  Menses are every month now. Not interested in future pregnancy, had tubal ligation.  Wt highest  200 lbs, today  wt is down to 112 lbs.  Current dose of wegovy at 2.4 mg weekly since 11/2023. No side effects except for burping.    Had abdominoplasty with panniculectomy 5/2024.      Wt is usually around 112-115, stable for the past year.      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  Youngest is 2 and oldest is 18 y.o. also a 3 and 4 y.o.  Problem List     Patient Active Problem List   Diagnosis    Obesity    Major depressive disorder, recurrent episode, mild    Generalized anxiety disorder    History of gestational diabetes    Gastroesophageal reflux disease with esophagitis, unspecified whether hemorrhage    PCOS (polycystic ovarian syndrome)    Myelitis (H)    Excess skin        Medications     Current Outpatient Medications   Medication Sig Dispense Refill    Semaglutide-Weight Management (WEGOVY) 1.7 MG/0.75ML pen Inject 1.7 mg subcutaneously once a week. 3 mL 0    spironolactone (ALDACTONE) 25 MG tablet Take 1 tablet (25 mg) by mouth 2 times daily. 180 tablet 4    hydrOXYzine (ATARAX) 25 MG tablet 1-2 tabs at bedtime for sleep and anxiety 50 tablet 1    Multiple Vitamin (MULTIVITAMIN PO)       omeprazole (PRILOSEC) 20 MG DR capsule Take 1 capsule (20 mg) by mouth daily 30 capsule 1     No current facility-administered medications for this visit.        Allergies     No Known Allergies    Medical / Surgical History     Past Medical History:   Diagnosis Date    Angioedema 2019    non-specific cause    Depression with anxiety     Depressive disorder 2012?    Intermittent episodes, currently mild    Gestational diabetes mellitus (GDM), antepartum, gestational diabetes method of control unspecified     2020    Hypertension 2018    on medication for about 6 months in 2018    Infertility, female     Migraines     Obesity 04/18/2012    Papanicolaou smear of cervix with low grade squamous intraepithelial lesion (LGSIL) 2009    resolved    Pre-eclampsia in third trimester 2020    Ventricular septal defect (VSD) of fetus in guevara pregnancy, antepartum      Past Surgical History:   Procedure Laterality Date    ABDOMINOPLASTY, PANNICULECTOMY, COMBINED N/A 5/10/2024    Procedure: ABDOMINOPLASTY, WITH PANNICULECTOMY;  Surgeon: ANABELA Fletcher MD;  Location: [facility] OR    BIOPSY  2010    Underarm  mole,    LAPAROSCOPY DIAGNOSTIC (GYN) N/A 2018    Procedure: LAPAROSCOPY DIAGNOSTIC (GYN);;  Surgeon: Kei Kelley MD;  Location: [facility] OR    LAPAROTOMY MINI, TUBAL LIGATION (POST PARTUM), COMBINED Bilateral 3/28/2022    Procedure: LIGATION, FALLOPIAN TUBE, POSTPARTUM;  Surgeon: Orquidea Mcneil MD;  Location: [facility]    OPERATIVE HYSTEROSCOPY WITH MORCELLATOR N/A 2018    Procedure: OPERATIVE HYSTEROSCOPY WITH MORCELLATOR (MYOSURE);  Diagnostic laparoscopy, hysteroscopy with biopsy;  Surgeon: Kei Kelley MD;  Location: [facility] OR       Social History     Social History     Socioeconomic History    Marital status:      Spouse name: Adolfo    Number of children: 2    Years of education: Not on file    Highest education level: Not on file   Occupational History    Occupation: - [employer]   Tobacco Use    Smoking status: Former     Current packs/day: 0.00     Average packs/day: 0.5 packs/day for 18.9 years (9.5 ttl pk-yrs)     Types: Cigarettes     Start date: 2003     Quit date: 2022     Years since quittin.5    Smokeless tobacco: Never   Vaping Use    Vaping status: Never Used   Substance and Sexual Activity    Alcohol use: Not Currently    Drug use: No    Sexual activity: Yes     Partners: Male     Birth control/protection: Female Surgical     Comment: Had tubal 2022 after baby born   Other Topics Concern    Parent/sibling w/ CABG, MI or angioplasty before 65F 55M? No   Social History Narrative    Not on file     Social Drivers of Health     Financial Resource Strain: Low Risk  (2025)    Financial Resource Strain     Within the past 12 months, have you or your family members you live with been unable to get utilities (heat, electricity) when it was really needed?: No   Food Insecurity: Low Risk  (2025)    Food Insecurity     Within the past 12 months, did you worry that your food would run out before you got money to buy more?: No      Within the past 12 months, did the food you bought just not last and you didn t have money to get more?: No   Transportation Needs: Low Risk  (1/29/2025)    Transportation Needs     Within the past 12 months, has lack of transportation kept you from medical appointments, getting your medicines, non-medical meetings or appointments, work, or from getting things that you need?: No   Physical Activity: Sufficiently Active (1/29/2025)    Exercise Vital Sign     Days of Exercise per Week: 5 days     Minutes of Exercise per Session: 60 min   Stress: Stress Concern Present (1/29/2025)    [employer] - Occupational Stress Questionnaire     Feeling of Stress : To some extent   Social Connections: Unknown (1/29/2025)    Social Connection and Isolation Panel [NHANES]     Frequency of Communication with Friends and Family: Not on file     Frequency of Social Gatherings with Friends and Family: Never     Attends [employer] Services: Not on file     Active Member of Clubs or Organizations: Not on file     Attends Club or Organization Meetings: Not on file     Marital Status: Not on file   Interpersonal Safety: Low Risk  (1/31/2025)    Interpersonal Safety     Do you feel physically and emotionally safe where you currently live?: Yes     Within the past 12 months, have you been hit, slapped, kicked or otherwise physically hurt by someone?: No     Within the past 12 months, have you been humiliated or emotionally abused in other ways by your partner or ex-partner?: No   Housing Stability: High Risk (1/29/2025)    Housing Stability     Do you have housing? : No     Are you worried about losing your housing?: No       Family History     Family History   Problem Relation Age of Onset    Other Cancer Father         Skin Cancer    Thyroid Disease Mother     Depression Mother     Anxiety Disorder Mother     Depression Brother     Anxiety Disorder Brother     Mental Illness Brother     Thyroid Disease Maternal  "Grandmother     Heart Failure Maternal Grandmother     Depression Maternal Grandmother     Mental Illness Maternal Grandmother     Brain Cancer Maternal Grandfather     Other Cancer Maternal Grandfather         Brain Cancer    Lung Cancer Paternal Grandmother     Other Cancer Paternal Grandmother         Lung Cancer    Cerebrovascular Disease Paternal Grandfather     Other Cancer Paternal Grandfather         Skin Cancer    Other Cancer Cousin         Testicular Cancer    Depression Cousin         Maternal Cousin    Diabetes Other         Maternal Aunt    Hypertension Other     Hyperlipidemia Other     Other Cancer Other         Brain Cancer    Anxiety Disorder Other         Maternal Uncle    Anxiety Disorder Other         Maternal Uncle    Mental Illness Other         Paternal Uncle    Thyroid Disease Other         Maternal Aunt    Obesity Other     Glaucoma No family hx of     Macular Degeneration No family hx of        ROS     12 ROS completed, pertinent positive and negative in HPI    Physical Exam   Ht 1.6 m (5' 3\")   Wt 50.8 kg (112 lb)   BMI 19.84 kg/m     GENERAL: alert and no distress  EYES: Eyes grossly normal to inspection.  No discharge or erythema, or obvious scleral/conjunctival abnormalities.  RESP: No audible wheeze, cough, or visible cyanosis.    SKIN: Visible skin clear. No significant rash, abnormal pigmentation or lesions.  NEURO: Cranial nerves grossly intact.  Mentation and speech appropriate for age.  PSYCH: Appropriate affect, tone, and pace of words     Labs/Imaging     Pertinent Labs were reviewed and updated in EPIC and discussed briefly.  Radiology Results were  reviewed and updated in EPIC and discussed briefly.    Summary of recent findings:   Lab Results   Component Value Date    A1C 5.2 02/24/2023    A1C 5.3 09/23/2022    A1C 5.3 09/02/2021    A1C 5.1 06/08/2020       TSH   Date Value Ref Range Status   01/31/2025 1.34 0.30 - 4.20 uIU/mL Final   07/02/2023 1.43 0.30 - 4.20 uIU/mL " "Final   02/24/2023 1.59 0.30 - 4.20 uIU/mL Final   09/23/2022 1.36 0.30 - 4.20 uIU/mL Final   12/28/2020 1.32 0.40 - 4.00 mU/L Final   06/28/2015 1.58 0.40 - 4.00 mU/L Final   01/08/2014 3.08 0.4 - 5.0 mU/L Final   04/18/2012 1.31 0.4 - 5.0 mU/L Final     T4 Free   Date Value Ref Range Status   01/08/2014 1.19 0.70 - 1.85 ng/dL Final   04/18/2012 1.52 0.70 - 1.85 ng/dL Final       Creatinine   Date Value Ref Range Status   01/31/2025 0.63 0.51 - 0.95 mg/dL Final   12/28/2020 0.63 0.52 - 1.04 mg/dL Final       Recent Labs   Lab Test 02/24/23  0952 09/23/22  0819   CHOL 188 197   HDL 50 44*   * 138*   TRIG 55 76       No results found for: \"[id number]\", \"[id number]\", \"[id number]\"    I personally reviewed the patient's outside records from "[employer] EMR. Summary of pertinent findings in HPI.    Impression / Plan     1. PCOS  2. Obesity class 1- resolved  3. Hyperandrogenism   4. Hirsutism   Significant improvement in her symptoms on current regimen. She is on wegovy , spironolactone and metformin. She is s/p tubal ligation not interested in future fertility. Her BMI is now down to 19, she has been taking her wegovy 2.4 mg every 10-12 days in order not to have more wt loss.     Plan:  - decrease wegovy to 1.7 mg weekly  - stop metformin  - refilled her spironolactone      Test and/or medications prescribed today:  No orders of the defined types were placed in this encounter.        Follow up: 4 month    The longitudinal plan of care for the diagnosis(es)/condition(s) as documented were addressed during this visit. Due to the added complexity in care, I will continue to support Fam in the subsequent management and with ongoing continuity of care.      Jerzy Amaral MD  Endocrinology, Diabetes and Metabolism  [facility]    "

## 2025-06-03 ENCOUNTER — TELEPHONE (OUTPATIENT)
Dept: ENDOCRINOLOGY | Facility: CLINIC | Age: 40
End: 2025-06-03
Payer: COMMERCIAL

## 2025-06-03 NOTE — TELEPHONE ENCOUNTER
Left Voicemail (1st Attempt) for the patient to call back and schedule the following:    Appointment type: return  Provider: dr. gruber  Return date: 10/2/2025  Specialty phone number: [phone]   Additonal Notes: Return in about 4 months (around 10/2/2025).     [facility]   Orthopedics, Podiatry, Sports Medicine, Ent ,Eye , Audiology, Adult Endocrine & Diabetes, Nutrition & Medication Therapy Management Specialties   [facility]

## (undated) DEVICE — NDL COUNTER 20CT 31142493

## (undated) DEVICE — SEAL SET MYOSURE ROD LENS SCOPE SINGLE USE 40-902

## (undated) DEVICE — TUBING INSUFFLATION W/FILTER CPC TO LUER 620-030-301

## (undated) DEVICE — ESU ELEC BLADE HEX-LOCKING 2.5" E1450X

## (undated) DEVICE — DRSG PRIMAPORE 02X3" 7133

## (undated) DEVICE — SOL WATER IRRIG 1000ML BOTTLE 07139-09

## (undated) DEVICE — SU VICRYL 0 CT-2 27" UND J270H

## (undated) DEVICE — PACK MINOR SBA15MIFSE

## (undated) DEVICE — NDL 19GA 1.5"

## (undated) DEVICE — SU ETHIBOND 0 CT-1 CR 8X18" CX21D

## (undated) DEVICE — PACK LAPAROTOMY CUSTOM LAKES

## (undated) DEVICE — DRAPE U SPLIT 74X120" 29440

## (undated) DEVICE — ESU PENCIL SMOKE EVAC W/ROCKER SWITCH 0703-047-000

## (undated) DEVICE — GLOVE PROTEXIS W/NEU-THERA 6.0  2D73TE60

## (undated) DEVICE — GOWN LG DISP 9515

## (undated) DEVICE — GLOVE BIOGEL PI MICRO SZ 7.0 48570

## (undated) DEVICE — ESU GROUND PAD ADULT W/CORD E7507

## (undated) DEVICE — SOL NACL 0.9% IRRIG 1000ML BOTTLE 07138-09

## (undated) DEVICE — SYR BULB IRRIG DOVER 60 ML LATEX FREE 67000

## (undated) DEVICE — DRSG KERLIX 4 1/2"X4YDS ROLL 6715

## (undated) DEVICE — SU STRATAFIX MONOCRYL 3-0 SPIRAL PS-2 45CM SXMP1B107

## (undated) DEVICE — SPONGE LAP 18X18" 1515

## (undated) DEVICE — NDL SPINAL 22GA 3.5" QUINCKE 405181

## (undated) DEVICE — SU PDS II 0 CT 36" Z358T

## (undated) DEVICE — ESU LIGASURE OPEN SEALER/DIVIDER SM JAW 16.5MM LF1212A

## (undated) DEVICE — SU MONOCRYL 4-0 PS-2 18" UND Y496G

## (undated) DEVICE — STPL SKIN 35W 054887

## (undated) DEVICE — BNDG ABDOMINAL BINDER 9X30-45" 79-89070

## (undated) DEVICE — SU STRATAFIX 2-0 MH 36" SXPD2B412

## (undated) DEVICE — ENDO TROCAR FIRST ENTRY KII FIOS Z-THRD 05X100MM CTF03

## (undated) DEVICE — PREP CHLORAPREP 26ML TINTED ORANGE  260815

## (undated) DEVICE — GLOVE PROTEXIS W/NEU-THERA 7.5  2D73TE75

## (undated) DEVICE — Device

## (undated) DEVICE — SUCTION CANISTER DOLPHIN 3000ML

## (undated) DEVICE — BLADE KNIFE SURG 11 371111

## (undated) DEVICE — DRAPE LAVH/LAPAROSCOPY W/POUCH 29474

## (undated) DEVICE — ANTIFOG SOLUTION W/FOAM PAD CF-1001

## (undated) DEVICE — DEVICE TISSUE REMOVAL HYSTEROSCOPIC MYOSURE LITE 30-401LITE

## (undated) DEVICE — SU MONOCRYL 2-0 SH 27" UND Y417H

## (undated) DEVICE — PAD PERI W/WINGS 1580A

## (undated) DEVICE — GLOVE PROTEXIS BLUE W/NEU-THERA 6.5  2D73EB65

## (undated) DEVICE — GLOVE BIOGEL PI MICRO INDICATOR UNDERGLOVE SZ 7.5 48975

## (undated) DEVICE — DRAPE SHEET HALF 40X60" 9358

## (undated) DEVICE — PREP SKIN SCRUB TRAY 4461A

## (undated) DEVICE — DRSG TEGADERM 2 3/8X2 3/4" 1624W

## (undated) DEVICE — CLIP APPLIER 11" MED LIGACLIP MCM30

## (undated) DEVICE — MARKER SKIN DOUBLE TIP W/FLEXI-RULER W/LABELS

## (undated) DEVICE — ENDO TROCAR FIRST ENTRY KII FIOS ADV FIX 05X100MM CFF03

## (undated) DEVICE — GLOVE PROTEXIS W/NEU-THERA 6.5  2D73TE65

## (undated) DEVICE — SU VICRYL 4-0 PS-2 18" UND J496H

## (undated) DEVICE — DRAPE IOBAN INCISE 23X17" 6650EZ

## (undated) DEVICE — DECANTER VIAL 2006S

## (undated) DEVICE — TUBING SYS AQUILEX BLUE INFLOW AQL-110 YLW OUTFLOW AQL-111

## (undated) DEVICE — SU VICRYL 3-0 SH 27" UND J416H

## (undated) DEVICE — BLADE KNIFE SURG 10 371110

## (undated) DEVICE — SUCTION TIP YANKAUER W/O VENT K86

## (undated) DEVICE — DRSG XEROFORM 5X9" 8884431605

## (undated) DEVICE — STOCKING SLEEVE COMPRESSION CALF MED

## (undated) DEVICE — DRSG GAUZE 4X4" 3033

## (undated) DEVICE — ADH SKIN CLOSURE PREMIERPRO EXOFIN 1.0ML 3470

## (undated) RX ORDER — FENTANYL CITRATE 50 UG/ML
INJECTION, SOLUTION INTRAMUSCULAR; INTRAVENOUS
Status: DISPENSED
Start: 2018-01-26

## (undated) RX ORDER — ONDANSETRON 2 MG/ML
INJECTION INTRAMUSCULAR; INTRAVENOUS
Status: DISPENSED
Start: 2022-03-28

## (undated) RX ORDER — MAGNESIUM SULFATE HEPTAHYDRATE 40 MG/ML
INJECTION, SOLUTION INTRAVENOUS
Status: DISPENSED
Start: 2022-03-28

## (undated) RX ORDER — BUPIVACAINE HYDROCHLORIDE 5 MG/ML
INJECTION, SOLUTION EPIDURAL; INTRACAUDAL
Status: DISPENSED
Start: 2018-01-26

## (undated) RX ORDER — FENTANYL CITRATE 50 UG/ML
INJECTION, SOLUTION INTRAMUSCULAR; INTRAVENOUS
Status: DISPENSED
Start: 2022-03-28

## (undated) RX ORDER — PROPOFOL 10 MG/ML
INJECTION, EMULSION INTRAVENOUS
Status: DISPENSED
Start: 2024-05-10

## (undated) RX ORDER — LIDOCAINE HCL/EPINEPHRINE/PF 2%-1:200K
VIAL (ML) INJECTION
Status: DISPENSED
Start: 2022-03-28

## (undated) RX ORDER — ACETAMINOPHEN 325 MG/1
TABLET ORAL
Status: DISPENSED
Start: 2022-03-28

## (undated) RX ORDER — EPINEPHRINE 1 MG/ML
INJECTION, SOLUTION INTRAMUSCULAR; SUBCUTANEOUS
Status: DISPENSED
Start: 2018-01-26

## (undated) RX ORDER — ONDANSETRON 2 MG/ML
INJECTION INTRAMUSCULAR; INTRAVENOUS
Status: DISPENSED
Start: 2024-05-10

## (undated) RX ORDER — CEFAZOLIN SODIUM 1 G/3ML
INJECTION, POWDER, FOR SOLUTION INTRAMUSCULAR; INTRAVENOUS
Status: DISPENSED
Start: 2024-05-10

## (undated) RX ORDER — EPHEDRINE SULFATE 50 MG/ML
INJECTION, SOLUTION INTRAMUSCULAR; INTRAVENOUS; SUBCUTANEOUS
Status: DISPENSED
Start: 2022-03-28

## (undated) RX ORDER — OXYCODONE HYDROCHLORIDE 5 MG/1
TABLET ORAL
Status: DISPENSED
Start: 2024-05-10

## (undated) RX ORDER — ACETAMINOPHEN 325 MG/1
TABLET ORAL
Status: DISPENSED
Start: 2024-05-10

## (undated) RX ORDER — GABAPENTIN 300 MG/1
CAPSULE ORAL
Status: DISPENSED
Start: 2022-03-28

## (undated) RX ORDER — FENTANYL CITRATE-0.9 % NACL/PF 10 MCG/ML
PLASTIC BAG, INJECTION (ML) INTRAVENOUS
Status: DISPENSED
Start: 2022-03-28

## (undated) RX ORDER — PROPOFOL 10 MG/ML
INJECTION, EMULSION INTRAVENOUS
Status: DISPENSED
Start: 2022-03-28

## (undated) RX ORDER — TRANEXAMIC ACID 100 MG/ML
INJECTION, SOLUTION INTRAVENOUS
Status: DISPENSED
Start: 2024-05-10

## (undated) RX ORDER — BUPIVACAINE HYDROCHLORIDE 2.5 MG/ML
INJECTION, SOLUTION INFILTRATION; PERINEURAL
Status: DISPENSED
Start: 2024-05-10

## (undated) RX ORDER — FENTANYL CITRATE 0.05 MG/ML
INJECTION, SOLUTION INTRAMUSCULAR; INTRAVENOUS
Status: DISPENSED
Start: 2024-05-10

## (undated) RX ORDER — DEXAMETHASONE SODIUM PHOSPHATE 4 MG/ML
INJECTION, SOLUTION INTRA-ARTICULAR; INTRALESIONAL; INTRAMUSCULAR; INTRAVENOUS; SOFT TISSUE
Status: DISPENSED
Start: 2022-03-28

## (undated) RX ORDER — CEFAZOLIN SODIUM 2 G/50ML
SOLUTION INTRAVENOUS
Status: DISPENSED
Start: 2024-05-10

## (undated) RX ORDER — HEPARIN SODIUM 5000 [USP'U]/.5ML
INJECTION, SOLUTION INTRAVENOUS; SUBCUTANEOUS
Status: DISPENSED
Start: 2024-05-10

## (undated) RX ORDER — DEXAMETHASONE SODIUM PHOSPHATE 4 MG/ML
INJECTION, SOLUTION INTRA-ARTICULAR; INTRALESIONAL; INTRAMUSCULAR; INTRAVENOUS; SOFT TISSUE
Status: DISPENSED
Start: 2024-05-10

## (undated) RX ORDER — BUPIVACAINE HYDROCHLORIDE 2.5 MG/ML
INJECTION, SOLUTION EPIDURAL; INFILTRATION; INTRACAUDAL
Status: DISPENSED
Start: 2024-05-10

## (undated) RX ORDER — OXYCODONE HYDROCHLORIDE 5 MG/1
TABLET ORAL
Status: DISPENSED
Start: 2018-01-26

## (undated) RX ORDER — LIDOCAINE HYDROCHLORIDE 10 MG/ML
INJECTION, SOLUTION INFILTRATION; PERINEURAL
Status: DISPENSED
Start: 2022-03-28

## (undated) RX ORDER — KETOROLAC TROMETHAMINE 30 MG/ML
INJECTION, SOLUTION INTRAMUSCULAR; INTRAVENOUS
Status: DISPENSED
Start: 2022-03-28

## (undated) RX ORDER — PHENYLEPHRINE HYDROCHLORIDE 10 MG/ML
INJECTION INTRAVENOUS
Status: DISPENSED
Start: 2022-03-28